# Patient Record
Sex: MALE | Employment: OTHER | URBAN - METROPOLITAN AREA
[De-identification: names, ages, dates, MRNs, and addresses within clinical notes are randomized per-mention and may not be internally consistent; named-entity substitution may affect disease eponyms.]

---

## 2017-02-01 ENCOUNTER — OFFICE VISIT (OUTPATIENT)
Dept: PEDIATRICS | Facility: CLINIC | Age: 77
End: 2017-02-01
Payer: COMMERCIAL

## 2017-02-01 VITALS
SYSTOLIC BLOOD PRESSURE: 120 MMHG | TEMPERATURE: 97.7 F | DIASTOLIC BLOOD PRESSURE: 60 MMHG | BODY MASS INDEX: 26.23 KG/M2 | OXYGEN SATURATION: 97 % | RESPIRATION RATE: 20 BRPM | HEIGHT: 70 IN | WEIGHT: 183.2 LBS | HEART RATE: 72 BPM

## 2017-02-01 DIAGNOSIS — J06.9 VIRAL URI WITH COUGH: Primary | ICD-10-CM

## 2017-02-01 DIAGNOSIS — J02.9 ACUTE PHARYNGITIS, UNSPECIFIED: ICD-10-CM

## 2017-02-01 DIAGNOSIS — B00.1 COLD SORE: ICD-10-CM

## 2017-02-01 LAB
DEPRECATED S PYO AG THROAT QL EIA: NORMAL
MICRO REPORT STATUS: NORMAL
SPECIMEN SOURCE: NORMAL

## 2017-02-01 PROCEDURE — 87081 CULTURE SCREEN ONLY: CPT | Performed by: STUDENT IN AN ORGANIZED HEALTH CARE EDUCATION/TRAINING PROGRAM

## 2017-02-01 PROCEDURE — 99213 OFFICE O/P EST LOW 20 MIN: CPT | Mod: GE | Performed by: STUDENT IN AN ORGANIZED HEALTH CARE EDUCATION/TRAINING PROGRAM

## 2017-02-01 PROCEDURE — 87880 STREP A ASSAY W/OPTIC: CPT | Performed by: STUDENT IN AN ORGANIZED HEALTH CARE EDUCATION/TRAINING PROGRAM

## 2017-02-01 NOTE — NURSING NOTE
"Chief Complaint   Patient presents with     Cough     URI       Initial /60 mmHg  Pulse 72  Temp(Src) 97.7  F (36.5  C) (Oral)  Resp 20  Ht 5' 10\" (1.778 m)  Wt 183 lb 3.2 oz (83.099 kg)  BMI 26.29 kg/m2  SpO2 97% Estimated body mass index is 26.29 kg/(m^2) as calculated from the following:    Height as of this encounter: 5' 10\" (1.778 m).    Weight as of this encounter: 183 lb 3.2 oz (83.099 kg).  BP completed using cuff size: bobbi Wallace MA      "

## 2017-02-01 NOTE — MR AVS SNAPSHOT
After Visit Summary   2/1/2017    Ciro Almonte    MRN: [id number]           Patient Information     Date Of Birth          1940        Visit Information        Provider Department      2/1/2017 11:15 AM Mauricio Aguilar MD [facility]        Today's Diagnoses     Viral URI with cough    -  1     Acute pharyngitis, unspecified           Care Instructions      Viral Upper Respiratory Illness (Adult)  You have a viral upper respiratory illness (URI), which is another term for the common cold. This illness is contagious during the first few days. It is spread through the air by coughing and sneezing. It may also be spread by direct contact (touching the sick person and then touching your own eyes, nose, or mouth). Frequent handwashing will decrease risk of spread. Most viral illnesses go away within 7 to 10 days with rest and simple home remedies. Sometimes the illness may last for several weeks. Antibiotics will not kill a virus, and they are generally not prescribed for this condition.    Home care    If symptoms are severe, rest at home for the first 2 to 3 days. When you resume activity, don't let yourself get too tired.    Avoid being exposed to cigarette smoke (yours or others ).    You may use acetaminophen or ibuprofen to control pain and fever, unless another medicine was prescribed. (Note: If you have chronic liver or kidney disease, have ever had a stomach ulcer or gastrointestinal bleeding, or are taking blood-thinning medicines, talk with your healthcare provider before using these medicines.) Aspirin should never be given to anyone under 18 years of age who is ill with a viral infection or fever. It may cause severe liver or brain damage.    Your appetite may be poor, so a light diet is fine. Avoid dehydration by drinking 6 to 8 glasses of fluids per day (water, soft drinks, juices, tea, or soup). Extra fluids will help loosen secretions in the nose and  lungs.    Over-the-counter cold medicines will not shorten the length of time you re sick, but they may be helpful for the following symptoms: cough, sore throat, and nasal and sinus congestion. (Note: Do not use decongestants if you have high blood pressure.)  Follow-up care  Follow up with your healthcare provider, or as advised.  When to seek medical advice  Call your healthcare provider right away if any of these occur:    Cough with lots of colored sputum (mucus)    Severe headache; face, neck, or ear pain    Difficulty swallowing due to throat pain    Fever of 100.4 F (38 C)  Call 911, or get immediate medical care  Call emergency services right away if any of these occur:    Chest pain, shortness of breath, wheezing, or difficulty breathing    Coughing up blood    Inability to swallow due to throat pain    7165-5036 [employer]. [address] [place] [address]. All rights reserved. This information is not intended as a substitute for professional medical care. Always follow your healthcare professional's instructions.      Please continue to get rest, use fluids, humidifier, warm beverages, and mucinex OTC.  Please apply Orajel to your sores in your mouth and if they do not go away after you feel better come back for a topical ointment or oral medication to help with resolution.  If you have questions or concerns please do not hesitate to contact us in clinic.    Mauricio Aguilar MD  IM/PEDS, PGY2  pager [phone]          Follow-ups after your visit        Who to contact     If you have questions or need follow up information about today's clinic visit or your schedule please contact [facility] directly at [phone].  Normal or non-critical lab and imaging results will be communicated to you by WebEventshart, letter or phone within 4 business days after the clinic has received the results. If you do not hear from us within 7 days, please contact the clinic through [email or web address]  "or phone. If you have a critical or abnormal lab result, we will notify you by phone as soon as possible.  Submit refill requests through [employer] or call your pharmacy and they will forward the refill request to us. Please allow 3 business days for your refill to be completed.          Additional Information About Your Visit        Markrhart Information     [employer] lets you send messages to your doctor, view your test results, renew your prescriptions, schedule appointments and more. To sign up, go to [email or web address] . Click on \"Log in\" on the left side of the screen, which will take you to the Welcome page. Then click on \"Sign up Now\" on the right side of the page.     You will be asked to enter the access code listed below, as well as some personal information. Please follow the directions to create your username and password.     Your access code is: [email or web address]  Expires: 2017  2:24 PM     Your access code will  in 90 days. If you need help or a new code, please call your [place] clinic or [phone].        Care EveryWhere ID     This is your Care EveryWhere ID. This could be used by other organizations to access your [place] medical records  [id number]        Your Vitals Were     Pulse Temperature Respirations Height BMI (Body Mass Index) Pulse Oximetry    72 97.7  F (36.5  C) (Oral) 20 5' 10\" (1.778 m) 26.29 kg/m2 97%       Blood Pressure from Last 3 Encounters:   17 120/60   11/10/16 124/68   10/24/16 130/78    Weight from Last 3 Encounters:   17 183 lb 3.2 oz (83.099 kg)   11/10/16 180 lb (81.647 kg)   10/13/16 176 lb (79.833 kg)              We Performed the Following     Beta strep group A culture     Strep, Rapid Screen        Primary Care Provider Office Phone # Fax #    Uri Queen [phone]       [facility] [address] [place] [address]        Thank you!     Thank you for choosing [facility]  for your care. Our goal " is always to provide you with excellent care. Hearing back from our patients is one way we can continue to improve our services. Please take a few minutes to complete the written survey that you may receive in the mail after your visit with us. Thank you!             Your Updated Medication List - Protect others around you: Learn how to safely use, store and throw away your medicines at www.disposemymeds.org.          This list is accurate as of: 2/1/17 11:55 AM.  Always use your most recent med list.                   Brand Name Dispense Instructions for use    amLODIPine 10 MG tablet    NORVASC    90 tablet    Take 1 tablet (10 mg) by mouth daily       AMOXICILLIN PO      Take 500 mg by mouth For dental visits       CITRACAL + D PO      Take 2 tablets by mouth daily       gabapentin 300 MG capsule    NEURONTIN    270 capsule    TAKE 3 CAPSULES BY MOUTH EVERY NIGHT AT BEDTIME       latanoprost 0.005 % ophthalmic solution    XALATAN    7.5 mL    Place 1 drop into both eyes At Bedtime Both eyes       lisinopril 10 MG tablet    PRINIVIL/ZESTRIL    90 tablet    Take 1 tablet (10 mg) by mouth daily       MENS MULTIVITAMIN PLUS PO      Take 1 tablet by mouth daily       PROSTATE 2.4 Caps      Take 2 capsules by mouth daily Prostate cap 2.0       UNABLE TO FIND      MEDICATION NAME: Protandim-Dietary Supplement

## 2017-02-01 NOTE — PATIENT INSTRUCTIONS
Viral Upper Respiratory Illness (Adult)  You have a viral upper respiratory illness (URI), which is another term for the common cold. This illness is contagious during the first few days. It is spread through the air by coughing and sneezing. It may also be spread by direct contact (touching the sick person and then touching your own eyes, nose, or mouth). Frequent handwashing will decrease risk of spread. Most viral illnesses go away within 7 to 10 days with rest and simple home remedies. Sometimes the illness may last for several weeks. Antibiotics will not kill a virus, and they are generally not prescribed for this condition.    Home care    If symptoms are severe, rest at home for the first 2 to 3 days. When you resume activity, don't let yourself get too tired.    Avoid being exposed to cigarette smoke (yours or others ).    You may use acetaminophen or ibuprofen to control pain and fever, unless another medicine was prescribed. (Note: If you have chronic liver or kidney disease, have ever had a stomach ulcer or gastrointestinal bleeding, or are taking blood-thinning medicines, talk with your healthcare provider before using these medicines.) Aspirin should never be given to anyone under 18 years of age who is ill with a viral infection or fever. It may cause severe liver or brain damage.    Your appetite may be poor, so a light diet is fine. Avoid dehydration by drinking 6 to 8 glasses of fluids per day (water, soft drinks, juices, tea, or soup). Extra fluids will help loosen secretions in the nose and lungs.    Over-the-counter cold medicines will not shorten the length of time you re sick, but they may be helpful for the following symptoms: cough, sore throat, and nasal and sinus congestion. (Note: Do not use decongestants if you have high blood pressure.)  Follow-up care  Follow up with your healthcare provider, or as advised.  When to seek medical advice  Call your healthcare provider right away if any  of these occur:    Cough with lots of colored sputum (mucus)    Severe headache; face, neck, or ear pain    Difficulty swallowing due to throat pain    Fever of 100.4 F (38 C)  Call 911, or get immediate medical care  Call emergency services right away if any of these occur:    Chest pain, shortness of breath, wheezing, or difficulty breathing    Coughing up blood    Inability to swallow due to throat pain    [id number] [employer]. [address] [place] [address], [place] [address]. All rights reserved. This information is not intended as a substitute for professional medical care. Always follow your healthcare professional's instructions.      Please continue to get rest, use fluids, humidifier, warm beverages, and mucinex OTC.  Please apply Orajel to your sores in your mouth and if they do not go away after you feel better come back for a topical ointment or oral medication to help with resolution.  If you have questions or concerns please do not hesitate to contact us in clinic.    Mauricio Aguilar MD  IM/PEDS, PGY2  pager [phone]

## 2017-02-01 NOTE — PROGRESS NOTES
SUBJECTIVE:                                                    Ciro Almonte is a 76 year old male who presents to clinic today for the following health issues:      RESPIRATORY SYMPTOMS      Duration: 6 days ago    Description  nasal congestion, rhinorrhea, sore throat, facial pain/pressure, cough, fever, chills, fatigue/malaise, hoarse voice, conjunctival irritation and cold sores and drainage    Severity: mild    Accompanying signs and symptoms: cold sores and drainage    History (predisposing factors):  none    Precipitating or alleviating factors: laying down helps    Therapies tried and outcome:  Tylenol and Ibuprofen     Thick green nasal discharge intermittent.  Nasal secretions thinning out last 24 hours.  Cough with light yellow phlegm.  Denies SOB.  Endorses a cold sore on the right side; has not applied Orajel.  Endorses conjunctivitis without discharge.  No strep exposures; rapid negative.  Influenza vaccination.  Denies myalgias, spiking fevers, and significant fatigue.  No seasonal and environmental allergies.  Has tried Tylenol and ibuprofen with fevers (up to 101) with improvement in symptoms and resolution of fevers.  No smoking exposures.  No changes in medication regimen.      Problem list and histories reviewed & adjusted, as indicated.  Additional history: as documented    Patient Active Problem List   Diagnosis     Cervical spine degeneration     Glaucoma     CARDIOVASCULAR SCREENING; LDL GOAL LESS THAN 130     Mitral regurgitation - systolic murmur     Hereditary and idiopathic peripheral neuropathy     Essential hypertension with goal blood pressure less than 140/90     Primary osteoarthritis of right foot     Benign non-nodular prostatic hyperplasia with lower urinary tract symptoms     Chronic low back pain, unspecified back pain laterality, with sciatica presence unspecified     Past Surgical History   Procedure Laterality Date     Ent surgery       Tonsils     Dermatology referral   2000     melanoma resection L shoulder     Orthopedic surgery  2008     R hip replacement     Arthroscopy knee  2002     meniscus ( side?)     Arthroplasty revision hip  3/12/2014      right Procedure: ARTHROPLASTY REVISION HIP;  Surgeon: Rudy Tobias MD;  Location: [facility] OR     C total hip arthroplasty Right 2008     Orthopedic surgery  2004     big toe in lawn mower, needed reconstructive surgery     Arthroplasty hip anterior Left 2014     Procedure: ARTHROPLASTY HIP ANTERIOR;  Surgeon: Rudy Tobias MD;  Location: [facility] OR     Discectomy lumbar posterior microscopic one level N/A 2016     Procedure: DISCECTOMY LUMBAR POSTERIOR MICROSCOPIC ONE LEVEL;  Surgeon: Ruben Marsh MD;  Location: [facility] OR     Biopsy of prostate,needle/punch         Social History   Substance Use Topics     Smoking status: Former Smoker     Quit date: 1976     Smokeless tobacco: Never Used     Alcohol Use: No      Comment: Quit 1014     Family History   Problem Relation Age of Onset     CEREBROVASCULAR DISEASE Mother      Hypertension Mother      Glaucoma Mother      HEART DISEASE Father 58     MI     CANCER Father      lung cancer;  age 91         Current Outpatient Prescriptions   Medication Sig Dispense Refill     gabapentin (NEURONTIN) 300 MG capsule TAKE 3 CAPSULES BY MOUTH EVERY NIGHT AT BEDTIME 270 capsule 0     UNABLE TO FIND MEDICATION NAME: Protandim-Dietary Supplement       amLODIPine (NORVASC) 10 MG tablet Take 1 tablet (10 mg) by mouth daily 90 tablet 3     lisinopril (PRINIVIL,ZESTRIL) 10 MG tablet Take 1 tablet (10 mg) by mouth daily 90 tablet 3     latanoprost (XALATAN) 0.005 % ophthalmic solution Place 1 drop into both eyes At Bedtime Both eyes 7.5 mL 3     AMOXICILLIN PO Take 500 mg by mouth For dental visits       Calcium Citrate-Vitamin D (CITRACAL + D PO) Take 2 tablets by mouth daily       Nutritional Supplements (PROSTATE 2.4) CAPS Take 2 capsules by mouth daily Prostate cap 2.0        "Multiple Vitamins-Minerals (MENS MULTIVITAMIN PLUS PO) Take 1 tablet by mouth daily       No Known Allergies  Recent Labs   Lab Test  07/05/16   1053  04/11/16   0749  07/02/15   1007  05/11/15   1127   10/13/14   1007  07/23/14   0915  07/01/14   0948   02/28/14   0936   LDL  121*   --   138*   --    --    --    --   117   --    --    HDL  59   --   73   --    --    --    --   80   --    --    TRIG  103   --   59   --    --    --    --   60   --    --    ALT   --   32   --    --    --   29   --    --    --   25   CR   --   0.98   --   0.90   < >  0.80   --    --    < >  1.00   GFRESTIMATED   --   75   --   83   < >  >90   --    --    < >  74   GFRESTBLACK   --   >90   GFR Calc     --   >90   < >  >90   --    --    < >  89   POTASSIUM   --   4.1   --   4.3   < >  4.0   --    --    < >  4.6   TSH   --    --    --    --    --   1.92  1.45   --    --    --     < > = values in this interval not displayed.      BP Readings from Last 3 Encounters:   02/01/17 120/60   11/10/16 124/68   10/24/16 130/78    Wt Readings from Last 3 Encounters:   02/01/17 183 lb 3.2 oz (83.099 kg)   11/10/16 180 lb (81.647 kg)   10/13/16 176 lb (79.833 kg)            Problem list, Medication list, Allergies, and Medical/Social/Surgical histories reviewed in [facility] and updated as appropriate.    ROS:  A focused ROS was obtained and documented as above in the HPI.      OBJECTIVE:                                                    /60 mmHg  Pulse 72  Temp(Src) 97.7  F (36.5  C) (Oral)  Resp 20  Ht 5' 10\" (1.778 m)  Wt 183 lb 3.2 oz (83.099 kg)  BMI 26.29 kg/m2  SpO2 97%  Body mass index is 26.29 kg/(m^2).  GENERAL: mildly ill appearing, alert and no distress  EYES: Eyes grossly normal to inspection, PERRLA, mild conjunctivitis bilaterally without drainage  HENT: ear canals and clear bulging TMs bilaterally, nose without ulcers or lesions, one ulcerated lesion inside of right side of mouth  NECK: no adenopathy, no " asymmetry, masses, or scars and thyroid normal to palpation  RESP: lungs clear to auscultation - no rales, rhonchi or wheezes  CV: regular rate and rhythm, normal S1 S2, no S3 or S4, 1/6 holosystolic murmur best appreciated at LLSB, no click or rub, no peripheral edema and peripheral pulses strong  MS: no gross musculoskeletal defects noted, no edema    Diagnostic Test Results:  none      ASSESSMENT/PLAN:                                                    1. Viral URI with cough  - symptomatic cares with rest, fluids, cough suppressant as needed, humidifier, warm beverages, throat lozenges, hand-washing, etc.    2. Acute pharyngitis, unspecified  Centor criteria 0.  No strep exposures.  - Strep, Rapid Screen  - Beta strep group A culture    3. Cold sores  - apply Orajel to affected areas  - follow up in a couple weeks for oral or topical antiviral    Follow up as needed in 5-7 days if symptoms do not improve.    The patient was discussed with Dr. Sharlene Dobbs, the attending, who agrees with the plan as stated above.    Mauricio Aguilar MD  [facility]  pager [phone]    I discussed this case in depth with Dr. Aguilar and agree with the key components of the history, assessment and plan.      Sharlene Dobbs MD  Internal Medicine/Pediatrics

## 2017-02-03 LAB
BACTERIA SPEC CULT: NORMAL
MICRO REPORT STATUS: NORMAL
SPECIMEN SOURCE: NORMAL

## 2017-03-20 DIAGNOSIS — G62.9 NEUROPATHY: ICD-10-CM

## 2017-03-21 RX ORDER — GABAPENTIN 300 MG/1
CAPSULE ORAL
Qty: 270 CAPSULE | Refills: 3 | Status: SHIPPED | OUTPATIENT
Start: 2017-03-21 | End: 2018-04-03

## 2017-03-21 RX ORDER — GABAPENTIN 300 MG/1
CAPSULE ORAL
Qty: 270 CAPSULE | Refills: 0 | Status: CANCELLED | OUTPATIENT
Start: 2017-03-21

## 2017-03-21 NOTE — TELEPHONE ENCOUNTER
gabapentin (NEURONTIN) 300 MG  Last Written Prescription Date: 12/15/2016  Last Quantity: 270, # refills: 0  Last Office Visit with [facility], [facility] or [facility] prescribing provider: 2/1/2017       Creatinine   Date Value Ref Range Status   04/11/2016 0.98 0.66 - 1.25 mg/dL Final     Lab Results   Component Value Date    AST 26 04/11/2016     Lab Results   Component Value Date    ALT 32 04/11/2016     BP Readings from Last 3 Encounters:   02/01/17 120/60   11/10/16 124/68   10/24/16 130/78

## 2017-03-22 ENCOUNTER — OFFICE VISIT (OUTPATIENT)
Dept: OPHTHALMOLOGY | Facility: CLINIC | Age: 77
End: 2017-03-22

## 2017-03-22 DIAGNOSIS — H25.13 SENILE NUCLEAR SCLEROSIS, BILATERAL: ICD-10-CM

## 2017-03-22 DIAGNOSIS — H40.003 GLAUCOMA SUSPECT OF BOTH EYES: Primary | ICD-10-CM

## 2017-03-22 ASSESSMENT — REFRACTION_WEARINGRX
OD_SPHERE: -3.00
OS_SPHERE: -3.00
SPECS_TYPE: PAL
OS_AXIS: 165
OD_AXIS: 015
OS_CYLINDER: +4.50
OD_CYLINDER: +5.00
OS_ADD: +2.50
OD_ADD: +2.50

## 2017-03-22 ASSESSMENT — VISUAL ACUITY
OS_CC: 20/30
OS_CC+: -2
OD_CC+: +1
CORRECTION_TYPE: GLASSES
METHOD: SNELLEN - LINEAR
OD_CC: 20/25

## 2017-03-22 ASSESSMENT — CUP TO DISC RATIO
OD_RATIO: 0.0
OS_RATIO: 0.0

## 2017-03-22 ASSESSMENT — CONF VISUAL FIELD
OD_NORMAL: 1
OS_NORMAL: 1
METHOD: COUNTING FINGERS

## 2017-03-22 ASSESSMENT — SLIT LAMP EXAM - LIDS
COMMENTS: NORMAL
COMMENTS: NORMAL

## 2017-03-22 ASSESSMENT — TONOMETRY
IOP_METHOD: APPLANATION
OD_IOP_MMHG: 18
OS_IOP_MMHG: 21

## 2017-03-22 ASSESSMENT — EXTERNAL EXAM - LEFT EYE: OS_EXAM: NORMAL

## 2017-03-22 ASSESSMENT — EXTERNAL EXAM - RIGHT EYE: OD_EXAM: NORMAL

## 2017-03-22 NOTE — PROGRESS NOTES
1)Glc Suspect --  K pachy:530/549    Tmax: L20s    HVF:Full c fluct      CDR: 0.0/0.0    HRT/OCT:OD:Mild RNFL thinning and OS:Mod RNFL thinning   FHX of Glc: Mother -- gtts,      Gonio: open      Intolerant to:      Asthma/COPD: No, on po BB  Steroid Use: No    Kidney Stones: Yes, in remote past    Sulfa Allergy: No      IOP targets:L20s -- IOP good, could consider further trial off gtts in future  2)ONH Drusen  3)NS OU -- ?visually significant   4)LAMBERT  5)Astigmatism -- will need corneal topography prior to IOL selection -- consider repeat refraction given significant change in amount of astigmatism in new glasses prior to cosndieration of cataract surgery    Patient will continue on Latanoprost which is a teal top drop at bedtime in both eyes.  Patient will return to clinic in 8-12 months with repeat visual field test, dilated eye exam, and OCT with RNFL analysis.  Patient will return to clinic for refraction only with technician (does not need to see MD on that day).  Patient will also return to clinic sooner for consideration of cataract surgery if blurred vision and glare continue to be a problem despite a change in glasses prescription.

## 2017-03-22 NOTE — PATIENT INSTRUCTIONS
Patient will continue on Latanoprost which is a teal top drop at bedtime in both eyes.  Patient will return to clinic in 8-12 months with repeat visual field test, dilated eye exam, and OCT with RNFL analysis.  Patient will return to clinic for refraction only with technician (does not need to see MD on that day).  Patient will also return to clinic sooner for consideration of cataract surgery if blurred vision and glare continue to be a problem despite a change in glasses prescription.

## 2017-03-22 NOTE — NURSING NOTE
Chief Complaints and History of Present Illnesses   Patient presents with     Glaucoma Follow Up     8 month return     HPI    Affected eye(s):  Both   Symptoms:     Blurred vision      Duration:  7 months   Frequency:  Constant       Do you have eye pain now?:  No      Comments:  Latanoprost QSH to BE / LD @ 10 pm last nicolasa MAHARAJ 8:08 AM 03/22/2017

## 2017-03-22 NOTE — MR AVS SNAPSHOT
After Visit Summary   3/22/2017    Ciro Almonte    MRN: [id number]           Patient Information     Date Of Birth          1940        Visit Information        Provider Department      3/22/2017 8:00 AM Shannon Yang MD [facility]        Today's Diagnoses     Glaucoma suspect of both eyes    -  1    Senile nuclear sclerosis, bilateral          Care Instructions    Patient will continue on Latanoprost which is a teal top drop at bedtime in both eyes.  Patient will return to clinic in 8-12 months with repeat visual field test, dilated eye exam, and OCT with RNFL analysis.  Patient will return to clinic for refraction only with technician (does not need to see MD on that day).  Patient will also return to clinic sooner for consideration of cataract surgery if blurred vision and glare continue to be a problem despite a change in glasses prescription.         Follow-ups after your visit        Who to contact     Please call your clinic at [phone] to:    Ask questions about your health    Make or cancel appointments    Discuss your medicines    Learn about your test results    Speak to your doctor   If you have compliments or concerns about an experience at your clinic, or if you wish to file a complaint, please contact [facility] Patient Relations at [phone] or email us at [email or web address]         Additional Information About Your Visit        MyChart Information     [employer] is an electronic gateway that provides easy, online access to your medical records. With [employer], you can request a clinic appointment, read your test results, renew a prescription or communicate with your care team.     To sign up for [employer]-Orig[employer] visit the website at [email or web address]   You will be asked to enter the access code listed below, as well as some personal information. Please follow the directions to create your username  and password.     Your access code is: [email or web address]  Expires: 2017  9:17 AM     Your access code will  in 90 days. If you need help or a new code, please contact your [facility] or call [phone] for assistance.        Care EveryWhere ID     This is your Care EveryWhere ID. This could be used by other organizations to access your [facility] medical records  [id number]         Blood Pressure from Last 3 Encounters:   17 120/60   11/10/16 124/68   10/24/16 130/78    Weight from Last 3 Encounters:   17 83.1 kg (183 lb 3.2 oz)   11/10/16 81.6 kg (180 lb)   10/13/16 79.8 kg (176 lb)              We Performed the Following     OCT Optic Nerve RNFL Spectralis OU (both eyes)     OVF 24-2 Dynamic OU        Primary Care Provider Office Phone # Fax #    Uri Queen [phone]       [facility] [address] [place] [address]        Thank you!     Thank you for choosing [facility]  for your care. Our goal is always to provide you with excellent care. Hearing back from our patients is one way we can continue to improve our services. Please take a few minutes to complete the written survey that you may receive in the mail after your visit with us. Thank you!             Your Updated Medication List - Protect others around you: Learn how to safely use, store and throw away your medicines at www.disposemymeds.org.          This list is accurate as of: 3/22/17  9:17 AM.  Always use your most recent med list.                   Brand Name Dispense Instructions for use    amLODIPine 10 MG tablet    NORVASC    90 tablet    Take 1 tablet (10 mg) by mouth daily       AMOXICILLIN PO      Take 500 mg by mouth For dental visits       CITRACAL + D PO      Take 2 tablets by mouth daily       gabapentin 300 MG capsule    NEURONTIN    270 capsule    TAKE 3 CAPSULES BY MOUTH EVERY NIGHT AT BEDTIME       latanoprost  0.005 % ophthalmic solution    XALATAN    7.5 mL    Place 1 drop into both eyes At Bedtime Both eyes       lisinopril 10 MG tablet    PRINIVIL/ZESTRIL    90 tablet    Take 1 tablet (10 mg) by mouth daily       MENS MULTIVITAMIN PLUS PO      Take 1 tablet by mouth daily       PROSTATE 2.4 Caps      Take 2 capsules by mouth daily Prostate cap 2.0       UNABLE TO FIND      MEDICATION NAME: Protandim-Dietary Supplement

## 2017-03-24 ENCOUNTER — ALLIED HEALTH/NURSE VISIT (OUTPATIENT)
Dept: OPHTHALMOLOGY | Facility: CLINIC | Age: 77
End: 2017-03-24

## 2017-03-24 DIAGNOSIS — H52.4 PRESBYOPIA: Primary | ICD-10-CM

## 2017-03-24 RX ORDER — LATANOPROST 50 UG/ML
1 SOLUTION/ DROPS OPHTHALMIC AT BEDTIME
Qty: 7.5 ML | Refills: 3 | Status: CANCELLED | OUTPATIENT
Start: 2017-03-24

## 2017-03-24 ASSESSMENT — REFRACTION_WEARINGRX
OD_CYLINDER: +4.75
OS_AXIS: 165
OS_CYLINDER: +4.25
OS_ADD: +2.50
SPECS_TYPE: PAL
OD_AXIS: 015
OD_ADD: +2.50
OD_SPHERE: -3.25
OS_SPHERE: -3.00

## 2017-03-24 ASSESSMENT — VISUAL ACUITY
OD_CC: 20/20
OD_CC+: -2
OS_CC+: -2+2
OS_CC: 20/30
CORRECTION_TYPE: GLASSES
METHOD: SNELLEN - LINEAR

## 2017-03-24 ASSESSMENT — REFRACTION_MANIFEST
OS_CYLINDER: +4.25
OS_SPHERE: -3.25
OD_SPHERE: -3.50
OD_CYLINDER: +4.75
OS_AXIS: 165
OD_AXIS: 017
OS_ADD: +2.50
OD_ADD: +2.50

## 2017-04-06 ENCOUNTER — OFFICE VISIT (OUTPATIENT)
Dept: URGENT CARE | Facility: URGENT CARE | Age: 77
End: 2017-04-06
Payer: COMMERCIAL

## 2017-04-06 VITALS
DIASTOLIC BLOOD PRESSURE: 62 MMHG | TEMPERATURE: 96.2 F | BODY MASS INDEX: 27.12 KG/M2 | OXYGEN SATURATION: 97 % | SYSTOLIC BLOOD PRESSURE: 138 MMHG | HEART RATE: 75 BPM | WEIGHT: 189 LBS

## 2017-04-06 DIAGNOSIS — S61.219A FINGER LACERATION, INITIAL ENCOUNTER: Primary | ICD-10-CM

## 2017-04-06 PROCEDURE — 90715 TDAP VACCINE 7 YRS/> IM: CPT | Performed by: PHYSICIAN ASSISTANT

## 2017-04-06 PROCEDURE — 90471 IMMUNIZATION ADMIN: CPT | Performed by: PHYSICIAN ASSISTANT

## 2017-04-06 PROCEDURE — 99213 OFFICE O/P EST LOW 20 MIN: CPT | Mod: 25 | Performed by: PHYSICIAN ASSISTANT

## 2017-04-06 NOTE — NURSING NOTE
"Chief Complaint   Patient presents with     Urgent Care     Laceration     \"pinched\" skin- bleeding, pt is wondering if it can be stitched        Initial /62 (BP Location: Right arm)  Pulse 75  Temp 96.2  F (35.7  C) (Tympanic)  Wt 189 lb (85.7 kg)  SpO2 97%  BMI 27.12 kg/m2 Estimated body mass index is 27.12 kg/(m^2) as calculated from the following:    Height as of 2/1/17: 5' 10\" (1.778 m).    Weight as of this encounter: 189 lb (85.7 kg).  Medication Reconciliation: complete     Gypsy Tse CMA.............................April 6, 2017 6:12 PM       "

## 2017-04-06 NOTE — PROGRESS NOTES
"SUBJECTIVE:     Chief Complaint   Patient presents with     Urgent Care     Laceration     \"pinched\" skin- bleeding, pt is wondering if it can be stitched      Ciro Almonte is a 77 year old male who presents to the clinic with a laceration on the right index finger sustained 30 minutes ago. Was putting away step ladder and pinched finger between step ladder. Pinched off a piece of skin. Still bleeding.     Associated symptoms: Denies numbness, weakness, or loss of function  Last tetanus booster within 10 years: no    EXAM:   The patient appears today in alert,no apparent distress distress  VITALS: /62 (BP Location: Right arm)  Pulse 75  Temp 96.2  F (35.7  C) (Tympanic)  Wt 189 lb (85.7 kg)  SpO2 97%  BMI 27.12 kg/m2    Size of laceration: 1 centimeters  Characteristics of the laceration: bleeding- moderate and flap type with flap avulsed, no tissue to be sutured   Tendon function intact: yes  Sensation to light touch intact: yes  Pulses intact: yes  Picture included in patient's chart: no    Assessment:  Finger laceration, initial encounter    PLAN:  Active bleeding was cauterized with electrocautery. Lesion was dressed with antibiotic ointment, telfa and coban. Recommended leaving dressing in place x 24 hours then change daily or when saturated. Discussed signs of infection that warrant follow up. Keep area clean and dry until healed.   Tdap given at today's visit.   "

## 2017-04-06 NOTE — MR AVS SNAPSHOT
"              After Visit Summary   4/6/2017    Ciro Almonte    MRN: [id number]           Patient Information     Date Of Birth          1940        Visit Information        Provider Department      4/6/2017 6:00 PM Shayy Hdz PA-C [facility]        Today's Diagnoses     Finger laceration, initial encounter    -  1       Follow-ups after your visit        Follow-up notes from your care team     Return if symptoms worsen or fail to improve.      Your next 10 appointments already scheduled     Jul 06, 2017  8:30 AM CDT   [facility]    [address]  [address]  [facility] [id number] [phone]              Who to contact     If you have questions or need follow up information about today's clinic visit or your schedule please contact [facility] directly at [phone].  Normal or non-critical lab and imaging results will be communicated to you by MyChart, letter or phone within 4 business days after the clinic has received the results. If you do not hear from us within 7 days, please contact the clinic through MyChart or phone. If you have a critical or abnormal lab result, we will notify you by phone as soon as possible.  Submit refill requests through [employer] or call your pharmacy and they will forward the refill request to us. Please allow 3 business days for your refill to be completed.          Additional Information About Your Visit        MyChart Information     [employer] lets you send messages to your doctor, view your test results, renew your prescriptions, schedule appointments and more. To sign up, go to [email or web address] . Click on \"Log in\" on the left side of the screen, which will take you to the Welcome page. Then click on \"Sign up Now\" on the right side of the page.     You will be asked to enter the access code listed below, as well as some personal " information. Please follow the directions to create your username and password.     Your access code is: [email or web address]  Expires: 2017  9:17 AM     Your access code will  in 90 days. If you need help or a new code, please call your [facility] clinic or [phone].        Care EveryWhere ID     This is your Care EveryWhere ID. This could be used by other organizations to access your [facility] medical records  [id number]        Your Vitals Were     Pulse Temperature Pulse Oximetry BMI (Body Mass Index)          75 96.2  F (35.7  C) (Tympanic) 97% 27.12 kg/m2         Blood Pressure from Last 3 Encounters:   17 138/62   17 120/60   11/10/16 124/68    Weight from Last 3 Encounters:   17 189 lb (85.7 kg)   17 183 lb 3.2 oz (83.1 kg)   11/10/16 180 lb (81.6 kg)              We Performed the Following     TDAP VACCINE (ADACEL)        Primary Care Provider Office Phone # Fax #    Uri Queen [phone]       [address] [place] [address]        Thank you!     Thank you for choosing [facility]  for your care. Our goal is always to provide you with excellent care. Hearing back from our patients is one way we can continue to improve our services. Please take a few minutes to complete the written survey that you may receive in the mail after your visit with us. Thank you!             Your Updated Medication List - Protect others around you: Learn how to safely use, store and throw away your medicines at www.disposemymeds.org.          This list is accurate as of: 17  6:56 PM.  Always use your most recent med list.                   Brand Name Dispense Instructions for use    amLODIPine 10 MG tablet    NORVASC    90 tablet    Take 1 tablet (10 mg) by mouth daily       AMOXICILLIN PO      Take 500 mg by mouth Reported on 2017       CITRACAL + D PO      Take 2 tablets by mouth daily       gabapentin 300 MG capsule     NEURONTIN    270 capsule    TAKE 3 CAPSULES BY MOUTH EVERY NIGHT AT BEDTIME       latanoprost 0.005 % ophthalmic solution    XALATAN    7.5 mL    Place 1 drop into both eyes At Bedtime Both eyes       lisinopril 10 MG tablet    PRINIVIL/ZESTRIL    90 tablet    Take 1 tablet (10 mg) by mouth daily       MENS MULTIVITAMIN PLUS PO      Take 1 tablet by mouth daily       PROSTATE 2.4 Caps      Take 2 capsules by mouth daily Prostate cap 2.0       UNABLE TO FIND      MEDICATION NAME: Protandim-Dietary Supplement

## 2017-04-21 ENCOUNTER — ALLIED HEALTH/NURSE VISIT (OUTPATIENT)
Dept: NURSING | Facility: CLINIC | Age: 77
End: 2017-04-21
Payer: COMMERCIAL

## 2017-04-21 DIAGNOSIS — H61.23 BILATERAL IMPACTED CERUMEN: Primary | ICD-10-CM

## 2017-04-21 PROCEDURE — 99207 ZZC NO CHARGE NURSE ONLY: CPT

## 2017-04-21 NOTE — MR AVS SNAPSHOT
"              After Visit Summary   4/21/2017    Ciro Almonte    MRN: [id number]           Patient Information     Date Of Birth          1940        Visit Information        Provider Department      4/21/2017 9:00 AM BUZZ NURSE AB [facility]        Today's Diagnoses     Bilateral impacted cerumen    -  1       Follow-ups after your visit        Your next 10 appointments already scheduled     Jul 06, 2017  8:30 AM CDT   PHYSICAL with Uri Queen MD   [facility]    [address]  [address]  [facility] [place] [facility] [address] [phone]              Who to contact     If you have questions or need follow up information about today's clinic visit or your schedule please contact [facility] directly at [phone].  Normal or non-critical lab and imaging results will be communicated to you by MyChart, letter or phone within 4 business days after the clinic has received the results. If you do not hear from us within 7 days, please contact the clinic through MyChart or phone. If you have a critical or abnormal lab result, we will notify you by phone as soon as possible.  Submit refill requests through [employer] or call your pharmacy and they will forward the refill request to us. Please allow 3 business days for your refill to be completed.          Additional Information About Your Visit        Wintegrahart Information     [employer] lets you send messages to your doctor, view your test results, renew your prescriptions, schedule appointments and more. To sign up, go to [email or web address] . Click on \"Log in\" on the left side of the screen, which will take you to the Welcome page. Then click on \"Sign up Now\" on the right side of the page.     You will be asked to enter the access code listed below, as well as some personal information. Please follow the directions to create your username and password.     Your access code is: [email or web address]  Expires: " 2017  9:17 AM     Your access code will  in 90 days. If you need help or a new code, please call your [facility] clinic or [phone].        Care EveryWhere ID     This is your Care EveryWhere ID. This could be used by other organizations to access your [facility] medical records  [id number]         Blood Pressure from Last 3 Encounters:   17 138/62   17 120/60   11/10/16 124/68    Weight from Last 3 Encounters:   17 189 lb (85.7 kg)   17 183 lb 3.2 oz (83.1 kg)   11/10/16 180 lb (81.6 kg)              We Performed the Following     REMOVE KWAME MARTINS        Primary Care Provider Office Phone # Fax #    Uri Queen [phone]       [facility] [address] [place] [address]        Thank you!     Thank you for choosing [facility]  for your care. Our goal is always to provide you with excellent care. Hearing back from our patients is one way we can continue to improve our services. Please take a few minutes to complete the written survey that you may receive in the mail after your visit with us. Thank you!             Your Updated Medication List - Protect others around you: Learn how to safely use, store and throw away your medicines at www.disposemymeds.org.          This list is accurate as of: 17 10:11 AM.  Always use your most recent med list.                   Brand Name Dispense Instructions for use    amLODIPine 10 MG tablet    NORVASC    90 tablet    Take 1 tablet (10 mg) by mouth daily       AMOXICILLIN PO      Take 500 mg by mouth Reported on 2017       CITRACAL + D PO      Take 2 tablets by mouth daily       gabapentin 300 MG capsule    NEURONTIN    270 capsule    TAKE 3 CAPSULES BY MOUTH EVERY NIGHT AT BEDTIME       latanoprost 0.005 % ophthalmic solution    XALATAN    7.5 mL    Place 1 drop into both eyes At Bedtime Both eyes       lisinopril 10 MG tablet    PRINIVIL/ZESTRIL    90 tablet     Take 1 tablet (10 mg) by mouth daily       MENS MULTIVITAMIN PLUS PO      Take 1 tablet by mouth daily       PROSTATE 2.4 Caps      Take 2 capsules by mouth daily Prostate cap 2.0       UNABLE TO FIND      MEDICATION NAME: Protandim-Dietary Supplement

## 2017-04-21 NOTE — NURSING NOTE
"Chief Complaint   Patient presents with     Allied Health Visit     ear wash        Initial There were no vitals taken for this visit. Estimated body mass index is 27.12 kg/(m^2) as calculated from the following:    Height as of 2/1/17: 5' 10\" (1.778 m).    Weight as of 4/6/17: 189 lb (85.7 kg).  Medication Reconciliation: {Medication Reconciliation:[id number]}     Molly Goldstein MA @today@,9:46 AM    "

## 2017-05-12 ENCOUNTER — APPOINTMENT (OUTPATIENT)
Dept: MRI IMAGING | Facility: CLINIC | Age: 77
End: 2017-05-12
Attending: EMERGENCY MEDICINE
Payer: MEDICARE

## 2017-05-12 ENCOUNTER — HOSPITAL ENCOUNTER (EMERGENCY)
Facility: CLINIC | Age: 77
Discharge: HOME | End: 2017-05-12
Attending: EMERGENCY MEDICINE | Admitting: EMERGENCY MEDICINE
Payer: MEDICARE

## 2017-05-12 ENCOUNTER — TELEPHONE (OUTPATIENT)
Dept: PEDIATRICS | Facility: CLINIC | Age: 77
End: 2017-05-12

## 2017-05-12 VITALS
HEIGHT: 70 IN | TEMPERATURE: 98 F | RESPIRATION RATE: 16 BRPM | DIASTOLIC BLOOD PRESSURE: 71 MMHG | HEART RATE: 72 BPM | SYSTOLIC BLOOD PRESSURE: 137 MMHG | WEIGHT: 183 LBS | BODY MASS INDEX: 26.2 KG/M2 | OXYGEN SATURATION: 97 %

## 2017-05-12 DIAGNOSIS — H02.401 DROOPING EYELID, RIGHT: ICD-10-CM

## 2017-05-12 DIAGNOSIS — H40.1130 PRIMARY OPEN ANGLE GLAUCOMA OF BOTH EYES: ICD-10-CM

## 2017-05-12 DIAGNOSIS — H81.10 BPPV (BENIGN PAROXYSMAL POSITIONAL VERTIGO), UNSPECIFIED LATERALITY: ICD-10-CM

## 2017-05-12 LAB
ANION GAP SERPL CALCULATED.3IONS-SCNC: 4 MMOL/L (ref 3–14)
BASOPHILS # BLD AUTO: 0 10E9/L (ref 0–0.2)
BASOPHILS NFR BLD AUTO: 0.4 %
BUN SERPL-MCNC: 16 MG/DL (ref 7–30)
CALCIUM SERPL-MCNC: 9.2 MG/DL (ref 8.5–10.1)
CHLORIDE SERPL-SCNC: 107 MMOL/L (ref 94–109)
CO2 SERPL-SCNC: 30 MMOL/L (ref 20–32)
CREAT SERPL-MCNC: 0.86 MG/DL (ref 0.66–1.25)
DIFFERENTIAL METHOD BLD: ABNORMAL
EOSINOPHIL # BLD AUTO: 0.1 10E9/L (ref 0–0.7)
EOSINOPHIL NFR BLD AUTO: 2.5 %
ERYTHROCYTE [DISTWIDTH] IN BLOOD BY AUTOMATED COUNT: 13.3 % (ref 10–15)
GFR SERPL CREATININE-BSD FRML MDRD: 87 ML/MIN/1.7M2
GLUCOSE BLDC GLUCOMTR-MCNC: 89 MG/DL (ref 70–99)
GLUCOSE SERPL-MCNC: 93 MG/DL (ref 70–99)
HCT VFR BLD AUTO: 38.2 % (ref 40–53)
HGB BLD-MCNC: 13 G/DL (ref 13.3–17.7)
IMM GRANULOCYTES # BLD: 0 10E9/L (ref 0–0.4)
IMM GRANULOCYTES NFR BLD: 0.2 %
INTERPRETATION ECG - MUSE: NORMAL
LYMPHOCYTES # BLD AUTO: 1.5 10E9/L (ref 0.8–5.3)
LYMPHOCYTES NFR BLD AUTO: 25.6 %
MCH RBC QN AUTO: 31.6 PG (ref 26.5–33)
MCHC RBC AUTO-ENTMCNC: 34 G/DL (ref 31.5–36.5)
MCV RBC AUTO: 93 FL (ref 78–100)
MONOCYTES # BLD AUTO: 0.5 10E9/L (ref 0–1.3)
MONOCYTES NFR BLD AUTO: 8 %
NEUTROPHILS # BLD AUTO: 3.6 10E9/L (ref 1.6–8.3)
NEUTROPHILS NFR BLD AUTO: 63.3 %
NRBC # BLD AUTO: 0 10*3/UL
NRBC BLD AUTO-RTO: 0 /100
PLATELET # BLD AUTO: 219 10E9/L (ref 150–450)
POTASSIUM SERPL-SCNC: 3.9 MMOL/L (ref 3.4–5.3)
RBC # BLD AUTO: 4.12 10E12/L (ref 4.4–5.9)
SODIUM SERPL-SCNC: 141 MMOL/L (ref 133–144)
WBC # BLD AUTO: 5.7 10E9/L (ref 4–11)

## 2017-05-12 PROCEDURE — 70553 MRI BRAIN STEM W/O & W/DYE: CPT

## 2017-05-12 PROCEDURE — 25000131 ZZH RX MED GY IP 250 OP 636 PS 637: Performed by: EMERGENCY MEDICINE

## 2017-05-12 PROCEDURE — 93005 ELECTROCARDIOGRAM TRACING: CPT

## 2017-05-12 PROCEDURE — 85025 COMPLETE CBC W/AUTO DIFF WBC: CPT | Performed by: EMERGENCY MEDICINE

## 2017-05-12 PROCEDURE — 80048 BASIC METABOLIC PNL TOTAL CA: CPT | Performed by: EMERGENCY MEDICINE

## 2017-05-12 PROCEDURE — 99285 EMERGENCY DEPT VISIT HI MDM: CPT | Mod: 25

## 2017-05-12 PROCEDURE — 00000146 ZZHCL STATISTIC GLUCOSE BY METER IP

## 2017-05-12 PROCEDURE — A9585 GADOBUTROL INJECTION: HCPCS | Performed by: RADIOLOGY

## 2017-05-12 PROCEDURE — 25500064 ZZH RX 255 OP 636: Performed by: RADIOLOGY

## 2017-05-12 RX ORDER — MECLIZINE HYDROCHLORIDE 25 MG/1
25 TABLET ORAL ONCE
Status: COMPLETED | OUTPATIENT
Start: 2017-05-12 | End: 2017-05-12

## 2017-05-12 RX ORDER — FOLIC ACID 0.8 MG
500 TABLET ORAL DAILY
COMMUNITY

## 2017-05-12 RX ORDER — MECLIZINE HYDROCHLORIDE 25 MG/1
25 TABLET ORAL EVERY 6 HOURS PRN
Qty: 12 TABLET | Refills: 0 | Status: SHIPPED | OUTPATIENT
Start: 2017-05-12 | End: 2017-07-06

## 2017-05-12 RX ORDER — GADOBUTROL 604.72 MG/ML
10 INJECTION INTRAVENOUS ONCE
Status: COMPLETED | OUTPATIENT
Start: 2017-05-12 | End: 2017-05-12

## 2017-05-12 RX ADMIN — GADOBUTROL 8 ML: 604.72 INJECTION INTRAVENOUS at 14:00

## 2017-05-12 RX ADMIN — MECLIZINE 25 MG: 25 TABLET ORAL at 12:42

## 2017-05-12 NOTE — ED AVS SNAPSHOT
[facility] Emergency Department    [address]    [facility] [address]    Phone:  [phone]    Fax:  [phone]                                       Ciro Almonte   MRN: [id number]    Department:  [facility] Emergency Department   Date of Visit:  5/12/2017           Patient Information     Date Of Birth          1940        Your diagnoses for this visit were:     BPPV (benign paroxysmal positional vertigo), unspecified laterality     Drooping eyelid, right        You were seen by Mauricio Galeano MD.      Follow-up Information     Follow up with Uri Queen MD In 3 days.    Specialty:  Internal Medicine    Contact information:    [facility] [facility] [address] [place] [address]  [phone]          Follow up with [facility] Emergency Department.    Specialty:  EMERGENCY MEDICINE    Why:  If symptoms worsen    Contact information:    [address]  [facility] [id number] [phone]        Discharge Instructions           Benign Paroxysmal Positional Vertigo  Benign paroxysmal positional vertigo (BPPV) is a problem with the inner ear. The inner ear contains the vestibular system. This system is what helps you keep your balance. BPPV causes a feeling of spinning. It is a common problem of the vestibular system.  Understanding the vestibular system  The vestibular system of the ear is made up of very tiny parts. They include the utricle, saccule, and semicircular canals. The utricle is a tiny organ that contains calcium crystals. In some people, the crystals can move into the semicircular canals. When this happens, the system no longer works as it should. This causes BPPV. Benign means it is not life-threatening. Paroxysmal means it happens suddenly. Positional means that it happens when you move your head. Vertigo is a feeling of spinning.  What causes BPPV?  Causes include injury to your head or neck.  Other problems with the vestibular system may cause BPPV. In many people, the cause of BPPV is not known.  Symptoms of BPPV  You many have repeated feelings of spinning (vertigo). The vertigo usually lasts less than 1 minute. Some movements, suchas rolling over in bed, can bring on vertigo.  Diagnosing BPPV  Your primary health care provider may diagnose and treat your BPPV. Or you may see an ear, nose, and throat doctor (otolaryngologist). In some cases, you may see a nervous system doctor (neurologist).  The health care provider will ask about your symptoms and your medical history. He or she will examine you. You may have hearing and balance tests. As part of the exam, your health care provider may have you move your head and body in certain ways. If you have BPPV, the movements can bring on vertigo. Your provider will also look for abnormal movements of your eyes. You may have other tests to check your vestibular or nervous systems.  Treatment for BPPV  Your health care provider may try to move the calcium crystals. This is done by having you move your head and neck in certain ways. This treatment is safe and often works well. You may also be told to do these movements at home. You may still have vertigo for a few weeks. Your health care provider will recheck your symptoms, usually in about a month. Special physical therapy may also be part of treatment.  In rare cases surgery may be needed for BPPV that does not go away.     When to call the health care provider  Call your health care provider right away if you have any of these:    Symptoms that do not go away with treatment    Symptoms that get worse    New symptoms        [id number] [employer]. [address] [place] [address], [place] [address]. All rights reserved. This information is not intended as a substitute for professional medical care. Always follow your healthcare professional's instructions.            Future Appointments        Provider  Department Dept Phone Center    7/6/2017 8:50 AM Uri Queen MD, MD [facility] [phone] [facility]      24 Hour Appointment Hotline       To make an appointment at any [facility], call [phone] ([phone]). If you don't have a family doctor or clinic, we will help you find one. [facility] are conveniently located to serve the needs of you and your family.             Review of your medicines      START taking        Dose / Directions Last dose taken    meclizine 25 MG tablet   Commonly known as:  ANTIVERT   Dose:  25 mg   Quantity:  12 tablet        Take 1 tablet (25 mg) by mouth every 6 hours as needed for dizziness   Refills:  0          Our records show that you are taking the medicines listed below. If these are incorrect, please call your family doctor or clinic.        Dose / Directions Last dose taken    amLODIPine 10 MG tablet   Commonly known as:  NORVASC   Dose:  10 mg   Quantity:  90 tablet        Take 1 tablet (10 mg) by mouth daily   Refills:  3        AMOXICILLIN PO   Dose:  500 mg   Indication:  takes 4 of them prior to dental work        Take 500 mg by mouth Reported on 4/6/2017   Refills:  0        ASPIRIN PO   Dose:  81 mg        Take 81 mg by mouth   Refills:  0        CITRACAL + D PO   Dose:  2 tablet        Take 2 tablets by mouth daily   Refills:  0        gabapentin 300 MG capsule   Commonly known as:  NEURONTIN   Quantity:  270 capsule        TAKE 3 CAPSULES BY MOUTH EVERY NIGHT AT BEDTIME   Refills:  3        latanoprost 0.005 % ophthalmic solution   Commonly known as:  XALATAN   Dose:  1 drop   Quantity:  7.5 mL        Place 1 drop into both eyes At Bedtime Both eyes   Refills:  3        lisinopril 10 MG tablet   Commonly known as:  PRINIVIL/ZESTRIL   Dose:  10 mg   Quantity:  90 tablet        Take 1 tablet (10 mg) by mouth daily   Refills:  3        Magnesium 500 MG Caps        Refills:  0        MENS MULTIVITAMIN PLUS PO   Dose:  1 tablet        Take  1 tablet by mouth daily   Refills:  0        PROSTATE 2.4 Caps   Dose:  2 capsule        Take 2 capsules by mouth daily Prostate cap 2.0   Refills:  0        UNABLE TO FIND        MEDICATION NAME: Protandim-Dietary Supplement   Refills:  0                Prescriptions were sent or printed at these locations (1 Prescription)                   Other Prescriptions                Printed at Department/Unit printer (1 of 1)         meclizine (ANTIVERT) 25 MG tablet                Procedures and tests performed during your visit     Basic metabolic panel (BMP)    CBC + differential    EKG 12 lead    Glucose by meter    IV access    MR Brain w/o & w Contrast      Orders Needing Specimen Collection     None      Pending Results     No orders found from 5/10/2017 to 5/13/2017.            Pending Culture Results     No orders found from 5/10/2017 to 5/13/2017.            Pending Results Instructions     If you had any lab results that were not finalized at the time of your Discharge, you can call the ED Lab Result RN at [phone]. You will be contacted by this team for any positive Lab results or changes in treatment. The nurses are available 7 days a week from 10A to 6:30P.  You can leave a message 24 hours per day and they will return your call.        Test Results From Your Hospital Stay        5/12/2017 12:26 PM      Component Results     Component Value Ref Range & Units Status    Glucose 89 70 - 99 mg/dL Final         5/12/2017  2:18 PM      Narrative     MRI OF THE BRAIN WITHOUT AND WITH CONTRAST  5/12/2017 2:05 PM     COMPARISON: Head CT 1/4/2014.    HISTORY:  Dizziness, slight imbalance, eyelid drooping right, right  side of mouth feels funny.     TECHNIQUE: Axial diffusion-weighted with ADC map, axial T2-weighted  with fat saturation, axial T1-weighted, axial turboFLAIR and coronal  T1-weighted images of the brain were acquired without intravenous  contrast.  Following intravenous administration of gadolinium (8  mL  Gadavist), axial T1-weighted images of the brain were acquired.     FINDINGS: There is mild diffuse cerebral volume loss. There are a few  tiny scattered focal and minimal patchy periventricular areas of  abnormal T2 signal hyperintensity in the cerebral white matter  bilaterally that are consistent with sequela of chronic small vessel  ischemic disease.    The ventricles and basal cisterns are within normal limits in  configuration given the degree of cerebral volume loss.  There is no  midline shift.  There are no extra-axial fluid collections.  There is  no evidence for stroke or acute intracranial hemorrhage.  There is no  abnormal contrast enhancement in the brain or its coverings.    There is no mastoiditis. There is an air-fluid level in the right  maxillary sinus that may represent acute right maxillary sinusitis.  The remaining paranasal sinuses are well aerated.        Impression     IMPRESSION: Possible acute right maxillary sinusitis.  Diffuse  cerebral volume loss and cerebral white matter changes consistent with  chronic small vessel ischemic disease. No evidence for acute  intracranial pathology.    SANIYA CARNEY MD         5/12/2017 12:42 PM      Component Results     Component Value Ref Range & Units Status    WBC 5.7 4.0 - 11.0 10e9/L Final    RBC Count 4.12 (L) 4.4 - 5.9 10e12/L Final    Hemoglobin 13.0 (L) 13.3 - 17.7 g/dL Final    Hematocrit 38.2 (L) 40.0 - 53.0 % Final    MCV 93 78 - 100 fl Final    MCH 31.6 26.5 - 33.0 pg Final    MCHC 34.0 31.5 - 36.5 g/dL Final    RDW 13.3 10.0 - 15.0 % Final    Platelet Count 219 150 - 450 10e9/L Final    Diff Method Automated Method  Final    % Neutrophils 63.3 % Final    % Lymphocytes 25.6 % Final    % Monocytes 8.0 % Final    % Eosinophils 2.5 % Final    % Basophils 0.4 % Final    % Immature Granulocytes 0.2 % Final    Nucleated RBCs 0 0 /100 Final    Absolute Neutrophil 3.6 1.6 - 8.3 10e9/L Final    Absolute Lymphocytes 1.5 0.8 - 5.3 10e9/L Final     Absolute Monocytes 0.5 0.0 - 1.3 10e9/L Final    Absolute Eosinophils 0.1 0.0 - 0.7 10e9/L Final    Absolute Basophils 0.0 0.0 - 0.2 10e9/L Final    Abs Immature Granulocytes 0.0 0 - 0.4 10e9/L Final    Absolute Nucleated RBC 0.0  Final         5/12/2017  1:05 PM      Component Results     Component Value Ref Range & Units Status    Sodium 141 133 - 144 mmol/L Final    Potassium 3.9 3.4 - 5.3 mmol/L Final    Chloride 107 94 - 109 mmol/L Final    Carbon Dioxide 30 20 - 32 mmol/L Final    Anion Gap 4 3 - 14 mmol/L Final    Glucose 93 70 - 99 mg/dL Final    Urea Nitrogen 16 7 - 30 mg/dL Final    Creatinine 0.86 0.66 - 1.25 mg/dL Final    GFR Estimate 87 >60 mL/min/1.7m2 Final    Non  GFR Calc    GFR Estimate If Black >90   GFR Calc   >60 mL/min/1.7m2 Final    Calcium 9.2 8.5 - 10.1 mg/dL Final                Clinical Quality Measure: Blood Pressure Screening     Your blood pressure was checked while you were in the emergency department today. The last reading we obtained was  BP: 136/76 . Please read the guidelines below about what these numbers mean and what you should do about them.  If your systolic blood pressure (the top number) is less than 120 and your diastolic blood pressure (the bottom number) is less than 80, then your blood pressure is normal. There is nothing more that you need to do about it.  If your systolic blood pressure (the top number) is 120-139 or your diastolic blood pressure (the bottom number) is 80-89, your blood pressure may be higher than it should be. You should have your blood pressure rechecked within a year by a primary care provider.  If your systolic blood pressure (the top number) is 140 or greater or your diastolic blood pressure (the bottom number) is 90 or greater, you may have high blood pressure. High blood pressure is treatable, but if left untreated over time it can put you at risk for heart attack, stroke, or kidney failure. You should have  "your blood pressure rechecked by a primary care provider within the next 4 weeks.  If your provider in the emergency department today gave you specific instructions to follow-up with your doctor or provider even sooner than that, you should follow that instruction and not wait for up to 4 weeks for your follow-up visit.        Thank you for choosing [facility]       Thank you for choosing [facility] for your care. Our goal is always to provide you with excellent care. Hearing back from our patients is one way we can continue to improve our services. Please take a few minutes to complete the written survey that you may receive in the mail after you visit with us. Thank you!        [employer] Information     [employer] lets you send messages to your doctor, view your test results, renew your prescriptions, schedule appointments and more. To sign up, go to [email or web address] . Click on \"Log in\" on the left side of the screen, which will take you to the Welcome page. Then click on \"Sign up Now\" on the right side of the page.     You will be asked to enter the access code listed below, as well as some personal information. Please follow the directions to create your username and password.     Your access code is: [email or web address]  Expires: 2017  9:17 AM     Your access code will  in 90 days. If you need help or a new code, please call your [facility] clinic or [phone].        Care EveryWhere ID     This is your Care EveryWhere ID. This could be used by other organizations to access your [facility] medical records  [id number]        After Visit Summary       This is your record. Keep this with you and show to your community pharmacist(s) and doctor(s) at your next visit.                  "

## 2017-05-12 NOTE — DISCHARGE INSTRUCTIONS
Benign Paroxysmal Positional Vertigo  Benign paroxysmal positional vertigo (BPPV) is a problem with the inner ear. The inner ear contains the vestibular system. This system is what helps you keep your balance. BPPV causes a feeling of spinning. It is a common problem of the vestibular system.  Understanding the vestibular system  The vestibular system of the ear is made up of very tiny parts. They include the utricle, saccule, and semicircular canals. The utricle is a tiny organ that contains calcium crystals. In some people, the crystals can move into the semicircular canals. When this happens, the system no longer works as it should. This causes BPPV. Benign means it is not life-threatening. Paroxysmal means it happens suddenly. Positional means that it happens when you move your head. Vertigo is a feeling of spinning.  What causes BPPV?  Causes include injury to your head or neck. Other problems with the vestibular system may cause BPPV. In many people, the cause of BPPV is not known.  Symptoms of BPPV  You many have repeated feelings of spinning (vertigo). The vertigo usually lasts less than 1 minute. Some movements, suchas rolling over in bed, can bring on vertigo.  Diagnosing BPPV  Your primary health care provider may diagnose and treat your BPPV. Or you may see an ear, nose, and throat doctor (otolaryngologist). In some cases, you may see a nervous system doctor (neurologist).  The health care provider will ask about your symptoms and your medical history. He or she will examine you. You may have hearing and balance tests. As part of the exam, your health care provider may have you move your head and body in certain ways. If you have BPPV, the movements can bring on vertigo. Your provider will also look for abnormal movements of your eyes. You may have other tests to check your vestibular or nervous systems.  Treatment for BPPV  Your health care provider may try to move the calcium crystals. This is done  by having you move your head and neck in certain ways. This treatment is safe and often works well. You may also be told to do these movements at home. You may still have vertigo for a few weeks. Your health care provider will recheck your symptoms, usually in about a month. Special physical therapy may also be part of treatment.  In rare cases surgery may be needed for BPPV that does not go away.     When to call the health care provider  Call your health care provider right away if you have any of these:    Symptoms that do not go away with treatment    Symptoms that get worse    New symptoms        [id number] [employer]. [address] [place] [address], [place] [address]. All rights reserved. This information is not intended as a substitute for professional medical care. Always follow your healthcare professional's instructions.

## 2017-05-12 NOTE — ED NOTES
Pt noted a right side eye droop for 2 days,  Also noted dizziness and off balance for the last few days.

## 2017-05-12 NOTE — TELEPHONE ENCOUNTER
"Patient's wife calls.  States that patient's eyelid is drooping.  Onset-2 days, but states that something was happening in the last 7 days, then the eye lid drooped.  Advised ER visit, but patient declined.  Explained this is important to go to rule out stroke.      Patient declines HA, one sided weakness, facial drooping.  States he is \"fine\".  ER advised due to eye symptoms.     Huddled with Dr. Queen to notify him and he agrees that ER is appropriate.    Patient agreed to go and wife will take him to the [facility].    Myesha Hoffmann, RN  Message handled by Nurse Triage.        "

## 2017-05-12 NOTE — ED AVS SNAPSHOT
[facility] Emergency Department    [address]    [facility] [address]    Phone:  [phone]    Fax:  [phone]                                       Ciro Almonte   MRN: [id number]    Department:  [facility] Emergency Department   Date of Visit:  5/12/2017           After Visit Summary Signature Page     I have received my discharge instructions, and my questions have been answered. I have discussed any challenges I see with this plan with the nurse or doctor.    ..........................................................................................................................................  Patient/Patient Representative Signature      ..........................................................................................................................................  Patient Representative Print Name and Relationship to Patient    ..................................................               ................................................  Date                                            Time    ..........................................................................................................................................  Reviewed by Signature/Title    ...................................................              ..............................................  Date                                                            Time

## 2017-05-13 NOTE — ED PROVIDER NOTES
CHIEF COMPLAINT:  Noted right eyelid droop as well as some slight increase of dizziness and off balance for the last couple of days.      HISTORY OF PRESENT ILLNESS:  Ciro Almonte is a pleasant, 67-year-old male who presents after his wife noticed that his right upper eyelid seemed to be drooping more over the last couple days.  He also reports he had a vague feeling of almost like a drooling sensation in the right outer aspect of his mouth, but no numbness or obvious weakness around the mouth.  He has had no numbness, tingling or weakness in extremities that he reports.  He does state that he has had some increased feelings of imbalance at times with walking over the last few days.  He does admit that he does have some imbalance fairly consistently due to his neuropathy, but it seemed to be worse lately.  He has had no recent head injuries; he has had no confusion, disorientation.  There has been no chest pain, shortness of breath or palpitations.      PAST MEDICAL HISTORY:  The patient has a history of heart murmur, neuropathy, PVCs, and hypertension.      MEDICATIONS:  Include gabapentin, lisinopril, and amlodipine.      ALLERGIES:  No known drug allergies.      REVIEW OF SYSTEMS:  All negative except as stated above.      FAMILY HISTORY:  Positive for family history of CVA.      SOCIAL HISTORY:  He presents with his wife.      PHYSICAL EXAMINATION:   VITAL SIGNS:  Blood pressure is 174/96, pulse is 72, respirations 16, O2 sat 98% on room air.   HEENT:  Eye exam:  Pupils are equal, round, react to light.  There is no nystagmus.  TMs are normal without hemotympanum.  Oral exam is moist without oral lesions noted.   NECK:  Supple, no meningeal signs or lymphadenopathy.   LUNGS:  Clear.  There are no rales, rhonchi or wheeze.   HEART:  Regular rate and rhythm, there are no murmurs.   ABDOMEN:  Soft, nondistended, nontender.     EXTREMITIES:  Normal, he does have some right hip pain which is normal for him with  range of motion.   NEUROLOGIC:  He is alert, oriented, answers questions appropriately.  He has a very subtle right upper eyelid droop, though this could be due to more of a structural and skin problem than actual facial weakness because he has good strength with lifting his eyebrows and he has no other lower facial motor weakness or sensory deficits.  There is no indication of Bell's palsy based on examination.  He has got equal strength in upper and lower extremities without weakness.  No peripheral vision defects.     PSYCH:  He has normal affect.        EKG normal sinus rhythm at a rate of 73 beats per minute, there is normal WA, QRS and QT intervals, no signs of any ST segment elevation or depression.        MEDICAL DECISION MAKING:  The patient is a 77-year-old male who presents due to concern about right upper eyelid droop.  On examination, there does not appear to be a true weakness of the eyelid or a ptosis.  It appears more to be consistent with loose skin that is now starting to drop into the patient's field of vision.  He had no definable facial, motor or extremity weakness.  However, he did describe some vertiginous symptoms and somewhat off balance and as well as an odd sensation in this right mouth.  Therefore, I did obtain lab tests which were unremarkable as well as an MRI of the brain which I think showed no evidence of acute ischemic stroke or other concerning findings.  At re-evaluation, the patient continues to appear well.  I discussed with him at length about his findings.  I suspect he likely has a case of mild vertigo based on his mild dizziness symptoms and imbalance which is more correlated with activity and movement and not at rest.  I recommended meclizine therapy and also discussed with him about potentially needing outpatient referral to the audiology balance center if symptoms persist.   I also advised him to see his Ophthalmologist for evaluation and treatment of right eye lid drop.    I recommended follow up with his primary care physician on Monday, which is 3 days from now.  The patient understands the reasons to return.  He was discharged home in good condition.        DIAGNOSIS:     ICD-10-CM   1. BPPV (benign paroxysmal positional vertigo), unspecified laterality H81.10   2. Drooping eyelid, right H02.401             RISHI BARRIOS MD             D: 2017 17:54   T: 2017 07:24   MT: JOAQUIN#[id number]      Name:     LUPE TURNER   MRN:      [id number]        Account:      [id number]   :      1940           Visit Date:   2017      Document: [id number]

## 2017-05-15 RX ORDER — LATANOPROST 50 UG/ML
1 SOLUTION/ DROPS OPHTHALMIC AT BEDTIME
Qty: 7.5 ML | Refills: 3 | Status: SHIPPED | OUTPATIENT
Start: 2017-05-15 | End: 2018-06-04

## 2017-05-15 NOTE — TELEPHONE ENCOUNTER
LATANOPROST 0.005%        Last Written Prescription Date:  4/27/16  Last Fill Quantity: 7.5ml,   # refills: 3  Last Office Visit : 3/22/17  Future Office visit:  NONE

## 2017-05-17 ENCOUNTER — RADIANT APPOINTMENT (OUTPATIENT)
Dept: GENERAL RADIOLOGY | Facility: CLINIC | Age: 77
End: 2017-05-17
Attending: INTERNAL MEDICINE
Payer: COMMERCIAL

## 2017-05-17 ENCOUNTER — OFFICE VISIT (OUTPATIENT)
Dept: PEDIATRICS | Facility: CLINIC | Age: 77
End: 2017-05-17
Payer: COMMERCIAL

## 2017-05-17 VITALS
TEMPERATURE: 98.9 F | OXYGEN SATURATION: 96 % | DIASTOLIC BLOOD PRESSURE: 62 MMHG | BODY MASS INDEX: 26.77 KG/M2 | SYSTOLIC BLOOD PRESSURE: 128 MMHG | HEIGHT: 70 IN | WEIGHT: 187 LBS | HEART RATE: 67 BPM

## 2017-05-17 DIAGNOSIS — R10.9 LEFT FLANK PAIN: ICD-10-CM

## 2017-05-17 DIAGNOSIS — G89.29 CHRONIC LEFT-SIDED LOW BACK PAIN WITH SCIATICA, SCIATICA LATERALITY UNSPECIFIED: ICD-10-CM

## 2017-05-17 DIAGNOSIS — R93.89 ABNORMAL FINDINGS ON DIAGNOSTIC IMAGING OF OTHER SPECIFIED BODY STRUCTURES: ICD-10-CM

## 2017-05-17 DIAGNOSIS — G51.0 BELL'S PALSY: ICD-10-CM

## 2017-05-17 DIAGNOSIS — M54.40 CHRONIC LEFT-SIDED LOW BACK PAIN WITH SCIATICA, SCIATICA LATERALITY UNSPECIFIED: ICD-10-CM

## 2017-05-17 DIAGNOSIS — M25.552 HIP PAIN, LEFT: ICD-10-CM

## 2017-05-17 DIAGNOSIS — H81.10 BPPV (BENIGN PAROXYSMAL POSITIONAL VERTIGO), UNSPECIFIED LATERALITY: Primary | ICD-10-CM

## 2017-05-17 LAB
ALBUMIN UR-MCNC: NEGATIVE MG/DL
APPEARANCE UR: CLEAR
BILIRUB UR QL STRIP: NEGATIVE
COLOR UR AUTO: YELLOW
ERYTHROCYTE [SEDIMENTATION RATE] IN BLOOD BY WESTERGREN METHOD: 9 MM/H (ref 0–20)
GLUCOSE UR STRIP-MCNC: NEGATIVE MG/DL
HGB UR QL STRIP: NEGATIVE
KETONES UR STRIP-MCNC: NEGATIVE MG/DL
LEUKOCYTE ESTERASE UR QL STRIP: NEGATIVE
NITRATE UR QL: NEGATIVE
PH UR STRIP: 5.5 PH (ref 5–7)
SP GR UR STRIP: 1.01 (ref 1–1.03)
URN SPEC COLLECT METH UR: NORMAL
UROBILINOGEN UR STRIP-ACNC: 0.2 EU/DL (ref 0.2–1)

## 2017-05-17 PROCEDURE — 84443 ASSAY THYROID STIM HORMONE: CPT | Performed by: INTERNAL MEDICINE

## 2017-05-17 PROCEDURE — 85652 RBC SED RATE AUTOMATED: CPT | Performed by: INTERNAL MEDICINE

## 2017-05-17 PROCEDURE — 81003 URINALYSIS AUTO W/O SCOPE: CPT | Performed by: INTERNAL MEDICINE

## 2017-05-17 PROCEDURE — 99214 OFFICE O/P EST MOD 30 MIN: CPT | Performed by: INTERNAL MEDICINE

## 2017-05-17 PROCEDURE — 86618 LYME DISEASE ANTIBODY: CPT | Performed by: INTERNAL MEDICINE

## 2017-05-17 PROCEDURE — 73523 X-RAY EXAM HIPS BI 5/> VIEWS: CPT

## 2017-05-17 PROCEDURE — 36415 COLL VENOUS BLD VENIPUNCTURE: CPT | Performed by: INTERNAL MEDICINE

## 2017-05-17 NOTE — NURSING NOTE
"Chief Complaint   Patient presents with     ER F/U       Initial /62 (BP Location: Right arm, Cuff Size: Adult Regular)  Pulse 67  Temp 98.9  F (37.2  C) (Oral)  Ht 5' 10\" (1.778 m)  Wt 187 lb (84.8 kg)  SpO2 96%  BMI 26.83 kg/m2 Estimated body mass index is 26.83 kg/(m^2) as calculated from the following:    Height as of this encounter: 5' 10\" (1.778 m).    Weight as of this encounter: 187 lb (84.8 kg).  Medication Reconciliation: complete   Tiffanie To MA    "

## 2017-05-17 NOTE — MR AVS SNAPSHOT
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, sciatica laterality unspecified        Bell's palsy        Abnormal findings on diagnostic imaging of other specified body structures           Care Instructions    1) For the dizziness- there is a balance clinic that can help get stones out of the ear    2) We will check bloodwork looking for lyme today as well as thyroid studies and markers of inflammation    3) Physical therapy downstairs for evaluation of the back area    4) Checking urine looking for cause of the left flank pain- let me know if pushing and finding pain    5) Xrays of the hip today as well    Uri Queen MD        Follow-ups after your visit        Additional Services     [facility] PT, HAND, AND CHIROPRACTIC REFERRAL       **This order will print in the [facility] Scheduling Office**    Physical Therapy, Hand Therapy and Chiropractic Care are available through:    *[facility]  *[facility]  *[facility]    Call one number to schedule at any of the above locations: [phone].    Your provider has referred you to: Physical Therapy at [facility] or [facility]    Indication/Reason for Referral: hip and back pain, previous back surgery  Onset of Illness: several weeks  Therapy Orders: Evaluate and Treat  Special Programs: None  Special Request: None    Agatha Snider      Additional Comments for the Therapist or Chiropractor:     Please be aware that coverage of these services is subject to the terms and limitations of your health insurance plan.  Call  member services at your health plan with any benefit or coverage questions.      Please bring the following to your appointment:    *Your personal calendar for scheduling future appointments  *Comfortable clothing            PHYSICAL THERAPY REFERRAL       *This therapy referral will be filtered to a centralized scheduling office at [facility] and the patient will receive a call to schedule an appointment at a [facility] location most convenient for them. *     [facility] provides Physical Therapy evaluation and treatment and many specialty services across the [facility] system.  If requesting a specialty program, please choose from the list below.    If you have not heard from the scheduling office within 2 business days, please call [phone] for all locations, with the exception of [place], please call [phone].  Treatment: Evaluation & Treatment  Special Instructions/Modalities:   Special Programs: Balance/Vestibular    Please be aware that coverage of these services is subject to the terms and limitations of your health insurance plan.  Call member services at your health plan with any benefit or coverage questions.      **Note to Provider:  If you are referring outside of [facility] for the therapy appointment, please list the name of the location in the  special instructions  above, print the referral and give to the patient to schedule the appointment.                  Your next 10 appointments already scheduled     May 17, 2017  3:05 PM CDT   XR PELVIS AND HIP BILATERAL 2 VIEWS with [id number]   [facility] [place] ([facility])    [address] [place] [address]  [address] [place] [id number] [phone]           Please bring a list of your current medicines to your exam. (Include vitamins, minerals and over-thecounter medicines.) Leave your valuables at home.  Tell your doctor if there is a chance you may be pregnant.  You do not need to do anything  "special for this exam.            2017  8:50 AM CDT   PHYSICAL with Uri Queen MD   [facility] [place] ([facility])    [address]  [address]  [place] [address] [phone]              Who to contact     If you have questions or need follow up information about today's clinic visit or your schedule please contact [facility] directly at [phone].  Normal or non-critical lab and imaging results will be communicated to you by [employer], letter or phone within 4 business days after the clinic has received the results. If you do not hear from us within 7 days, please contact the clinic through [facility] or phone. If you have a critical or abnormal lab result, we will notify you by phone as soon as possible.  Submit refill requests through [employer] or call your pharmacy and they will forward the refill request to us. Please allow 3 business days for your refill to be completed.          Additional Information About Your Visit        [employer] Information     [employer] lets you send messages to your doctor, view your test results, renew your prescriptions, schedule appointments and more. To sign up, go to [email or web address] . Click on \"Log in\" on the left side of the screen, which will take you to the Welcome page. Then click on \"Sign up Now\" on the right side of the page.     You will be asked to enter the access code listed below, as well as some personal information. Please follow the directions to create your username and password.     Your access code is: [email or web address]  Expires: 2017  9:17 AM     Your access code will  in 90 days. If you need help or a new code, please call your [place] clinic or [phone].        Care EveryWhere ID     This is your Care EveryWhere ID. This could be used by other organizations to access your [place] medical records  [id number]        Your Vitals Were     Pulse Temperature Height Pulse Oximetry BMI " "(Body Mass Index)       67 98.9  F (37.2  C) (Oral) 5' 10\" (1.778 m) 96% 26.83 kg/m2        Blood Pressure from Last 3 Encounters:   05/17/17 128/62   05/12/17 137/71   04/06/17 138/62    Weight from Last 3 Encounters:   05/17/17 187 lb (84.8 kg)   05/12/17 183 lb (83 kg)   04/06/17 189 lb (85.7 kg)              We Performed the Following     *UA reflex to Microscopic and Culture ([facility] (except [place] and [place])     ESR: Erythrocyte sedimentation rate     [facility] PT, HAND, AND CHIROPRACTIC REFERRAL     Lyme Disease Erica with reflex to WB Serum     PHYSICAL THERAPY REFERRAL     TSH with free T4 reflex        Primary Care Provider Office Phone # Fax #    Uri Queen [phone]       [address] [place] [address]        Thank you!     Thank you for choosing [facility]  for your care. Our goal is always to provide you with excellent care. Hearing back from our patients is one way we can continue to improve our services. Please take a few minutes to complete the written survey that you may receive in the mail after your visit with us. Thank you!             Your Updated Medication List - Protect others around you: Learn how to safely use, store and throw away your medicines at www.disposemymeds.org.          This list is accurate as of: 5/17/17  3:04 PM.  Always use your most recent med list.                   Brand Name Dispense Instructions for use    amLODIPine 10 MG tablet    NORVASC    90 tablet    Take 1 tablet (10 mg) by mouth daily       AMOXICILLIN PO      Take 500 mg by mouth Reported on 4/6/2017       ASPIRIN PO      Take 81 mg by mouth       CITRACAL + D PO      Take 2 tablets by mouth daily       gabapentin 300 MG capsule    NEURONTIN    270 capsule    TAKE 3 CAPSULES BY MOUTH EVERY NIGHT AT BEDTIME       latanoprost 0.005 % ophthalmic solution    XALATAN    7.5 mL    Place 1 drop into both eyes At Bedtime       " lisinopril 10 MG tablet    PRINIVIL/ZESTRIL    90 tablet    Take 1 tablet (10 mg) by mouth daily       Magnesium 500 MG Caps          meclizine 25 MG tablet    ANTIVERT    12 tablet    Take 1 tablet (25 mg) by mouth every 6 hours as needed for dizziness       MENS MULTIVITAMIN PLUS PO      Take 1 tablet by mouth daily       PROSTATE 2.4 Caps      Take 2 capsules by mouth daily Prostate cap 2.0       UNABLE TO FIND      MEDICATION NAME: Protandim-Dietary Supplement

## 2017-05-17 NOTE — PATIENT INSTRUCTIONS
1) For the dizziness- there is a balance clinic that can help get stones out of the ear    2) We will check bloodwork looking for lyme today as well as thyroid studies and markers of inflammation    3) Physical therapy downstairs for evaluation of the back area    4) Checking urine looking for cause of the left flank pain- let me know if pushing and finding pain    5) Xrays of the hip today as well    Uri Queen MD

## 2017-05-17 NOTE — PROGRESS NOTES
SUBJECTIVE:                                                    Ciro Almonte is a 77 year old male who presents to clinic today for the following health issues:      ED/UC Followup:    Facility:  [facility]   Date of visit: 5/12/2017  Reason for visit: Drooping on right side of face of eye and lip.   Current Status: Improved.      Right eye was drooping and did have slight drooping out of the mouth, did have some difficulty with balance. Did get some pills for dizziness (meclizine). Had MRI that was done ruling out central cause. He has been having improvement in symptoms and can close his right eye completely without issues.     Still feeling dizzy, worse with moving head some- especially forward then back quickly.     HR 48-52 noted in ER, did not feel dizzy with this, will get HR  Of 50's, does not seem to go along with it.     Will get pain in the side, feel pain in the left side, will get at times, cannot put finger on area, getting out of bed and walking to bathroom and going to bathroom. This has been going on for the last 2 weeks. No hematuria. No rashes.     Problem list and histories reviewed & adjusted, as indicated.  Additional history: as documented    Patient Active Problem List   Diagnosis     Cervical spine degeneration     Glaucoma     CARDIOVASCULAR SCREENING; LDL GOAL LESS THAN 130     Mitral regurgitation - systolic murmur     Hereditary and idiopathic peripheral neuropathy     Essential hypertension with goal blood pressure less than 140/90     Primary osteoarthritis of right foot     Benign non-nodular prostatic hyperplasia with lower urinary tract symptoms     Chronic low back pain, unspecified back pain laterality, with sciatica presence unspecified     Past Surgical History:   Procedure Laterality Date     ARTHROPLASTY HIP ANTERIOR Left 11/14/2014    Procedure: ARTHROPLASTY HIP ANTERIOR;  Surgeon: Rudy Tobias MD;  Location: [facility] OR     ARTHROPLASTY REVISION HIP  3/12/2014      "right Procedure: ARTHROPLASTY REVISION HIP;  Surgeon: Rudy Tobias MD;  Location:  OR     ARTHROSCOPY KNEE  2002    meniscus ( side?)     BIOPSY OF PROSTATE,NEEDLE/PUNCH       C TOTAL HIP ARTHROPLASTY Right 2008     DERMATOLOGY REFERRAL      melanoma resection L shoulder     DISCECTOMY LUMBAR POSTERIOR MICROSCOPIC ONE LEVEL N/A 2016    Procedure: DISCECTOMY LUMBAR POSTERIOR MICROSCOPIC ONE LEVEL;  Surgeon: Ruben Marsh MD;  Location:  OR     ENT SURGERY      Tonsils     ORTHOPEDIC SURGERY  2008    R hip replacement     ORTHOPEDIC SURGERY      big toe in lawn mower, needed reconstructive surgery       Social History   Substance Use Topics     Smoking status: Former Smoker     Quit date: 1976     Smokeless tobacco: Never Used     Alcohol use No      Comment: Quit 1014     Family History   Problem Relation Age of Onset     CEREBROVASCULAR DISEASE Mother      Hypertension Mother      Glaucoma Mother      HEART DISEASE Father 58     MI     CANCER Father      lung cancer;  age 91           Reviewed and updated as needed this visit by clinical staff       Reviewed and updated as needed this visit by Provider         ROS:  Constitutional, HEENT, cardiovascular, pulmonary, GI, , musculoskeletal, neuro, skin, endocrine and psych systems are negative, except as otherwise noted.    OBJECTIVE:                                                    /62 (BP Location: Right arm, Cuff Size: Adult Regular)  Pulse 67  Temp 98.9  F (37.2  C) (Oral)  Ht 5' 10\" (1.778 m)  Wt 187 lb (84.8 kg)  SpO2 96%  BMI 26.83 kg/m2  Body mass index is 26.83 kg/(m^2).  GENERAL: healthy, alert and no distress  EYES: Eyes grossly normal to inspection, PERRL and conjunctivae and sclerae normal  HENT: ear canals and TM's normal, nose and mouth without ulcers or lesions  NECK: no adenopathy, no asymmetry, masses, or scars and thyroid normal to palpation  RESP: lungs clear to auscultation - no rales, rhonchi or " wheezes  CV: regular rate and rhythm, normal S1 S2, no S3 or S4, no murmur, click or rub, no peripheral edema and peripheral pulses strong  ABDOMEN: soft, nontender, no hepatosplenomegaly, no masses and bowel sounds normal  MS: normal ROM of the hips, negative straight leg raise, normal great toe strength  SKIN: no suspicious lesions or rashes  NEURO: Normal strength and tone, mentation intact and speech normal  PSYCH: mentation appears normal, affect normal/bright    Diagnostic Test Results:  Results for orders placed or performed in visit on 05/17/17   *UA reflex to Microscopic and Culture ([facility] (except [place] and [place])   Result Value Ref [place]    Color Urine Yellow     Appearance Urine Clear     Glucose Urine Negative NEG mg/dL    Bilirubin Urine Negative NEG    Ketones Urine Negative NEG mg/dL    Specific Gravity Urine 1.015 1.003 - 1.035    Blood Urine Negative NEG    pH Urine 5.5 5.0 - 7.0 pH    Protein Albumin Urine Negative NEG mg/dL    Urobilinogen Urine 0.2 0.2 - 1.0 EU/dL    Nitrite Urine Negative NEG    Leukocyte Esterase Urine Negative NEG    Source Midstream Urine    TSH with free T4 reflex   Result Value Ref [place]    TSH 1.91 0.40 - 4.00 mU/L   ESR: Erythrocyte sedimentation rate   Result Value Ref [place]    Sed Rate 9 0 - 20 mm/h        ASSESSMENT/PLAN:                                                    1. BPPV (benign paroxysmal positional vertigo), unspecified laterality  LIkely cause of dizziness. MRI negative for concerning causes such as stroke. Will do vertigo assessment and therapy with PT  - PHYSICAL THERAPY REFERRAL    2. Left flank pain  No hematuria to suggest nephrolithasis, no rash to suggest shingles, no evidence of UTI, no overt abdominal tenderness to suggest diverticulitis or other changes.  - *UA reflex to Microscopic and Culture ([facility] (except [facility])  - TSH with free T4 reflex  - Lyme Disease Erica with reflex to WB  Serum  - ESR: Erythrocyte sedimentation rate    3. Hip pain, left  Xray without clear cause of pain, will see if improved with PT  - XR Pelvis and Hip Bilateral 2 Views; Future  - [facility] PT, HAND, AND CHIROPRACTIC REFERRAL  - TSH with free T4 reflex  - Lyme Disease Erica with reflex to WB Serum  - ESR: Erythrocyte sedimentation rate    4. 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 Bell's palsy  Will check lyme as well   - Lyme Disease Erica with reflex to WB Serum  - ESR: Erythrocyte sedimentation rate    6. Abnormal findings on diagnostic imaging of other specified body structures   - TSH with free T4 reflex      Patient Instructions   1) For the dizziness- there is a balance clinic that can help get stones out of the ear    2) We will check bloodwork looking for lyme today as well as thyroid studies and markers of inflammation    3) Physical therapy downstairs for evaluation of the back area    4) Checking urine looking for cause of the left flank pain- let me know if pushing and finding pain    5) Xrays of the hip today as well    MD Uri Stacy MD, MD  [facility] [place]

## 2017-05-18 LAB — TSH SERPL DL<=0.005 MIU/L-ACNC: 1.91 MU/L (ref 0.4–4)

## 2017-05-19 LAB — B BURGDOR IGG+IGM SER QL: 0.03 (ref 0–0.89)

## 2017-05-22 ENCOUNTER — THERAPY VISIT (OUTPATIENT)
Dept: PHYSICAL THERAPY | Facility: CLINIC | Age: 77
End: 2017-05-22
Payer: COMMERCIAL

## 2017-05-22 DIAGNOSIS — M54.50 LUMBAGO: Primary | ICD-10-CM

## 2017-05-22 DIAGNOSIS — M25.552 HIP PAIN, LEFT: ICD-10-CM

## 2017-05-22 PROCEDURE — 97110 THERAPEUTIC EXERCISES: CPT | Mod: GP | Performed by: PHYSICAL THERAPIST

## 2017-05-22 PROCEDURE — 97161 PT EVAL LOW COMPLEX 20 MIN: CPT | Mod: GP | Performed by: PHYSICAL THERAPIST

## 2017-05-22 ASSESSMENT — ACTIVITIES OF DAILY LIVING (ADL)
ROLLING_OVER_IN_BED: SLIGHT DIFFICULTY
GOING_UP_1_FLIGHT_OF_STAIRS: EXTREME DIFFICULTY
GOING_DOWN_1_FLIGHT_OF_STAIRS: EXTREME DIFFICULTY
WALKING_APPROXIMATELY_10_MINUTES: SLIGHT DIFFICULTY
WALKING_15_MINUTES_OR_GREATER: MODERATE DIFFICULTY
STEPPING_UP_AND_DOWN_CURBS: MODERATE DIFFICULTY
GETTING_INTO_AND_OUT_OF_AN_AVERAGE_CAR: SLIGHT DIFFICULTY
HOW_WOULD_YOU_RATE_YOUR_CURRENT_LEVEL_OF_FUNCTION_DURING_YOUR_USUAL_ACTIVITIES_OF_DAILY_LIVING_FROM_0_TO_100_WITH_100_BEING_YOUR_LEVEL_OF_FUNCTION_PRIOR_TO_YOUR_HIP_PROBLEM_AND_0_BEING_THE_INABILITY_TO_PERFORM_ANY_OF_YOUR_USUAL_DAILY_ACTIVITIES?: 60
TWISTING/PIVOTING_ON_INVOLVED_LEG: SLIGHT DIFFICULTY
WALKING_DOWN_STEEP_HILLS: MODERATE DIFFICULTY
RECREATIONAL_ACTIVITIES: MODERATE DIFFICULTY
HOS_ADL_COUNT: 16
PUTTING_ON_SOCKS_AND_SHOES: SLIGHT DIFFICULTY
LIGHT_TO_MODERATE_WORK: SLIGHT DIFFICULTY
HOS_ADL_HIGHEST_POTENTIAL_SCORE: 64
HEAVY_WORK: EXTREME DIFFICULTY
WALKING_INITIALLY: MODERATE DIFFICULTY
SITTING_FOR_15_MINUTES: NO DIFFICULTY AT ALL
WALKING_UP_STEEP_HILLS: MODERATE DIFFICULTY
STANDING_FOR_15_MINUTES: MODERATE DIFFICULTY
HOS_ADL_SCORE(%): 51.56
DEEP_SQUATTING: EXTREME DIFFICULTY
HOS_ADL_ITEM_SCORE_TOTAL: 33

## 2017-05-22 NOTE — PROGRESS NOTES
Subjective:    Patient is a 77 year old male presenting with rehab back hpi. The history is provided by the patient. No  was used.   Ciro Almonte is a 77 year old male with a lumbar (and left hip) condition.  Condition occurred with:  Insidious onset.  Condition occurred: for unknown reasons.  This is a recurrent and chronic condition  Patient reports chronic low back and hip issues for several years.  Physical therapy was ordered on 5/17/2017.  Patient c/o pain going upstairs and lfting especially >50 pounds.  He c/o increased pain after working in the yard for 6 hours.  Patient has had both hips replaced, right hip was revised.  Patient also had lumbar decompression surgery for right sided leg symptoms.  Patient's current left side symptoms are similar to the right side symptoms he had prior to lumbar surgery.  Right leg is weaker on the stairs.  He also c/o pain with prolong standing.    Site of Pain: left lateral hip, right lateral hip.  Radiates to:  Thigh left.  Pain is described as aching and is intermittent Pain Scale: 0/10.   Pain is worse in the P.M..  Symptoms are exacerbated by standing, bending, lifting and other (stairs) and relieved by rest and NSAID's.  Since onset symptoms are gradually worsening.  Special tests:  X-ray.  Previous treatment: None recently.    General health as reported by patient is good.  Pertinent medical history includes:  Cancer, high blood pressure, implanted device, other and history of fractures (peripheral neuropathy).  Medical allergies: no.  Other surgeries include:  Orthopedic surgery.  Current medications:  High blood pressure medication and anti-inflammatory.  Current occupation is Retired.        Barriers include:  None as reported by the patient.    Red flags:  None as reported by the patient.                        Objective:    Standing Alignment:        Lumbar:  Lordosis decr            Gait:    Gait Type:  Antalgic   Assistive Devices:   None  Deviations:  Lumbar:  Trunk lean R               Lumbar/SI Evaluation  ROM:    AROM Lumbar:   Flexion:        75% - HS tightness  Ext:                    33% -  No symptoms - ROM improves with repeated movement   Side Bend:        Left:     Right:   Rotation:           Left:     Right:   Side Glide:        Left:     Right:         Strength: Fair core strength  Lumbar Myotomes:  normal                  Neural Tension/Mobility:      Left side:SLR  negative.     Right side:   SLR  negative.                                             Hip Evaluation  HIP AROM:  : Allows for normal function. : Allows for normal function.                  Hip PROM:  : No pain w/PROM. : No pain w/PROM.                          Hip Strength:      Extension:  Left: 5-/5  Pain:Right: 5-/5    Pain:    Abduction:  Left: 4-/5     Pain:Right: 3-/5    Pain:                                 General     ROS    Assessment/Plan:      Patient is a 77 year old male with lumbar and left side hip complaints.    Patient has the following significant findings with corresponding treatment plan.                Diagnosis 1:  B Hip pain/weakness, lumbar derangement  Pain -  self management, education, directional preference exercise and home program  Decreased ROM/flexibility - therapeutic exercise, therapeutic activity and home program  Decreased strength - therapeutic exercise, therapeutic activities and home program  Impaired balance - neuro re-education, gait training, therapeutic activities and home program  Impaired gait - gait training and home program  Impaired muscle performance - neuro re-education and home program  Decreased function - therapeutic activities and home program    Therapy Evaluation Codes:   1) History comprised of:   Personal factors that impact the plan of care:      None.    Comorbidity factors that impact the plan of care are:      Cancer, High blood pressure, Implanted device and Vertigo, Peripheral Neuropathy .      Medications impacting care: Anti-inflammatory and High blood pressure.  2) Examination of Body Systems comprised of:   Body structures and functions that impact the plan of care:      Hip and Lumbar spine.   Activity limitations that impact the plan of care are:      Bending, Lifting, Stairs and Standing.  3) Clinical presentation characteristics are:   Stable/Uncomplicated.  4) Decision-Making    Low complexity using standardized patient assessment instrument and/or measureable assessment of functional outcome.  Cumulative Therapy Evaluation is: Low complexity.    Previous and current functional limitations:  (See Goal Flow Sheet for this information)    Short term and Long term goals: (See Goal Flow Sheet for this information)     Communication ability:  Patient appears to be able to clearly communicate and understand verbal and written communication and follow directions correctly.  Treatment Explanation - The following has been discussed with the patient:   RX ordered/plan of care  Anticipated outcomes  Possible risks and side effects  This patient would benefit from PT intervention to resume normal activities.   Rehab potential is good.    Frequency:  1 X week, once daily  Duration:  for 8 weeks  Discharge Plan:  Achieve all LTG.  Independent in home treatment program.  Reach maximal therapeutic benefit.    Please refer to the daily flowsheet for treatment today, total treatment time and time spent performing 1:1 timed codes.

## 2017-05-24 ENCOUNTER — HOSPITAL ENCOUNTER (OUTPATIENT)
Dept: PHYSICAL THERAPY | Facility: CLINIC | Age: 77
End: 2017-05-24
Attending: INTERNAL MEDICINE
Payer: COMMERCIAL

## 2017-05-24 DIAGNOSIS — R42 DIZZINESS: Primary | ICD-10-CM

## 2017-05-24 PROCEDURE — G8978 MOBILITY CURRENT STATUS: HCPCS | Mod: CI | Performed by: PHYSICAL THERAPIST

## 2017-05-24 PROCEDURE — 97162 PT EVAL MOD COMPLEX 30 MIN: CPT | Mod: GP | Performed by: PHYSICAL THERAPIST

## 2017-05-24 PROCEDURE — 40000840 ZZHC STATISTIC PT VESTIBULAR VISIT: Mod: GP | Performed by: PHYSICAL THERAPIST

## 2017-05-24 PROCEDURE — G8979 MOBILITY GOAL STATUS: HCPCS | Mod: CH | Performed by: PHYSICAL THERAPIST

## 2017-05-24 PROCEDURE — 97112 NEUROMUSCULAR REEDUCATION: CPT | Mod: GP | Performed by: PHYSICAL THERAPIST

## 2017-05-29 NOTE — PROGRESS NOTES
05/24/17 0900   Quick Adds   Quick Adds Vestibular Eval   General Information   Start of Care Date 05/24/17   Referring Physician Uri Queen MD    Orders Evaluate and Treat as Indicated   Medical Diagnosis BPPV (benign paroxysmal positional vertigo), unspecified laterality H81.10    Onset of illness/injury or Date of Surgery 05/24/17   Pertinent history of current problem (include personal factors and/or comorbidities that impact the POC) Patient present; reporting long stadning dizziness to have been happening for a couple weeks now. States that more recently, he had also noticed a drooping eye lid, lip and numbness over his face which brought him into the ER. An MRI was completed due to concerns of a stroke- but this was found to be negative. The aptietn notes that his dizziness seems to worsen in the AM; but there are times when it doesn't happen at all. Denying a room spinning sensation. States his dizziness feels moreso as if he is rocking on a boat. Also notices that sometimes when he is walking, he will stagger forward. States that dizziness can last for a little while. He notes vision changes due to worsening cataracts. Also indicating long standing hearing impairment- one ear is worse than the other. Doesn't feel as though he has noticed recent changes.    Patient role/Employment history Retired  ()   Living environment House/[facility]   Home/Community Accessibility Comments The toñito lives in a 2 story house with his spouse. Has stairs to enter and within his home. Railing present on both.    Assistive Devices Comments Does not utilize an AD   Patient/Family Goals Statement Resolve dizziness   Fall Risk Screen   Fall screen completed by PT   Per patient - Fall 2 or more times in past year? No   Per patient - Fall with injury in past year? No   Is patient a fall risk? No   Fall screen comments Patient is inherently at increased risk of falling tacos to dizziness symptom; However, he  demonstrates good stability with gait, transfers and positional changes through session this date. No overt concerns for balance and fall risk.    Functional Scales   Functional Scales and Outcomes Patient did not complete pre-evalaution paperwork, will provide patient with DHI next session   Pain   Pain comments Patient denying pain this date. Denies neck pain or headaches   Cognitive Status Examination   Orientation orientation to person, place and time   Level of Consciousness alert   Follows Commands and Answers Questions 100% of the time   Personal Safety and Judgment intact   Memory intact   Posture   Posture Forward head position;Protracted shoulders   Range of Motion (ROM)   ROM Comment FUnctionally assessed through general mobility screening and bilateral extremties found to be within normal limits   Strength   Strength Comments Functioanlly assessed through general mobility screening. Bilateral extremities found to be within functional limits and globally graded at least 3/5 as patient is able to lift all extrmeities agaisnt gravity without difficulty.   Bed Mobility   Bed Mobility Comments IND   Transfer Skills   Transfer Comments IND   Gait   Gait Comments IND   Balance   Balance Comments Patient demonstrating good stability with gait, trnasfers and positional changes this date. No overt concerns for balance.    Sensory Examination   Sensory Perception no deficits were identified   Coordination   Coordination no deficits were identified   Muscle Tone   Muscle Tone no deficits were identified   Cervicogenic Screen   Neck ROM Cervical ROM is within normal limits and approrpate to allow for positional testing.    Oculomotor Exam   Smooth Pursuit Normal   Saccades Normal   VOR Normal   Rapid Head Thrust Normal   Convergence Testing Normal   Convergence Testing Comments 4.5 inches   Infrared Goggle Exam or Frenzel Lense Exam   Vestibular Suppressant in Last 24 Hours? Yes   Exam completed with Infrared Goggles    Spontaneous Nystagmus Negative   Gaze Evoked Nystagmus Horizontal R   Head Shake Horizontal Nystagmus Negative   Positional testing Negative   Positional Testing comments Completed positional testing for bialteral posterior, anterior and horizontal canals. All positional testing found to be negative   Dynamic Visual Acuity (DVA)   Horizontal Head Movement at 1 Hz (LogMar) 5 line discrepency   Horizontal Head Movement at 2 Hz (LogMar) 8 line discrepency   DVA Comments Patient demonstrating significant discrepency ouside normative ranges that indicates deficieny with functional gaze stability. Patient demonstrating significant discrepency but denying dizziness sensation with activity.   Planned Therapy Interventions   Planned Therapy Interventions balance training;gait training;neuromuscular re-education;ROM;strengthening;stretching;other (see comments)  (Vestibular rehab)   Clinical Impression   Criteria for Skilled Therapeutic Interventions Met yes, treatment indicated   PT Diagnosis Decreased activity toerlance and safe functional mobility   Influenced by the following impairments Dizziness   Functional limitations due to impairments gait, transfers, positional changes   Clinical Presentation Stable/Uncomplicated   Clinical Decision Making (Complexity) Moderate complexity   Therapy Frequency 1 time/week   Predicted Duration of Therapy Intervention (days/wks) 6 weeks   Risk & Benefits of therapy have been explained Yes   Patient, Family & other staff in agreement with plan of care Yes   Clinical Impression Comments Patient presenting with long standing, but intermittent dizziness that seems to occur during ambulation activiites. Subjective and objective findings are indicative of possible hypofunction. Positional testing found to be negative for BPPV. A functional gaze stability deficits was identified with DVA assessment validating possible hypofunction; however; was unable to recreat dizziness symptoms docign  head shake and DVA assessment. Recommending particiaption in vestibular rehab to address gaze stability deficit that is likely contributing to experienced dizziness. Subjective reporting of dizziness symptoms are indicative of hypofunction, though further assessment required.   GOALS   PT Eval Goals 1;2   Goal 1   Goal Identifier Return to activity   Goal Description The patient will report being able to return to all normal activity without restrction due to dizziness   Target Date 07/05/17   Goal 2   Goal Identifier DHI   Goal Description The patien will demonstrate an 18 point improvement (or scoring of 0/100 if scoring less than 18/100 at baseline) to demonstrate a significant improvement in dizziness symptoms and the subjective perception of the degree of handicap associated with dizziness.    Target Date 07/05/17   Total Evaluation Time   Total Evaluation Time (Minutes) 50     This patient cancelled remaining visits and failed to schedule further treatment sessions. Suspect probable symptom recovery, however, had not been able to reassess goals and the patient has not been seen since time of evaluation. Please utilize this as discharge summary. The patient will be discharged at this time.

## 2017-05-29 NOTE — PROGRESS NOTES
[facility]        OUTPATIENT PHYSICAL THERAPY FUNCTIONAL EVALUATION  PLAN OF TREATMENT FOR OUTPATIENT REHABILITATION  (COMPLETE FOR INITIAL CLAIMS ONLY)  Patient's Last Name, First Name, M.I.  YOB: 1940  Ciro Almonte     Provider's Name   [facility]   Medical Record No.  [id number]     Start of Care Date:  05/24/17   Onset Date:  05/24/17   Type:     _X__PT   ____OT  ____SLP Medical Diagnosis:        PT Diagnosis:  Decreased activity toerlance and safe functional mobility Visits from SOC:  1                              __________________________________________________________________________________  Plan of Treatment/Functional Goals:  balance training, gait training, neuromuscular re-education, ROM, strengthening, stretching, other (see comments) (Vestibular rehab)           GOALS  Return to activity  The patient will report being able to return to all normal activity without restrction due to dizziness  07/05/17    DHI  The patien will demonstrate an 18 point improvement (or scoring of 0/100 if scoring less than 18/100 at baseline) to demonstrate a significant improvement in dizziness symptoms and the subjective perception of the degree of handicap associated with dizziness.   07/05/17           Patient presenting with long standing, but intermittent dizziness that seems to occur during ambulation activiites. Subjective and objective findings are suspect for possible hypofunction. Positional testing found to be negative for BPPV. Functional gaze stability deficits were identified with DVA assessment validating possible hypofunction; however; was unable to recreate the patient's dizziness symptoms this date with other testing which is unusual. Recommending particiaption in vestibular rehab to address gaze stability deficit that is likely contributing to  experienced dizziness. Subjective reporting of dizziness symptoms are indicative of hypofunction, though further assessment required pending response to gaze stability training.                                                           Therapy Frequency:  1 time/week   Predicted Duration of Therapy Intervention:  6 weeks    Carlie Aranda, JOSELYN                                    I CERTIFY THE NEED FOR THESE SERVICES FURNISHED UNDER        THIS PLAN OF TREATMENT AND WHILE UNDER MY CARE     (Physician co-signature of this document indicates review and certification of the therapy plan).                Certification Date From:    5/24/17  Certification Date To:   8/21/17    Referring Provider:  Uri Queen MD     Initial Assessment  See Epic Evaluation- Start of Care Date: 05/24/17

## 2017-05-31 ENCOUNTER — THERAPY VISIT (OUTPATIENT)
Dept: PHYSICAL THERAPY | Facility: CLINIC | Age: 77
End: 2017-05-31
Payer: COMMERCIAL

## 2017-05-31 DIAGNOSIS — M25.552 HIP PAIN, LEFT: ICD-10-CM

## 2017-05-31 DIAGNOSIS — M54.50 LUMBAGO: ICD-10-CM

## 2017-05-31 PROCEDURE — 97110 THERAPEUTIC EXERCISES: CPT | Mod: GP | Performed by: PHYSICAL THERAPIST

## 2017-05-31 PROCEDURE — 97112 NEUROMUSCULAR REEDUCATION: CPT | Mod: GP | Performed by: PHYSICAL THERAPIST

## 2017-06-07 ENCOUNTER — THERAPY VISIT (OUTPATIENT)
Dept: PHYSICAL THERAPY | Facility: CLINIC | Age: 77
End: 2017-06-07
Payer: COMMERCIAL

## 2017-06-07 DIAGNOSIS — M54.50 LUMBAGO: ICD-10-CM

## 2017-06-07 DIAGNOSIS — M25.552 HIP PAIN, LEFT: ICD-10-CM

## 2017-06-07 PROCEDURE — 97112 NEUROMUSCULAR REEDUCATION: CPT | Mod: GP | Performed by: PHYSICAL THERAPIST

## 2017-06-07 PROCEDURE — 97110 THERAPEUTIC EXERCISES: CPT | Mod: GP | Performed by: PHYSICAL THERAPIST

## 2017-06-21 ENCOUNTER — THERAPY VISIT (OUTPATIENT)
Dept: PHYSICAL THERAPY | Facility: CLINIC | Age: 77
End: 2017-06-21
Payer: COMMERCIAL

## 2017-06-21 DIAGNOSIS — M25.552 HIP PAIN, LEFT: ICD-10-CM

## 2017-06-21 DIAGNOSIS — M54.50 LUMBAGO: ICD-10-CM

## 2017-06-21 PROCEDURE — 97112 NEUROMUSCULAR REEDUCATION: CPT | Mod: GP | Performed by: PHYSICAL THERAPIST

## 2017-06-21 PROCEDURE — 97110 THERAPEUTIC EXERCISES: CPT | Mod: GP | Performed by: PHYSICAL THERAPIST

## 2017-07-06 ENCOUNTER — RADIANT APPOINTMENT (OUTPATIENT)
Dept: GENERAL RADIOLOGY | Facility: CLINIC | Age: 77
End: 2017-07-06
Attending: INTERNAL MEDICINE
Payer: COMMERCIAL

## 2017-07-06 ENCOUNTER — OFFICE VISIT (OUTPATIENT)
Dept: PEDIATRICS | Facility: CLINIC | Age: 77
End: 2017-07-06
Payer: COMMERCIAL

## 2017-07-06 DIAGNOSIS — R00.1 BRADYCARDIA: ICD-10-CM

## 2017-07-06 DIAGNOSIS — Z12.11 ENCOUNTER FOR SCREENING FOR MALIGNANT NEOPLASM OF COLON: ICD-10-CM

## 2017-07-06 DIAGNOSIS — M79.672 BILATERAL FOOT PAIN: ICD-10-CM

## 2017-07-06 DIAGNOSIS — E78.2 MIXED HYPERLIPIDEMIA: ICD-10-CM

## 2017-07-06 DIAGNOSIS — R25.2 CRAMP OF LIMB: ICD-10-CM

## 2017-07-06 DIAGNOSIS — Z12.5 ENCOUNTER FOR SCREENING FOR MALIGNANT NEOPLASM OF PROSTATE: ICD-10-CM

## 2017-07-06 DIAGNOSIS — Z00.00 ROUTINE GENERAL MEDICAL EXAMINATION AT A HEALTH CARE FACILITY: Primary | ICD-10-CM

## 2017-07-06 DIAGNOSIS — M79.671 BILATERAL FOOT PAIN: ICD-10-CM

## 2017-07-06 DIAGNOSIS — R42 VERTIGO: ICD-10-CM

## 2017-07-06 LAB
ALBUMIN SERPL-MCNC: 3.7 G/DL (ref 3.4–5)
ALP SERPL-CCNC: 88 U/L (ref 40–150)
ALT SERPL W P-5'-P-CCNC: 34 U/L (ref 0–70)
ANION GAP SERPL CALCULATED.3IONS-SCNC: 8 MMOL/L (ref 3–14)
AST SERPL W P-5'-P-CCNC: 28 U/L (ref 0–45)
BILIRUB SERPL-MCNC: 0.8 MG/DL (ref 0.2–1.3)
BUN SERPL-MCNC: 17 MG/DL (ref 7–30)
CALCIUM SERPL-MCNC: 8.8 MG/DL (ref 8.5–10.1)
CHLORIDE SERPL-SCNC: 108 MMOL/L (ref 94–109)
CHOLEST SERPL-MCNC: 220 MG/DL
CO2 SERPL-SCNC: 25 MMOL/L (ref 20–32)
CREAT SERPL-MCNC: 0.8 MG/DL (ref 0.66–1.25)
GFR SERPL CREATININE-BSD FRML MDRD: ABNORMAL ML/MIN/1.7M2
GLUCOSE SERPL-MCNC: 100 MG/DL (ref 70–99)
HCV AB SERPL QL IA: NORMAL
HDLC SERPL-MCNC: 72 MG/DL
LDLC SERPL CALC-MCNC: 135 MG/DL
NONHDLC SERPL-MCNC: 148 MG/DL
POTASSIUM SERPL-SCNC: 3.8 MMOL/L (ref 3.4–5.3)
PROT SERPL-MCNC: 6.6 G/DL (ref 6.8–8.8)
PSA SERPL-ACNC: 1.78 UG/L (ref 0–4)
SODIUM SERPL-SCNC: 141 MMOL/L (ref 133–144)
TRIGL SERPL-MCNC: 63 MG/DL
TSH SERPL DL<=0.005 MIU/L-ACNC: 1.88 MU/L (ref 0.4–4)

## 2017-07-06 PROCEDURE — 80061 LIPID PANEL: CPT | Performed by: INTERNAL MEDICINE

## 2017-07-06 PROCEDURE — 99213 OFFICE O/P EST LOW 20 MIN: CPT | Mod: 25 | Performed by: INTERNAL MEDICINE

## 2017-07-06 PROCEDURE — 36415 COLL VENOUS BLD VENIPUNCTURE: CPT | Performed by: INTERNAL MEDICINE

## 2017-07-06 PROCEDURE — 99397 PER PM REEVAL EST PAT 65+ YR: CPT | Performed by: INTERNAL MEDICINE

## 2017-07-06 PROCEDURE — 84443 ASSAY THYROID STIM HORMONE: CPT | Performed by: INTERNAL MEDICINE

## 2017-07-06 PROCEDURE — 73630 X-RAY EXAM OF FOOT: CPT | Mod: RT

## 2017-07-06 PROCEDURE — G0103 PSA SCREENING: HCPCS | Performed by: INTERNAL MEDICINE

## 2017-07-06 PROCEDURE — 86803 HEPATITIS C AB TEST: CPT | Performed by: INTERNAL MEDICINE

## 2017-07-06 PROCEDURE — 80053 COMPREHEN METABOLIC PANEL: CPT | Performed by: INTERNAL MEDICINE

## 2017-07-06 RX ORDER — TERBINAFINE HYDROCHLORIDE 250 MG/1
TABLET ORAL
Refills: 3 | COMMUNITY
Start: 2017-06-19 | End: 2017-11-01

## 2017-07-06 RX ORDER — ROPINIROLE 0.25 MG/1
0.25-0.5 TABLET, FILM COATED ORAL AT BEDTIME
Qty: 60 TABLET | Refills: 1 | Status: SHIPPED | OUTPATIENT
Start: 2017-07-06 | End: 2017-11-11

## 2017-07-06 ASSESSMENT — ANXIETY QUESTIONNAIRES
3. WORRYING TOO MUCH ABOUT DIFFERENT THINGS: NOT AT ALL
7. FEELING AFRAID AS IF SOMETHING AWFUL MIGHT HAPPEN: NOT AT ALL
2. NOT BEING ABLE TO STOP OR CONTROL WORRYING: NOT AT ALL
GAD7 TOTAL SCORE: 1
5. BEING SO RESTLESS THAT IT IS HARD TO SIT STILL: NOT AT ALL
1. FEELING NERVOUS, ANXIOUS, OR ON EDGE: NOT AT ALL
6. BECOMING EASILY ANNOYED OR IRRITABLE: SEVERAL DAYS
IF YOU CHECKED OFF ANY PROBLEMS ON THIS QUESTIONNAIRE, HOW DIFFICULT HAVE THESE PROBLEMS MADE IT FOR YOU TO DO YOUR WORK, TAKE CARE OF THINGS AT HOME, OR GET ALONG WITH OTHER PEOPLE: NOT DIFFICULT AT ALL

## 2017-07-06 ASSESSMENT — PATIENT HEALTH QUESTIONNAIRE - PHQ9: 5. POOR APPETITE OR OVEREATING: NOT AT ALL

## 2017-07-06 NOTE — LETTER
[facility]  [address]  [place] [address]                  [phone]   July 7, 2017    Ciro Almonte  [address] [place] [address]      Dear Ciro,     Here are the results from the recent Labs that we did.     Your prostate testing was normal.     Your thyroid testing was normal.     Your total cholesterol returned as elevated. I generally like your total cholesterol to be less than 200. I generally recommend that we start a cholesterol medication with these numbers to decrease your overall risk of heart attack and stroke. I will send this to your pharmacy and you can call to fill if interested. If you start this, I generally recheck things 2-3 months after starting this. You should continue on your daily aspirin.     Your foot xray did not show fractures (broken bones) but did show arthritis.         Let me know if you have questions or concerns!     Sincerely,       Uri Queen MD   Internal Medicine and Pediatrics         Results for orders placed or performed in visit on 07/06/17   Comprehensive metabolic panel   Result Value Ref Range    Sodium 141 133 - 144 mmol/L    Potassium 3.8 3.4 - 5.3 mmol/L    Chloride 108 94 - 109 mmol/L    Carbon Dioxide 25 20 - 32 mmol/L    Anion Gap 8 3 - 14 mmol/L    Glucose 100 (H) 70 - 99 mg/dL    Urea Nitrogen 17 7 - 30 mg/dL    Creatinine 0.80 0.66 - 1.25 mg/dL    GFR Estimate >90  Non  GFR Calc   >60 mL/min/1.7m2    GFR Estimate If Black >90   GFR Calc   >60 mL/min/1.7m2    Calcium 8.8 8.5 - 10.1 mg/dL    Bilirubin Total 0.8 0.2 - 1.3 mg/dL    Albumin 3.7 3.4 - 5.0 g/dL    Protein Total 6.6 (L) 6.8 - 8.8 g/dL    Alkaline Phosphatase 88 40 - 150 U/L    ALT 34 0 - 70 U/L    AST 28 0 - 45 U/L   Lipid panel reflex to direct LDL   Result Value Ref Range    Cholesterol 220 (H) <200 mg/dL    Triglycerides 63 <150 mg/dL    HDL Cholesterol 72 >39 mg/dL    LDL Cholesterol Calculated 135 (H) <100  mg/dL    Non HDL Cholesterol 148 (H) <130 mg/dL   Hepatitis C Screen Reflex to HCV RNA Quant and Genotype   Result Value Ref Range    Hepatitis C Antibody  NR     Nonreactive   Assay performance characteristics have not been established for newborns,   infants, and children     PSA, screen   Result Value Ref Range    PSA 1.78 0 - 4 ug/L   TSH with free T4 reflex   Result Value Ref Range    TSH 1.88 0.40 - 4.00 mU/L

## 2017-07-06 NOTE — PATIENT INSTRUCTIONS
1) Labs today as we discussed    2) Foot xrays today as well of the left side    3) Try the requip at night to help with cramps in the legs at night    4) They should call you to set up event monitor at [facility]    5) Call to set up with ears, nose, and throat for evaluation of the dizziness    Uri Queen MD      Preventive Health Recommendations:       Male Ages 65 and over    Yearly exam:             See your health care provider every year in order to  o   Review health changes.   o   Discuss preventive care.    o   Review your medicines if your doctor has prescribed any.    Talk with your health care provider about whether you should have a test to screen for prostate cancer (PSA).    Every 3 years, have a diabetes test (fasting glucose). If you are at risk for diabetes, you should have this test more often.    Every 5 years, have a cholesterol test. Have this test more often if you are at risk for high cholesterol or heart disease.     Every 10 years, have a colonoscopy. Or, have a yearly FIT test (stool test). These exams will check for colon cancer.    Talk to with your health care provider about screening for Abdominal Aortic Aneurysm if you have a family history of AAA or have a history of smoking.  Shots:     Get a flu shot each year.     Get a tetanus shot every 10 years.     Talk to your doctor about your pneumonia vaccines. There are now two you should receive - Pneumovax (PPSV 23) and Prevnar (PCV 13).    Talk to your doctor about a shingles vaccine.     Talk to your doctor about the hepatitis B vaccine.  Nutrition:     Eat at least 5 servings of fruits and vegetables each day.     Eat whole-grain bread, whole-wheat pasta and brown rice instead of white grains and rice.     Talk to your doctor about Calcium and Vitamin D.   Lifestyle    Exercise for at least 150 minutes a week (30 minutes a day, 5 days a week). This will help you control your weight and prevent disease.     Limit alcohol to one drink per  day.     No smoking.     Wear sunscreen to prevent skin cancer.     See your dentist every six months for an exam and cleaning.     See your eye doctor every 1 to 2 years to screen for conditions such as glaucoma, macular degeneration and cataracts.

## 2017-07-06 NOTE — PROGRESS NOTES
SUBJECTIVE:   Ciro Almonte is a 77 year old male who presents for Preventive Visit.    Are you in the first 12 months of your Medicare coverage?  No    Physical   Annual:     Getting at least 3 servings of Calcium per day::  Yes    Bi-annual eye exam::  Yes    Dental care twice a year::  Yes    Sleep apnea or symptoms of sleep apnea::  None    Taking medications regularly::  Yes    Additional concerns today::  YES    Dizziness: feels episodes of dizziness when getting up, has been working with balance center, had previous evaluation at [facility] with MRI without acute changes. PT does not think it is BPPV. Has been noted to have bradycardia in the past but is unsure what heart rate is doing at the time of symptoms.       Foot pain: has been having ongoing foot pain, has been using inserts without much for help. No paresthesias. No claudication. Has not tried medication for severe limb cramps at nighttime.      COGNITIVE SCREEN  1) Repeat 3 items (Banana, Sunrise, Chair)    2) Clock draw: NORMAL  3) 3 item recall: Recalls 3 objects  Results: 3 items recalled: COGNITIVE IMPAIRMENT LESS LIKELY    Mini-CogTM Copyright S Garret. Licensed by the author for use in [facility]; reprinted with permission (ricki@[email or web address]). All rights reserved.        Reviewed and updated as needed this visit by clinical staff         Reviewed and updated as needed this visit by Provider        Social History   Substance Use Topics     Smoking status: Former Smoker     Quit date: 6/27/1976     Smokeless tobacco: Never Used     Alcohol use No      Comment: Quit 8/1014       The patient does not drink >3 drinks per day nor >7 drinks per week.            Today's PHQ-2 Score:   PHQ-2 ( 1999 Pfizer) 12/18/2015   Q1: Little interest or pleasure in doing things 0   Q2: Feeling down, depressed or hopeless 0   PHQ-2 Score 0       Do you feel safe in your environment - Yes    Do you have a Health Care Directive?: No: Advance care planning was  "reviewed with patient; patient declined at this time.    Current providers sharing in care for this patient include:   Patient Care Team:  Uri Queen MD as PCP - General (Internal Medicine)  Ryan Fernandez MD as MD (Ophthalmology)      Hearing impairment: No    Ability to successfully perform activities of daily living: Yes, no assistance needed     Fall risk:  Fallen 2 or more times in the past year?: No  Any fall with injury in the past year?: No    Home safety:  none identified      The following health maintenance items are reviewed in Epic and correct as of today:  Health Maintenance   Topic Date Due     ANTONI QUESTIONNAIRE 1 YEAR  1940     PHQ-9 Q1YR  07/23/2015     FALL RISK ASSESSMENT  07/05/2017     INFLUENZA VACCINE (SYSTEM ASSIGNED)  09/01/2017     LIPID SCREEN Q5 YR MALE (SYSTEM ASSIGNED)  07/05/2021     ADVANCE DIRECTIVE PLANNING Q5 YRS  07/05/2021     TETANUS IMMUNIZATION (SYSTEM ASSIGNED)  04/06/2027     PNEUMOCOCCAL  Completed     AORTIC ANEURYSM SCREENING (SYSTEM ASSIGNED)  Completed     Labs reviewed in EPIC    Pneumonia Vaccine:Adults age 65+ who received Pneumovax (PPSV23) at 65 years or older: Should be given PCV13 > 1 year after their most recent PPSV23    ROS:  Constitutional, HEENT, cardiovascular, pulmonary, GI, , musculoskeletal, neuro, skin, endocrine and psych systems are negative, except as otherwise noted.    OBJECTIVE:   /78  Pulse 53  Temp 97.7  F (36.5  C) (Oral)  Ht 5' 10\" (1.778 m)  Wt 184 lb (83.5 kg)  SpO2 96%  BMI 26.4 kg/m2 Estimated body mass index is 26.83 kg/(m^2) as calculated from the following:    Height as of 5/17/17: 5' 10\" (1.778 m).    Weight as of 5/17/17: 187 lb (84.8 kg).  EXAM:   GENERAL: healthy, alert and no distress  EYES: Eyes grossly normal to inspection, PERRL and conjunctivae and sclerae normal  HENT: ear canals and TM's normal, nose and mouth without ulcers or lesions  NECK: no adenopathy, no asymmetry, masses, or scars " and thyroid normal to palpation  RESP: lungs clear to auscultation - no rales, rhonchi or wheezes  CV: slightly bradycardic but regular rate and rhythm, normal S1 S2, no S3 or S4, no murmur, click or rub, no peripheral edema and peripheral pulses strong  ABDOMEN: soft, nontender, no hepatosplenomegaly, no masses and bowel sounds normal  MS: no gross musculoskeletal defects noted, no edema  MS: bilateral feet with chronic arthritic changes noted, shoes with good inserts noted  SKIN: no suspicious lesions or rashes  NEURO: Normal strength and tone, mentation intact and speech normal  PSYCH: mentation appears normal, affect normal/bright    ASSESSMENT / PLAN:   1. Vertigo  Will refer to ENT for other causes, MRI ok recently, will get CV event monitor to ensure not related to bradycardia  - Cardiac Event Monitor - Peds/Adult; Future  - OTOLARYNGOLOGY REFERRAL    2. Bradycardia  Check thyroid function today, last EKG with sinus rhythm without acute changes, did check lyme recently as well  - TSH with free T4 reflex    3. Bilateral foot pain  Will get xrays of the feet, will continue to follow with podiatry    4. Cramp of limb  Will try requip for this  - rOPINIRole (REQUIP) 0.25 MG tablet; Take 1-2 tablets (0.25-0.5 mg) by mouth At Bedtime  Dispense: 60 tablet; Refill: 1    5. Routine general medical examination at a health care facility  Discussed routine screening  - Fecal colorectal cancer screen (FIT); Future  - Comprehensive metabolic panel  - Lipid panel reflex to direct LDL  - Hepatitis C Screen Reflex to HCV RNA Quant and Genotype  - PSA, screen    6. Encounter for screening for malignant neoplasm of colon     - Fecal colorectal cancer screen (FIT); Future    7. Encounter for screening for malignant neoplasm of prostate     - PSA, screen    8. Mixed hyperlipidemia  Continue current medication  - atorvastatin (LIPITOR) 20 MG tablet; Take 1 tablet (20 mg) by mouth daily  Dispense: 30 tablet; Refill: 3    End of  "Life Planning:  Patient currently has an advanced directive: Yes.  Practitioner is supportive of decision.    COUNSELING:  Reviewed preventive health counseling, as reflected in patient instructions        Estimated body mass index is 26.83 kg/(m^2) as calculated from the following:    Height as of 5/17/17: 5' 10\" (1.778 m).    Weight as of 5/17/17: 187 lb (84.8 kg).     reports that he quit smoking about 41 years ago. He has never used smokeless tobacco.      Appropriate preventive services were discussed with this patient, including applicable screening as appropriate for cardiovascular disease, diabetes, osteopenia/osteoporosis, and glaucoma.  As appropriate for age/gender, discussed screening for colorectal cancer, prostate cancer, breast cancer, and cervical cancer. Checklist reviewing preventive services available has been given to the patient.    Reviewed patients plan of care and provided an AVS. The Basic Care Plan (routine screening as documented in Health Maintenance) for Ciro meets the Care Plan requirement. This Care Plan has been established and reviewed with the Patient.    Counseling Resources:  ATP IV Guidelines  Pooled Cohorts Equation Calculator  Breast Cancer Risk Calculator  FRAX Risk Assessment  ICSI Preventive Guidelines  Dietary Guidelines for Americans, 2010  [facility]'s MyPlate  ASA Prophylaxis  Lung CA Screening    Uri Queen MD, MD  [facility] [place]  "

## 2017-07-06 NOTE — NURSING NOTE
"Chief Complaint   Patient presents with     Physical       Initial /72 (BP Location: Right arm, Cuff Size: Adult Regular)  Pulse 53  Temp 97.7  F (36.5  C) (Oral)  Ht 5' 10\" (1.778 m)  Wt 184 lb (83.5 kg)  SpO2 96%  BMI 26.4 kg/m2 Estimated body mass index is 26.4 kg/(m^2) as calculated from the following:    Height as of this encounter: 5' 10\" (1.778 m).    Weight as of this encounter: 184 lb (83.5 kg).  Medication Reconciliation: complete   Tiffanie To MA    "

## 2017-07-06 NOTE — MR AVS SNAPSHOT
After Visit Summary   7/6/2017    Ciro Almonte    MRN: [id number]           Patient Information     Date Of Birth          1940        Visit Information        Provider Department      7/6/2017 8:50 AM Uri Queen MD [facility]        Today's Diagnoses     Vertigo    -  1    Bradycardia        Bilateral foot pain        Cramp of limb        Routine general medical examination at a health care facility        Encounter for screening for malignant neoplasm of colon         Encounter for screening for malignant neoplasm of prostate           Care Instructions    1) Labs today as we discussed    2) Foot xrays today as well of the left side    3) Try the requip at night to help with cramps in the legs at night    4) They should call you to set up event monitor at [facility]    5) Call to set up with ears, nose, and throat for evaluation of the dizziness    Uri Queen MD      Preventive Health Recommendations:       Male Ages 65 and over    Yearly exam:             See your health care provider every year in order to  o   Review health changes.   o   Discuss preventive care.    o   Review your medicines if your doctor has prescribed any.    Talk with your health care provider about whether you should have a test to screen for prostate cancer (PSA).    Every 3 years, have a diabetes test (fasting glucose). If you are at risk for diabetes, you should have this test more often.    Every 5 years, have a cholesterol test. Have this test more often if you are at risk for high cholesterol or heart disease.     Every 10 years, have a colonoscopy. Or, have a yearly FIT test (stool test). These exams will check for colon cancer.    Talk to with your health care provider about screening for Abdominal Aortic Aneurysm if you have a family history of AAA or have a history of smoking.  Shots:     Get a flu shot each year.     Get a tetanus shot every 10 years.     Talk to your doctor about your  pneumonia vaccines. There are now two you should receive - Pneumovax (PPSV 23) and Prevnar (PCV 13).    Talk to your doctor about a shingles vaccine.     Talk to your doctor about the hepatitis B vaccine.  Nutrition:     Eat at least 5 servings of fruits and vegetables each day.     Eat whole-grain bread, whole-wheat pasta and brown rice instead of white grains and rice.     Talk to your doctor about Calcium and Vitamin D.   Lifestyle    Exercise for at least 150 minutes a week (30 minutes a day, 5 days a week). This will help you control your weight and prevent disease.     Limit alcohol to one drink per day.     No smoking.     Wear sunscreen to prevent skin cancer.     See your dentist every six months for an exam and cleaning.     See your eye doctor every 1 to 2 years to screen for conditions such as glaucoma, macular degeneration and cataracts.          Follow-ups after your visit        Additional Services     OTOLARYNGOLOGY REFERRAL       Your provider has referred you to: [facility] [phone]   [email or web address]  [facility]: [place] [facility] [phone]   [email or web address]    Please be aware that coverage of these services is subject to the terms and limitations of your health insurance plan.  Call member services at your health plan with any benefit or coverage questions.      Please bring the following with you to your appointment:    (1) Any X-Rays, CTs or MRIs which have been performed.  Contact the facility where they were done to arrange for  prior to your scheduled appointment.   (2) List of current medications  (3) This referral request   (4) Any documents/labs given to you for this referral                  Your next 10 appointments already scheduled     Jul 07, 2017  7:40 AM CDT   [facility] with Marcos James PT   [facility] [place]  "(RO[facility]N [facility]  )    [address]  [address]  [place] [address]   [phone]              Future tests that were ordered for you today     Open Future Orders        Priority Expected Expires Ordered    XR Foot Right G/E 3 Views Routine 2017    Fecal colorectal cancer screen (FIT) Routine 2017    Cardiac Event Monitor - Peds/Adult Routine  2017            Who to contact     If you have questions or need follow up information about today's clinic visit or your schedule please contact [facility] directly at [phone].  Normal or non-critical lab and imaging results will be communicated to you by "[employer]hart, letter or phone within 4 business days after the clinic has received the results. If you do not hear from us within 7 days, please contact the clinic through "[employer]hart or phone. If you have a critical or abnormal lab result, we will notify you by phone as soon as possible.  Submit refill requests through Ring or call your pharmacy and they will forward the refill request to us. Please allow 3 business days for your refill to be completed.          Additional Information About Your Visit        "[employer]hart Information     Ring lets you send messages to your doctor, view your test results, renew your prescriptions, schedule appointments and more. To sign up, go to [email or web address] . Click on \"Log in\" on the left side of the screen, which will take you to the Welcome page. Then click on \"Sign up Now\" on the right side of the page.     You will be asked to enter the access code listed below, as well as some personal information. Please follow the directions to create your username and password.     Your access code is: [email or web address]  Expires: 10/4/2017  9:21 AM     Your access code will  in 90 days. If you need help or a new code, please call your [facility] or [phone].        Care EveryWhere ID     This is your " "Care EveryWhere ID. This could be used by other organizations to access your [place] medical records  [id number]        Your Vitals Were     Pulse Temperature Height Pulse Oximetry BMI (Body Mass Index)       53 97.7  F (36.5  C) (Oral) 5' 10\" (1.778 m) 96% 26.4 kg/m2        Blood Pressure from Last 3 Encounters:   07/06/17 146/72   05/17/17 128/62   05/12/17 137/71    Weight from Last 3 Encounters:   07/06/17 184 lb (83.5 kg)   05/17/17 187 lb (84.8 kg)   05/12/17 183 lb (83 kg)              We Performed the Following     Comprehensive metabolic panel     Hepatitis C Screen Reflex to HCV RNA Quant and Genotype     Lipid panel reflex to direct LDL     OTOLARYNGOLOGY REFERRAL     PSA, screen     TSH with free T4 reflex          Today's Medication Changes          These changes are accurate as of: 7/6/17  9:21 AM.  If you have any questions, ask your nurse or doctor.               Start taking these medicines.        Dose/Directions    rOPINIRole 0.25 MG tablet   Commonly known as:  REQUIP   Used for:  Cramp of limb   Started by:  Uri Queen MD        Dose:  0.25-0.5 mg   Take 1-2 tablets (0.25-0.5 mg) by mouth At Bedtime   Quantity:  60 tablet   Refills:  1            Where to get your medicines      These medications were sent to [id number] [place] - [address]  AT [facility] & [address] [place] [address]     Phone:  [phone]     rOPINIRole 0.25 MG tablet                Primary Care Provider Office Phone # Fax #    Uri Queen [phone]       [facility] [place] [address] [place] [address]        Equal Access to Services     JAKY RAE AH: John Loya, waravida luvitalyadaha, qaybta kaalmada hugoyada, shawanda mortensen. So [facility] [phone].    ATENCIÓN: Si habla español, tiene a oliver disposición servicios gratuitos de asistencia lingüística. Llame al " [phone].    We comply with applicable federal civil rights laws and [place] laws. We do not discriminate on the basis of race, color, national origin, age, disability sex, sexual orientation or gender identity.            Thank you!     Thank you for choosing [facility] [place]  for your care. Our goal is always to provide you with excellent care. Hearing back from our patients is one way we can continue to improve our services. Please take a few minutes to complete the written survey that you may receive in the mail after your visit with us. Thank you!             Your Updated Medication List - Protect others around you: Learn how to safely use, store and throw away your medicines at www.disposemymeds.org.          This list is accurate as of: 7/6/17  9:21 AM.  Always use your most recent med list.                   Brand Name Dispense Instructions for use Diagnosis    amLODIPine 10 MG tablet    NORVASC    90 tablet    Take 1 tablet (10 mg) by mouth daily    Essential hypertension with goal blood pressure less than 140/90       AMOXICILLIN PO      Take 500 mg by mouth Reported on 4/6/2017        ASPIRIN PO      Take 81 mg by mouth        CITRACAL + D PO      Take 2 tablets by mouth daily        gabapentin 300 MG capsule    NEURONTIN    270 capsule    TAKE 3 CAPSULES BY MOUTH EVERY NIGHT AT BEDTIME    Neuropathy (H)       latanoprost 0.005 % ophthalmic solution    XALATAN    7.5 mL    Place 1 drop into both eyes At Bedtime    Primary open angle glaucoma of both eyes       lisinopril 10 MG tablet    PRINIVIL/ZESTRIL    90 tablet    Take 1 tablet (10 mg) by mouth daily    Essential hypertension with goal blood pressure less than 140/90       Magnesium 500 MG Caps           MENS MULTIVITAMIN PLUS PO      Take 1 tablet by mouth daily        PROSTATE 2.4 Caps      Take 2 capsules by mouth daily Prostate cap 2.0        rOPINIRole 0.25 MG tablet    REQUIP    60 tablet    Take 1-2 tablets (0.25-0.5 mg) by mouth  At Bedtime    Cramp of limb       terbinafine 250 MG tablet    lamISIL     TK 1 T PO QD FOR ONCHOMYCOSIS

## 2017-07-07 ENCOUNTER — THERAPY VISIT (OUTPATIENT)
Dept: PHYSICAL THERAPY | Facility: CLINIC | Age: 77
End: 2017-07-07
Payer: COMMERCIAL

## 2017-07-07 DIAGNOSIS — M25.552 HIP PAIN, LEFT: ICD-10-CM

## 2017-07-07 DIAGNOSIS — M54.50 LUMBAGO: ICD-10-CM

## 2017-07-07 PROBLEM — E78.2 MIXED HYPERLIPIDEMIA: Status: ACTIVE | Noted: 2017-07-07

## 2017-07-07 PROCEDURE — 97112 NEUROMUSCULAR REEDUCATION: CPT | Mod: GP | Performed by: PHYSICAL THERAPIST

## 2017-07-07 PROCEDURE — 97110 THERAPEUTIC EXERCISES: CPT | Mod: GP | Performed by: PHYSICAL THERAPIST

## 2017-07-07 RX ORDER — ATORVASTATIN CALCIUM 20 MG/1
20 TABLET, FILM COATED ORAL DAILY
Qty: 30 TABLET | Refills: 3 | Status: SHIPPED | OUTPATIENT
Start: 2017-07-07 | End: 2017-11-20

## 2017-07-07 ASSESSMENT — ANXIETY QUESTIONNAIRES: GAD7 TOTAL SCORE: 1

## 2017-07-07 ASSESSMENT — PATIENT HEALTH QUESTIONNAIRE - PHQ9: SUM OF ALL RESPONSES TO PHQ QUESTIONS 1-9: 2

## 2017-07-07 NOTE — PROGRESS NOTES
Subjective:    HPI                    Objective:    System    Physical Exam    General     ROS    Assessment/Plan:      DISCHARGE REPORT    Progress reporting period is from 5/22/2017 to 7/7/2017.       SUBJECTIVE  Patient no longer c/o left hip or LBP. He primarily c/o right lateral hip pain intermittently and significant B foot pain. Right lateral hip pain is most pronounced climbing stairs. Patient states he limps because of foot pain, not because of hip pain.  Current pain level is 0/10 L Hip,  2/10 R Hip, 4/10 B Foot     Changes in function: Function limited mostly by bilateral foot pain.  Adverse reaction to treatment or activity: None    OBJECTIVE  TROM: Allows for normal function.  Lumbar feel better with repeated extension.  B Hip ROM allows for normal function.  MMT: 3+/5 R Hip Abd, 4+/5 Hip Abd.  Gait: Antalgic d/t foot pain.        ASSESSMENT/PLAN  Updated problem list and treatment plan: Diagnosis 1:  LBP, Hip Pain  Pain -  home program  Decreased ROM/flexibility - home program  Decreased strength - home program  Impaired muscle performance - home program  Decreased function - home program  STG/LTGs have been met or progress has been made towards goals:  Yes (See Goal flow sheet completed today.)  Assessment of Progress: The patient's condition is improving.  Patient is meeting short term goals and is progressing towards long term goals.  Self Management Plans:  Patient has been instructed in a home treatment program.  Patient is independent in a home treatment program.  I have re-evaluated this patient and find that the nature, scope, duration and intensity of the therapy is appropriate for the medical condition of the patient.  Ciro continues to require the following intervention to meet STG and LTG's:  PT intervention is no longer required to meet STG/LTG.    Recommendations:  This patient is ready to be discharged from therapy and continue their home treatment program.    Patient to pursue  treatment for bilateral foot pain.    Please refer to the daily flowsheet for treatment today, total treatment time and time spent performing 1:1 timed codes.

## 2017-07-10 PROCEDURE — G0328 FECAL BLOOD SCRN IMMUNOASSAY: HCPCS | Performed by: INTERNAL MEDICINE

## 2017-07-10 RX ORDER — ROPINIROLE 0.25 MG/1
TABLET, FILM COATED ORAL
Qty: 180 TABLET | Refills: 1 | OUTPATIENT
Start: 2017-07-10

## 2017-07-11 DIAGNOSIS — Z12.11 ENCOUNTER FOR SCREENING FOR MALIGNANT NEOPLASM OF COLON: ICD-10-CM

## 2017-07-11 DIAGNOSIS — Z00.00 ROUTINE GENERAL MEDICAL EXAMINATION AT A HEALTH CARE FACILITY: ICD-10-CM

## 2017-07-11 LAB — HEMOCCULT STL QL IA: NEGATIVE

## 2017-07-18 VITALS
BODY MASS INDEX: 26.34 KG/M2 | WEIGHT: 184 LBS | TEMPERATURE: 97.7 F | HEIGHT: 70 IN | OXYGEN SATURATION: 96 % | HEART RATE: 53 BPM | DIASTOLIC BLOOD PRESSURE: 78 MMHG | SYSTOLIC BLOOD PRESSURE: 138 MMHG

## 2017-07-20 ENCOUNTER — HOSPITAL ENCOUNTER (OUTPATIENT)
Dept: CARDIOLOGY | Facility: CLINIC | Age: 77
Discharge: HOME | End: 2017-07-20
Attending: INTERNAL MEDICINE | Admitting: INTERNAL MEDICINE
Payer: MEDICARE

## 2017-07-20 DIAGNOSIS — R42 VERTIGO: ICD-10-CM

## 2017-07-20 PROCEDURE — 93270 REMOTE 30 DAY ECG REV/REPORT: CPT

## 2017-07-20 PROCEDURE — 93272 ECG/REVIEW INTERPRET ONLY: CPT | Performed by: INTERNAL MEDICINE

## 2017-07-20 NOTE — LETTER
[facility]  [address] [address]                  [phone]   2017    Ciro Almonte  [address] [address]      Dear Ciro,     Here are the results from the recent testing that we did.     Your heart rhythm did show a couple of rare early contractions of the ventricle (bottom part) of your heart. I would not recommend a medication for these as they did not always correlate with dizziness.     Let me know if you have questions or concerns!     Sincerely,       Uri Queen MD   Internal Medicine and Pediatrics         Results for orders placed or performed during the hospital encounter of 17   Cardiac Event Monitor - Peds/Adult    [facility]  [address]  [place] [address]  [phone]  2017      Patient:  Ciro Almonte  Chart: [id number]  :  1940  Age:  77 year old  Sex:  male       Procedure:  Event Monitor Placed: Please see scanned document for result once interpretation is completed.        Technician performing hook-up:  Fanta Greenfield

## 2017-07-24 ENCOUNTER — TRANSFERRED RECORDS (OUTPATIENT)
Dept: HEALTH INFORMATION MANAGEMENT | Facility: CLINIC | Age: 77
End: 2017-07-24

## 2017-08-02 ENCOUNTER — TRANSFERRED RECORDS (OUTPATIENT)
Dept: HEALTH INFORMATION MANAGEMENT | Facility: CLINIC | Age: 77
End: 2017-08-02

## 2017-08-09 ENCOUNTER — TRANSFERRED RECORDS (OUTPATIENT)
Dept: HEALTH INFORMATION MANAGEMENT | Facility: CLINIC | Age: 77
End: 2017-08-09

## 2017-08-10 ENCOUNTER — TELEPHONE (OUTPATIENT)
Dept: PEDIATRICS | Facility: CLINIC | Age: 77
End: 2017-08-10

## 2017-08-10 DIAGNOSIS — R42 DISEQUILIBRIUM: Primary | ICD-10-CM

## 2017-08-10 NOTE — TELEPHONE ENCOUNTER
Spoke with Patients wife Phoebe, she stated that she believes the patient already sees a neurologist, but couldn't remember where or who. She said she would talk with Ciro and get back to us.     If he does already see a neurologist, he just needs to follow up with them. But if he doesn't we can provide the referral info to the patient.    Tiffanie To MA

## 2017-08-10 NOTE — TELEPHONE ENCOUNTER
Please call patient. I placed a referral for the neurologist after reviewing the ENT notes. He can call to set up if he would like.    Uri Queen MD

## 2017-10-02 ENCOUNTER — TRANSFERRED RECORDS (OUTPATIENT)
Dept: HEALTH INFORMATION MANAGEMENT | Facility: CLINIC | Age: 77
End: 2017-10-02

## 2017-10-04 DIAGNOSIS — I10 ESSENTIAL HYPERTENSION WITH GOAL BLOOD PRESSURE LESS THAN 140/90: ICD-10-CM

## 2017-10-04 NOTE — TELEPHONE ENCOUNTER
amLODIPine (NORVASC) 10 MG tablet      Last Written Prescription Date: 7/5/2016  Last Fill Quantity: 90, # refills: 3    [facility] or  Health prescribing provider:  7/6/2017   Future Office Visit:    Next 5 appointments (look out 90 days)     Oct 05, 2017 10:30 AM CDT   Nurse Only with EA NURSE AB   [facility]    [address] [place] [address]  [address]  [place] [address]   [phone]                    BP Readings from Last 3 Encounters:   07/06/17 138/78   05/17/17 128/62   05/12/17 137/71       lisinopril (PRINIVIL,ZESTRIL) 10 MG tablet      Last Written Prescription Date: 7/5/2016  Last Fill Quantity: 90, # refills: 3  Last Office Visit with [facility] or  Health prescribing provider: 7/6/2017  Next 5 appointments (look out 90 days)     Oct 05, 2017 10:30 AM CDT   Nurse Only with EA NURSE AB   [facility]    [address] [place] [address]  [address]  [place] [address]   [phone]                   Potassium   Date Value Ref Range Status   07/06/2017 3.8 3.4 - 5.3 mmol/L Final     Creatinine   Date Value Ref Range Status   07/06/2017 0.80 0.66 - 1.25 mg/dL Final     BP Readings from Last 3 Encounters:   07/06/17 138/78   05/17/17 128/62   05/12/17 137/71

## 2017-10-05 ENCOUNTER — ALLIED HEALTH/NURSE VISIT (OUTPATIENT)
Dept: NURSING | Facility: CLINIC | Age: 77
End: 2017-10-05
Payer: COMMERCIAL

## 2017-10-05 DIAGNOSIS — Z23 NEED FOR PROPHYLACTIC VACCINATION AND INOCULATION AGAINST INFLUENZA: Primary | ICD-10-CM

## 2017-10-05 PROCEDURE — 90662 IIV NO PRSV INCREASED AG IM: CPT

## 2017-10-05 PROCEDURE — G0008 ADMIN INFLUENZA VIRUS VAC: HCPCS

## 2017-10-05 PROCEDURE — 99207 ZZC NO CHARGE NURSE ONLY: CPT

## 2017-10-05 RX ORDER — AMLODIPINE BESYLATE 10 MG/1
10 TABLET ORAL DAILY
Qty: 90 TABLET | Refills: 2 | Status: SHIPPED | OUTPATIENT
Start: 2017-10-05 | End: 2018-07-09

## 2017-10-05 RX ORDER — LISINOPRIL 10 MG/1
10 TABLET ORAL DAILY
Qty: 90 TABLET | Refills: 2 | Status: SHIPPED | OUTPATIENT
Start: 2017-10-05 | End: 2018-07-09

## 2017-10-05 NOTE — TELEPHONE ENCOUNTER
Prescription approved per [facility] Refill Protocol.  Vianney Colvin, RN  Triage Nurse

## 2017-10-05 NOTE — PROGRESS NOTES
Injectable Influenza Immunization Documentation    1.  Is the person to be vaccinated sick today?   No    2. Does the person to be vaccinated have an allergy to a component   of the vaccine?   No    3. Has the person to be vaccinated ever had a serious reaction   to influenza vaccine in the past?   No    4. Has the person to be vaccinated ever had Guillain-Barré syndrome?   No    Form completed by Laila Mckeon LPN           Injectable Influenza Immunization Documentation    1.  Is the person to be vaccinated sick today?   No    2. Does the person to be vaccinated have an allergy to a component   of the vaccine?   No    3. Has the person to be vaccinated ever had a serious reaction   to influenza vaccine in the past?   No    4. Has the person to be vaccinated ever had Guillain-Barré syndrome?   No    Form completed by Laila Mckeon LPN

## 2017-10-05 NOTE — MR AVS SNAPSHOT
"              After Visit Summary   10/5/2017    Ciro Almonte    MRN: [id number]           Patient Information     Date Of Birth          1940        Visit Information        Provider Department      10/5/2017 10:30 AM BUZZ NURSE AB [facility] [place]        Today's Diagnoses     Need for prophylactic vaccination and inoculation against influenza    -  1       Follow-ups after your visit        Who to contact     If you have questions or need follow up information about today's clinic visit or your schedule please contact [facility] directly at [phone].  Normal or non-critical lab and imaging results will be communicated to you by MyChart, letter or phone within 4 business days after the clinic has received the results. If you do not hear from us within 7 days, please contact the clinic through [employer] or phone. If you have a critical or abnormal lab result, we will notify you by phone as soon as possible.  Submit refill requests through [employer] or call your pharmacy and they will forward the refill request to us. Please allow 3 business days for your refill to be completed.          Additional Information About Your Visit        MyChart Information     [employer] lets you send messages to your doctor, view your test results, renew your prescriptions, schedule appointments and more. To sign up, go to [email or web address] . Click on \"Log in\" on the left side of the screen, which will take you to the Welcome page. Then click on \"Sign up Now\" on the right side of the page.     You will be asked to enter the access code listed below, as well as some personal information. Please follow the directions to create your username and password.     Your access code is: [email or web address]  Expires: 1/3/2018 11:14 AM     Your access code will  in 90 days. If you need help or a new code, please call your [facility] or [phone].        Care EveryWhere ID     This is your Care EveryWhere ID. This " could be used by other organizations to access your [place] medical records  [id number]         Blood Pressure from Last 3 Encounters:   07/06/17 138/78   05/17/17 128/62   05/12/17 137/71    Weight from Last 3 Encounters:   07/06/17 184 lb (83.5 kg)   05/17/17 187 lb (84.8 kg)   05/12/17 183 lb (83 kg)              We Performed the Following     FLU VACCINE, INCREASED ANTIGEN, PRESV FREE, AGE 65+ [12512]     Vaccine Administration, Initial [49547]        Primary Care Provider Office Phone # Fax #    Uri Queen [phone] [address] [place] [address]        Equal Access to Services     BLAYNE RAE : Hadii eusebia price hadasho Soleonali, waaxda luqadaha, qaybta kaalmada adeegyada, shawanda alonzo . So [facility] [phone].    ATENCIÓN: Si habla español, tiene a oliver disposición servicios gratuitos de asistencia lingüística. [facility] [phone].    We comply with applicable federal civil rights laws and [place] laws. We do not discriminate on the basis of race, color, national origin, age, disability, sex, sexual orientation, or gender identity.            Thank you!     Thank you for choosing [facility]  for your care. Our goal is always to provide you with excellent care. Hearing back from our patients is one way we can continue to improve our services. Please take a few minutes to complete the written survey that you may receive in the mail after your visit with us. Thank you!             Your Updated Medication List - Protect others around you: Learn how to safely use, store and throw away your medicines at www.disposemymeds.org.          This list is accurate as of: 10/5/17 11:14 AM.  Always use your most recent med list.                   Brand Name Dispense Instructions for use Diagnosis    amLODIPine 10 MG tablet    NORVASC    90 tablet    Take 1 tablet (10 mg) by mouth daily    Essential hypertension with goal blood pressure less  than 140/90       AMOXICILLIN PO      Take 500 mg by mouth Reported on 4/6/2017        ASPIRIN PO      Take 81 mg by mouth        atorvastatin 20 MG tablet    LIPITOR    30 tablet    Take 1 tablet (20 mg) by mouth daily    Mixed hyperlipidemia       CITRACAL + D PO      Take 2 tablets by mouth daily        gabapentin 300 MG capsule    NEURONTIN    270 capsule    TAKE 3 CAPSULES BY MOUTH EVERY NIGHT AT BEDTIME    Neuropathy       latanoprost 0.005 % ophthalmic solution    XALATAN    7.5 mL    Place 1 drop into both eyes At Bedtime    Primary open angle glaucoma of both eyes       lisinopril 10 MG tablet    PRINIVIL/ZESTRIL    90 tablet    Take 1 tablet (10 mg) by mouth daily    Essential hypertension with goal blood pressure less than 140/90       Magnesium 500 MG Caps           MENS MULTIVITAMIN PLUS PO      Take 1 tablet by mouth daily        PROSTATE 2.4 Caps      Take 2 capsules by mouth daily Prostate cap 2.0        rOPINIRole 0.25 MG tablet    REQUIP    60 tablet    Take 1-2 tablets (0.25-0.5 mg) by mouth At Bedtime    Cramp of limb       terbinafine 250 MG tablet    lamISIL     TK 1 T PO QD FOR ONCHOMYCOSIS

## 2017-11-01 ENCOUNTER — OFFICE VISIT (OUTPATIENT)
Dept: PEDIATRICS | Facility: CLINIC | Age: 77
End: 2017-11-01
Payer: COMMERCIAL

## 2017-11-01 VITALS
BODY MASS INDEX: 26.05 KG/M2 | DIASTOLIC BLOOD PRESSURE: 62 MMHG | SYSTOLIC BLOOD PRESSURE: 130 MMHG | TEMPERATURE: 97.7 F | WEIGHT: 182 LBS | HEART RATE: 60 BPM | OXYGEN SATURATION: 96 % | HEIGHT: 70 IN

## 2017-11-01 DIAGNOSIS — Z01.818 PREOP GENERAL PHYSICAL EXAM: Primary | ICD-10-CM

## 2017-11-01 DIAGNOSIS — I49.3 PVC'S (PREMATURE VENTRICULAR CONTRACTIONS): ICD-10-CM

## 2017-11-01 DIAGNOSIS — G56.02 CARPAL TUNNEL SYNDROME OF LEFT WRIST: ICD-10-CM

## 2017-11-01 LAB
ANION GAP SERPL CALCULATED.3IONS-SCNC: 10 MMOL/L (ref 3–14)
BUN SERPL-MCNC: 19 MG/DL (ref 7–30)
CALCIUM SERPL-MCNC: 9 MG/DL (ref 8.5–10.1)
CHLORIDE SERPL-SCNC: 107 MMOL/L (ref 94–109)
CO2 SERPL-SCNC: 26 MMOL/L (ref 20–32)
CREAT SERPL-MCNC: 0.91 MG/DL (ref 0.66–1.25)
GFR SERPL CREATININE-BSD FRML MDRD: 81 ML/MIN/1.7M2
GLUCOSE SERPL-MCNC: 88 MG/DL (ref 70–99)
POTASSIUM SERPL-SCNC: 3.9 MMOL/L (ref 3.4–5.3)
SODIUM SERPL-SCNC: 143 MMOL/L (ref 133–144)

## 2017-11-01 PROCEDURE — 99215 OFFICE O/P EST HI 40 MIN: CPT | Performed by: INTERNAL MEDICINE

## 2017-11-01 PROCEDURE — 93000 ELECTROCARDIOGRAM COMPLETE: CPT | Performed by: INTERNAL MEDICINE

## 2017-11-01 PROCEDURE — 80048 BASIC METABOLIC PNL TOTAL CA: CPT | Performed by: INTERNAL MEDICINE

## 2017-11-01 PROCEDURE — 36415 COLL VENOUS BLD VENIPUNCTURE: CPT | Performed by: INTERNAL MEDICINE

## 2017-11-01 NOTE — NURSING NOTE
"Chief Complaint   Patient presents with     Pre-Op Exam       Initial /62 (BP Location: Right arm, Cuff Size: Adult Regular)  Pulse 60  Temp 97.7  F (36.5  C) (Oral)  Ht 5' 10\" (1.778 m)  Wt 182 lb (82.6 kg)  SpO2 96%  BMI 26.11 kg/m2 Estimated body mass index is 26.11 kg/(m^2) as calculated from the following:    Height as of this encounter: 5' 10\" (1.778 m).    Weight as of this encounter: 182 lb (82.6 kg).  Medication Reconciliation: complete   Tiffanie To MA    "

## 2017-11-01 NOTE — LETTER
[facility]  [address]  [place] [address]                  [phone]   November 2, 2017    Ciro Almonte  [address]   [place] [address]      Dear Ciro,    Here is a summary of your recent test results:    Your kidney function and electrolytes were normal. Good luck with your surgery!     Your test results are enclosed.      Please contact me if you have any questions.           Thank you very much for choosing [facility]    Best regards,    Uri Queen MD        Results for orders placed or performed in visit on 11/01/17   Basic metabolic panel   Result Value Ref Range    Sodium 143 133 - 144 mmol/L    Potassium 3.9 3.4 - 5.3 mmol/L    Chloride 107 94 - 109 mmol/L    Carbon Dioxide 26 20 - 32 mmol/L    Anion Gap 10 3 - 14 mmol/L    Glucose 88 70 - 99 mg/dL    Urea Nitrogen 19 7 - 30 mg/dL    Creatinine 0.91 0.66 - 1.25 mg/dL    GFR Estimate 81 >60 mL/min/1.7m2    GFR Estimate If Black >90 >60 mL/min/1.7m2    Calcium 9.0 8.5 - 10.1 mg/dL

## 2017-11-01 NOTE — PROGRESS NOTES
[facility]  [address]  [address]  [place] [address]  [phone]  Dept: [phone]    PRE-OP EVALUATION:  Today's date: 2017    Ciro Almonte (: 1940) presents for pre-operative evaluation assessment as requested by Dr. Pitts.  He requires evaluation and anesthesia risk assessment prior to undergoing surgery/procedure for treatment of Left open carpal tunnel release .  Proposed procedure: carpal tunnel release    Date of Surgery/ Procedure: 17  Time of Surgery/ Procedure: 6:15am  Hospital/Surgical Facility: [facility]  Fax number for surgical facility: [phone]  Primary Physician: Uri Queen  Type of Anesthesia Anticipated: to be determined    Patient has a Health Care Directive or Living Will:  NO    1. NO - Do you have a history of heart attack, stroke, stent, bypass or surgery on an artery in the head, neck, heart or legs?  2. NO - Do you ever have any pain or discomfort in your chest?  3. NO - Do you have a history of  Heart Failure?  4. NO - Are you troubled by shortness of breath when: walking on the level, up a slight hill or at night?  5. NO - Do you currently have a cold, bronchitis or other respiratory infection?  6. NO - Do you have a cough, shortness of breath or wheezing?  7. YES - DO YOU SOMETIMES GET PAINS IN THE CALVES OF YOUR LEGS WHEN YOU WALK? Not when walking but when sleeping  8. NO - Do you or anyone in your family have previous history of blood clots?  9. NO - Do you or does anyone in your family have a serious bleeding problem such as prolonged bleeding following surgeries or cuts?  10. NO - Have you ever had problems with anemia or been told to take iron pills?  11. NO - Have you had any abnormal blood loss such as black, tarry or bloody stools, or abnormal vaginal bleeding?  12. NO - Have you ever had a blood transfusion?  13. NO - Have you or any of your relatives ever had problems with anesthesia?  14.  NO - Do you have sleep apnea, excessive snoring or daytime drowsiness?  15. NO - Do you have any prosthetic heart valves?  16. YES - DO YOU HAVE PROSTHETIC JOINTS? Both Hip Joints  17. NO - Is there any chance that you may be pregnant?        HPI:                                                      Brief HPI related to upcoming procedure: Mr. Almonte is a 77 yr old male with history of hypertension who presents for pre-op prior to left carpal tunnel surgery. He has been having progressive pain in the left hand at night and getting electrical shocks into thumb, index finger over the last 6 months. He will get some pain into the fingers with flexion of his hand on the left.       HYPERTENSION - Patient has longstanding history of mod-severe HTN , currently denies any symptoms referable to elevated blood pressure. Specifically denies chest pain, palpitations, dyspnea, orthopnea, PND or peripheral edema. Blood pressure readings have been in normal range. Current medication regimen is as listed below. Patient denies any side effects of medication.                                                                                                                                                                                          .    MEDICAL HISTORY:                                                    Patient Active Problem List    Diagnosis Date Noted     Mixed hyperlipidemia 07/07/2017     Priority: Medium     Lumbago 05/22/2017     Priority: Medium     Hip pain, left 05/22/2017     Priority: Medium     Essential hypertension with goal blood pressure less than 140/90 07/05/2016     Priority: Medium     Primary osteoarthritis of right foot 07/05/2016     Priority: Medium     Benign non-nodular prostatic hyperplasia with lower urinary tract symptoms 07/05/2016     Priority: Medium     Chronic low back pain, unspecified back pain laterality, with sciatica presence unspecified 07/05/2016     Priority: Medium     Hereditary  and idiopathic peripheral neuropathy 07/02/2015     Priority: Medium     Problem list name updated by automated process. Provider to review       CARDIOVASCULAR SCREENING; LDL GOAL LESS THAN 130 05/11/2015     Priority: Medium     Mitral regurgitation - systolic murmur 05/11/2015     Priority: Medium     Glaucoma 11/07/2014     Priority: Medium     Cervical spine degeneration 07/01/2014     Priority: Medium      Past Medical History:   Diagnosis Date     Cancer (H) 2000    L shoulder melanoma     Glaucoma      Heart murmur      Neuropathy      Other premature beats      PVC's (premature ventricular contractions) 5/14/2015     Zoster 2000     Past Surgical History:   Procedure Laterality Date     ARTHROPLASTY HIP ANTERIOR Left 11/14/2014    Procedure: ARTHROPLASTY HIP ANTERIOR;  Surgeon: Rudy Tobias MD;  Location:  OR     ARTHROPLASTY REVISION HIP  3/12/2014     right Procedure: ARTHROPLASTY REVISION HIP;  Surgeon: Rudy Tobias MD;  Location:  OR     ARTHROSCOPY KNEE  2002    meniscus ( side?)     BIOPSY OF PROSTATE,NEEDLE/PUNCH       C TOTAL HIP ARTHROPLASTY Right 2008     DERMATOLOGY REFERRAL  2000    melanoma resection L shoulder     DISCECTOMY LUMBAR POSTERIOR MICROSCOPIC ONE LEVEL N/A 4/11/2016    Procedure: DISCECTOMY LUMBAR POSTERIOR MICROSCOPIC ONE LEVEL;  Surgeon: Ruben Marsh MD;  Location:  OR     ENT SURGERY      Tonsils     ORTHOPEDIC SURGERY  2008    R hip replacement     ORTHOPEDIC SURGERY  2004    big toe in , needed reconstructive surgery     Current Outpatient Prescriptions   Medication Sig Dispense Refill     amLODIPine (NORVASC) 10 MG tablet Take 1 tablet (10 mg) by mouth daily 90 tablet 2     lisinopril (PRINIVIL/ZESTRIL) 10 MG tablet Take 1 tablet (10 mg) by mouth daily 90 tablet 2     atorvastatin (LIPITOR) 20 MG tablet Take 1 tablet (20 mg) by mouth daily 30 tablet 3     terbinafine (LAMISIL) 250 MG tablet TK 1 T PO QD FOR ONCHOMYCOSIS  3     rOPINIRole (REQUIP)  "0.25 MG tablet Take 1-2 tablets (0.25-0.5 mg) by mouth At Bedtime 60 tablet 1     latanoprost (XALATAN) 0.005 % ophthalmic solution Place 1 drop into both eyes At Bedtime 7.5 mL 3     ASPIRIN PO Take 81 mg by mouth       Magnesium 500 MG CAPS        gabapentin (NEURONTIN) 300 MG capsule TAKE 3 CAPSULES BY MOUTH EVERY NIGHT AT BEDTIME 270 capsule 3     AMOXICILLIN PO Take 500 mg by mouth Reported on 4/6/2017       Calcium Citrate-Vitamin D (CITRACAL + D PO) Take 2 tablets by mouth daily       Nutritional Supplements (PROSTATE 2.4) CAPS Take 2 capsules by mouth daily Prostate cap 2.0       Multiple Vitamins-Minerals (MENS MULTIVITAMIN PLUS PO) Take 1 tablet by mouth daily       OTC products: Aspirin    No Known Allergies   Latex Allergy: NO    Social History   Substance Use Topics     Smoking status: Former Smoker     Quit date: 6/27/1976     Smokeless tobacco: Never Used     Alcohol use No      Comment: Quit 8/1014     History   Drug Use No       REVIEW OF SYSTEMS:                                                    Constitutional, neuro, ENT, endocrine, pulmonary, cardiac, gastrointestinal, genitourinary, musculoskeletal, integument and psychiatric systems are negative, except as otherwise noted.      EXAM:                                                    /62 (BP Location: Right arm, Cuff Size: Adult Regular)  Pulse 60  Temp 97.7  F (36.5  C) (Oral)  Ht 5' 10\" (1.778 m)  Wt 182 lb (82.6 kg)  SpO2 96%  BMI 26.11 kg/m2    GENERAL APPEARANCE: healthy, alert and no distress     EYES: EOMI,  PERRL     HENT: ear canals and TM's normal and nose and mouth without ulcers or lesions     NECK: no adenopathy, no asymmetry, masses, or scars and thyroid normal to palpation     RESP: lungs clear to auscultation - no rales, rhonchi or wheezes     CV: regular rates and rhythm, normal S1 S2, no S3 or S4 and no murmur, click or rub     ABDOMEN:  soft, nontender, no HSM or masses and bowel sounds normal     MS: positive " tinel and phalen test on the left carpal tunnel     SKIN: no suspicious lesions or rashes     NEURO: Normal strength and tone, sensory exam grossly normal, mentation intact and speech normal     PSYCH: mentation appears normal. and affect normal/bright     LYMPHATICS: No axillary, cervical, or supraclavicular nodes    DIAGNOSTICS:                                                    EKG: appears normal, NSR, normal axis, normal intervals, no acute ST/T changes c/w ischemia, no LVH by voltage criteria, unchanged from previous tracings    Recent Labs   Lab Test  07/06/17   0938  05/12/17   1220  04/11/16   0749   HGB   --   13.0*  12.9*   PLT   --   219  175   NA  141  141  143   POTASSIUM  3.8  3.9  4.1   CR  0.80  0.86  0.98        IMPRESSION:                                                    Reason for surgery/procedure: Left carpal tunnel  Diagnosis/reason for consult: pre-operative clearance    The proposed surgical procedure is considered LOW risk.    REVISED CARDIAC RISK INDEX  The patient has the following serious cardiovascular risks for perioperative complications such as (MI, PE, VFib and 3  AV Block):  No serious cardiac risks  INTERPRETATION: 0 risks: Class I (very low risk - 0.4% complication rate)    The patient has the following additional risks for perioperative complications:  No identified additional risks      ICD-10-CM    1. Preop general physical exam Z01.818 EKG 12-lead complete w/read - Clinics   2. Carpal tunnel syndrome of left wrist G56.02    3. PVC's (premature ventricular contractions) I49.3        RECOMMENDATIONS:                                                        Cardiovascular Risk  Performs 4 METs exercise without symptoms (Climb a flight of stairs and Shoveling) .   - history of PVCs without symptoms with this      --Patient is to take all scheduled medications on the day of surgery EXCEPT for modifications listed below.  - hold aspirin    APPROVAL GIVEN to proceed with proposed  procedure, without further diagnostic evaluation       Signed Electronically by: Uri Queen MD, MD    Copy of this evaluation report is provided to requesting physician.    [place] Preop Guidelines

## 2017-11-01 NOTE — MR AVS SNAPSHOT
After Visit Summary   11/1/2017    Ciro Almonte    MRN: [id number]           Patient Information     Date Of Birth          1940        Visit Information        Provider Department      11/1/2017 9:30 AM Uri Queen MD [facility]        Today's Diagnoses     Preop general physical exam    -  1    Carpal tunnel syndrome of left wrist        PVC's (premature ventricular contractions)          Care Instructions    1) Stop aspirin 7 days before surgery- no ibuprofen, can take tylenol for pain    2) Continue all other medications    3) Blood work downstairs    Uri Queen MD      Before Your Surgery      Call your surgeon if there is any change in your health. This includes signs of a cold or flu (such as a sore throat, runny nose, cough, rash or fever).    Do not smoke, drink alcohol or take over the counter medicine (unless your surgeon or primary care doctor tells you to) for the 24 hours before and after surgery.    If you take prescribed drugs: Follow your doctor s orders about which medicines to take and which to stop until after surgery.    Eating and drinking prior to surgery: follow the instructions from your surgeon    Take a shower or bath the night before surgery. Use the soap your surgeon gave you to gently clean your skin. If you do not have soap from your surgeon, use your regular soap. Do not shave or scrub the surgery site.  Wear clean pajamas and have clean sheets on your bed.           Follow-ups after your visit        Who to contact     If you have questions or need follow up information about today's clinic visit or your schedule please contact [facility] directly at [phone].  Normal or non-critical lab and imaging results will be communicated to you by MyChart, letter or phone within 4 business days after the clinic has received the results. If you do not hear from us within 7 days, please contact the clinic through MyChart or phone. If you  "have a critical or abnormal lab result, we will notify you by phone as soon as possible.  Submit refill requests through Bloom[employer] or call your pharmacy and they will forward the refill request to us. Please allow 3 business days for your refill to be completed.          Additional Information About Your Visit        TxCellhart Information     [employer]omfire lets you send messages to your doctor, view your test results, renew your prescriptions, schedule appointments and more. To sign up, go to [email or web address] . Click on \"Log in\" on the left side of the screen, which will take you to the Welcome page. Then click on \"Sign up Now\" on the right side of the page.     You will be asked to enter the access code listed below, as well as some personal information. Please follow the directions to create your username and password.     Your access code is: [email or web address]  Expires: 1/3/2018 11:14 AM     Your access code will  in 90 days. If you need help or a new code, please call your [place] clinic or [phone].        Care EveryWhere ID     This is your Care EveryWhere ID. This could be used by other organizations to access your [place] medical records  [id number]        Your Vitals Were     Pulse Temperature Height Pulse Oximetry BMI (Body Mass Index)       60 97.7  F (36.5  C) (Oral) 5' 10\" (1.778 m) 96% 26.11 kg/m2        Blood Pressure from Last 3 Encounters:   17 130/62   17 138/78   17 128/62    Weight from Last 3 Encounters:   17 182 lb (82.6 kg)   17 184 lb (83.5 kg)   17 187 lb (84.8 kg)              We Performed the Following     Basic metabolic panel     EKG 12-lead complete w/read - Clinics        Primary Care Provider Office Phone # Fax #    Uri Queen [phone] [address] [place] [address]        Equal Access to Services     [facility]: Hadii eusebia Loya, alexandr chino, shawanda urena " diogenes mcdanieljanusz reedaan ah. So [facility] [phone].    ATENCIÓN: Si sukhjinder huynh, tiene a oliver disposición servicios gratuitos de asistencia lingüística. Bonnie al [phone].    We comply with applicable federal civil rights laws and [place] laws. We do not discriminate on the basis of race, color, national origin, age, disability, sex, sexual orientation, or gender identity.            Thank you!     Thank you for choosing [facility] [place]  for your care. Our goal is always to provide you with excellent care. Hearing back from our patients is one way we can continue to improve our services. Please take a few minutes to complete the written survey that you may receive in the mail after your visit with us. Thank you!             Your Updated Medication List - Protect others around you: Learn how to safely use, store and throw away your medicines at www.disposemymeds.org.          This list is accurate as of: 11/1/17  9:59 AM.  Always use your most recent med list.                   Brand Name Dispense Instructions for use Diagnosis    amLODIPine 10 MG tablet    NORVASC    90 tablet    Take 1 tablet (10 mg) by mouth daily    Essential hypertension with goal blood pressure less than 140/90       AMOXICILLIN PO      Take 500 mg by mouth Reported on 4/6/2017        ASPIRIN PO      Take 81 mg by mouth        atorvastatin 20 MG tablet    LIPITOR    30 tablet    Take 1 tablet (20 mg) by mouth daily    Mixed hyperlipidemia       CITRACAL + D PO      Take 2 tablets by mouth daily        gabapentin 300 MG capsule    NEURONTIN    270 capsule    TAKE 3 CAPSULES BY MOUTH EVERY NIGHT AT BEDTIME    Neuropathy       latanoprost 0.005 % ophthalmic solution    XALATAN    7.5 mL    Place 1 drop into both eyes At Bedtime    Primary open angle glaucoma of both eyes       lisinopril 10 MG tablet    PRINIVIL/ZESTRIL    90 tablet    Take 1 tablet (10 mg) by mouth daily    Essential hypertension with goal blood pressure less  than 140/90       Magnesium 500 MG Caps           MENS MULTIVITAMIN PLUS PO      Take 1 tablet by mouth daily        PROSTATE 2.4 Caps      Take 2 capsules by mouth daily Prostate cap 2.0        rOPINIRole 0.25 MG tablet    REQUIP    60 tablet    Take 1-2 tablets (0.25-0.5 mg) by mouth At Bedtime    Cramp of limb

## 2017-11-11 DIAGNOSIS — R25.2 CRAMP OF LIMB: ICD-10-CM

## 2017-11-14 RX ORDER — ROPINIROLE 0.25 MG/1
TABLET, FILM COATED ORAL
Qty: 60 TABLET | Refills: 3 | Status: SHIPPED | OUTPATIENT
Start: 2017-11-14 | End: 2018-07-14

## 2017-11-14 NOTE — TELEPHONE ENCOUNTER
Prescription approved per [facility] Refill Protocol.    Magalie FREGOSO RN, BSN, PHN  [employer] RN

## 2017-11-20 DIAGNOSIS — E78.2 MIXED HYPERLIPIDEMIA: ICD-10-CM

## 2017-11-21 RX ORDER — ATORVASTATIN CALCIUM 20 MG/1
TABLET, FILM COATED ORAL
Qty: 30 TABLET | Refills: 7 | Status: SHIPPED | OUTPATIENT
Start: 2017-11-21 | End: 2018-10-02

## 2018-01-23 ENCOUNTER — OFFICE VISIT (OUTPATIENT)
Dept: PEDIATRICS | Facility: CLINIC | Age: 78
End: 2018-01-23
Payer: COMMERCIAL

## 2018-01-23 VITALS
DIASTOLIC BLOOD PRESSURE: 68 MMHG | SYSTOLIC BLOOD PRESSURE: 138 MMHG | TEMPERATURE: 97.9 F | OXYGEN SATURATION: 96 % | BODY MASS INDEX: 26.99 KG/M2 | WEIGHT: 188.5 LBS | HEART RATE: 79 BPM | HEIGHT: 70 IN

## 2018-01-23 DIAGNOSIS — H93.8X1 EAR FULLNESS, RIGHT: Primary | ICD-10-CM

## 2018-01-23 DIAGNOSIS — I49.3 PVC'S (PREMATURE VENTRICULAR CONTRACTIONS): ICD-10-CM

## 2018-01-23 PROCEDURE — 99214 OFFICE O/P EST MOD 30 MIN: CPT | Performed by: INTERNAL MEDICINE

## 2018-01-23 NOTE — PATIENT INSTRUCTIONS
Flonase or Nasonex 2 sprays per nostril per day to help the ear drain.    2 sprays per nostril per day. Continue with humidifier in the bedroom to keep moisturized.    Recheck with the Ears, Nose, and throat doctor if not getting better.    Uri Queen MD

## 2018-01-23 NOTE — MR AVS SNAPSHOT
"              After Visit Summary   1/23/2018    Ciro Almonte    MRN: [id number]           Patient Information     Date Of Birth          1940        Visit Information        Provider Department      1/23/2018 11:30 AM [facility] MD [facility]        Care Instructions    Flonase or Nasonex 2 sprays per nostril per day to help the ear drain.    2 sprays per nostril per day. Continue with humidifier in the bedroom to keep moisturized.    Recheck with the Ears, Nose, and throat doctor if not getting better.    Uri Queen MD          Follow-ups after your visit        Who to contact     If you have questions or need follow up information about today's clinic visit or your schedule please contact [facility] directly at [phone].  Normal or non-critical lab and imaging results will be communicated to you by MyChart, letter or phone within 4 business days after the clinic has received the results. If you do not hear from us within 7 days, please contact the clinic through MyChart or phone. If you have a critical or abnormal lab result, we will notify you by phone as soon as possible.  Submit refill requests through [employer] or call your pharmacy and they will forward the refill request to us. Please allow 3 business days for your refill to be completed.          Additional Information About Your Visit        MyChart Information     [employer]iew lets you send messages to your doctor, view your test results, renew your prescriptions, schedule appointments and more. To sign up, go to [email or web address] . Click on \"Log in\" on the left side of the screen, which will take you to the Welcome page. Then click on \"Sign up Now\" on the right side of the page.     You will be asked to enter the access code listed below, as well as some personal information. Please follow the directions to create your username and password.     Your access code is: [email or web address]  Expires: 4/23/2018 12:23 PM   " "  Your access code will  in 90 days. If you need help or a new code, please call your [place] clinic or [phone].        Care EveryWhere ID     This is your Care EveryWhere ID. This could be used by other organizations to access your [place] medical records  [id number]        Your Vitals Were     Pulse Temperature Height Pulse Oximetry BMI (Body Mass Index)       79 97.9  F (36.6  C) (Oral) 5' 10\" (1.778 m) 96% 27.05 kg/m2        Blood Pressure from Last 3 Encounters:   18 138/68   17 130/62   17 138/78    Weight from Last 3 Encounters:   18 188 lb 8 oz (85.5 kg)   17 182 lb (82.6 kg)   17 184 lb (83.5 kg)              Today, you had the following     No orders found for display       Primary Care Provider Office Phone # Fax #    Uri Queen [phone] [address] [place] [address]        Equal Access to Services     [facility]: Hadii aad ku hadasho Soomaali, waaxda luqadaha, qaybta kaalmada adereid, shawanda alonzo . So [facility] [phone].    ATENCIÓN: Si habla español, tiene a oliver disposición servicios gratuitos de asistencia lingüística. [facility] [phone].    We comply with applicable federal civil rights laws and [place] laws. We do not discriminate on the basis of race, color, national origin, age, disability, sex, sexual orientation, or gender identity.            Thank you!     Thank you for choosing [facility] [place]  for your care. Our goal is always to provide you with excellent care. Hearing back from our patients is one way we can continue to improve our services. Please take a few minutes to complete the written survey that you may receive in the mail after your visit with us. Thank you!             Your Updated Medication List - Protect others around you: Learn how to safely use, store and throw away your medicines at www.disposemymeds.org.          This list is " accurate as of: 1/23/18 12:23 PM.  Always use your most recent med list.                   Brand Name Dispense Instructions for use Diagnosis    amLODIPine 10 MG tablet    NORVASC    90 tablet    Take 1 tablet (10 mg) by mouth daily    Essential hypertension with goal blood pressure less than 140/90       ASPIRIN PO      Take 81 mg by mouth        atorvastatin 20 MG tablet    LIPITOR    30 tablet    TAKE 1 TABLET BY MOUTH DAILY.    Mixed hyperlipidemia       CITRACAL + D PO      Take 2 tablets by mouth daily        gabapentin 300 MG capsule    NEURONTIN    270 capsule    TAKE 3 CAPSULES BY MOUTH EVERY NIGHT AT BEDTIME    Neuropathy       latanoprost 0.005 % ophthalmic solution    XALATAN    7.5 mL    Place 1 drop into both eyes At Bedtime    Primary open angle glaucoma of both eyes       lisinopril 10 MG tablet    PRINIVIL/ZESTRIL    90 tablet    Take 1 tablet (10 mg) by mouth daily    Essential hypertension with goal blood pressure less than 140/90       Magnesium 500 MG Caps           MENS MULTIVITAMIN PLUS PO      Take 1 tablet by mouth daily        PROSTATE 2.4 Caps      Take 2 capsules by mouth daily Prostate cap 2.0        rOPINIRole 0.25 MG tablet    REQUIP    60 tablet    TAKE 1 TO 2 TABLETS(0.25 TO 0.5 MG) BY MOUTH AT BEDTIME    Cramp of limb

## 2018-01-23 NOTE — PROGRESS NOTES
SUBJECTIVE:   Ciro Almonte is a 77 year old male who presents to clinic today for the following health issues:      Pt was fit today for new hearing aids this morning and was told that right ear was concave, no pain or pressure.   Pt reported have normal hearing loss and in the last 2 weeks hearing has decrease more on the, feels that there's water in right ear.     Patient notes that he has been evaluated by ENT in the past.  He has had some ongoing intermittent dizziness which they note there is no clear cause for right now.  They offered balance rehabilitation.  He is decided to wait on this for right now.  He feels that this is been stable.  No paresthesias or focal weakness.    Patient has been having some slight lightheadedness at times.  He was stopped on metoprolol by cardiology.  He is noted to have PVCs in the past.  He will note these occasionally when he checks his heart rate.  No orthopnea PND.  He no chest pain or shortness of breath.  He notes that his blood pressures typically in the 130s-140s at home and does not have lows.      Problem list and histories reviewed & adjusted, as indicated.  Additional history: as documented    Patient Active Problem List   Diagnosis     Cervical spine degeneration     Glaucoma     CARDIOVASCULAR SCREENING; LDL GOAL LESS THAN 130     Mitral regurgitation - systolic murmur     Hereditary and idiopathic peripheral neuropathy     Essential hypertension with goal blood pressure less than 140/90     Primary osteoarthritis of right foot     Benign non-nodular prostatic hyperplasia with lower urinary tract symptoms     Chronic low back pain, unspecified back pain laterality, with sciatica presence unspecified     Lumbago     Hip pain, left     Mixed hyperlipidemia     Past Surgical History:   Procedure Laterality Date     ARTHROPLASTY HIP ANTERIOR Left 11/14/2014    Procedure: ARTHROPLASTY HIP ANTERIOR;  Surgeon: Rudy Tobias MD;  Location: [facility] OR     ARTHROPLASTY  "REVISION HIP  3/12/2014     right Procedure: ARTHROPLASTY REVISION HIP;  Surgeon: Rudy Tobias MD;  Location:  OR     ARTHROSCOPY KNEE  2002    meniscus ( side?)     BIOPSY OF PROSTATE,NEEDLE/PUNCH       C TOTAL HIP ARTHROPLASTY Right 2008     DERMATOLOGY REFERRAL      melanoma resection L shoulder     DISCECTOMY LUMBAR POSTERIOR MICROSCOPIC ONE LEVEL N/A 2016    Procedure: DISCECTOMY LUMBAR POSTERIOR MICROSCOPIC ONE LEVEL;  Surgeon: Ruben Marsh MD;  Location:  OR     ENT SURGERY      Tonsils     ORTHOPEDIC SURGERY  2008    R hip replacement     ORTHOPEDIC SURGERY      big toe in lawn mower, needed reconstructive surgery       Social History   Substance Use Topics     Smoking status: Former Smoker     Quit date: 1976     Smokeless tobacco: Never Used     Alcohol use No      Comment: Quit 1014     Family History   Problem Relation Age of Onset     CEREBROVASCULAR DISEASE Mother      Hypertension Mother      Glaucoma Mother      HEART DISEASE Father 58     MI     CANCER Father      lung cancer;  age 91             Reviewed and updated as needed this visit by clinical staff       Reviewed and updated as needed this visit by Provider         ROS:  Constitutional, HEENT, cardiovascular, pulmonary, GI, , musculoskeletal, neuro, skin, endocrine and psych systems are negative, except as otherwise noted.      OBJECTIVE:   /68  Pulse 79  Temp 97.9  F (36.6  C) (Oral)  Ht 5' 10\" (1.778 m)  Wt 188 lb 8 oz (85.5 kg)  SpO2 96%  BMI 27.05 kg/m2  Body mass index is 27.05 kg/(m^2).  GENERAL: healthy, alert and no distress  EYES: Eyes grossly normal to inspection, PERRL and conjunctivae and sclerae normal  HENT: right TM slightly contracted, normal left TM, MMM without lesions  NECK: no adenopathy, no asymmetry, masses, or scars and thyroid normal to palpation  RESP: lungs clear to auscultation - no rales, rhonchi or wheezes  CV: occasional premature beat but variable, otherwise " regular rhythm, no loud murmurs  ABDOMEN: soft, nontender, no hepatosplenomegaly, no masses and bowel sounds normal  MS: no gross musculoskeletal defects noted, no edema  SKIN: no suspicious lesions or rashes  NEURO: Normal strength and tone, mentation intact and speech normal  PSYCH: mentation appears normal, affect normal/bright    Diagnostic Test Results:  Results for orders placed or performed in visit on 11/01/17   Basic metabolic panel   Result Value Ref Range    Sodium 143 133 - 144 mmol/L    Potassium 3.9 3.4 - 5.3 mmol/L    Chloride 107 94 - 109 mmol/L    Carbon Dioxide 26 20 - 32 mmol/L    Anion Gap 10 3 - 14 mmol/L    Glucose 88 70 - 99 mg/dL    Urea Nitrogen 19 7 - 30 mg/dL    Creatinine 0.91 0.66 - 1.25 mg/dL    GFR Estimate 81 >60 mL/min/1.7m2    GFR Estimate If Black >90 >60 mL/min/1.7m2    Calcium 9.0 8.5 - 10.1 mg/dL       ASSESSMENT/PLAN:       ICD-10-CM    1. Ear fullness, right H93.8X1    2. PVC's (premature ventricular contractions) I49.3      We will start nasal spray to try to help ear drain on the right otherwise will consider ENT evaluation to see if tube warranted on this side.  For lightheadedness encourage patient to continue to check his blood pressure at home.  May consider repeat echocardiogram last one was in 2015 with slightly decreased EF.  He has recently had a Holter monitor done showing PVCs but no correlation with lightheadedness or dizziness.  Discussed warning signs for recheck in clinic.    Patient Instructions   Flonase or Nasonex 2 sprays per nostril per day to help the ear drain.    2 sprays per nostril per day. Continue with humidifier in the bedroom to keep moisturized.    Recheck with the Ears, Nose, and throat doctor if not getting better.    MD Uri Stacy MD, MD  [facility] [place]

## 2018-04-03 DIAGNOSIS — G62.9 NEUROPATHY: ICD-10-CM

## 2018-04-03 RX ORDER — GABAPENTIN 300 MG/1
CAPSULE ORAL
Qty: 270 CAPSULE | Refills: 0 | Status: SHIPPED | OUTPATIENT
Start: 2018-04-03 | End: 2018-07-06

## 2018-04-03 NOTE — TELEPHONE ENCOUNTER
** reviewed, last refilled on 1/2/18 for #270** filled oxycodone 5 mg for #20 on 11/13/17 from Dr. Pitts**    Routing refill request to provider for review/approval because:  Drug not on the [facility] refill protocol     Myesha Hoffmann RN  Message handled by Nurse Triage.

## 2018-04-03 NOTE — TELEPHONE ENCOUNTER
gabapentin (NEURONTIN) 300 MG capsule      Last Written Prescription Date:  3/21/2017  Last Fill Quantity: 270,   # refills: 3  Last Office Visit: 1/23/2018  Future Office visit:       Routing refill request to provider for review/approval because:  Drug not on the [facility], [facility] or [facility] refill protocol or controlled substance

## 2018-05-29 ENCOUNTER — OFFICE VISIT (OUTPATIENT)
Dept: PEDIATRICS | Facility: CLINIC | Age: 78
End: 2018-05-29
Payer: COMMERCIAL

## 2018-05-29 VITALS
HEART RATE: 64 BPM | BODY MASS INDEX: 26.48 KG/M2 | HEIGHT: 70 IN | TEMPERATURE: 97.6 F | OXYGEN SATURATION: 96 % | DIASTOLIC BLOOD PRESSURE: 66 MMHG | WEIGHT: 185 LBS | SYSTOLIC BLOOD PRESSURE: 124 MMHG

## 2018-05-29 DIAGNOSIS — R07.0 THROAT PAIN: Primary | ICD-10-CM

## 2018-05-29 DIAGNOSIS — M25.511 ACUTE PAIN OF RIGHT SHOULDER: ICD-10-CM

## 2018-05-29 LAB
DEPRECATED S PYO AG THROAT QL EIA: NORMAL
SPECIMEN SOURCE: NORMAL

## 2018-05-29 PROCEDURE — 99214 OFFICE O/P EST MOD 30 MIN: CPT | Performed by: INTERNAL MEDICINE

## 2018-05-29 PROCEDURE — 87880 STREP A ASSAY W/OPTIC: CPT | Performed by: INTERNAL MEDICINE

## 2018-05-29 PROCEDURE — 87081 CULTURE SCREEN ONLY: CPT | Performed by: INTERNAL MEDICINE

## 2018-05-29 NOTE — PATIENT INSTRUCTIONS
[facility]; no signs of infection in your ear today.    For your shoulder, Follow up with physical therapy; they will call you.    For your sore throat:    Increase fluid intake, and gargle if this helps.    Motrin or tylenol may also help with sore throat symptoms.      Call back if any problems with difficulty swallowing or breathing easily..                                            Rotator Cuff Injury   What is a rotator cuff injury?   A rotator cuff injury is a strain or tear in the group of tendons and muscles that hold your shoulder joint together and help move your shoulder.   How does it occur?   A rotator cuff injury may result from:     using your arm to break a fall     falling onto your arm     lifting a heavy object     use of your shoulder in sports with a repetitive overhead movement, such as swimming, baseball (mainly pitchers), football, and tennis, which gradually strains the tendon     manual labor such as painting, plastering, raking leaves, or housework   What are the symptoms?   The symptoms of a torn rotator cuff are:     arm and shoulder pain     shoulder weakness     shoulder tenderness     loss of shoulder movement, especially overhead   How is it diagnosed?   Your healthcare provider will examine you and check your shoulder for pain, tenderness, and loss of motion as you move your arm in all directions. Your provider will ask if your shoulder pain began suddenly or gradually. You may have an X-ray to make sure there are not any fractures or bone spurs.   Based on these results, you may have other tests or procedures right away or later, such as:     magnetic resonance imaging (MRI), which creates images of your shoulder and surrounding structures with sound waves     an arthrogram, which is an X-ray or MRI that is taken after a special dye has been injected into your shoulder joint to outline its soft structures     arthroscopy, a surgical  procedure in which a small instrument is inserted into your shoulder joint so your provider can look directly at your rotator cuff   What is the treatment?   A tendon in your shoulder can be inflamed, partially torn, or completely torn. What is done about it depends on how torn it is and how much it hurts.   If your tear is a minor one, it can be left to heal by itself if it does not interfere with your everyday activities. Your treatment plan should include:     proper sitting posture, in which your head and shoulders are balanced     rest for your shoulder, which means avoiding strenuous activity or any overhead motion that causes pain     ice packs at least once a day, and preferably 2 or 3 times a day     doing the exercises your healthcare provider gives you     anti-inflammatory drugs. Adults aged 65 years and older should not take non-steroidal anti-inflammatory medicine for more than 7 days without their healthcare provider's approval.     physical therapy to strengthen your shoulder as it heals   If you have a bad tear, you may need to have it repaired by arthroscopy. Arthroscopy can be used to perform surgery on a joint as well as to see inside the joint. The rough edges of a torn tendon can be trimmed and left to heal. Larger tears can be stitched back together. After surgery, your treatment plan will include physical therapy to strengthen your shoulder as it heals.   How long will the effects last?   Full recovery depends on what is torn and how it is treated.   When can I return to my normal activities?   Everyone recovers from an injury at a different rate. Return to your activities will be determined by how soon your shoulder recovers, not by how many days or weeks it has been since your injury has occurred. In general, the longer you have symptoms before you start treatment, the longer it will take to get better. The goal of rehabilitation is to return you to your normal activities as soon as is  safely possible. If you return too soon you may worsen your injury.   You may safely return to your normal activities when:     Your injured shoulder has full range of motion without pain.     Your injured shoulder has regained normal strength compared to the uninjured shoulder.   What can be done to help prevent this from recurring?   The best way to prevent a recurrence is to strengthen your shoulder muscles and keep them in peak condition with shoulder exercises.           Rotator Cuff Strain Rehabilitation Exercises                  You may do all of these exercises right away.   Isometric shoulder external rotation:  a doorway with your elbow bent 90 degrees and the back of the wrist on your injured side pressed against the door frame. Try to press your hand outward into the door frame. Hold for 5 seconds. Do 3 sets of 10.   Isometric shoulder internal rotation:  a doorway with your elbow bent 90 degrees and the front of the wrist on your injured side pressed against the door frame. Try to press your palm into the door frame. Hold for 5 seconds. Do 3 sets of 10.   Wand exercise: Flexion: Stand upright and hold a stick in both hands, palms down. Stretch your arms by lifting them over your head, keeping your arms straight. Hold for 5 seconds and return to the starting position. Repeat 10 times.   Wand exercise: Extension: Stand upright and hold a stick in both hands behind your back. Move the stick away from your back. Hold the end position for 5 seconds. Relax and return to the starting position. Repeat 10 times.   Wand exercise: External rotation: Lie on your back and hold a stick in both hands, palms up. Your upper arms should be resting on the floor with your elbows at your sides and bent 90 degrees. Use your uninjured arm to push your injured arm out away from your body. Keep the elbow of your injured arm at your side while it is being pushed. Hold the stretch for 5 seconds. Repeat 10 times.    Wand exercise: Shoulder abduction and adduction: Stand and hold a stick with both hands, palms facing away from your body. Rest the stick against the front of your thighs. Use your uninjured arm to push your injured arm out to the side and up as high as possible. Keep your arms straight. Hold for 5 seconds. Repeat 10 times.   Resisted shoulder external rotation: Stand sideways next to a door with your injured arm farther from the door. Tie a knot in the end of the tubing and shut the knot in the door at waist level. Hold the other end of the tubing with the hand of your injured arm. Rest the hand of your injured arm across your stomach. Keeping your elbow in at your side, rotate your arm outward and away from your waist. Make sure you keep your elbow bent 90 degrees and your forearm parallel to the floor. Repeat 10 times. Build up to 3 sets of 10.   Resisted shoulder internal rotation: Stand sideways next to a door with your injured arm closest to the door. Tie a knot in the end of the tubing and shut the knot in the door at waist level. Hold the other end of the tubing with the hand of your injured arm. Bend the elbow of your injured arm 90 degrees. Keeping your elbow in at your side, rotate your forearm across your body and then back to the starting position. Make sure you keep your forearm parallel to the floor. Do 3 sets of 10.   Scaption: Stand with your arms at your sides and with your elbows straight. Slowly raise your arms to eye level. As you raise your arms, spread them apart so that they are only slightly in front of your body (at about a 30-degree angle to the front of your body). Point your thumbs toward the ceiling. Hold for 2 seconds and lower your arms slowly. Do 3 sets of 10. Progress to holding a soup can or light weight when you are doing the exercise and increase the weight as the exercise gets easier.   Side-lying external rotation: Lie on your uninjured side with your injured arm at your side  "and your elbow bent 90 degrees. Keeping your elbow against your side, raise your forearm toward the ceiling and hold for 2 seconds. Slowly lower your arm. Do 3 sets of 10. You can start doing this exercise holding a soup can or light weight and gradually increase the weight as long as there is no pain.   Horizontal abduction: Lie on your stomach on a table or the edge of a bed with the arm on your injured side hanging down over the edge. Raise your arm out to the side, with your thumb pointed toward the ceiling, until your arm is parallel to the floor. Hold for 2 seconds and then lower it slowly. Start this exercise with no weight. As you get stronger, add a light weight or hold a soup can. Do 3 sets of 10.   Push-up with a plus: Begin on the floor on your hands and knees. Keep your arms a shoulder width apart and lift your feet off the floor. Arch your back as high as possible and round your shoulders (this is the \"plus\" part or the exercise). Bend your elbows and lower your body to the floor. Return to the starting position and arch your back again. Do 3 sets of 10.   Published by [employer].  This content is reviewed periodically and is subject to change as new health information becomes available. The information is intended to inform and educate and is not a replacement for medical evaluation, advice, diagnosis or treatment by a healthcare professional.   Written by Ayse Irby MS, PT, and Saumya Linton PT, [facility], for [employer].   ? 2010 [facility] and/or its affiliates. All Rights Reserved.   Copyright   [employer] 2011  Adult Health Advisor                "

## 2018-05-29 NOTE — MR AVS SNAPSHOT
After Visit Summary   5/29/2018    Ciro Almonte    MRN: [id number]           Patient Information     Date Of Birth          1940        Visit Information        Provider Department      5/29/2018 11:00 AM Ciro Macdonald MD [facility]        Today's Diagnoses     Throat pain    -  1    Acute pain of right shoulder          Care Instructions    [facility]; no signs of infection in your ear today.    For your shoulder, Follow up with physical therapy; they will call you.    For your sore throat:    Increase fluid intake, and gargle if this helps.    Motrin or tylenol may also help with sore throat symptoms.      Call back if any problems with difficulty swallowing or breathing easily..                                            Rotator Cuff Injury   What is a rotator cuff injury?   A rotator cuff injury is a strain or tear in the group of tendons and muscles that hold your shoulder joint together and help move your shoulder.   How does it occur?   A rotator cuff injury may result from:     using your arm to break a fall     falling onto your arm     lifting a heavy object     use of your shoulder in sports with a repetitive overhead movement, such as swimming, baseball (mainly pitchers), football, and tennis, which gradually strains the tendon     manual labor such as painting, plastering, raking leaves, or housework   What are the symptoms?   The symptoms of a torn rotator cuff are:     arm and shoulder pain     shoulder weakness     shoulder tenderness     loss of shoulder movement, especially overhead   How is it diagnosed?   Your healthcare provider will examine you and check your shoulder for pain, tenderness, and loss of motion as you move your arm in all directions. Your provider will ask if your shoulder pain began suddenly or gradually. You may have an X-ray to make sure there are not any fractures or bone spurs.   Based on these  results, you may have other tests or procedures right away or later, such as:     magnetic resonance imaging (MRI), which creates images of your shoulder and surrounding structures with sound waves     an arthrogram, which is an X-ray or MRI that is taken after a special dye has been injected into your shoulder joint to outline its soft structures     arthroscopy, a surgical procedure in which a small instrument is inserted into your shoulder joint so your provider can look directly at your rotator cuff   What is the treatment?   A tendon in your shoulder can be inflamed, partially torn, or completely torn. What is done about it depends on how torn it is and how much it hurts.   If your tear is a minor one, it can be left to heal by itself if it does not interfere with your everyday activities. Your treatment plan should include:     proper sitting posture, in which your head and shoulders are balanced     rest for your shoulder, which means avoiding strenuous activity or any overhead motion that causes pain     ice packs at least once a day, and preferably 2 or 3 times a day     doing the exercises your healthcare provider gives you     anti-inflammatory drugs. Adults aged 65 years and older should not take non-steroidal anti-inflammatory medicine for more than 7 days without their healthcare provider's approval.     physical therapy to strengthen your shoulder as it heals   If you have a bad tear, you may need to have it repaired by arthroscopy. Arthroscopy can be used to perform surgery on a joint as well as to see inside the joint. The rough edges of a torn tendon can be trimmed and left to heal. Larger tears can be stitched back together. After surgery, your treatment plan will include physical therapy to strengthen your shoulder as it heals.   How long will the effects last?   Full recovery depends on what is torn and how it is treated.   When can I return to my normal activities?   Everyone recovers from an  injury at a different rate. Return to your activities will be determined by how soon your shoulder recovers, not by how many days or weeks it has been since your injury has occurred. In general, the longer you have symptoms before you start treatment, the longer it will take to get better. The goal of rehabilitation is to return you to your normal activities as soon as is safely possible. If you return too soon you may worsen your injury.   You may safely return to your normal activities when:     Your injured shoulder has full range of motion without pain.     Your injured shoulder has regained normal strength compared to the uninjured shoulder.   What can be done to help prevent this from recurring?   The best way to prevent a recurrence is to strengthen your shoulder muscles and keep them in peak condition with shoulder exercises.           Rotator Cuff Strain Rehabilitation Exercises                  You may do all of these exercises right away.   Isometric shoulder external rotation:  a doorway with your elbow bent 90 degrees and the back of the wrist on your injured side pressed against the door frame. Try to press your hand outward into the door frame. Hold for 5 seconds. Do 3 sets of 10.   Isometric shoulder internal rotation:  a doorway with your elbow bent 90 degrees and the front of the wrist on your injured side pressed against the door frame. Try to press your palm into the door frame. Hold for 5 seconds. Do 3 sets of 10.   Abiodun exercise: Flexion: Stand upright and hold a stick in both hands, palms down. Stretch your arms by lifting them over your head, keeping your arms straight. Hold for 5 seconds and return to the starting position. Repeat 10 times.   Abiodun exercise: Extension: Stand upright and hold a stick in both hands behind your back. Move the stick away from your back. Hold the end position for 5 seconds. Relax and return to the starting position. Repeat 10 times.   Abiodun  exercise: External rotation: Lie on your back and hold a stick in both hands, palms up. Your upper arms should be resting on the floor with your elbows at your sides and bent 90 degrees. Use your uninjured arm to push your injured arm out away from your body. Keep the elbow of your injured arm at your side while it is being pushed. Hold the stretch for 5 seconds. Repeat 10 times.   Wand exercise: Shoulder abduction and adduction: Stand and hold a stick with both hands, palms facing away from your body. Rest the stick against the front of your thighs. Use your uninjured arm to push your injured arm out to the side and up as high as possible. Keep your arms straight. Hold for 5 seconds. Repeat 10 times.   Resisted shoulder external rotation: Stand sideways next to a door with your injured arm farther from the door. Tie a knot in the end of the tubing and shut the knot in the door at waist level. Hold the other end of the tubing with the hand of your injured arm. Rest the hand of your injured arm across your stomach. Keeping your elbow in at your side, rotate your arm outward and away from your waist. Make sure you keep your elbow bent 90 degrees and your forearm parallel to the floor. Repeat 10 times. Build up to 3 sets of 10.   Resisted shoulder internal rotation: Stand sideways next to a door with your injured arm closest to the door. Tie a knot in the end of the tubing and shut the knot in the door at waist level. Hold the other end of the tubing with the hand of your injured arm. Bend the elbow of your injured arm 90 degrees. Keeping your elbow in at your side, rotate your forearm across your body and then back to the starting position. Make sure you keep your forearm parallel to the floor. Do 3 sets of 10.   Scaption: Stand with your arms at your sides and with your elbows straight. Slowly raise your arms to eye level. As you raise your arms, spread them apart so that they are only slightly in front of your body  "(at about a 30-degree angle to the front of your body). Point your thumbs toward the ceiling. Hold for 2 seconds and lower your arms slowly. Do 3 sets of 10. Progress to holding a soup can or light weight when you are doing the exercise and increase the weight as the exercise gets easier.   Side-lying external rotation: Lie on your uninjured side with your injured arm at your side and your elbow bent 90 degrees. Keeping your elbow against your side, raise your forearm toward the ceiling and hold for 2 seconds. Slowly lower your arm. Do 3 sets of 10. You can start doing this exercise holding a soup can or light weight and gradually increase the weight as long as there is no pain.   Horizontal abduction: Lie on your stomach on a table or the edge of a bed with the arm on your injured side hanging down over the edge. Raise your arm out to the side, with your thumb pointed toward the ceiling, until your arm is parallel to the floor. Hold for 2 seconds and then lower it slowly. Start this exercise with no weight. As you get stronger, add a light weight or hold a soup can. Do 3 sets of 10.   Push-up with a plus: Begin on the floor on your hands and knees. Keep your arms a shoulder width apart and lift your feet off the floor. Arch your back as high as possible and round your shoulders (this is the \"plus\" part or the exercise). Bend your elbows and lower your body to the floor. Return to the starting position and arch your back again. Do 3 sets of 10.   Published by [employer].  This content is reviewed periodically and is subject to change as new health information becomes available. The information is intended to inform and educate and is not a replacement for medical evaluation, advice, diagnosis or treatment by a healthcare professional.   Written by Ayse Irby, MS, PT, and Saumya Linton, PT, [facility], [facility], for [employer].   ? 2010 [employer] and/or its affiliates. All Rights Reserved.   Copyright   Clinical Reference " Systems 2011  Adult Health Advisor                        Follow-ups after your visit        Additional Services     [facility] PT, HAND, AND CHIROPRACTIC REFERRAL       **This order will print in the [facility] Scheduling Office**    Physical Therapy, Hand Therapy and Chiropractic Care are available through:    *[facility]  *[facility]  *[facility]    Call one number to schedule at any of the above locations: [phone].    Your provider has referred you to: Physical Therapy at [facility] or [facility]    Indication/Reason for Referral: Shoulder Pain  Onset of Illness: 2 weeks.  Therapy Orders: Evaluate and Treat  Special Programs: None  Special Request: None    Agatha Snider      Additional Comments for the Therapist or Chiropractor:     Please be aware that coverage of these services is subject to the terms and limitations of your health insurance plan.  Call member services at your health plan with any benefit or coverage questions.      Please bring the following to your appointment:    *Your personal calendar for scheduling future appointments  *Comfortable clothing                  Who to contact     If you have questions or need follow up information about today's clinic visit or your schedule please contact [facility] directly at [phone].  Normal or non-critical lab and imaging results will be communicated to you by MyChart, letter or phone within 4 business days after the clinic has received the results. If you do not hear from us within 7 days, please contact the clinic through MyChart or phone. If you have a critical or abnormal lab result, we will notify you by phone as soon as possible.  Submit refill requests through [employer] or call your pharmacy and they will forward the refill request to us. Please allow 3 business days for your refill to be completed.          Additional Information About Your Visit        Care EveryWhere ID     This is your Care  "EveryWhere ID. This could be used by other organizations to access your [facility] medical records  [id number]        Your Vitals Were     Pulse Temperature Height Pulse Oximetry BMI (Body Mass Index)       64 97.6  F (36.4  C) (Oral) 5' 10\" (1.778 m) 96% 26.54 kg/m2        Blood Pressure from Last 3 Encounters:   05/29/18 124/66   01/23/18 138/68   11/01/17 130/62    Weight from Last 3 Encounters:   05/29/18 185 lb (83.9 kg)   01/23/18 188 lb 8 oz (85.5 kg)   11/01/17 182 lb (82.6 kg)              We Performed the Following     [facility] PT, HAND, AND CHIROPRACTIC REFERRAL     Strep, Rapid Screen        Primary Care Provider Office Phone # Fax #    Uri Queen [phone] [address] [place] [address]        Equal Access to Services     [facility]: Hadii aad ku hadasho Soomaali, waaxda luqadaha, qaybta kaalmada adeegyada, waxay ashleyin hayvikyn hugo alonzo . So [facility] [phone].    ATENCIÓN: Si sukhjinder huynh, tiene a oliver disposición servicios gratuitos de asistencia lingüística. Llame al [phone].    We comply with applicable federal civil rights laws and [place] laws. We do not discriminate on the basis of race, color, national origin, age, disability, sex, sexual orientation, or gender identity.            Thank you!     [facility]  [facility]. Our goal is always to provide you with excellent care. Hearing back from our patients is one way we can continue to improve our services. Please take a few minutes to complete the written survey that you may receive in the mail after your visit with us. Thank you!             Your Updated Medication List - Protect others around you: Learn how to safely use, store and throw away your medicines at www.disposemymeds.org.          This list is accurate as of 5/29/18 11:28 AM.  Always use your most recent med list.                   Brand Name Dispense Instructions for use Diagnosis    " amLODIPine 10 MG tablet    NORVASC    90 tablet    Take 1 tablet (10 mg) by mouth daily    Essential hypertension with goal blood pressure less than 140/90       ASPIRIN PO      Take 81 mg by mouth        atorvastatin 20 MG tablet    LIPITOR    30 tablet    TAKE 1 TABLET BY MOUTH DAILY.    Mixed hyperlipidemia       CITRACAL + D PO      Take 2 tablets by mouth daily        gabapentin 300 MG capsule    NEURONTIN    270 capsule    TAKE 3 CAPSULES BY MOUTH EVERY NIGHT AT BEDTIME    Neuropathy       latanoprost 0.005 % ophthalmic solution    XALATAN    7.5 mL    Place 1 drop into both eyes At Bedtime    Primary open angle glaucoma of both eyes       lisinopril 10 MG tablet    PRINIVIL/ZESTRIL    90 tablet    Take 1 tablet (10 mg) by mouth daily    Essential hypertension with goal blood pressure less than 140/90       Magnesium 500 MG Caps           MENS MULTIVITAMIN PLUS PO      Take 1 tablet by mouth daily        PROSTATE 2.4 Caps      Take 2 capsules by mouth daily Prostate cap 2.0        rOPINIRole 0.25 MG tablet    REQUIP    60 tablet    TAKE 1 TO 2 TABLETS(0.25 TO 0.5 MG) BY MOUTH AT BEDTIME    Cramp of limb

## 2018-05-29 NOTE — PROGRESS NOTES
"  SUBJECTIVE:   Ciro Almonte is a 78 year old male who presents to clinic today for the following health issues:      RESPIRATORY SYMPTOMS      Duration: five days    Description  Throat issue: feels like piece of pepper is stuck and gets scratchy, hoarse.  Right ear pressure or that \"ear drum isn't moving properly\". No underlying nasal congestion or drainage, but has been sniffling more. Occasional cough that is burning.     Severity: mild    Accompanying signs and symptoms: None    History (predisposing factors):  strep exposure, grandson had strep about one month ago.    Precipitating or alleviating factors: None    Therapies tried and outcome:  None    Pt also with right shoulder strained, now with significant pain and difficulty lifting arm.       1)  Throat pain for last 5-6 days; not severe; does not feel like a \"normal sore throat\", but feels like voice is hoarse; feels something in throat that burns.  No fevers.  Has not felt sick, but had expose to strep a month ago.  Coughing makes it worse.      2) has hearing aids.  Tried flonase, which improved symptoms.  Now hearing is worse again.  No pain or drainage.  Some mild sniffing.     3)  Also, he has additional complaints of having pulled right shoulder; feels sore.      Problem list and histories reviewed & adjusted, as indicated.  Additional history: as documented    Patient Active Problem List   Diagnosis     Cervical spine degeneration     Glaucoma     Mitral regurgitation - systolic murmur     Hereditary and idiopathic peripheral neuropathy     Essential hypertension with goal blood pressure less than 140/90     Primary osteoarthritis of right foot     Benign non-nodular prostatic hyperplasia with lower urinary tract symptoms     Chronic low back pain, unspecified back pain laterality, with sciatica presence unspecified     Lumbago     Hip pain, left     Mixed hyperlipidemia     Past Surgical History:   Procedure Laterality Date     ARTHROPLASTY HIP " ANTERIOR Left 2014    Procedure: ARTHROPLASTY HIP ANTERIOR;  Surgeon: Rudy Tobias MD;  Location: [facility] OR     ARTHROPLASTY REVISION HIP  3/12/2014     right Procedure: ARTHROPLASTY REVISION HIP;  Surgeon: Rudy Tobias MD;  Location:  OR     ARTHROSCOPY KNEE      meniscus ( side?)     BIOPSY OF PROSTATE,NEEDLE/PUNCH       C TOTAL HIP ARTHROPLASTY Right 2008     DERMATOLOGY REFERRAL      melanoma resection L shoulder     DISCECTOMY LUMBAR POSTERIOR MICROSCOPIC ONE LEVEL N/A 2016    Procedure: DISCECTOMY LUMBAR POSTERIOR MICROSCOPIC ONE LEVEL;  Surgeon: Ruben Marsh MD;  Location:  OR     ENT SURGERY      Tonsils     ORTHOPEDIC SURGERY  2008    R hip replacement     ORTHOPEDIC SURGERY      big toe in lawn mower, needed reconstructive surgery       Social History   Substance Use Topics     Smoking status: Former Smoker     Quit date: 1976     Smokeless tobacco: Never Used     Alcohol use No      Comment: Quit 1014     Family History   Problem Relation Age of Onset     CEREBROVASCULAR DISEASE Mother      Hypertension Mother      Glaucoma Mother      HEART DISEASE Father 58     MI     CANCER Father      lung cancer;  age 91         Current Outpatient Prescriptions   Medication Sig Dispense Refill     amLODIPine (NORVASC) 10 MG tablet Take 1 tablet (10 mg) by mouth daily 90 tablet 2     ASPIRIN PO Take 81 mg by mouth       atorvastatin (LIPITOR) 20 MG tablet TAKE 1 TABLET BY MOUTH DAILY. 30 tablet 7     Calcium Citrate-Vitamin D (CITRACAL + D PO) Take 2 tablets by mouth daily       gabapentin (NEURONTIN) 300 MG capsule TAKE 3 CAPSULES BY MOUTH EVERY NIGHT AT BEDTIME 270 capsule 0     latanoprost (XALATAN) 0.005 % ophthalmic solution Place 1 drop into both eyes At Bedtime 7.5 mL 3     lisinopril (PRINIVIL/ZESTRIL) 10 MG tablet Take 1 tablet (10 mg) by mouth daily 90 tablet 2     Magnesium 500 MG CAPS        Multiple Vitamins-Minerals (MENS MULTIVITAMIN PLUS PO) Take 1 tablet  "by mouth daily       Nutritional Supplements (PROSTATE 2.4) CAPS Take 2 capsules by mouth daily Prostate cap 2.0       rOPINIRole (REQUIP) 0.25 MG tablet TAKE 1 TO 2 TABLETS(0.25 TO 0.5 MG) BY MOUTH AT BEDTIME 60 tablet 3     No Known Allergies  BP Readings from Last 3 Encounters:   05/29/18 124/66   01/23/18 138/68   11/01/17 130/62    Wt Readings from Last 3 Encounters:   05/29/18 185 lb (83.9 kg)   01/23/18 188 lb 8 oz (85.5 kg)   11/01/17 182 lb (82.6 kg)                  Labs reviewed in EPIC    Reviewed and updated as needed this visit by clinical staff       Reviewed and updated as needed this visit by Provider         ROS:  CONSTITUTIONAL: NEGATIVE for fever, chills, change in weight  ENT/MOUTH: NEGATIVE for ear, mouth and throat problems  RESP: NEGATIVE for significant cough or SOB  CV: NEGATIVE for chest pain, palpitations or peripheral edema    OBJECTIVE:                                                    /66 (BP Location: Right arm, Cuff Size: Adult Regular)  Pulse 64  Temp 97.6  F (36.4  C) (Oral)  Ht 5' 10\" (1.778 m)  Wt 185 lb (83.9 kg)  SpO2 96%  BMI 26.54 kg/m2  Body mass index is 26.54 kg/(m^2).   GENERAL: healthy, alert, well nourished, well hydrated, no distress  HENT: ear canals- normal; TMs- normal in color, no fluid; mobility did not seem impaired, but difficult to get a good seal; Nose- normal; Mouth- no ulcers, no lesions  NECK: no tenderness, no adenopathy, no asymmetry, no masses, no stiffness; thyroid- normal to palpation  RESP: lungs clear to auscultation - no rales, no rhonchi, no wheezes  CV: regular rates and rhythm, normal S1 S2, no S3 or S4 and no murmur, no click or rub -  ABDOMEN: soft, no tenderness, no  hepatosplenomegaly, no masses, normal bowel sounds    Diagnostic test results:  Diagnostic Test Results:  none      ASSESSMENT/PLAN:                                                    1. Throat pain  Increase fluid intake, and gargle if this helps.    Motrin or " tylenol may also help with sore throat symptoms.      Call back if any problems with difficulty swallowing or breathing easily.   - Strep, Rapid Screen  - Beta strep group A culture    2. Acute pain of right shoulder  Begin physical therapy and as needed tyelnol or motrin.  - [facility] PT, HAND, AND CHIROPRACTIC REFERRAL    3.  Right ear decreased hearing:  No signs of infection; advised trial of zyrtec with flonase.  [facility].       See Patient Instructions    Ciro Macdonald MD  [facility]

## 2018-05-30 ENCOUNTER — THERAPY VISIT (OUTPATIENT)
Dept: PHYSICAL THERAPY | Facility: CLINIC | Age: 78
End: 2018-05-30
Attending: INTERNAL MEDICINE
Payer: COMMERCIAL

## 2018-05-30 DIAGNOSIS — M25.511 SHOULDER PAIN, RIGHT: Primary | ICD-10-CM

## 2018-05-30 LAB
BACTERIA SPEC CULT: NORMAL
SPECIMEN SOURCE: NORMAL

## 2018-05-30 PROCEDURE — 97161 PT EVAL LOW COMPLEX 20 MIN: CPT | Mod: GP | Performed by: PHYSICAL THERAPIST

## 2018-05-30 PROCEDURE — 97110 THERAPEUTIC EXERCISES: CPT | Mod: GP | Performed by: PHYSICAL THERAPIST

## 2018-05-30 NOTE — PROGRESS NOTES
[facility] Initial Evaluation  Subjective:  Patient is a 78 year old male presenting with rehab right shoulder hpi.   Ciro Almonte is a 78 year old male with a right shoulder condition.  Condition occurred with:  Lifting.  Condition occurred: at home.  This is a new condition  Patient reports that he hurt his right shoulder about 5/15/18.  Thinks it was from lifting some stuff in the garage.    Right lateral and anterior shoulder pain. .        Pain is described as aching and sharp and is intermittent and reported as 7/10 and 3/10.   Pain is the same all the time.  Symptoms are exacerbated by carrying, lifting, certain positions, lying on extremity, using arm behind back, using arm at shoulder level and using arm overhead and relieved by rest and NSAID's.  Since onset symptoms are gradually improving.        General health as reported by patient is good.  Pertinent medical history includes:  Cancer, heart problems, high blood pressure, implaned devices and osteoporosis.  Medical allergies: no.  Other surgeries include:  Cancer surgery and orthopedic surgery (Hips 2007 and 2014 (right), left 2014. ).  Current medications:  Anti-inflammatory, meds to increase bone density and high blood pressure medication.  Current occupation is Retired.   Hobbies: House improvement.    .        Barriers include:  None as reported by the patient.    Red flags:  None as reported by the patient.                        Objective:  System                   Shoulder Evaluation:  ROM:  AROM:  : crepitus with AROM on right shoulder.  Flexion:  Left:  145    Right:  120    Abduction:  Left: 145   Right:  70      External Rotation:  Left:  90    Right:  70            Extension/Internal Rotation:  Left:  10    Right:  L5          Strength:    Flexion: Right: 4/5     Pain:   Extension:  Right: 4/5    Pain:  Abduction:  Right: 4-/5     Pain:    Internal Rotation:  Right: 4+/5     Pain:  External Rotation:   Right:4+/5      Pain:    Horizontal Abduction:  Right:4-/5    Pain:  Horizontal Adduction:  Right:4-/5     Pain:  Elbow Flexion:  Right:5/5     Pain:  Elbow Extension:  Right:5/5     Pain:                                           General     ROS    Assessment/Plan:    Patient is a 78 year old male with right side shoulder complaints.    Patient has the following significant findings with corresponding treatment plan.                Diagnosis 1:  Right shoulder pain  Pain -  manual therapy, self management, education, directional preference exercise and home program  Decreased ROM/flexibility - manual therapy, therapeutic exercise and home program  Decreased strength - therapeutic exercise, therapeutic activities and home program  Impaired muscle performance - neuro re-education and home program  Decreased function - therapeutic activities and home program    Therapy Evaluation Codes:   1) History comprised of:   Personal factors that impact the plan of care:      None.    Comorbidity factors that impact the plan of care are:      None.     Medications impacting care: Anti-inflammatory.  2) Examination of Body Systems comprised of:   Body structures and functions that impact the plan of care:      Shoulder.   Activity limitations that impact the plan of care are:      Lifting, Sleeping and Laying down.  3) Clinical presentation characteristics are:   Stable/Uncomplicated.  4) Decision-Making    Low complexity using standardized patient assessment instrument and/or measureable assessment of functional outcome.  Cumulative Therapy Evaluation is: Low complexity.    Previous and current functional limitations:  (See Goal Flow Sheet for this information)    Short term and Long term goals: (See Goal Flow Sheet for this information)     Communication ability:  Patient appears to be able to clearly communicate and understand verbal and written communication and follow directions correctly.  Treatment Explanation - The following has been  discussed with the patient:   RX ordered/plan of care  Anticipated outcomes  Possible risks and side effects  This patient would benefit from PT intervention to resume normal activities.   Rehab potential is excellent.    Frequency:  1 X week, once daily  Duration:  for 6 weeks  Discharge Plan:  Achieve all LTG.  Independent in home treatment program.  Reach maximal therapeutic benefit.    Please refer to the daily flowsheet for treatment today, total treatment time and time spent performing 1:1 timed codes.

## 2018-05-30 NOTE — MR AVS SNAPSHOT
After Visit Summary   5/30/2018    Ciro Almonte    MRN: [id number]           Patient Information     Date Of Birth          1940        Visit Information        Provider Department      5/30/2018 2:00 PM Emigdio Joshi, PT [facility]        Today's Diagnoses     Shoulder pain, right    -  1       Follow-ups after your visit        Who to contact     If you have questions or need follow up information about today's clinic visit or your schedule please contact [facility] directly at [phone].  Normal or non-critical lab and imaging results will be communicated to you by MyChart, letter or phone within 4 business days after the clinic has received the results. If you do not hear from us within 7 days, please contact the clinic through MyChart or phone. If you have a critical or abnormal lab result, we will notify you by phone as soon as possible.  Submit refill requests through [employer] or call your pharmacy and they will forward the refill request to us. Please allow 3 business days for your refill to be completed.          Additional Information About Your Visit        Care EveryWhere ID     This is your Care EveryWhere ID. This could be used by other organizations to access your [place] medical records  [id number]         Blood Pressure from Last 3 Encounters:   05/29/18 124/66   01/23/18 138/68   11/01/17 130/62    Weight from Last 3 Encounters:   05/29/18 83.9 kg (185 lb)   01/23/18 85.5 kg (188 lb 8 oz)   11/01/17 82.6 kg (182 lb)              We Performed the Following     HC PT EVAL, LOW COMPLEXITY     ROSELYN INITIAL EVAL REPORT     THERAPEUTIC EXERCISES        Primary Care Provider Office Phone # Fax #    Uri Queen [phone] [address] [place] [address]        Equal Access to Services     JAKY RAE : alexandr Mustafa qaybta kaalmada adeegyada, waxay  diogenes mcdanieljanusz ortez'aan ah. So [facility] [phone].    ATENCIÓN: Si sukhjinder huynh, tiene a oliver disposición servicios gratuitos de asistencia lingüística. Bonnie al [phone].    We comply with applicable federal civil rights laws and [place] laws. We do not discriminate on the basis of race, color, national origin, age, disability, sex, sexual orientation, or gender identity.            Thank you!     Thank you for choosing [facility]  for your care. Our goal is always to provide you with excellent care. Hearing back from our patients is one way we can continue to improve our services. Please take a few minutes to complete the written survey that you may receive in the mail after your visit with us. Thank you!             Your Updated Medication List - Protect others around you: Learn how to safely use, store and throw away your medicines at www.disposemymeds.org.          This list is accurate as of 5/30/18  3:54 PM.  Always use your most recent med list.                   Brand Name Dispense Instructions for use Diagnosis    amLODIPine 10 MG tablet    NORVASC    90 tablet    Take 1 tablet (10 mg) by mouth daily    Essential hypertension with goal blood pressure less than 140/90       ASPIRIN PO      Take 81 mg by mouth        atorvastatin 20 MG tablet    LIPITOR    30 tablet    TAKE 1 TABLET BY MOUTH DAILY.    Mixed hyperlipidemia       CITRACAL + D PO      Take 2 tablets by mouth daily        gabapentin 300 MG capsule    NEURONTIN    270 capsule    TAKE 3 CAPSULES BY MOUTH EVERY NIGHT AT BEDTIME    Neuropathy       latanoprost 0.005 % ophthalmic solution    XALATAN    7.5 mL    Place 1 drop into both eyes At Bedtime    Primary open angle glaucoma of both eyes       lisinopril 10 MG tablet    PRINIVIL/ZESTRIL    90 tablet    Take 1 tablet (10 mg) by mouth daily    Essential hypertension with goal blood pressure less than 140/90       Magnesium 500 MG Caps           MENS  MULTIVITAMIN PLUS PO      Take 1 tablet by mouth daily        PROSTATE 2.4 Caps      Take 2 capsules by mouth daily Prostate cap 2.0        rOPINIRole 0.25 MG tablet    REQUIP    60 tablet    TAKE 1 TO 2 TABLETS(0.25 TO 0.5 MG) BY MOUTH AT BEDTIME    Cramp of limb

## 2018-06-04 DIAGNOSIS — H40.1130 PRIMARY OPEN ANGLE GLAUCOMA OF BOTH EYES: ICD-10-CM

## 2018-06-04 DIAGNOSIS — H40.233 INTERMITTENT PRIMARY ANGLE-CLOSURE GLAUCOMA OF BOTH EYES: Primary | ICD-10-CM

## 2018-06-05 NOTE — TELEPHONE ENCOUNTER
latanoprost (XALATAN) 0.005 %   Last Written Prescription Date:  5/15/17  Last Fill Quantity: 7.5,   # refills: 3  Last Office Visit : 3/22/17  Future Office visit: NONE    Routing refill request to provider for review/approval because:  3/22/17 NOTE:Patient will continue on Latanoprost which is a teal top drop at bedtime in both eyes.  Patient will return to clinic in 8-12 months with repeat visual field test, dilated eye exam, and OCT with RNFL analysis.  NO PT APPT. F/U

## 2018-06-11 ENCOUNTER — THERAPY VISIT (OUTPATIENT)
Dept: PHYSICAL THERAPY | Facility: CLINIC | Age: 78
End: 2018-06-11
Payer: COMMERCIAL

## 2018-06-11 DIAGNOSIS — M25.511 SHOULDER PAIN, RIGHT: ICD-10-CM

## 2018-06-11 PROCEDURE — 97110 THERAPEUTIC EXERCISES: CPT | Mod: GP | Performed by: PHYSICAL THERAPIST

## 2018-06-11 PROCEDURE — 97112 NEUROMUSCULAR REEDUCATION: CPT | Mod: GP | Performed by: PHYSICAL THERAPIST

## 2018-06-11 RX ORDER — LATANOPROST 50 UG/ML
1 SOLUTION/ DROPS OPHTHALMIC AT BEDTIME
Qty: 7.5 ML | Refills: 0 | Status: SHIPPED | OUTPATIENT
Start: 2018-06-11 | End: 2018-06-12

## 2018-06-12 DIAGNOSIS — H40.233 INTERMITTENT PRIMARY ANGLE-CLOSURE GLAUCOMA OF BOTH EYES: Primary | ICD-10-CM

## 2018-06-12 RX ORDER — LATANOPROST 50 UG/ML
1 SOLUTION/ DROPS OPHTHALMIC AT BEDTIME
Qty: 7.5 ML | Refills: 0 | Status: SHIPPED | OUTPATIENT
Start: 2018-06-12 | End: 2019-01-29

## 2018-06-22 ENCOUNTER — TELEPHONE (OUTPATIENT)
Dept: OPHTHALMOLOGY | Facility: CLINIC | Age: 78
End: 2018-06-22

## 2018-06-22 NOTE — TELEPHONE ENCOUNTER
[facility] requesting that pt make follow up appt with Dr. Yang. His last refill was approved, but no more will be given until then.  Ellen MAHARAJ 2:37 PM 06/22/2018

## 2018-06-26 ENCOUNTER — THERAPY VISIT (OUTPATIENT)
Dept: PHYSICAL THERAPY | Facility: CLINIC | Age: 78
End: 2018-06-26
Payer: COMMERCIAL

## 2018-06-26 DIAGNOSIS — M25.511 SHOULDER PAIN, RIGHT: ICD-10-CM

## 2018-06-26 PROCEDURE — 97112 NEUROMUSCULAR REEDUCATION: CPT | Mod: GP | Performed by: PHYSICAL THERAPIST

## 2018-06-26 PROCEDURE — 97110 THERAPEUTIC EXERCISES: CPT | Mod: GP | Performed by: PHYSICAL THERAPIST

## 2018-06-26 NOTE — MR AVS SNAPSHOT
After Visit Summary   6/26/2018    Ciro Almonte    MRN: [id number]           Patient Information     Date Of Birth          1940        Visit Information        Provider Department      6/26/2018 8:00 AM Emigdio Joshi PT [facility] [place]        Today's Diagnoses     Shoulder pain, right           Follow-ups after your visit        Who to contact     If you have questions or need follow up information about today's clinic visit or your schedule please contact [facility] directly at [phone].  Normal or non-critical lab and imaging results will be communicated to you by MyChart, letter or phone within 4 business days after the clinic has received the results. If you do not hear from us within 7 days, please contact the clinic through MyChart or phone. If you have a critical or abnormal lab result, we will notify you by phone as soon as possible.  Submit refill requests through [employer] or call your pharmacy and they will forward the refill request to us. Please allow 3 business days for your refill to be completed.          Additional Information About Your Visit        Care EveryWhere ID     This is your Care EveryWhere ID. This could be used by other organizations to access your [facility] medical records  [id number]         Blood Pressure from Last 3 Encounters:   05/29/18 124/66   01/23/18 138/68   11/01/17 130/62    Weight from Last 3 Encounters:   05/29/18 83.9 kg (185 lb)   01/23/18 85.5 kg (188 lb 8 oz)   11/01/17 82.6 kg (182 lb)              We Performed the Following     NEUROMUSCULAR RE-EDUCATION     THERAPEUTIC EXERCISES        Primary Care Provider Office Phone # Fax #    Uri Queen [phone] [address] [place] [address]        Equal Access to Services     BLAYNE RAE AH: John Loya, alexandr chino, shawanda urena  ah. So [facility] [phone].    ATENCIÓN: Si sukhjinder huynh, tiene a oliver disposición servicios gratuitos de asistencia lingüística. Bonnie britton [phone].    We comply with applicable federal civil rights laws and [place] laws. We do not discriminate on the basis of race, color, national origin, age, disability, sex, sexual orientation, or gender identity.            Thank you!     Thank you for choosing [facility] [place]  for your care. Our goal is always to provide you with excellent care. Hearing back from our patients is one way we can continue to improve our services. Please take a few minutes to complete the written survey that you may receive in the mail after your visit with us. Thank you!             Your Updated Medication List - Protect others around you: Learn how to safely use, store and throw away your medicines at www.disposemymeds.org.          This list is accurate as of 6/26/18  8:41 AM.  Always use your most recent med list.                   Brand Name Dispense Instructions for use Diagnosis    amLODIPine 10 MG tablet    NORVASC    90 tablet    Take 1 tablet (10 mg) by mouth daily    Essential hypertension with goal blood pressure less than 140/90       ASPIRIN PO      Take 81 mg by mouth        atorvastatin 20 MG tablet    LIPITOR    30 tablet    TAKE 1 TABLET BY MOUTH DAILY.    Mixed hyperlipidemia       CITRACAL + D PO      Take 2 tablets by mouth daily        gabapentin 300 MG capsule    NEURONTIN    270 capsule    TAKE 3 CAPSULES BY MOUTH EVERY NIGHT AT BEDTIME    Neuropathy       latanoprost 0.005 % ophthalmic solution    XALATAN    7.5 mL    Place 1 drop into both eyes At Bedtime Call clinic to schedule MD APPT.    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

## 2018-07-05 ENCOUNTER — OFFICE VISIT (OUTPATIENT)
Dept: OPHTHALMOLOGY | Facility: CLINIC | Age: 78
End: 2018-07-05
Payer: COMMERCIAL

## 2018-07-05 DIAGNOSIS — H11.32 CONJUNCTIVAL HEMORRHAGE OF LEFT EYE: Primary | ICD-10-CM

## 2018-07-05 ASSESSMENT — SLIT LAMP EXAM - LIDS
COMMENTS: NORMAL
COMMENTS: NORMAL

## 2018-07-05 ASSESSMENT — CONF VISUAL FIELD
METHOD: COUNTING FINGERS
OS_NORMAL: 1
OD_NORMAL: 1

## 2018-07-05 ASSESSMENT — TONOMETRY
IOP_METHOD: ICARE
OD_IOP_MMHG: 12
OS_IOP_MMHG: 17

## 2018-07-05 ASSESSMENT — REFRACTION_WEARINGRX
OS_ADD: +2.50
OS_CYLINDER: +4.75
OS_SPHERE: -3.25
OD_SPHERE: -3.50
OD_ADD: +2.50
OD_CYLINDER: +5.00
OS_AXIS: 165
OD_AXIS: 015

## 2018-07-05 ASSESSMENT — VISUAL ACUITY
OS_PH_SC: 20/20-3
OD_CC: 20/25-2
METHOD: SNELLEN - LINEAR
OS_CC: 20/30+
CORRECTION_TYPE: GLASSES

## 2018-07-05 ASSESSMENT — EXTERNAL EXAM - RIGHT EYE: OD_EXAM: NORMAL

## 2018-07-05 ASSESSMENT — EXTERNAL EXAM - LEFT EYE: OS_EXAM: NORMAL

## 2018-07-05 NOTE — MR AVS SNAPSHOT
After Visit Summary   7/5/2018    Ciro Almonte    MRN: [id number]           Patient Information     Date Of Birth          1940        Visit Information        Provider Department      7/5/2018 8:30 AM Jesse Upton, KEITH [facility]        Today's Diagnoses     Conjunctival hemorrhage of left eye    -  1       Follow-ups after your visit        Your next 10 appointments already scheduled     Jul 19, 2018  8:00 AM CDT   RETURN GLAUCOMA with Shannon Yang MD   Eye Clinic ([facility])    [address]  [facility] [address] [phone]              Who to contact     Please call your clinic at [phone] to:    Ask questions about your health    Make or cancel appointments    Discuss your medicines    Learn about your test results    Speak to your doctor            Additional Information About Your Visit        Care EveryWhere ID     This is your Care EveryWhere ID. This could be used by other organizations to access your [place] medical records  [id number]         Blood Pressure from Last 3 Encounters:   05/29/18 124/66   01/23/18 138/68   11/01/17 130/62    Weight from Last 3 Encounters:   05/29/18 83.9 kg (185 lb)   01/23/18 85.5 kg (188 lb 8 oz)   11/01/17 82.6 kg (182 lb)              Today, you had the following     No orders found for display       Primary Care Provider Office Phone # Fax #    Uri Queen [phone] [address] [place] [address]        Equal Access to Services     [facility] AH: Hadii aad ku hadasho Soomaali, waaxda luqadaha, qaybta kaalmada adeegyada, waxay idiin hayvikyn hugo ortez'nguyễn . So [facility] [phone].    ATENCIÓN: Si habla español, tiene a oliver disposición servicios gratuitos de asistencia lingüística. Llame al [phone].    We comply with applicable federal civil rights laws and [place] laws. We do not  discriminate on the basis of race, color, national origin, age, disability, sex, sexual orientation, or gender identity.            Thank you!     Thank you for choosing [facility]  for your care. Our goal is always to provide you with excellent care. Hearing back from our patients is one way we can continue to improve our services. Please take a few minutes to complete the written survey that you may receive in the mail after your visit with us. Thank you!             Your Updated Medication List - Protect others around you: Learn how to safely use, store and throw away your medicines at www.disposemymeds.org.          This list is accurate as of 7/5/18 10:28 AM.  Always use your most recent med list.                   Brand Name Dispense Instructions for use Diagnosis    amLODIPine 10 MG tablet    NORVASC    90 tablet    Take 1 tablet (10 mg) by mouth daily    Essential hypertension with goal blood pressure less than 140/90       ASPIRIN PO      Take 81 mg by mouth        atorvastatin 20 MG tablet    LIPITOR    30 tablet    TAKE 1 TABLET BY MOUTH DAILY.    Mixed hyperlipidemia       CITRACAL + D PO      Take 2 tablets by mouth daily        gabapentin 300 MG capsule    NEURONTIN    270 capsule    TAKE 3 CAPSULES BY MOUTH EVERY NIGHT AT BEDTIME    Neuropathy       latanoprost 0.005 % ophthalmic solution    XALATAN    7.5 mL    Place 1 drop into both eyes At Bedtime Call clinic to schedule MD APPT.    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

## 2018-07-05 NOTE — PROGRESS NOTES
Assessment/Plan  (H11.32) Conjunctival hemorrhage of left eye  (primary encounter diagnosis)  Comment: Subconjunctival hemorrhage OS, taking daily aspirin, rubbed eye due to foreign body sensation  Plan:  Educated patient on clinical findings and normal course of healing over the next 2 weeks. Recommended warm or cool compresses for comfort, and artificial tears as needed.    Return to clinic in 2 weeks for comprehensive eye exam with Dr. Yang. Recommend BAT testing given complaints of blurred vision with night driving.    Complete documentation of historical and exam elements from today's encounter can  be found in the full encounter summary report (not reduplicated in this progress  note). I personally obtained the chief complaint(s) and history of present illness. I  confirmed and edited as necessary the review of systems, past medical/surgical  history, family history, social history, and examination findings as documented by  others; and I examined the patient myself. I personally reviewed the relevant tests,  images, and reports as documented above. I formulated and edited as necessary the  assessment and plan and discussed the findings and management plan with the  patient and family.    Jesse Upton, KEITH, FAAO

## 2018-07-06 DIAGNOSIS — G62.9 NEUROPATHY: ICD-10-CM

## 2018-07-07 NOTE — TELEPHONE ENCOUNTER
Requested Prescriptions   Pending Prescriptions Disp Refills     gabapentin (NEURONTIN) 300 MG capsule [Pharmacy Med Name: GABAPENTIN 300MG CAPSULES]  Last Written Prescription Date:  04/03/2018  Last Fill Quantity: 270 capsule,  # refills: 0   Last office visit: 5/29/2018 with prescribing provider:  Ciro Macdonald MD   Future Office Visit:     270 capsule 0     Sig: TAKE 3 CAPSULES BY MOUTH EVERY NIGHT AT BEDTIME    There is no refill protocol information for this order   :     Routing refill request to provider for review/approval because:  Drug not on the [facility], [facility] or [facility] refill protocol or controlled substance

## 2018-07-09 DIAGNOSIS — I10 ESSENTIAL HYPERTENSION WITH GOAL BLOOD PRESSURE LESS THAN 140/90: ICD-10-CM

## 2018-07-09 RX ORDER — GABAPENTIN 300 MG/1
CAPSULE ORAL
Qty: 270 CAPSULE | Refills: 0 | Status: SHIPPED | OUTPATIENT
Start: 2018-07-09 | End: 2018-10-08

## 2018-07-09 NOTE — TELEPHONE ENCOUNTER
"Requested Prescriptions   Pending Prescriptions Disp Refills     amLODIPine (NORVASC) 10 MG tablet [Pharmacy Med Name: AMLODIPINE BESYLATE 10MG TABLETS]    Last Written Prescription Date:  10/5/2017  Last Fill Quantity: 90,  # refills: 2   Last office visit: 5/29/2018 with prescribing provider:  Uri Queen Office Visit:     90 tablet 0     Sig: TAKE 1 TABLET(10 MG) BY MOUTH DAILY    Calcium Channel Blockers Protocol  Passed    7/9/2018  9:47 AM       Passed - Blood pressure under 140/90 in past 12 months    BP Readings from Last 3 Encounters:   05/29/18 124/66   01/23/18 138/68   11/01/17 130/62                Passed - Recent (12 mo) or future (30 days) visit within the authorizing provider's specialty    Patient had office visit in the last 12 months or has a visit in the next 30 days with authorizing provider or within the authorizing provider's specialty.  See \"Patient Info\" tab in inbasket, or \"Choose Columns\" in Meds & Orders section of the refill encounter.           Passed - Patient is age 18 or older       Passed - Normal serum creatinine on file in past 12 months    Recent Labs   Lab Test  11/01/17   1003   CR  0.91             lisinopril (PRINIVIL/ZESTRIL) 10 MG tablet [Pharmacy Med Name: LISINOPRIL 10MG TABLETS]    Last Written Prescription Date:  11/21/2017  Last Fill Quantity: 30,  # refills: 7   Last office visit: 5/29/2018 with prescribing provider:  Uri Queen Office Visit:     90 tablet 0     Sig: TAKE 1 TABLET(10 MG) BY MOUTH DAILY    ACE Inhibitors (Including Combos) Protocol Passed    7/9/2018  9:47 AM       Passed - Blood pressure under 140/90 in past 12 months    BP Readings from Last 3 Encounters:   05/29/18 124/66   01/23/18 138/68   11/01/17 130/62                Passed - Recent (12 mo) or future (30 days) visit within the authorizing provider's specialty    Patient had office visit in the last 12 months or has a visit in the next 30 days with " "authorizing provider or within the authorizing provider's specialty.  See \"Patient Info\" tab in inbasket, or \"Choose Columns\" in Meds & Orders section of the refill encounter.           Passed - Patient is age 18 or older       Passed - Normal serum creatinine on file in past 12 months    Recent Labs   Lab Test  11/01/17   1003   CR  0.91            Passed - Normal serum potassium on file in past 12 months    Recent Labs   Lab Test  11/01/17   1003   POTASSIUM  3.9                 "

## 2018-07-09 NOTE — TELEPHONE ENCOUNTER
Routing refill request to provider for review/approval because:  Drug not on the [facility] refill protocol     RX [place] reviewed:  reviewed- no concerns    [place]  [email or web address]      Last Filled:  1/2/2018, #590    Magalie FREGOSO RN, BSN, PHN  Satin Flex RN

## 2018-07-10 RX ORDER — AMLODIPINE BESYLATE 10 MG/1
TABLET ORAL
Qty: 90 TABLET | Refills: 1 | Status: SHIPPED | OUTPATIENT
Start: 2018-07-10 | End: 2018-11-07

## 2018-07-10 RX ORDER — LISINOPRIL 10 MG/1
TABLET ORAL
Qty: 90 TABLET | Refills: 1 | Status: SHIPPED | OUTPATIENT
Start: 2018-07-10 | End: 2018-11-07

## 2018-07-14 DIAGNOSIS — R25.2 CRAMP OF LIMB: ICD-10-CM

## 2018-07-14 NOTE — TELEPHONE ENCOUNTER
"Requested Prescriptions   Pending Prescriptions Disp Refills     rOPINIRole (REQUIP) 0.25 MG tablet [Pharmacy Med Name: ROPINIROLE 0.25MG TABLETS]  Last Written Prescription Date:  11/17/2017  Last Fill Quantity: 60 tablet,  # refills: 3   Last office visit: 5/29/2018 with prescribing provider:  Ciro Macdonald MD    Future Office Visit:     60 tablet 0     Sig: TAKE 1 TO 2 TABLETS(0.25 TO 0.5 MG) BY MOUTH AT BEDTIME    Antiparkinson's Agents Protocol Failed    7/14/2018  9:53 AM       Failed - CBC on record in past 12 months    Recent Labs   Lab Test  05/12/17   1220   WBC  5.7   RBC  4.12*   HGB  13.0*   HCT  38.2*   PLT  219       For [facility] ONLY: [id number] = WBC, LBAE127 = RBC         Failed - ALT on record in past 12 months        Recent Labs   Lab Test  07/06/17   0938   ALT  34            Passed - Blood pressure under 140/90 in past 12 months    BP Readings from Last 3 Encounters:   05/29/18 124/66   01/23/18 138/68   11/01/17 130/62                Passed - Serum Creatinine on file in past 12 months    Recent Labs   Lab Test  11/01/17   1003   CR  0.91            Passed - Patient is age 18 or older       Passed - Recent (6 mo) or future (30 days) visit within the authorizing provider's specialty    Patient had office visit in the last 6 months or has a visit in the next 30 days with authorizing provider or within the authorizing provider's specialty.  See \"Patient Info\" tab in inbasket, or \"Choose Columns\" in Meds & Orders section of the refill encounter.              "

## 2018-07-17 RX ORDER — ROPINIROLE 0.25 MG/1
TABLET, FILM COATED ORAL
Qty: 60 TABLET | Refills: 0 | Status: SHIPPED | OUTPATIENT
Start: 2018-07-17 | End: 2018-09-18

## 2018-07-17 NOTE — TELEPHONE ENCOUNTER
Routing refill request to provider for review/approval because:  Labs not current:  CBC/ALT  Charles Strickland RN, BSN

## 2018-07-19 ENCOUNTER — TELEPHONE (OUTPATIENT)
Dept: OPHTHALMOLOGY | Facility: CLINIC | Age: 78
End: 2018-07-19

## 2018-07-19 ENCOUNTER — OFFICE VISIT (OUTPATIENT)
Dept: OPHTHALMOLOGY | Facility: CLINIC | Age: 78
End: 2018-07-19
Attending: OPHTHALMOLOGY
Payer: MEDICARE

## 2018-07-19 DIAGNOSIS — H40.9 GLAUCOMA: ICD-10-CM

## 2018-07-19 DIAGNOSIS — H40.003 GLAUCOMA SUSPECT OF BOTH EYES: Primary | ICD-10-CM

## 2018-07-19 DIAGNOSIS — H25.13 NUCLEAR SENILE CATARACT OF BOTH EYES: ICD-10-CM

## 2018-07-19 PROCEDURE — 92015 DETERMINE REFRACTIVE STATE: CPT | Mod: ZF

## 2018-07-19 PROCEDURE — G0463 HOSPITAL OUTPT CLINIC VISIT: HCPCS | Mod: ZF

## 2018-07-19 PROCEDURE — 92083 EXTENDED VISUAL FIELD XM: CPT | Mod: ZF | Performed by: OPHTHALMOLOGY

## 2018-07-19 PROCEDURE — 92133 CPTRZD OPH DX IMG PST SGM ON: CPT | Mod: ZF | Performed by: OPHTHALMOLOGY

## 2018-07-19 RX ORDER — DICLOFENAC SODIUM 75 MG/1
2 TABLET, DELAYED RELEASE ORAL PRN
Refills: 3 | COMMUNITY
Start: 2018-07-16 | End: 2019-11-14

## 2018-07-19 RX ORDER — CELECOXIB 200 MG/1
1 CAPSULE ORAL DAILY
Refills: 2 | COMMUNITY
Start: 2018-07-16 | End: 2019-07-26

## 2018-07-19 ASSESSMENT — VISUAL ACUITY
OS_CC: 20/25
OD_CC: J3
OS_CC+: -2
OS_CC: J3
METHOD: SNELLEN - LINEAR
OD_CC: 20/20
CORRECTION_TYPE: GLASSES
OD_CC+: -2

## 2018-07-19 ASSESSMENT — REFRACTION_WEARINGRX
OD_ADD: +2.50
OS_SPHERE: -3.25
OS_CYLINDER: +4.25
OS_ADD: +2.50
OD_AXIS: 017
SPECS_TYPE: PAL
OS_AXIS: 165
OD_CYLINDER: +4.75
OD_SPHERE: -3.50

## 2018-07-19 ASSESSMENT — REFRACTION_MANIFEST
OS_AXIS: 155
OD_SPHERE: -3.75
OD_AXIS: 010
OS_CYLINDER: +4.00
OD_CYLINDER: +5.00
OS_SPHERE: -2.75
OD_ADD: +2.75
OS_ADD: +2.75

## 2018-07-19 ASSESSMENT — TONOMETRY
OS_IOP_MMHG: 15
OD_IOP_MMHG: 13
IOP_METHOD: APPLANATION

## 2018-07-19 ASSESSMENT — EXTERNAL EXAM - RIGHT EYE: OD_EXAM: NORMAL

## 2018-07-19 ASSESSMENT — CONF VISUAL FIELD
OD_NORMAL: 1
OS_NORMAL: 1
METHOD: COUNTING FINGERS

## 2018-07-19 ASSESSMENT — SLIT LAMP EXAM - LIDS
COMMENTS: NORMAL
COMMENTS: NORMAL

## 2018-07-19 ASSESSMENT — EXTERNAL EXAM - LEFT EYE: OS_EXAM: NORMAL

## 2018-07-19 ASSESSMENT — CUP TO DISC RATIO
OD_RATIO: 0.0
OS_RATIO: 0.0

## 2018-07-19 ASSESSMENT — PACHYMETRY
OD_CT(UM): 530
OS_CT(UM): 549

## 2018-07-19 NOTE — NURSING NOTE
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 Would like an updated pair of gls, current pair is 2-3yrs old. Also, would like to know the progression of his cataracts; Pt is interested in sx.     No other concerns.     Ocular meds: Latanoprost qhsOU, Refresh PRN  Anika Sepulveda COT 9:16 AM July 19, 2018

## 2018-07-19 NOTE — PATIENT INSTRUCTIONS
Patient will continue on Latanoprost which is a teal top drop at bedtime in both eyes.  Patient will return to clinic in 1-2 months with IOL master, glare test and repeat discussion regarding possible cataract surgery.  Patient will also obtain Pentacam corneal topography at [facility] eye [place] prior to his follow up visit.

## 2018-07-19 NOTE — PROGRESS NOTES
1)Glc Suspect --  K pachy:530/549    Tmax: L20s    HVF:Full c fluct      CDR: 0.0/0.0    HRT/OCT:OD:Mild RNFL thinning and OS:Mod RNFL thinning   FHX of Glc: Mother -- gtts,      Gonio: open      Intolerant to:      Asthma/COPD: No, on po BB  Steroid Use: No    Kidney Stones: Yes, in remote past    Sulfa Allergy: No      IOP targets:L20s -- IOP good, could consider further trial off gtts in future  2)ONH Drusen  3)NS OU -- ?visually significant  -- pt uses magnifier for reading, difficulty driving at night  4)LAMBERT  5)Astigmatism -- will need corneal topography prior to IOL selection    Patient will continue on Latanoprost which is a teal top drop at bedtime in both eyes.  Patient will return to clinic in 1-2 months with IOL master, glare test and repeat discussion regarding possible cataract surgery.  Patient will also obtain Pentacam corneal topography at [facility] eye [place] prior to his follow up visit.         Resident note:  One year follow up  Patient having trouble with vision at night, issues with glare and halos  On Latanoprost at bedtime - tolerating that ok  IOP within rang  Continue Latanoprost  Patient interested in cataract surgery, perform BAT testing    Jude Iverson MD  PGY-3 Ophthalmology Resident  [phone]    Attending Physician Attestation:  Complete documentation of historical and exam elements from today's encounter can be found in the full encounter summary report (not reduplicated in this progress note). I personally obtained the chief complaint(s) and history of present illness.  I confirmed and edited as necessary the review of systems, past medical/surgical history, family history, social history, and examination findings as documented by others; and I examined the patient myself. I personally reviewed the relevant tests, images, and reports as documented above. I formulated and edited as necessary the assessment and plan and discussed the findings and management plan with the  patient and family.  - Shannon Yang MD

## 2018-07-19 NOTE — MR AVS SNAPSHOT
After Visit Summary   7/19/2018    Ciro Almonte    MRN: [id number]           Patient Information     Date Of Birth          1940        Visit Information        Provider Department      7/19/2018 8:00 AM Shannon Yang MD Eye Clinic        Today's Diagnoses     Glaucoma suspect of both eyes    -  1    Glaucoma        Nuclear senile cataract of both eyes          Care Instructions    Patient will continue on Latanoprost which is a teal top drop at bedtime in both eyes.  Patient will return to clinic in 1-2 months with IOL master, glare test and repeat discussion regarding possible cataract surgery.  Patient will also obtain Pentacam corneal topography at [facility] eye [place] prior to his follow up visit.           Follow-ups after your visit        Follow-up notes from your care team     Return 1-2 months with IOL master, glare test and repeat discussion regarding possible cataract surgery. .      Future tests that were ordered for you today     Open Future Orders        Priority Expected Expires Ordered    DILATED FUNDUS EXAM Routine  7/19/2019 7/19/2018            Who to contact     Please call your clinic at [phone] to:    Ask questions about your health    Make or cancel appointments    Discuss your medicines    Learn about your test results    Speak to your doctor            Additional Information About Your Visit        Care EveryWhere ID     This is your Care EveryWhere ID. This could be used by other organizations to access your [place] medical records  [id number]         Blood Pressure from Last 3 Encounters:   05/29/18 124/66   01/23/18 138/68   11/01/17 130/62    Weight from Last 3 Encounters:   05/29/18 83.9 kg (185 lb)   01/23/18 85.5 kg (188 lb 8 oz)   11/01/17 82.6 kg (182 lb)              We Performed the Following     OCT Optic Nerve RNFL Spectralis OU (both eyes)     OVF 24-2 Dynamic OU     [place]se-Operative Worksheet (Glaucoma)          Today's Medication Changes           These changes are accurate as of 7/19/18 11:59 PM.  If you have any questions, ask your nurse or doctor.               Stop taking these medicines if you haven't already. Please contact your care team if you have questions.     ASPIRIN PO   Stopped by:  Shannon Yang MD                    Primary Care Provider Office Phone # Fax #    Uri Ricardo Queen [phone] [address] [place] [address]        Equal Access to Services     [facility]: Hadii aad ku hadasho Soomaali, waaxda luqadaha, qaybta kaalmada adeegyada, waxay idiin hayaan adeeg kharash la'aan . So [facility] [phone].    ATENCIÓN: Si habla español, tiene a oliver disposición servicios gratuitos de asistencia lingüística. [facility] [phone].    We comply with applicable federal civil rights laws and [place] laws. We do not discriminate on the basis of race, color, national origin, age, disability, sex, sexual orientation, or gender identity.            Thank you!     Thank you for choosing EYE CLINIC  for your care. Our goal is always to provide you with excellent care. Hearing back from our patients is one way we can continue to improve our services. Please take a few minutes to complete the written survey that you may receive in the mail after your visit with us. Thank you!             Your Updated Medication List - Protect others around you: Learn how to safely use, store and throw away your medicines at www.disposemymeds.org.          This list is accurate as of 7/19/18 11:59 PM.  Always use your most recent med list.                   Brand Name Dispense Instructions for use Diagnosis    amLODIPine 10 MG tablet    NORVASC    90 tablet    TAKE 1 TABLET(10 MG) BY MOUTH DAILY    Essential hypertension with goal blood pressure less than 140/90       atorvastatin 20 MG tablet    LIPITOR    30 tablet    TAKE 1 TABLET BY MOUTH DAILY.    Mixed hyperlipidemia       celecoxib 200 MG capsule    celeBREX      Take 1 capsule by mouth daily        CITRACAL + D PO      Take 2 tablets by mouth daily        diclofenac 75 MG EC tablet    VOLTAREN     Take 1 tablet by mouth as needed        gabapentin 300 MG capsule    NEURONTIN    270 capsule    TAKE 3 CAPSULES BY MOUTH EVERY NIGHT AT BEDTIME    Neuropathy       latanoprost 0.005 % ophthalmic solution    XALATAN    7.5 mL    Place 1 drop into both eyes At Bedtime Call clinic to schedule MD APPT.    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

## 2018-07-19 NOTE — TELEPHONE ENCOUNTER
[facility] Call Center    Phone Message    May a detailed message be left on voicemail: yes    Reason for Call: PT lost his wallet in PWB and is wondering if the clinic found it.     Action Taken: Message routed to:  [facility] E[facility]

## 2018-07-20 NOTE — TELEPHONE ENCOUNTER
Note forwarded to /facilitator   Will verbally ask also this AM  Reginaldo Wood RN 6:47 AM 07/20/18

## 2018-07-24 ENCOUNTER — TELEPHONE (OUTPATIENT)
Dept: OPHTHALMOLOGY | Facility: CLINIC | Age: 78
End: 2018-07-24

## 2018-07-30 ENCOUNTER — TELEPHONE (OUTPATIENT)
Dept: OPHTHALMOLOGY | Facility: CLINIC | Age: 78
End: 2018-07-30

## 2018-07-31 ENCOUNTER — TRANSFERRED RECORDS (OUTPATIENT)
Dept: HEALTH INFORMATION MANAGEMENT | Facility: CLINIC | Age: 78
End: 2018-07-31

## 2018-09-11 ENCOUNTER — OFFICE VISIT (OUTPATIENT)
Dept: OPHTHALMOLOGY | Facility: CLINIC | Age: 78
End: 2018-09-11
Attending: OPHTHALMOLOGY
Payer: MEDICARE

## 2018-09-11 DIAGNOSIS — H25.13 NUCLEAR SENILE CATARACT OF BOTH EYES: Primary | ICD-10-CM

## 2018-09-11 PROCEDURE — G0463 HOSPITAL OUTPT CLINIC VISIT: HCPCS | Mod: ZF

## 2018-09-11 ASSESSMENT — REFRACTION_WEARINGRX
OD_CYLINDER: +4.75
OS_ADD: +2.50
OD_AXIS: 017
OD_SPHERE: -3.50
OD_ADD: +2.50
OS_AXIS: 165
OS_CYLINDER: +4.25
SPECS_TYPE: PAL
OS_SPHERE: -3.25

## 2018-09-11 ASSESSMENT — VISUAL ACUITY
OD_CC: 20/40
CORRECTION_TYPE: GLASSES
OS_CC: 20/40
OD_PH_CC: 20/25-1
OS_BAT_HIGH: 20/80
OD_BAT_HIGH: 20/80
OD_BAT_MED: 20/70
OD_CC+: -2
OS_PH_CC: 20/30-2
METHOD: SNELLEN - LINEAR
OS_BAT_MED: 20/70
OS_CC+: -1
OD_BAT_LOW: 20/50
OS_BAT_LOW: 20/60

## 2018-09-11 ASSESSMENT — PACHYMETRY
OD_CT(UM): 530
OS_CT(UM): 549

## 2018-09-11 ASSESSMENT — SLIT LAMP EXAM - LIDS
COMMENTS: NORMAL
COMMENTS: NORMAL

## 2018-09-11 ASSESSMENT — EXTERNAL EXAM - LEFT EYE: OS_EXAM: NORMAL

## 2018-09-11 ASSESSMENT — EXTERNAL EXAM - RIGHT EYE: OD_EXAM: NORMAL

## 2018-09-11 ASSESSMENT — TONOMETRY
IOP_METHOD: APPLANATION
OS_IOP_MMHG: 18
OD_IOP_MMHG: 17

## 2018-09-11 ASSESSMENT — CONF VISUAL FIELD
OD_NORMAL: 1
OS_NORMAL: 1

## 2018-09-11 NOTE — NURSING NOTE
Chief Complaints and History of Present Illnesses   Patient presents with     Follow Up For     2 month f/u for Glc Suspect, and discuss cataract surgery     HPI    Affected eye(s):  Both   Symptoms:           Do you have eye pain now?:  No      Comments:  Pt notes vision is about the same if not deteriorated a bit since last visit.     Cheyanne Davidson@ [facility] 1:52 PM September 11, 2018

## 2018-09-11 NOTE — PROGRESS NOTES
1)Glc Suspect --  K pachy:530/549    Tmax: L20s    HVF:Full c fluct      CDR: 0.0/0.0    HRT/OCT:OD:Mild RNFL thinning and OS:Mod RNFL thinning   FHX of Glc: Mother -- gtts,      Gonio: open      Intolerant to:      Asthma/COPD: No, on po BB  Steroid Use: No    Kidney Stones: Yes, in remote past    Sulfa Allergy: No      IOP targets:L20s -- IOP good, could consider further trial off gtts in future  2)[facility] Drusen  3)NS OU -- visually significant by glare testing (9/18) -- pt uses magnifier for reading, difficulty driving at night -- consider Istent and toric IOL  4)LAMBERT  5)Astigmatism -- had Pentacam at [facility] (approx 2.5-3D) -- will need meeting with FC abraham prior to toric -- pt leaning towards just wearing glasses    Patient will continue on Latanoprost which is a teal top drop at bedtime in both eyes. A long discussion of the risks, benefits, and alternatives including potential treatment and management options were had with patient and a decision was made to trial glasses prior to consideration of cataract surgery.  Patient will return to clinic in 8-12 months with visual field test, dilated eye exam, and OCT with RNFL analysis and return to clinic sooner if interested in proceeding with cataract surgery.        Attending Physician Attestation:  Complete documentation of historical and exam elements from today's encounter can be found in the full encounter summary report (not reduplicated in this progress note). I personally obtained the chief complaint(s) and history of present illness.  I confirmed and edited as necessary the review of systems, past medical/surgical history, family history, social history, and examination findings as documented by others; and I examined the patient myself. I personally reviewed the relevant tests, images, and reports as documented above. I formulated and edited as necessary the assessment and plan and discussed the findings and management plan with the patient and family.  - Shannon  Trey WETZEL

## 2018-09-11 NOTE — PATIENT INSTRUCTIONS
Patient will continue on Latanoprost which is a teal top drop at bedtime in both eyes. A long discussion of the risks, benefits, and alternatives including potential treatment and management options were had with patient and a decision was made to trial glasses prior to consideration of cataract surgery.  Patient will return to clinic in 8-12 months with visual field test, dilated eye exam, and OCT with RNFL analysis and return to clinic sooner if interested in proceeding with cataract surgery.      Future Appointments  Date Time Provider Department Center   9/12/2019 9:00 AM [facility]

## 2018-09-11 NOTE — MR AVS SNAPSHOT
After Visit Summary   9/11/2018    Ciro Almonte    MRN: [id number]           Patient Information     Date Of Birth          1940        Visit Information        Provider Department      9/11/2018 1:30 PM Shannon Yang MD Eye Clinic        Today's Diagnoses     Nuclear senile cataract of both eyes    -  1      Care Instructions    Patient will continue on Latanoprost which is a teal top drop at bedtime in both eyes. A long discussion of the risks, benefits, and alternatives including potential treatment and management options were had with patient and a decision was made to trial glasses prior to consideration of cataract surgery.  Patient will return to clinic in 8-12 months with visual field test, dilated eye exam, and OCT with RNFL analysis and return to clinic sooner if interested in proceeding with cataract surgery.      Future Appointments  Date Time Provider Department Center   9/12/2019 9:00 AM Shannon Yang MD [facility] CL[facility]               Follow-ups after your visit        Follow-up notes from your care team     Return 8-12 months with visual field test, dilated eye exam, and OCT with RNFL analysis and return to clini.      Who to contact     Please call your clinic at [phone] to:    Ask questions about your health    Make or cancel appointments    Discuss your medicines    Learn about your test results    Speak to your doctor            Additional Information About Your Visit        MyChart Information     [employer] is an electronic gateway that provides easy, online access to your medical records. With [employer], you can request a clinic appointment, read your test results, renew a prescription or communicate with your care team.     To sign up for [employer] visit the website at [email or web address]   You will be asked to enter the access code listed below, as well as some personal information. Please follow the directions to create your username and  password.     Your access code is: [email or web address]  Expires: 12/10/2018  3:41 PM     Your access code will  in 90 days. If you need help or a new code, please contact your [facility] or call [phone] for assistance.        Care EveryWhere ID     This is your Care EveryWhere ID. This could be used by other organizations to access your [place] medical records  [id number]         Blood Pressure from Last 3 Encounters:   18 124/66   18 138/68   17 130/62    Weight from Last 3 Encounters:   18 83.9 kg (185 lb)   18 85.5 kg (188 lb 8 oz)   17 82.6 kg (182 lb)              Today, you had the following     No orders found for display       Primary Care Provider Office Phone # Fax #    Uri Queen [phone] [address] [place] [address]        Equal Access to Services     [facility]: Hadii aad ku hadasho Soomaali, waaxda luqadaha, qaybta kaalmada adeegyada, waxay idiin hayaan hugo buiarachasity alonzo . So [facility] [phone].    ATENCIÓN: Si habla español, tiene a oliver disposición servicios gratuitos de asistencia lingüística. Llame al [phone].    We comply with applicable federal civil rights laws and [place] laws. We do not discriminate on the basis of race, color, national origin, age, disability, sex, sexual orientation, or gender identity.            Thank you!     Thank you for choosing EYE CLINIC  for your care. Our goal is always to provide you with excellent care. Hearing back from our patients is one way we can continue to improve our services. Please take a few minutes to complete the written survey that you may receive in the mail after your visit with us. Thank you!             Your Updated Medication List - Protect others around you: Learn how to safely use, store and throw away your medicines at www.disposemymeds.org.          This list is accurate as of 18  3:46 PM.  Always use  your most recent med list.                   Brand Name Dispense Instructions for use Diagnosis    amLODIPine 10 MG tablet    NORVASC    90 tablet    TAKE 1 TABLET(10 MG) BY MOUTH DAILY    Essential hypertension with goal blood pressure less than 140/90       atorvastatin 20 MG tablet    LIPITOR    30 tablet    TAKE 1 TABLET BY MOUTH DAILY.    Mixed hyperlipidemia       celecoxib 200 MG capsule    celeBREX     Take 1 capsule by mouth daily        CITRACAL + D PO      Take 2 tablets by mouth daily        diclofenac 75 MG EC tablet    VOLTAREN     Take 1 tablet by mouth as needed        gabapentin 300 MG capsule    NEURONTIN    270 capsule    TAKE 3 CAPSULES BY MOUTH EVERY NIGHT AT BEDTIME    Neuropathy       latanoprost 0.005 % ophthalmic solution    XALATAN    7.5 mL    Place 1 drop into both eyes At Bedtime Call clinic to schedule MD APPT.    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

## 2018-09-18 DIAGNOSIS — R25.2 CRAMP OF LIMB: ICD-10-CM

## 2018-09-19 RX ORDER — ROPINIROLE 0.25 MG/1
TABLET, FILM COATED ORAL
Qty: 90 TABLET | Refills: 11 | Status: SHIPPED | OUTPATIENT
Start: 2018-09-19 | End: 2018-09-20

## 2018-09-19 NOTE — TELEPHONE ENCOUNTER
"Requested Prescriptions   Pending Prescriptions Disp Refills     rOPINIRole (REQUIP) 0.25 MG tablet [Pharmacy Med Name: ROPINIROLE 0.25MG TABLETS]  Last Written Prescription Date:  07/17/2018  Last Fill Quantity: 60 tablet,  # refills: 0   Last office visit: 5/29/2018 with prescribing provider:  Ciro Macdonald MD    Future Office Visit:     60 tablet 0     Sig: TAKE 1 TO 2 TABLETS(0.25 TO 0.5 MG) BY MOUTH AT BEDTIME    Antiparkinson's Agents Protocol Failed    9/18/2018  7:33 PM       Failed - CBC on record in past 12 months    Recent Labs   Lab Test  05/12/17   1220   WBC  5.7   RBC  4.12*   HGB  13.0*   HCT  38.2*   PLT  219       For [facility] ONLY: [id number] = WBC, [id number] = RBC         Failed - ALT on record in past 12 months        Recent Labs   Lab Test  07/06/17   0938   ALT  34            Passed - Blood pressure under 140/90 in past 12 months    BP Readings from Last 3 Encounters:   05/29/18 124/66   01/23/18 138/68   11/01/17 130/62                Passed - Serum Creatinine on file in past 12 months    Recent Labs   Lab Test  11/01/17   1003   CR  0.91            Passed - Patient is age 18 or older       Passed - Recent (6 mo) or future (30 days) visit within the authorizing provider's specialty    Patient had office visit in the last 6 months or has a visit in the next 30 days with authorizing provider or within the authorizing provider's specialty.  See \"Patient Info\" tab in inbasket, or \"Choose Columns\" in Meds & Orders section of the refill encounter.              "

## 2018-09-19 NOTE — TELEPHONE ENCOUNTER
Routing refill request to provider for review/approval because:  Labs not current.    Please send to pharmacy if approved and route to TC to schedule lab visit.  Mima Kaplan RN

## 2018-09-20 DIAGNOSIS — R25.2 CRAMP OF LIMB: ICD-10-CM

## 2018-09-20 RX ORDER — ROPINIROLE 0.25 MG/1
TABLET, FILM COATED ORAL
Qty: 180 TABLET | Refills: 11 | Status: SHIPPED | OUTPATIENT
Start: 2018-09-20 | End: 2019-11-14

## 2018-09-20 NOTE — TELEPHONE ENCOUNTER
PCP:    Pharmacy requesting 90 day supply. Med can be taken up to 2 times per day. Will route to you to ensure the full 180 per 90 days is ok.     Phoebe Lara RN -- [facility]

## 2018-09-20 NOTE — TELEPHONE ENCOUNTER
The pt is aware and has no further questions at this time.   Suzanne Fernandez on 9/20/2018 at 3:24 PM

## 2018-09-20 NOTE — TELEPHONE ENCOUNTER
Not due for a refill.     rOPINIRole (REQUIP) 0.25 MG tablet was filled 9/19/2018, qty 90 with 11 refills.

## 2018-10-02 DIAGNOSIS — E78.2 MIXED HYPERLIPIDEMIA: ICD-10-CM

## 2018-10-02 NOTE — TELEPHONE ENCOUNTER
"Requested Prescriptions   Pending Prescriptions Disp Refills     atorvastatin (LIPITOR) 20 MG tablet [Pharmacy Med Name: ATORVASTATIN 20MG TABLETS]    Last Written Prescription Date:  11/21/2017  Last Fill Quantity: 30,  # refills: 7   Last office visit: 5/29/2018 with prescribing provider:  Uri Queen     Future Office Visit:     30 tablet 0     Sig: TAKE 1 TABLET BY MOUTH DAILY.    Statins Protocol Failed    10/2/2018 12:24 PM       Failed - LDL on file in past 12 months    Recent Labs   Lab Test  07/06/17   0938   LDL  135*            Passed - No abnormal creatine kinase in past 12 months    No lab results found.            Passed - Recent (12 mo) or future (30 days) visit within the authorizing provider's specialty    Patient had office visit in the last 12 months or has a visit in the next 30 days with authorizing provider or within the authorizing provider's specialty.  See \"Patient Info\" tab in inbasket, or \"Choose Columns\" in Meds & Orders section of the refill encounter.           Passed - Patient is age 18 or older          "

## 2018-10-03 DIAGNOSIS — E78.2 MIXED HYPERLIPIDEMIA: ICD-10-CM

## 2018-10-03 RX ORDER — ATORVASTATIN CALCIUM 20 MG/1
TABLET, FILM COATED ORAL
Qty: 30 TABLET | Refills: 0 | Status: SHIPPED | OUTPATIENT
Start: 2018-10-03 | End: 2018-10-03

## 2018-10-03 NOTE — TELEPHONE ENCOUNTER
30 day supply given.  Patient is due for an physical and labs.  Please call and assist with scheduling appointment prior to next refill   Sharlene Espitia RN - Triage  [facility]

## 2018-10-04 NOTE — TELEPHONE ENCOUNTER
Please reach out to pt if he haven't scheduled an OV by next week Thanks.     Pt calling back, notified as below. Offered to schedule an appointment. Pt states that his wife will call us back to schedule that appointment. Pt is well aware that he need the OV for more refill. LOV for px was on 7/6/17.     Roxana, RN  Triage Nurse

## 2018-10-05 RX ORDER — ATORVASTATIN CALCIUM 20 MG/1
TABLET, FILM COATED ORAL
Status: SHIPPED
Start: 2018-10-05 | End: 2018-12-01

## 2018-10-17 ENCOUNTER — ALLIED HEALTH/NURSE VISIT (OUTPATIENT)
Dept: NURSING | Facility: CLINIC | Age: 78
End: 2018-10-17
Payer: COMMERCIAL

## 2018-10-17 DIAGNOSIS — Z23 NEED FOR PROPHYLACTIC VACCINATION AND INOCULATION AGAINST INFLUENZA: Primary | ICD-10-CM

## 2018-10-17 PROCEDURE — 90662 IIV NO PRSV INCREASED AG IM: CPT

## 2018-10-17 PROCEDURE — G0008 ADMIN INFLUENZA VIRUS VAC: HCPCS

## 2018-10-17 NOTE — PROGRESS NOTES

## 2018-10-17 NOTE — MR AVS SNAPSHOT
After Visit Summary   10/17/2018    Ciro Almonte    MRN: [id number]           Patient Information     Date Of Birth          1940        Visit Information        Provider Department      10/17/2018 10:20 AM EA FLU CLINIC NURSE [facility]        Today's Diagnoses     Need for prophylactic vaccination and inoculation against influenza    -  1       Follow-ups after your visit        Your next 10 appointments already scheduled     Oct 29, 2018  8:30 AM CDT   LAB with EA LAB   [facility]    [address] [place] [address]  [address]  [facility] [address]   [phone]           Please do not eat 10-12 hours before your appointment if you are coming in fasting for labs on lipids, cholesterol, or glucose (sugar). This does not apply to pregnant women. Water, hot tea and black coffee (with nothing added) are okay. Do not drink other fluids, diet soda or chew gum.            Nov 07, 2018  1:10 PM CST   PHYSICAL with Uri Queen MD   [facility] ([facility])    [address]  [address]  [facility] [address]   [phone]            Sep 12, 2019  9:00 AM CDT   RETURN GLAUCOMA with Shannon Yang MD   [facility] ([facility])    [address] [facility] [address] [place] [id number] [phone]              Who to contact     If you have questions or need follow up information about today's clinic visit or your schedule please contact [facility] directly at [phone].  Normal or non-critical lab and imaging results will be communicated to you by MyChart, letter or phone within 4 business days after the clinic has received the results. If you do not hear from us within 7 days, please contact the clinic through MyChart or phone. If you have a critical or abnormal lab result, we will notify you by phone as soon as possible.  Submit refill requests  "through [employer] or call your pharmacy and they will forward the refill request to us. Please allow 3 business days for your refill to be completed.          Additional Information About Your Visit        [employer] [place] Information     [employer] lets you send messages to your doctor, view your test results, renew your prescriptions, schedule appointments and more. To sign up, go to [email or web address] . Click on \"Log in\" on the left side of the screen, which will take you to the Welcome page. Then click on \"Sign up Now\" on the right side of the page.     You will be asked to enter the access code listed below, as well as some personal information. Please follow the directions to create your username and password.     Your access code is: [email or web address]  Expires: 12/10/2018  3:41 PM     Your access code will  in 90 days. If you need help or a new code, please call your [facility] clinic or [phone].        Care EveryWhere ID     This is your Care EveryWhere ID. This could be used by other organizations to access your [facility] medical records  [id number]         Blood Pressure from Last 3 Encounters:   18 124/66   18 138/68   17 130/62    Weight from Last 3 Encounters:   18 185 lb (83.9 kg)   18 188 lb 8 oz (85.5 kg)   17 182 lb (82.6 kg)              We Performed the Following     FLU VACCINE, INCREASED ANTIGEN, PRESV FREE, AGE 65+ [[id number]]     Vaccine Administration, Initial [[id number]]        Primary Care Provider Office Phone # Fax #    Uri Queen [phone] [address] [place] [address]        Equal Access to Services     [facility]: John Loya, alexanrd chino, shawanda urena. So [facility] [phone].    ATENCIÓN: Si habla español, tiene a oliver disposición servicios gratuitos de asistencia lingüística. Llame al [phone].    We comply with applicable federal " civil rights laws and [place] laws. We do not discriminate on the basis of race, color, national origin, age, disability, sex, sexual orientation, or gender identity.            Thank you!     Thank you for choosing [facility] [place]  for your care. Our goal is always to provide you with excellent care. Hearing back from our patients is one way we can continue to improve our services. Please take a few minutes to complete the written survey that you may receive in the mail after your visit with us. Thank you!             Your Updated Medication List - Protect others around you: Learn how to safely use, store and throw away your medicines at www.disposemymeds.org.          This list is accurate as of 10/17/18 10:34 AM.  Always use your most recent med list.                   Brand Name Dispense Instructions for use Diagnosis    amLODIPine 10 MG tablet    NORVASC    90 tablet    TAKE 1 TABLET(10 MG) BY MOUTH DAILY    Essential hypertension with goal blood pressure less than 140/90       atorvastatin 20 MG tablet    LIPITOR     TAKE 1 TABLET BY MOUTH DAILY    Mixed hyperlipidemia       celecoxib 200 MG capsule    celeBREX     Take 1 capsule by mouth daily        CITRACAL + D PO      Take 2 tablets by mouth daily        diclofenac 75 MG EC tablet    VOLTAREN     Take 1 tablet by mouth as needed        gabapentin 300 MG capsule    NEURONTIN    270 capsule    TAKE 3 CAPSULES BY MOUTH EVERY NIGHT AT BEDTIME    Neuropathy, Mixed hyperlipidemia       latanoprost 0.005 % ophthalmic solution    XALATAN    7.5 mL    Place 1 drop into both eyes At Bedtime Call clinic to schedule MD APPT.    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

## 2018-10-29 DIAGNOSIS — Z12.5 SCREENING FOR PROSTATE CANCER: ICD-10-CM

## 2018-10-29 DIAGNOSIS — E78.2 MIXED HYPERLIPIDEMIA: ICD-10-CM

## 2018-10-29 DIAGNOSIS — G62.9 NEUROPATHY: ICD-10-CM

## 2018-10-29 LAB
ALBUMIN SERPL-MCNC: 3.7 G/DL (ref 3.4–5)
ALP SERPL-CCNC: 87 U/L (ref 40–150)
ALT SERPL W P-5'-P-CCNC: 48 U/L (ref 0–70)
ANION GAP SERPL CALCULATED.3IONS-SCNC: 6 MMOL/L (ref 3–14)
AST SERPL W P-5'-P-CCNC: 27 U/L (ref 0–45)
BASOPHILS # BLD AUTO: 0 10E9/L (ref 0–0.2)
BASOPHILS NFR BLD AUTO: 0.2 %
BILIRUB SERPL-MCNC: 0.7 MG/DL (ref 0.2–1.3)
BUN SERPL-MCNC: 18 MG/DL (ref 7–30)
CALCIUM SERPL-MCNC: 8.8 MG/DL (ref 8.5–10.1)
CHLORIDE SERPL-SCNC: 109 MMOL/L (ref 94–109)
CHOLEST SERPL-MCNC: 139 MG/DL
CO2 SERPL-SCNC: 29 MMOL/L (ref 20–32)
CREAT SERPL-MCNC: 0.8 MG/DL (ref 0.66–1.25)
DIFFERENTIAL METHOD BLD: ABNORMAL
EOSINOPHIL # BLD AUTO: 0.2 10E9/L (ref 0–0.7)
EOSINOPHIL NFR BLD AUTO: 3 %
ERYTHROCYTE [DISTWIDTH] IN BLOOD BY AUTOMATED COUNT: 12.7 % (ref 10–15)
GFR SERPL CREATININE-BSD FRML MDRD: >90 ML/MIN/1.7M2
GLUCOSE SERPL-MCNC: 89 MG/DL (ref 70–99)
HCT VFR BLD AUTO: 38.7 % (ref 40–53)
HDLC SERPL-MCNC: 69 MG/DL
HGB BLD-MCNC: 12.5 G/DL (ref 13.3–17.7)
LDLC SERPL CALC-MCNC: 58 MG/DL
LYMPHOCYTES # BLD AUTO: 1.3 10E9/L (ref 0.8–5.3)
LYMPHOCYTES NFR BLD AUTO: 24.7 %
MCH RBC QN AUTO: 31.6 PG (ref 26.5–33)
MCHC RBC AUTO-ENTMCNC: 32.3 G/DL (ref 31.5–36.5)
MCV RBC AUTO: 98 FL (ref 78–100)
MONOCYTES # BLD AUTO: 0.6 10E9/L (ref 0–1.3)
MONOCYTES NFR BLD AUTO: 10.7 %
NEUTROPHILS # BLD AUTO: 3.3 10E9/L (ref 1.6–8.3)
NEUTROPHILS NFR BLD AUTO: 61.4 %
NONHDLC SERPL-MCNC: 70 MG/DL
PLATELET # BLD AUTO: 222 10E9/L (ref 150–450)
POTASSIUM SERPL-SCNC: 4.3 MMOL/L (ref 3.4–5.3)
PROT SERPL-MCNC: 6.8 G/DL (ref 6.8–8.8)
PSA SERPL-ACNC: 1.85 UG/L (ref 0–4)
RBC # BLD AUTO: 3.95 10E12/L (ref 4.4–5.9)
SODIUM SERPL-SCNC: 144 MMOL/L (ref 133–144)
TRIGL SERPL-MCNC: 62 MG/DL
WBC # BLD AUTO: 5.3 10E9/L (ref 4–11)

## 2018-10-29 PROCEDURE — 80053 COMPREHEN METABOLIC PANEL: CPT | Performed by: INTERNAL MEDICINE

## 2018-10-29 PROCEDURE — G0103 PSA SCREENING: HCPCS | Performed by: INTERNAL MEDICINE

## 2018-10-29 PROCEDURE — 85025 COMPLETE CBC W/AUTO DIFF WBC: CPT | Performed by: INTERNAL MEDICINE

## 2018-10-29 PROCEDURE — 80061 LIPID PANEL: CPT | Performed by: INTERNAL MEDICINE

## 2018-10-29 PROCEDURE — 36415 COLL VENOUS BLD VENIPUNCTURE: CPT | Performed by: INTERNAL MEDICINE

## 2018-11-06 NOTE — PATIENT INSTRUCTIONS
1) Urine check downstairs today- let me now if the back is changing    2) Check on the shingles vaccine (shingrix)     3) Can try the viagra at 50 mg per time to see if helps, could try the Cialis at 5 mg per day. Let me know if lightheaded or dizzy. Do not take the viagra and the cialis together.    4) Let me know if the skin areas are changing on the head    Let me know if other issues come up.    Uri Queen MD    Preventive Health Recommendations:     See your health care provider every year to    Review health changes.     Discuss preventive care.      Review your medicines if your doctor has prescribed any.      Talk with your health care provider about whether you should have a test to screen for prostate cancer (PSA).    Every 3 years, have a diabetes test (fasting glucose). If you are at risk for diabetes, you should have this test more often.    Every 5 years, have a cholesterol test. Have this test more often if you are at risk for high cholesterol or heart disease.     Every 10 years, have a colonoscopy. Or, have a yearly FIT test (stool test). These exams will check for colon cancer.    Talk to with your health care provider about screening for Abdominal Aortic Aneurysm if you have a family history of AAA or have a history of smoking.    Shots:     Get a flu shot each year.     Get a tetanus shot every 10 years.     Talk to your doctor about your pneumonia vaccines. There are now two you should receive - Pneumovax (PPSV 23) and Prevnar (PCV 13).     Talk to your pharmacist about a shingles vaccine.     Talk to your doctor about the hepatitis B vaccine.  Nutrition:     Eat at least 5 servings of fruits and vegetables each day.     Eat whole-grain bread, whole-wheat pasta and brown rice instead of white grains and rice.     Get adequate Calcium and Vitamin D.   Lifestyle    Exercise for at least 150 minutes a week (30 minutes a day, 5 days a week). This will help you control your weight and prevent  disease.     Limit alcohol to one drink per day.     No smoking.     Wear sunscreen to prevent skin cancer.    See your dentist every six months for an exam and cleaning.    See your eye doctor every 1 to 2 years to screen for conditions such as glaucoma, macular degeneration, cataracts, etc.    Personalized Prevention Plan  You are due for the preventive services outlined below.  Your care team is available to assist you in scheduling these services.  If you have already completed any of these items, please share that information with your care team to update in your medical record.  Health Maintenance Due   Topic Date Due     ANTONI QUESTIONNAIRE 1 YEAR  07/06/2018     Depression Assessment - yearly  07/06/2018       Preventive Health Recommendations:     See your health care provider every year to    Review health changes.     Discuss preventive care.      Review your medicines if your doctor has prescribed any.      Talk with your health care provider about whether you should have a test to screen for prostate cancer (PSA).    Every 3 years, have a diabetes test (fasting glucose). If you are at risk for diabetes, you should have this test more often.    Every 5 years, have a cholesterol test. Have this test more often if you are at risk for high cholesterol or heart disease.     Every 10 years, have a colonoscopy. Or, have a yearly FIT test (stool test). These exams will check for colon cancer.    Talk to with your health care provider about screening for Abdominal Aortic Aneurysm if you have a family history of AAA or have a history of smoking.    Shots:     Get a flu shot each year.     Get a tetanus shot every 10 years.     Talk to your doctor about your pneumonia vaccines. There are now two you should receive - Pneumovax (PPSV 23) and Prevnar (PCV 13).     Talk to your pharmacist about a shingles vaccine.     Talk to your doctor about the hepatitis B vaccine.  Nutrition:     Eat at least 5 servings of fruits  and vegetables each day.     Eat whole-grain bread, whole-wheat pasta and brown rice instead of white grains and rice.     Get adequate Calcium and Vitamin D.   Lifestyle    Exercise for at least 150 minutes a week (30 minutes a day, 5 days a week). This will help you control your weight and prevent disease.     Limit alcohol to one drink per day.     No smoking.     Wear sunscreen to prevent skin cancer.    See your dentist every six months for an exam and cleaning.    See your eye doctor every 1 to 2 years to screen for conditions such as glaucoma, macular degeneration, cataracts, etc.    Personalized Prevention Plan  You are due for the preventive services outlined below.  Your care team is available to assist you in scheduling these services.  If you have already completed any of these items, please share that information with your care team to update in your medical record.  Health Maintenance Due   Topic Date Due     ANTONI QUESTIONNAIRE 1 YEAR  07/06/2018     Depression Assessment - yearly  07/06/2018       At your visit today, we discussed your risk for falls and preventive options.    Fall Prevention  Falls often occur due to slipping, tripping or losing your balance. Millions of people fall every year and injure themselves. Here are ways to reduce your risk of falling again.    Think about your fall, was there anything that caused your fall that can be fixed, removed, or replaced?    Make your home safe by keeping walkways clear of objects you may trip over, such as electric cords.    Use non-slip pads under rugs. Don't use area rugs or small throw rugs.    Use non-slip mats in bathtubs and showers.    Install handrails and lights on staircases. The handrails should be on both sides of the stairs.    Don't walk in poorly lit areas.    Don't stand on chairs or wobbly ladders.    Use caution when reaching overhead or looking upward. This position can cause a loss of balance.    Be sure your shoes fit properly,  have non-slip bottoms and are in good condition.     Wear shoes both inside and out. Don't go barefoot or wear slippers.    Be cautious when going up and down stairs, curbs, and when walking on uneven sidewalks.    If your balance is poor, consider using a cane or walker.    If your fall was related to alcohol use, stop or limit alcohol intake.     If your fall was related to use of sleeping medicines, talk to your healthcare provider about this. You may need to reduce your dosage at bedtime if you awaken during the night to go to the bathroom.      To reduce the need for nighttime bathroom trips:  ? Don't drink fluids for several hours before going to bed  ? Empty your bladder before going to bed  ? Men can keep a urinal at the bedside    Stay as active as you can. Balance, flexibility, strength, and endurance all come from exercise. They all play a role in preventing falls. Ask your healthcare provider which types of activity are right for you.    Get your vision checked on a regular basis.    If you have pets, know where they are before you stand up or walk so you don't trip over them.    Use night lights.    Go over all your medicines with a pharmacist or other healthcare provider to see if any of them could make you more likely to fall.  Date Last Reviewed: 4/1/2018 2000-2018 [employer]. [address] [place] [address], [place] [address]. All rights reserved. This information is not intended as a substitute for professional medical care. Always follow your healthcare professional's instructions.

## 2018-11-06 NOTE — PROGRESS NOTES
"SUBJECTIVE:   CC: Ciro Almonte is an 78 year old male who presents for preventative health visit.     HPI  {Outside tests to abstract? :411024}    {additional problems to add (Optional):390361}    Today's PHQ-2 Score:   PHQ-2 ( 1999 Pfizer) 7/6/2017   Q1: Little interest or pleasure in doing things 0   Q2: Feeling down, depressed or hopeless 0   PHQ-2 Score 0   Q1: Little interest or pleasure in doing things Not at all   Q2: Feeling down, depressed or hopeless Not at all   PHQ-2 Score 0       Abuse: Current or Past(Physical, Sexual or Emotional)- {YES/NO/NA:282095}  Do you feel safe in your environment - {YES/NO/NA:058954}    Social History   Substance Use Topics     Smoking status: Former Smoker     Quit date: 6/27/1976     Smokeless tobacco: Never Used     Alcohol use No      Comment: Quit 8/1014     No flowsheet data found.{add AUDIT responses (Optional) (A score of 7 for adult men is an indication of hazardous drinking; a score of 8 or more is an indication of an alcohol use disorder.  A score of 7 or more for adult women is an indication of hazardous drinking or an alchohol use disorder):363399}    Last PSA:   PSA   Date Value Ref Range Status   10/29/2018 1.85 0 - 4 ug/L Final     Comment:     Assay Method:  Chemiluminescence using Siemens Vista analyzer       Reviewed orders with patient. Reviewed health maintenance and updated orders accordingly - {Yes/No:140815::\"Yes\"}  {Chronicprobdata (Optional):612476}    Reviewed and updated as needed this visit by clinical staff         Reviewed and updated as needed this visit by Provider        {HISTORY OPTIONS (Optional):359566}    Review of Systems  {MALE ROS (Optional):492994::\"CONSTITUTIONAL: NEGATIVE for fever, chills, change in weight\",\"INTEGUMENTARY/SKIN: NEGATIVE for worrisome rashes, moles or lesions\",\"EYES: NEGATIVE for vision changes or irritation\",\"ENT: NEGATIVE for ear, mouth and throat problems\",\"RESP: NEGATIVE for significant cough or SOB\",\"CV: " "NEGATIVE for chest pain, palpitations or peripheral edema\",\"GI: NEGATIVE for nausea, abdominal pain, heartburn, or change in bowel habits\",\" male: negative for dysuria, hematuria, decreased urinary stream, erectile dysfunction, urethral discharge\",\"MUSCULOSKELETAL: NEGATIVE for significant arthralgias or myalgia\",\"NEURO: NEGATIVE for weakness, dizziness or paresthesias\",\"PSYCHIATRIC: NEGATIVE for changes in mood or affect\"}    OBJECTIVE:   There were no vitals taken for this visit.    Physical Exam  {Exam Choices (Optional):815555}    {Diagnostic Test Results (Optional):494196::\"Diagnostic Test Results:\",\"none \"}    ASSESSMENT/PLAN:   {Dia Picklist:959606}    COUNSELING:   {MALE COUNSELING MESSAGES:599556::\"Reviewed preventive health counseling, as reflected in patient instructions\"}    BP Readings from Last 1 Encounters:   05/29/18 124/66     Estimated body mass index is 26.54 kg/(m^2) as calculated from the following:    Height as of 5/29/18: 5' 10\" (1.778 m).    Weight as of 5/29/18: 185 lb (83.9 kg).    {BP Counseling- Complete if BP >= 120/80  (Optional):798093}  {Weight Management Plan (ACO) Complete if BMI is abnormal-  Ages 18-64  BMI >24.9.  Age 65+ with BMI <23 or >30 (Optional):279638}     reports that he quit smoking about 42 years ago. He has never used smokeless tobacco.  {Tobacco Cessation -- Complete if patient is a smoker (Optional):267453}    Counseling Resources:  ATP IV Guidelines  Pooled Cohorts Equation Calculator  FRAX Risk Assessment  ICSI Preventive Guidelines  Dietary Guidelines for Americans, 2010  USDA's MyPlate  ASA Prophylaxis  Lung CA Screening    Uri Queen MD, MD  [facility] [place]  "

## 2018-11-07 ENCOUNTER — OFFICE VISIT (OUTPATIENT)
Dept: PEDIATRICS | Facility: CLINIC | Age: 78
End: 2018-11-07
Payer: COMMERCIAL

## 2018-11-07 VITALS
WEIGHT: 185 LBS | OXYGEN SATURATION: 96 % | HEIGHT: 70 IN | HEART RATE: 63 BPM | BODY MASS INDEX: 26.48 KG/M2 | TEMPERATURE: 98.3 F | DIASTOLIC BLOOD PRESSURE: 60 MMHG | SYSTOLIC BLOOD PRESSURE: 122 MMHG

## 2018-11-07 DIAGNOSIS — Z00.00 ROUTINE HISTORY AND PHYSICAL EXAMINATION OF ADULT: Primary | ICD-10-CM

## 2018-11-07 DIAGNOSIS — R10.9 FLANK PAIN: ICD-10-CM

## 2018-11-07 DIAGNOSIS — I10 ESSENTIAL HYPERTENSION WITH GOAL BLOOD PRESSURE LESS THAN 140/90: ICD-10-CM

## 2018-11-07 LAB
ALBUMIN UR-MCNC: NEGATIVE MG/DL
APPEARANCE UR: CLEAR
BILIRUB UR QL STRIP: NEGATIVE
COLOR UR AUTO: YELLOW
GLUCOSE UR STRIP-MCNC: NEGATIVE MG/DL
HGB UR QL STRIP: NEGATIVE
KETONES UR STRIP-MCNC: NEGATIVE MG/DL
LEUKOCYTE ESTERASE UR QL STRIP: NEGATIVE
NITRATE UR QL: NEGATIVE
PH UR STRIP: 7 PH (ref 5–7)
SOURCE: NORMAL
SP GR UR STRIP: 1.01 (ref 1–1.03)
UROBILINOGEN UR STRIP-ACNC: 0.2 EU/DL (ref 0.2–1)

## 2018-11-07 PROCEDURE — 99213 OFFICE O/P EST LOW 20 MIN: CPT | Mod: 25 | Performed by: INTERNAL MEDICINE

## 2018-11-07 PROCEDURE — 99397 PER PM REEVAL EST PAT 65+ YR: CPT | Performed by: INTERNAL MEDICINE

## 2018-11-07 PROCEDURE — 81003 URINALYSIS AUTO W/O SCOPE: CPT | Performed by: INTERNAL MEDICINE

## 2018-11-07 RX ORDER — AMLODIPINE BESYLATE 10 MG/1
TABLET ORAL
Qty: 90 TABLET | Refills: 11 | Status: SHIPPED | OUTPATIENT
Start: 2018-11-07 | End: 2019-04-09

## 2018-11-07 RX ORDER — LISINOPRIL 10 MG/1
TABLET ORAL
Qty: 90 TABLET | Refills: 11 | Status: SHIPPED | OUTPATIENT
Start: 2018-11-07 | End: 2019-04-09

## 2018-11-07 ASSESSMENT — PATIENT HEALTH QUESTIONNAIRE - PHQ9
SUM OF ALL RESPONSES TO PHQ QUESTIONS 1-9: 1
5. POOR APPETITE OR OVEREATING: NOT AT ALL

## 2018-11-07 ASSESSMENT — ANXIETY QUESTIONNAIRES
1. FEELING NERVOUS, ANXIOUS, OR ON EDGE: NOT AT ALL
2. NOT BEING ABLE TO STOP OR CONTROL WORRYING: NOT AT ALL
5. BEING SO RESTLESS THAT IT IS HARD TO SIT STILL: NOT AT ALL
GAD7 TOTAL SCORE: 0
6. BECOMING EASILY ANNOYED OR IRRITABLE: NOT AT ALL
3. WORRYING TOO MUCH ABOUT DIFFERENT THINGS: NOT AT ALL
IF YOU CHECKED OFF ANY PROBLEMS ON THIS QUESTIONNAIRE, HOW DIFFICULT HAVE THESE PROBLEMS MADE IT FOR YOU TO DO YOUR WORK, TAKE CARE OF THINGS AT HOME, OR GET ALONG WITH OTHER PEOPLE: NOT DIFFICULT AT ALL
7. FEELING AFRAID AS IF SOMETHING AWFUL MIGHT HAPPEN: NOT AT ALL

## 2018-11-07 NOTE — LETTER
[facility]  [address]  [place] [address]                  [phone]   November 8, 2018    Ciro Almonte  [address] [place] [place] [address]      Dear Ciro,    Here is a summary of your recent test results:    The urine was normal without concerning changes. We can consider imaging if the flank pain still is going on or worsening.     Your test results are enclosed.      Please contact me if you have any questions.           Thank you very much for choosing [facility]    Best regards,    Uri Queen MD        Results for orders placed or performed in visit on 11/07/18   *UA reflex to Microscopic and Culture ([facility] and [facility] (except [place] and [place])   Result Value Ref Range    Color Urine Yellow     Appearance Urine Clear     Glucose Urine Negative NEG^Negative mg/dL    Bilirubin Urine Negative NEG^Negative    Ketones Urine Negative NEG^Negative mg/dL    Specific Gravity Urine 1.015 1.003 - 1.035    Blood Urine Negative NEG^Negative    pH Urine 7.0 5.0 - 7.0 pH    Protein Albumin Urine Negative NEG^Negative mg/dL    Urobilinogen Urine 0.2 0.2 - 1.0 EU/dL    Nitrite Urine Negative NEG^Negative    Leukocyte Esterase Urine Negative NEG^Negative    Source Midstream Urine

## 2018-11-07 NOTE — MR AVS SNAPSHOT
After Visit Summary   11/7/2018    Ciro Almonte    MRN: [id number]           Patient Information     Date Of Birth          1940        Visit Information        Provider Department      11/7/2018 1:10 PM Uri Queen MD [facility]        Today's Diagnoses     Flank pain    -  1    Essential hypertension with goal blood pressure less than 140/90          Care Instructions    1) Urine check downstairs today- let me now if the back is changing    2) Check on the shingles vaccine (shingrix)     3) Can try the viagra at 50 mg per time to see if helps, could try the Cialis at 5 mg per day. Let me know if lightheaded or dizzy. Do not take the viagra and the cialis together.    4) Let me know if the skin areas are changing on the head    Let me know if other issues come up.    Uri Queen MD    Preventive Health Recommendations:     See your health care provider every year to    Review health changes.     Discuss preventive care.      Review your medicines if your doctor has prescribed any.      Talk with your health care provider about whether you should have a test to screen for prostate cancer (PSA).    Every 3 years, have a diabetes test (fasting glucose). If you are at risk for diabetes, you should have this test more often.    Every 5 years, have a cholesterol test. Have this test more often if you are at risk for high cholesterol or heart disease.     Every 10 years, have a colonoscopy. Or, have a yearly FIT test (stool test). These exams will check for colon cancer.    Talk to with your health care provider about screening for Abdominal Aortic Aneurysm if you have a family history of AAA or have a history of smoking.    Shots:     Get a flu shot each year.     Get a tetanus shot every 10 years.     Talk to your doctor about your pneumonia vaccines. There are now two you should receive - Pneumovax (PPSV 23) and Prevnar (PCV 13).     Talk to your pharmacist about a shingles  vaccine.     Talk to your doctor about the hepatitis B vaccine.  Nutrition:     Eat at least 5 servings of fruits and vegetables each day.     Eat whole-grain bread, whole-wheat pasta and brown rice instead of white grains and rice.     Get adequate Calcium and Vitamin D.   Lifestyle    Exercise for at least 150 minutes a week (30 minutes a day, 5 days a week). This will help you control your weight and prevent disease.     Limit alcohol to one drink per day.     No smoking.     Wear sunscreen to prevent skin cancer.    See your dentist every six months for an exam and cleaning.    See your eye doctor every 1 to 2 years to screen for conditions such as glaucoma, macular degeneration, cataracts, etc.    Personalized Prevention Plan  You are due for the preventive services outlined below.  Your care team is available to assist you in scheduling these services.  If you have already completed any of these items, please share that information with your care team to update in your medical record.  Health Maintenance Due   Topic Date Due     ANTONI QUESTIONNAIRE 1 YEAR  07/06/2018     Depression Assessment - yearly  07/06/2018       Preventive Health Recommendations:     See your health care provider every year to    Review health changes.     Discuss preventive care.      Review your medicines if your doctor has prescribed any.      Talk with your health care provider about whether you should have a test to screen for prostate cancer (PSA).    Every 3 years, have a diabetes test (fasting glucose). If you are at risk for diabetes, you should have this test more often.    Every 5 years, have a cholesterol test. Have this test more often if you are at risk for high cholesterol or heart disease.     Every 10 years, have a colonoscopy. Or, have a yearly FIT test (stool test). These exams will check for colon cancer.    Talk to with your health care provider about screening for Abdominal Aortic Aneurysm if you have a family  history of AAA or have a history of smoking.    Shots:     Get a flu shot each year.     Get a tetanus shot every 10 years.     Talk to your doctor about your pneumonia vaccines. There are now two you should receive - Pneumovax (PPSV 23) and Prevnar (PCV 13).     Talk to your pharmacist about a shingles vaccine.     Talk to your doctor about the hepatitis B vaccine.  Nutrition:     Eat at least 5 servings of fruits and vegetables each day.     Eat whole-grain bread, whole-wheat pasta and brown rice instead of white grains and rice.     Get adequate Calcium and Vitamin D.   Lifestyle    Exercise for at least 150 minutes a week (30 minutes a day, 5 days a week). This will help you control your weight and prevent disease.     Limit alcohol to one drink per day.     No smoking.     Wear sunscreen to prevent skin cancer.    See your dentist every six months for an exam and cleaning.    See your eye doctor every 1 to 2 years to screen for conditions such as glaucoma, macular degeneration, cataracts, etc.    Personalized Prevention Plan  You are due for the preventive services outlined below.  Your care team is available to assist you in scheduling these services.  If you have already completed any of these items, please share that information with your care team to update in your medical record.  Health Maintenance Due   Topic Date Due     ANTONI QUESTIONNAIRE 1 YEAR  07/06/2018     Depression Assessment - yearly  07/06/2018       At your visit today, we discussed your risk for falls and preventive options.    Fall Prevention  Falls often occur due to slipping, tripping or losing your balance. Millions of people fall every year and injure themselves. Here are ways to reduce your risk of falling again.    Think about your fall, was there anything that caused your fall that can be fixed, removed, or replaced?    Make your home safe by keeping walkways clear of objects you may trip over, such as electric cords.    Use non-slip  pads under rugs. Don't use area rugs or small throw rugs.    Use non-slip mats in bathtubs and showers.    Install handrails and lights on staircases. The handrails should be on both sides of the stairs.    Don't walk in poorly lit areas.    Don't stand on chairs or wobbly ladders.    Use caution when reaching overhead or looking upward. This position can cause a loss of balance.    Be sure your shoes fit properly, have non-slip bottoms and are in good condition.     Wear shoes both inside and out. Don't go barefoot or wear slippers.    Be cautious when going up and down stairs, curbs, and when walking on uneven sidewalks.    If your balance is poor, consider using a cane or walker.    If your fall was related to alcohol use, stop or limit alcohol intake.     If your fall was related to use of sleeping medicines, talk to your healthcare provider about this. You may need to reduce your dosage at bedtime if you awaken during the night to go to the bathroom.      To reduce the need for nighttime bathroom trips:  ? Don't drink fluids for several hours before going to bed  ? Empty your bladder before going to bed  ? Men can keep a urinal at the bedside    Stay as active as you can. Balance, flexibility, strength, and endurance all come from exercise. They all play a role in preventing falls. Ask your healthcare provider which types of activity are right for you.    Get your vision checked on a regular basis.    If you have pets, know where they are before you stand up or walk so you don't trip over them.    Use night lights.    Go over all your medicines with a pharmacist or other healthcare provider to see if any of them could make you more likely to fall.  Date Last Reviewed: 4/1/2018 2000-2018 [employer]. [address], [place] [address]. All rights reserved. This information is not intended as a substitute for professional medical care. Always follow your healthcare professional's  "instructions.                Follow-ups after your visit        Follow-up notes from your care team     Return in about 6 months (around 2019).      Your next 10 appointments already scheduled     Sep 12, 2019  9:00 AM CDT   RETURN GLAUCOMA with Shannon Yang MD   Eye Clinic ([facility])    Tavo Mohamud [address]  [address] [address]  [facility] [address] [phone]              Who to contact     If you have questions or need follow up information about today's clinic visit or your schedule please contact [facility] [place] directly at [phone].  Normal or non-critical lab and imaging results will be communicated to you by MyChart, letter or phone within 4 business days after the clinic has received the results. If you do not hear from us within 7 days, please contact the clinic through MyChart or phone. If you have a critical or abnormal lab result, we will notify you by phone as soon as possible.  Submit refill requests through [employer] or call your pharmacy and they will forward the refill request to us. Please allow 3 business days for your refill to be completed.          Additional Information About Your Visit        MyChart Information     [employer] lets you send messages to your doctor, view your test results, renew your prescriptions, schedule appointments and more. To sign up, go to [email or web address] . Click on \"Log in\" on the left side of the screen, which will take you to the Welcome page. Then click on \"Sign up Now\" on the right side of the page.     You will be asked to enter the access code listed below, as well as some personal information. Please follow the directions to create your username and password.     Your access code is: [email or web address]  Expires: 12/10/2018  2:41 PM     Your access code will  in 90 days. If you need help or a new code, please call your [facility] clinic or [phone].        Care EveryWhere ID     This is your " "Care EveryWhere ID. This could be used by other organizations to access your [facility] medical records  [id number]        Your Vitals Were     Pulse Temperature Height Pulse Oximetry BMI (Body Mass Index)       63 98.3  F (36.8  C) (Oral) 5' 10\" (1.778 m) 96% 26.54 kg/m2        Blood Pressure from Last 3 Encounters:   11/07/18 122/60   05/29/18 124/66   01/23/18 138/68    Weight from Last 3 Encounters:   11/07/18 185 lb (83.9 kg)   05/29/18 185 lb (83.9 kg)   01/23/18 188 lb 8 oz (85.5 kg)              We Performed the Following     *UA reflex to Microscopic and Culture ([place] and [facility] (except [place] and [place])          Where to get your medicines      These medications were sent to [facility] - [place], [place] - [address]]  AT [facility] [address] [place] [address]     Phone:  [phone]     amLODIPine 10 MG tablet    lisinopril 10 MG tablet          Primary Care Provider Office Phone # Fax #    Uri Queen [phone] [address] [place] [address]        Equal Access to Services     [facility] AH: Hadii aad ku hadasho Soomaali, waaxda luqadaha, qaybta kaalmada adeegyada, waxay ashleyin haynguyễn mortensen. So [facility] [phone].    ATENCIÓN: Si habla español, tiene a oliver disposición servicios gratuitos de asistencia lingüística. Bonnie al [phone].    We comply with applicable federal civil rights laws and [place] laws. We do not discriminate on the basis of race, color, national origin, age, disability, sex, sexual orientation, or gender identity.            Thank you!     Thank you for choosing [facility]  for your care. Our goal is always to provide you with excellent care. Hearing back from our patients is one way we can continue to improve our services. Please take a few minutes to complete the written survey that you may receive in the mail after your visit with us. " Thank you!             Your Updated Medication List - Protect others around you: Learn how to safely use, store and throw away your medicines at www.disposemymeds.org.          This list is accurate as of 11/7/18  2:31 PM.  Always use your most recent med list.                   Brand Name Dispense Instructions for use Diagnosis    amLODIPine 10 MG tablet    NORVASC    90 tablet    TAKE 1 TABLET(10 MG) BY MOUTH DAILY    Essential hypertension with goal blood pressure less than 140/90       ASPIRIN PO      Take 81 mg by mouth        atorvastatin 20 MG tablet    LIPITOR     TAKE 1 TABLET BY MOUTH DAILY    Mixed hyperlipidemia       celecoxib 200 MG capsule    celeBREX     Take 1 capsule by mouth daily        CITRACAL + D PO      Take 2 tablets by mouth daily        diclofenac 75 MG EC tablet    VOLTAREN     Take 2 tablets by mouth as needed        gabapentin 300 MG capsule    NEURONTIN    270 capsule    TAKE 3 CAPSULES BY MOUTH EVERY NIGHT AT BEDTIME    Neuropathy, Mixed hyperlipidemia       latanoprost 0.005 % ophthalmic solution    XALATAN    7.5 mL    Place 1 drop into both eyes At Bedtime Call clinic to schedule MD APPT.    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

## 2018-11-07 NOTE — PROGRESS NOTES
"  SUBJECTIVE:   Ciro Almonte is a 78 year old male who presents for Preventive Visit.    Are you in the first 12 months of your Medicare Part B coverage?  No    Physical Health:    In general, how would you rate your overall physical health? good    Outside of work, how many days during the week do you exercise? none    Outside of work, approximately how many minutes a day do you exercise?not applicable    If you drink alcohol do you typically have >3 drinks per day or >7 drinks per week? No    Do you usually eat at least 4 servings of fruit and vegetables a day, include whole grains & fiber and avoid regularly eating high fat or \"junk\" foods? NO    Do you have any problems taking medications regularly?  No    Do you have any side effects from medications? not applicable    Needs assistance for the following daily activities: no assistance needed    Which of the following safety concerns are present in your home?:  none identified     Hearing impairment: Yes, Difficulty following a conversation in a noisy restaurant or crowded room.    In the past 6 months, have you been bothered by leaking of urine? yes    Mental Health:    In general, how would you rate your overall mental or emotional health? good  PHQ-2 Score:      Additional concerns to address?  No    Fall risk:      Fallen 2 or more times in the past year?: Yes  Any fall with injury in the past year?: Yes      COGNITIVE SCREEN  1) Repeat 3 items (Leader, Season, Table)    2) Clock draw: NORMAL  3) 3 item recall: Recalls 3 objects  Results: 3 items recalled: COGNITIVE IMPAIRMENT LESS LIKELY    Mini-CogTM Copyright RIKKI Combs. Licensed by the author for use in [facility]; reprinted with permission (ricki@.[facility]). All rights reserved.    Follows with dermatology for skin, no acute issues.    Flank pain: has been having intermittent left flank pain, worse with rotation at times. No radicular symptoms. No fevers or chills. No dysuria or hematuria. " "    BLOOD PRESSURE has been well managed without headaches, chest pain, or shortness of breath.         Reviewed and updated as needed this visit by clinical staff  Tobacco  Allergies  Meds  Med Hx  Surg Hx  Fam Hx  Soc Hx        Reviewed and updated as needed this visit by Provider        Social History   Substance Use Topics     Smoking status: Former Smoker     Quit date: 6/27/1976     Smokeless tobacco: Never Used     Alcohol use No      Comment: Quit 8/1014                             Do you feel safe in your environment - Yes    Do you have a Health Care Directive?: No: Advance care planning was reviewed with patient; patient declined at this time.    Current providers sharing in care for this patient include:   Patient Care Team:  Uri Queen MD as PCP - General (Internal Medicine)    The following health maintenance items are reviewed in Epic and correct as of today:  Health Maintenance   Topic Date Due     ANTONI QUESTIONNAIRE 1 YEAR  07/06/2018     PHQ-9 Q1YR  07/06/2018     FALL RISK ASSESSMENT  09/11/2019     ADVANCE DIRECTIVE PLANNING Q5 YRS  07/05/2021     LIPID SCREEN Q5 YR MALE (SYSTEM ASSIGNED)  10/29/2023     TETANUS IMMUNIZATION (SYSTEM ASSIGNED)  04/06/2027     PNEUMOCOCCAL  Completed     INFLUENZA VACCINE  Completed     Labs reviewed in EPIC    Pneumonia Vaccine:Adults age 65+ who received Pneumovax (PPSV23) at 65 years or older: Should be given PCV13 > 1 year after their most recent PPSV23    ROS:  Constitutional, HEENT, cardiovascular, pulmonary, GI, , musculoskeletal, neuro, skin, endocrine and psych systems are negative, except as otherwise noted.    OBJECTIVE:   /60 (BP Location: Right arm, Cuff Size: Adult Regular)  Pulse 63  Temp 98.3  F (36.8  C) (Oral)  Ht 5' 10\" (1.778 m)  Wt 185 lb (83.9 kg)  SpO2 96%  BMI 26.54 kg/m2 Estimated body mass index is 26.54 kg/(m^2) as calculated from the following:    Height as of this encounter: 5' 10\" (1.778 m).    Weight as of " this encounter: 185 lb (83.9 kg).  EXAM:   GENERAL: healthy, alert and no distress  EYES: Eyes grossly normal to inspection, PERRL and conjunctivae and sclerae normal  HENT: ear canals and TM's normal, nose and mouth without ulcers or lesions  NECK: no adenopathy, no asymmetry, masses, or scars and thyroid normal to palpation  RESP: lungs clear to auscultation - no rales, rhonchi or wheezes  CV: regular rate and rhythm, normal S1 S2, no S3 or S4, no murmur, click or rub, no peripheral edema and peripheral pulses strong  ABDOMEN: soft, nontender, no hepatosplenomegaly, no masses and bowel sounds normal  MS: no gross musculoskeletal defects noted, no edema  SKIN: no suspicious lesions or rashes  NEURO: Normal strength and tone, mentation intact and speech normal  PSYCH: mentation appears normal, affect normal/bright    Diagnostic Test Results:  Results for orders placed or performed in visit on 11/07/18   *UA reflex to Microscopic and Culture ([facility] (except [place] and [place])   Result Value Ref Range    Color Urine Yellow     Appearance Urine Clear     Glucose Urine Negative NEG^Negative mg/dL    Bilirubin Urine Negative NEG^Negative    Ketones Urine Negative NEG^Negative mg/dL    Specific Gravity Urine 1.015 1.003 - 1.035    Blood Urine Negative NEG^Negative    pH Urine 7.0 5.0 - 7.0 pH    Protein Albumin Urine Negative NEG^Negative mg/dL    Urobilinogen Urine 0.2 0.2 - 1.0 EU/dL    Nitrite Urine Negative NEG^Negative    Leukocyte Esterase Urine Negative NEG^Negative    Source Midstream Urine        ASSESSMENT / PLAN:       ICD-10-CM    1. Routine history and physical examination of adult Z00.00    2. Essential hypertension with goal blood pressure less than 140/90 I10 amLODIPine (NORVASC) 10 MG tablet     lisinopril (PRINIVIL/ZESTRIL) 10 MG tablet     *UA reflex to Microscopic and Culture ([place] [facility] (except [place] and [place])   3. Flank pain R10.9 *UA reflex to  "Microscopic and Culture ([place] and [facility] (except [place] and [place])   flank pain seems MSK related, consider imaging such as CT scan if worsening    End of Life Planning:  Patient currently has an advanced directive: Full code    COUNSELING:  Reviewed preventive health counseling, as reflected in patient instructions    BP Readings from Last 1 Encounters:   11/07/18 122/60     Estimated body mass index is 26.54 kg/(m^2) as calculated from the following:    Height as of this encounter: 5' 10\" (1.778 m).    Weight as of this encounter: 185 lb (83.9 kg).           reports that he quit smoking about 42 years ago. He has never used smokeless tobacco.      Appropriate preventive services were discussed with this patient, including applicable screening as appropriate for cardiovascular disease, diabetes, osteopenia/osteoporosis, and glaucoma.  As appropriate for age/gender, discussed screening for colorectal cancer, prostate cancer, breast cancer, and cervical cancer. Checklist reviewing preventive services available has been given to the patient.    Reviewed patients plan of care and provided an AVS. The Basic Care Plan (routine screening as documented in Health Maintenance) for Ciro meets the Care Plan requirement. This Care Plan has been established and reviewed with the Patient.    Counseling Resources:  ATP IV Guidelines  Pooled Cohorts Equation Calculator  Breast Cancer Risk Calculator  FRAX Risk Assessment  ICSI Preventive Guidelines  Dietary Guidelines for Americans, 2010  USDA's MyPlate  ASA Prophylaxis  Lung CA Screening    Uri Queen MD, MD  [facility] [place]  "

## 2018-11-08 ASSESSMENT — ANXIETY QUESTIONNAIRES: GAD7 TOTAL SCORE: 0

## 2019-01-19 DIAGNOSIS — I10 ESSENTIAL HYPERTENSION WITH GOAL BLOOD PRESSURE LESS THAN 140/90: ICD-10-CM

## 2019-01-19 NOTE — TELEPHONE ENCOUNTER
"Requested Prescriptions   Pending Prescriptions Disp Refills     lisinopril (PRINIVIL/ZESTRIL) 10 MG tablet [Pharmacy Med Name: LISINOPRIL 10MG TABLETS]  Last Written Prescription Date:  11/7/18  Last Fill Quantity: 90,  # refills: 11?   Last office visit: 11/7/2018 with prescribing provider:  11/7/18   Future Office Visit:     90 tablet 0     Sig: TAKE 1 TABLET(10 MG) BY MOUTH DAILY    ACE Inhibitors (Including Combos) Protocol Passed - 1/19/2019  2:23 PM       Passed - Blood pressure under 140/90 in past 12 months    BP Readings from Last 3 Encounters:   11/07/18 122/60   05/29/18 124/66   01/23/18 138/68                Passed - Recent (12 mo) or future (30 days) visit within the authorizing provider's specialty    Patient had office visit in the last 12 months or has a visit in the next 30 days with authorizing provider or within the authorizing provider's specialty.  See \"Patient Info\" tab in inbasket, or \"Choose Columns\" in Meds & Orders section of the refill encounter.             Passed - Medication is active on med list       Passed - Patient is age 18 or older       Passed - Normal serum creatinine on file in past 12 months    Recent Labs   Lab Test 10/29/18  0824   CR 0.80            Passed - Normal serum potassium on file in past 12 months    Recent Labs   Lab Test 10/29/18  0824   POTASSIUM 4.3             amLODIPine (NORVASC) 10 MG tablet [Pharmacy Med Name: AMLODIPINE BESYLATE 10MG TABLETS]  Last Written Prescription Date:  11/7/18  Last Fill Quantity: 90,  # refills: 11?   Last office visit: 11/7/2018 with prescribing provider:  11/7/18   Future Office Visit:     90 tablet 0     Sig: TAKE 1 TABLET(10 MG) BY MOUTH DAILY    Calcium Channel Blockers Protocol  Passed - 1/19/2019  2:23 PM       Passed - Blood pressure under 140/90 in past 12 months    BP Readings from Last 3 Encounters:   11/07/18 122/60   05/29/18 124/66   01/23/18 138/68                Passed - Recent (12 mo) or future (30 days) visit " "within the authorizing provider's specialty    Patient had office visit in the last 12 months or has a visit in the next 30 days with authorizing provider or within the authorizing provider's specialty.  See \"Patient Info\" tab in inbasket, or \"Choose Columns\" in Meds & Orders section of the refill encounter.             Passed - Medication is active on med list       Passed - Patient is age 18 or older       Passed - Normal serum creatinine on file in past 12 months    Recent Labs   Lab Test 10/29/18  0824   CR 0.80               "

## 2019-01-21 RX ORDER — AMLODIPINE BESYLATE 10 MG/1
TABLET ORAL
Qty: 90 TABLET | Refills: 2 | Status: SHIPPED | OUTPATIENT
Start: 2019-01-21 | End: 2019-11-14

## 2019-01-21 RX ORDER — LISINOPRIL 10 MG/1
TABLET ORAL
Qty: 90 TABLET | Refills: 2 | Status: SHIPPED | OUTPATIENT
Start: 2019-01-21 | End: 2019-11-14

## 2019-01-21 NOTE — ADDENDUM NOTE
Encounter addended by: Carlie Antonio, PT on: 1/21/2019 10:55 AM   Actions taken: Sign clinical note, Episode resolved

## 2019-01-21 NOTE — TELEPHONE ENCOUNTER
Prescription approved per [facility] Refill Protocol.    Cindi Lewis RN

## 2019-01-25 DIAGNOSIS — H40.233 INTERMITTENT PRIMARY ANGLE-CLOSURE GLAUCOMA OF BOTH EYES: ICD-10-CM

## 2019-01-29 RX ORDER — LATANOPROST 50 UG/ML
1 SOLUTION/ DROPS OPHTHALMIC AT BEDTIME
Qty: 7.5 ML | Refills: 0 | Status: SHIPPED | OUTPATIENT
Start: 2019-01-29 | End: 2019-05-21

## 2019-02-05 PROBLEM — M25.511 SHOULDER PAIN, RIGHT: Status: RESOLVED | Noted: 2018-05-30 | Resolved: 2019-02-05

## 2019-02-05 NOTE — PROGRESS NOTES
Discharge Note    Progress reporting period is from initial eval/last PN to Jun 26, 2018.     Ciro failed to return and current status is unknown.  Please see information below for last relevant information on current status.  Patient seen for 3 visits.  SUBJECTIVE  Subjective changes noted by patient:  Feeling much better since cortisone injection.  Over 85% better.   .  Current pain level is  .     Previous pain level was  7/10.   Changes in function:  Yes (See Goal flowsheet attached for changes in current functional level)  Adverse reaction to treatment or activity: None    OBJECTIVE  Changes noted in objective findings: Right shoulder abd arom 120 degrees.  RIght shoulder ER 4+/5, IR 5/5, flexion 4+/5, abd 4+/5.      ASSESSMENT/PLAN  Diagnosis: Right shoulder   DIAGP:  The encounter diagnosis was Shoulder pain, right.  Updated problem list and treatment plan:   Decreased ROM/flexibility - HEP  Decreased function - HEP  Decreased strength - HEP  STG/LTGs have been met or progress has been made towards goals:  Yes, please see goal flowsheet for most current information  Assessment of Progress: current status is unknown.    Last current status: Pt is progressing well   Self Management Plans:  HEP  I have re-evaluated this patient and find that the nature, scope, duration and intensity of the therapy is appropriate for the medical condition of the patient.  Ciro continues to require the following intervention to meet STG and LTG's:  HEP.    Recommendations:  Discharge with current home program.  Patient to follow up with MD as needed.    Please refer to the daily flowsheet for treatment today, total treatment time and time spent performing 1:1 timed codes.

## 2019-04-09 ENCOUNTER — OFFICE VISIT (OUTPATIENT)
Dept: PEDIATRICS | Facility: CLINIC | Age: 79
End: 2019-04-09
Payer: COMMERCIAL

## 2019-04-09 VITALS
WEIGHT: 187 LBS | BODY MASS INDEX: 26.77 KG/M2 | DIASTOLIC BLOOD PRESSURE: 70 MMHG | OXYGEN SATURATION: 98 % | HEIGHT: 70 IN | TEMPERATURE: 97.6 F | HEART RATE: 53 BPM | SYSTOLIC BLOOD PRESSURE: 142 MMHG

## 2019-04-09 DIAGNOSIS — M79.18 GLUTEAL PAIN: Primary | ICD-10-CM

## 2019-04-09 PROCEDURE — 99213 OFFICE O/P EST LOW 20 MIN: CPT | Performed by: INTERNAL MEDICINE

## 2019-04-09 ASSESSMENT — MIFFLIN-ST. JEOR: SCORE: 1579.48

## 2019-04-09 NOTE — PROGRESS NOTES
SUBJECTIVE:                                                    Ciro Almonte is a 79 year old male who presents to clinic today for the following health issues:    Musculoskeletal problem/pain      Duration: 1-2 months    Description  Location: left side, buttocks-focal area.  Worse when sitting on hard kitchen chair, no sx when sitting on cushioned chair    Concerned about a cyst or mass. Pain does not radiate to leg, Pt denies numbness, paresthesias or weakness.     Intensity:  mild    Accompanying signs and symptoms: none    History  Previous similar problem: no   Previous evaluation:  none    Precipitating or alleviating factors:  Trauma or overuse: no   Aggravating factors include: sitting    Therapies tried and outcome: nothing      Patient Active Problem List   Diagnosis     Cervical spine degeneration     Glaucoma     Mitral regurgitation - systolic murmur     Hereditary and idiopathic peripheral neuropathy     Essential hypertension with goal blood pressure less than 140/90     Primary osteoarthritis of right foot     Benign non-nodular prostatic hyperplasia with lower urinary tract symptoms     Chronic low back pain, unspecified back pain laterality, with sciatica presence unspecified     Lumbago     Hip pain, left     Mixed hyperlipidemia     Past Surgical History:   Procedure Laterality Date     ARTHROPLASTY HIP ANTERIOR Left 11/14/2014    Procedure: ARTHROPLASTY HIP ANTERIOR;  Surgeon: Rudy Tobias MD;  Location:  OR     ARTHROPLASTY REVISION HIP  3/12/2014     right Procedure: ARTHROPLASTY REVISION HIP;  Surgeon: Rudy Tobias MD;  Location: [facility] OR     ARTHROSCOPY KNEE  2002    meniscus ( side?)     BIOPSY OF PROSTATE,NEEDLE/PUNCH       C TOTAL HIP ARTHROPLASTY Right 2008     DERMATOLOGY REFERRAL  2000    melanoma resection L shoulder     DISCECTOMY LUMBAR POSTERIOR MICROSCOPIC ONE LEVEL N/A 4/11/2016    Procedure: DISCECTOMY LUMBAR POSTERIOR MICROSCOPIC ONE LEVEL;  Surgeon: Ruben Marsh  MD ALEKSANDAR;  Location: [facility] OR     ENT SURGERY      Tonsils     ORTHOPEDIC SURGERY  2008    R hip replacement     ORTHOPEDIC SURGERY  2004    big toe in lawn mower, needed reconstructive surgery       Social History     Tobacco Use     Smoking status: Former Smoker     Last attempt to quit: 1976     Years since quittin.8     Smokeless tobacco: Never Used   Substance Use Topics     Alcohol use: No     Alcohol/week: 0.0 oz     Comment: Quit 1014     Family History   Problem Relation Age of Onset     Cerebrovascular Disease Mother      Hypertension Mother      Glaucoma Mother      Heart Disease Father 58        MI     Cancer Father         lung cancer;  age 91         Current Outpatient Medications   Medication Sig Dispense Refill     Acetaminophen (TYLENOL PO) Take 1,000 mg by mouth 2 times daily       amLODIPine (NORVASC) 10 MG tablet TAKE 1 TABLET(10 MG) BY MOUTH DAILY 90 tablet 2     ASPIRIN PO Take 81 mg by mouth       atorvastatin (LIPITOR) 20 MG tablet TAKE 1 TABLET BY MOUTH EVERY DAY 90 tablet 11     Calcium Citrate-Vitamin D (CITRACAL + D PO) Take 2 tablets by mouth daily       celecoxib (CELEBREX) 200 MG capsule Take 1 capsule by mouth daily  2     diclofenac (VOLTAREN) 75 MG EC tablet Take 2 tablets by mouth as needed   3     gabapentin (NEURONTIN) 300 MG capsule Take 3 capsules (900 mg) by mouth At Bedtime 270 capsule 11     latanoprost (XALATAN) 0.005 % ophthalmic solution Place 1 drop into both eyes At Bedtime Call clinic to schedule MD APPT. 7.5 mL 0     lisinopril (PRINIVIL/ZESTRIL) 10 MG tablet TAKE 1 TABLET(10 MG) BY MOUTH DAILY 90 tablet 2     Magnesium 500 MG CAPS        Multiple Vitamins-Minerals (MENS MULTIVITAMIN PLUS PO) Take 1 tablet by mouth daily       Nutritional Supplements (PROSTATE 2.4) CAPS Take 2 capsules by mouth daily Prostate cap 2.0       rOPINIRole (REQUIP) 0.25 MG tablet TAKE 1 TO 2 TABLETS(0.25 TO 0.5 MG) BY MOUTH AT BEDTIME 180 tablet 11         OBJECTIVE:                "                                     /70 (Cuff Size: Adult Large)   Pulse 53   Temp 97.6  F (36.4  C) (Oral)   Ht 1.778 m (5' 10\")   Wt 84.8 kg (187 lb)   SpO2 98%   BMI 26.83 kg/m   Body mass index is 26.83 kg/m .  GENERAL:  alert,  no distress  Ext: left hip, buttocks without erythema warmth or swelling, no palpable mass       Subjective tenderness over left ischial tuberosity     ASSESSMENT/PLAN:                                                        ICD-10-CM    1. Gluteal pain M79.18       rec conservative measures-donut cushion / offloading, added cushioning when seated  Sx not characteristic for sciatica  Follow-up next few weeks if sx fail to improve    Kevin Fabian MD  [facility] [place]    "

## 2019-05-21 DIAGNOSIS — H40.233 INTERMITTENT PRIMARY ANGLE-CLOSURE GLAUCOMA OF BOTH EYES: ICD-10-CM

## 2019-05-21 RX ORDER — LATANOPROST 50 UG/ML
1 SOLUTION/ DROPS OPHTHALMIC AT BEDTIME
Qty: 7.5 ML | Refills: 3 | Status: SHIPPED | OUTPATIENT
Start: 2019-05-21 | End: 2020-05-26

## 2019-07-16 DIAGNOSIS — E29.1 MALE HYPOGONADISM: ICD-10-CM

## 2019-07-16 PROCEDURE — 84270 ASSAY OF SEX HORMONE GLOBUL: CPT | Performed by: UROLOGY

## 2019-07-16 PROCEDURE — 36415 COLL VENOUS BLD VENIPUNCTURE: CPT | Performed by: UROLOGY

## 2019-07-16 PROCEDURE — 84403 ASSAY OF TOTAL TESTOSTERONE: CPT | Performed by: UROLOGY

## 2019-07-18 LAB
SHBG SERPL-SCNC: 40 NMOL/L (ref 11–80)
TESTOST FREE SERPL-MCNC: 2.53 NG/DL (ref 4.7–24.4)
TESTOST SERPL-MCNC: 153 NG/DL (ref 240–950)

## 2019-07-24 ENCOUNTER — OFFICE VISIT (OUTPATIENT)
Dept: OPHTHALMOLOGY | Facility: CLINIC | Age: 79
End: 2019-07-24
Attending: STUDENT IN AN ORGANIZED HEALTH CARE EDUCATION/TRAINING PROGRAM
Payer: COMMERCIAL

## 2019-07-24 DIAGNOSIS — H25.813 COMBINED FORMS OF AGE-RELATED CATARACT OF BOTH EYES: Primary | ICD-10-CM

## 2019-07-24 DIAGNOSIS — H40.233 INTERMITTENT PRIMARY ANGLE-CLOSURE GLAUCOMA OF BOTH EYES: ICD-10-CM

## 2019-07-24 DIAGNOSIS — H51.9 CONVERGENCE INSUFFICIENCY OR PALSY IN BINOCULAR EYE MOVEMENT: ICD-10-CM

## 2019-07-24 PROCEDURE — G0463 HOSPITAL OUTPT CLINIC VISIT: HCPCS | Mod: ZF

## 2019-07-24 PROCEDURE — 92060 SENSORIMOTOR EXAMINATION: CPT | Mod: ZF | Performed by: STUDENT IN AN ORGANIZED HEALTH CARE EDUCATION/TRAINING PROGRAM

## 2019-07-24 ASSESSMENT — TONOMETRY
IOP_METHOD: TONOPEN
OD_IOP_MMHG: 15
OS_IOP_MMHG: 16

## 2019-07-24 ASSESSMENT — REFRACTION_WEARINGRX
OS_SPHERE: -3.25
OS_ADD: +2.50
OD_AXIS: 017
OS_CYLINDER: +4.25
OS_AXIS: 165
OD_SPHERE: -3.50
OD_CYLINDER: +4.75
OD_ADD: +2.50
SPECS_TYPE: PAL

## 2019-07-24 ASSESSMENT — VISUAL ACUITY
OD_CC: 20/20
OS_PH_CC: 20/20
OD_CC+: -2
OS_CC: 20/40
METHOD: SNELLEN - LINEAR
OS_PH_CC+: -2
CORRECTION_TYPE: GLASSES

## 2019-07-24 ASSESSMENT — CONF VISUAL FIELD
OD_NORMAL: 1
METHOD: COUNTING FINGERS
OS_NORMAL: 1

## 2019-07-24 ASSESSMENT — SLIT LAMP EXAM - LIDS
COMMENTS: NORMAL
COMMENTS: NORMAL

## 2019-07-24 ASSESSMENT — EXTERNAL EXAM - LEFT EYE: OS_EXAM: NORMAL

## 2019-07-24 ASSESSMENT — EXTERNAL EXAM - RIGHT EYE: OD_EXAM: NORMAL

## 2019-07-24 ASSESSMENT — CUP TO DISC RATIO
OS_RATIO: 0.0
OD_RATIO: 0.0

## 2019-07-24 NOTE — PROGRESS NOTES
HPI  Ciro Almonte is a 79 year old male who presents for vision changes in his left eye. He has starting seeing a ghost image in the left eye about 3 weeks ago. This is making it difficult for him to see street signs. Not seeing two separate images. No eye pain. No flashes, floaters. No other concerns.     PMHx: HTN, HLD, MR, OA  POHx: Glaucoma suspect, refractive error  Ocular GTTS: Latanoprost    Assessment & Plan    (H25.813) Combined forms of age-related cataract of both eyes  (primary encounter diagnosis)  Comment: Suspect this is cause of patient's left eye changes. Significant cortical change in central axis. He pinholes to 20/20, discussed that a new pair of glasses may help his symptoms, he does not want to be refracted until September appt with Dr. Yang. Discussed that we can attempt to move up his appt, however his wife is scheduled the same day and he prefers to keep follow-up as scheduled.  Plan: Follow-up with Dr. Yang as scheduled, recommend checking MRx and BAT. Patient to call with any new or worsening symptoms.    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 He does not wish to pursue refraction today nor is he interested in prism.   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  Plan: Continue latanoprost, follow up with Dr. Yang as scheduled.       -----------------------------------------------------------------------------------    Patient disposition:   Return for If possible please move up Sept appt with Dr. Yang for cat eval/planning (wife needs same day). or sooner as needed.    Sondra Sepulveda MD  Ophthalmology Resident, PGY-3    Discussed with Dr. Ardon.    Teaching statement:  Complete documentation of historical and exam elements from today's  encounter can be found in the full encounter summary report (not reduplicated in this progress note). I personally obtained the chief complaint(s) and history of present illness.  I confirmed and edited as necessary the review of systems, past medical/surgical history, family history, social history, and examination findings as documented by others; and I examined the patient myself. I personally reviewed the relevant tests, images, and reports as documented above.     I formulated and edited as necessary the assessment and plan and discussed the findings and management plan with the patient and family.    Nataly Ardon MD  Comprehensive Ophthalmology & Ocular Pathology  Department of Ophthalmology and Visual Neurosciences  [email or web address]  Pager [phone]

## 2019-07-24 NOTE — NURSING NOTE
Chief Complaints and History of Present Illnesses   Patient presents with     Blurred Vision Evaluation     Pt states recent double VA      Chief Complaint(s) and History of Present Illness(es)     Blurred Vision Evaluation     Laterality: left eye    Onset: gradual    Onset: 3 weeks ago    Quality: shade in vision and dim vision    Context: driving, reading, distance vision and mid-range vision    Associated symptoms: double vision.  Negative for floaters, flashes, eye pain, dryness, glare and haloes    Treatments tried: no treatments    Pain scale: 0/10    Comments: Pt states recent double VA               Comments     Pt is reporting of recent double VA in left eye only. Started 3 weeks ago, states that when reading letters have a shadow letter. Vision in LE has always been worse. Has noticed when reading when turning his head to the side VA clears up. Denies flashes floaters. No changes in vision LUCIE LUCAS July 24, 2019 8:28 AM  Meg PIÑA July 24, 2019 8:50 AM

## 2019-07-26 ENCOUNTER — HOSPITAL ENCOUNTER (OUTPATIENT)
Dept: CT IMAGING | Facility: CLINIC | Age: 79
Discharge: HOME | End: 2019-07-26
Attending: PHYSICIAN ASSISTANT | Admitting: PHYSICIAN ASSISTANT
Payer: COMMERCIAL

## 2019-07-26 ENCOUNTER — OFFICE VISIT (OUTPATIENT)
Dept: PEDIATRICS | Facility: CLINIC | Age: 79
End: 2019-07-26
Payer: COMMERCIAL

## 2019-07-26 ENCOUNTER — OFFICE VISIT (OUTPATIENT)
Dept: URGENT CARE | Facility: URGENT CARE | Age: 79
End: 2019-07-26
Payer: COMMERCIAL

## 2019-07-26 VITALS
SYSTOLIC BLOOD PRESSURE: 137 MMHG | OXYGEN SATURATION: 99 % | TEMPERATURE: 98.3 F | HEART RATE: 59 BPM | DIASTOLIC BLOOD PRESSURE: 73 MMHG | RESPIRATION RATE: 14 BRPM

## 2019-07-26 VITALS
OXYGEN SATURATION: 98 % | HEART RATE: 50 BPM | WEIGHT: 181.9 LBS | HEIGHT: 70 IN | DIASTOLIC BLOOD PRESSURE: 52 MMHG | TEMPERATURE: 98.2 F | SYSTOLIC BLOOD PRESSURE: 136 MMHG | BODY MASS INDEX: 26.04 KG/M2

## 2019-07-26 DIAGNOSIS — R74.8 ELEVATED CK: ICD-10-CM

## 2019-07-26 DIAGNOSIS — K59.00 CONSTIPATION, UNSPECIFIED CONSTIPATION TYPE: Primary | ICD-10-CM

## 2019-07-26 DIAGNOSIS — E78.2 MIXED HYPERLIPIDEMIA: ICD-10-CM

## 2019-07-26 DIAGNOSIS — M54.50 ACUTE LEFT-SIDED LOW BACK PAIN WITHOUT SCIATICA: ICD-10-CM

## 2019-07-26 DIAGNOSIS — G62.9 NEUROPATHY: ICD-10-CM

## 2019-07-26 DIAGNOSIS — M54.50 ACUTE LEFT-SIDED LOW BACK PAIN WITHOUT SCIATICA: Primary | ICD-10-CM

## 2019-07-26 DIAGNOSIS — R10.9 LEFT FLANK PAIN: ICD-10-CM

## 2019-07-26 LAB
ALBUMIN SERPL-MCNC: 3.7 G/DL (ref 3.4–5)
ALBUMIN UR-MCNC: NEGATIVE MG/DL
ALP SERPL-CCNC: 86 U/L (ref 40–150)
ALT SERPL W P-5'-P-CCNC: 44 U/L (ref 0–70)
ANION GAP SERPL CALCULATED.3IONS-SCNC: 4 MMOL/L (ref 3–14)
APPEARANCE UR: CLEAR
AST SERPL W P-5'-P-CCNC: 30 U/L (ref 0–45)
BASOPHILS # BLD AUTO: 0 10E9/L (ref 0–0.2)
BASOPHILS NFR BLD AUTO: 0.2 %
BILIRUB SERPL-MCNC: 0.6 MG/DL (ref 0.2–1.3)
BILIRUB UR QL STRIP: NEGATIVE
BUN SERPL-MCNC: 18 MG/DL (ref 7–30)
CALCIUM SERPL-MCNC: 8.7 MG/DL (ref 8.5–10.1)
CHLORIDE SERPL-SCNC: 109 MMOL/L (ref 94–109)
CK SERPL-CCNC: 301 U/L (ref 30–300)
CO2 SERPL-SCNC: 30 MMOL/L (ref 20–32)
COLOR UR AUTO: YELLOW
CREAT BLD-MCNC: 0.8 MG/DL (ref 0.66–1.25)
CREAT SERPL-MCNC: 0.78 MG/DL (ref 0.66–1.25)
CRP SERPL-MCNC: <2.9 MG/L (ref 0–8)
DIFFERENTIAL METHOD BLD: ABNORMAL
EOSINOPHIL # BLD AUTO: 0.1 10E9/L (ref 0–0.7)
EOSINOPHIL NFR BLD AUTO: 2.7 %
ERYTHROCYTE [DISTWIDTH] IN BLOOD BY AUTOMATED COUNT: 13.1 % (ref 10–15)
ERYTHROCYTE [SEDIMENTATION RATE] IN BLOOD BY WESTERGREN METHOD: 8 MM/H (ref 0–20)
GFR SERPL CREATININE-BSD FRML MDRD: 87 ML/MIN/{1.73_M2}
GFR SERPL CREATININE-BSD FRML MDRD: >90 ML/MIN/{1.73_M2}
GLUCOSE SERPL-MCNC: 89 MG/DL (ref 70–99)
GLUCOSE UR STRIP-MCNC: NEGATIVE MG/DL
HCT VFR BLD AUTO: 37.3 % (ref 40–53)
HGB BLD-MCNC: 12.2 G/DL (ref 13.3–17.7)
HGB UR QL STRIP: NEGATIVE
IMM GRANULOCYTES # BLD: 0 10E9/L (ref 0–0.4)
IMM GRANULOCYTES NFR BLD: 0.2 %
KETONES UR STRIP-MCNC: NEGATIVE MG/DL
LEUKOCYTE ESTERASE UR QL STRIP: NEGATIVE
LYMPHOCYTES # BLD AUTO: 1.6 10E9/L (ref 0.8–5.3)
LYMPHOCYTES NFR BLD AUTO: 35.8 %
MCH RBC QN AUTO: 32 PG (ref 26.5–33)
MCHC RBC AUTO-ENTMCNC: 32.7 G/DL (ref 31.5–36.5)
MCV RBC AUTO: 98 FL (ref 78–100)
MONOCYTES # BLD AUTO: 0.5 10E9/L (ref 0–1.3)
MONOCYTES NFR BLD AUTO: 11.3 %
MUCOUS THREADS #/AREA URNS LPF: PRESENT /LPF
NEUTROPHILS # BLD AUTO: 2.2 10E9/L (ref 1.6–8.3)
NEUTROPHILS NFR BLD AUTO: 49.8 %
NITRATE UR QL: NEGATIVE
NRBC # BLD AUTO: 0 10*3/UL
NRBC BLD AUTO-RTO: 0 /100
PH UR STRIP: 5 PH (ref 5–7)
PLATELET # BLD AUTO: 226 10E9/L (ref 150–450)
POTASSIUM SERPL-SCNC: 4.2 MMOL/L (ref 3.4–5.3)
PROT SERPL-MCNC: 6.7 G/DL (ref 6.8–8.8)
RBC # BLD AUTO: 3.81 10E12/L (ref 4.4–5.9)
RBC #/AREA URNS AUTO: 1 /HPF (ref 0–2)
SODIUM SERPL-SCNC: 143 MMOL/L (ref 133–144)
SOURCE: ABNORMAL
SP GR UR STRIP: 1.03 (ref 1–1.03)
URATE SERPL-MCNC: 5.3 MG/DL (ref 3.5–7.2)
UROBILINOGEN UR STRIP-MCNC: NORMAL MG/DL (ref 0–2)
WBC # BLD AUTO: 4.4 10E9/L (ref 4–11)
WBC #/AREA URNS AUTO: 1 /HPF (ref 0–5)

## 2019-07-26 PROCEDURE — 25000128 H RX IP 250 OP 636: Performed by: PHYSICIAN ASSISTANT

## 2019-07-26 PROCEDURE — 36415 COLL VENOUS BLD VENIPUNCTURE: CPT | Performed by: PHYSICIAN ASSISTANT

## 2019-07-26 PROCEDURE — 81001 URINALYSIS AUTO W/SCOPE: CPT | Performed by: PHYSICIAN ASSISTANT

## 2019-07-26 PROCEDURE — 86140 C-REACTIVE PROTEIN: CPT | Performed by: PHYSICIAN ASSISTANT

## 2019-07-26 PROCEDURE — 87086 URINE CULTURE/COLONY COUNT: CPT | Performed by: PHYSICIAN ASSISTANT

## 2019-07-26 PROCEDURE — 82565 ASSAY OF CREATININE: CPT | Mod: 91

## 2019-07-26 PROCEDURE — 99214 OFFICE O/P EST MOD 30 MIN: CPT

## 2019-07-26 PROCEDURE — 85025 COMPLETE CBC W/AUTO DIFF WBC: CPT | Performed by: PHYSICIAN ASSISTANT

## 2019-07-26 PROCEDURE — 74177 CT ABD & PELVIS W/CONTRAST: CPT

## 2019-07-26 PROCEDURE — 82550 ASSAY OF CK (CPK): CPT | Performed by: PHYSICIAN ASSISTANT

## 2019-07-26 PROCEDURE — 99207: CPT | Performed by: FAMILY MEDICINE

## 2019-07-26 PROCEDURE — 80053 COMPREHEN METABOLIC PANEL: CPT | Performed by: PHYSICIAN ASSISTANT

## 2019-07-26 PROCEDURE — 84550 ASSAY OF BLOOD/URIC ACID: CPT | Performed by: PHYSICIAN ASSISTANT

## 2019-07-26 PROCEDURE — 85652 RBC SED RATE AUTOMATED: CPT | Performed by: PHYSICIAN ASSISTANT

## 2019-07-26 RX ORDER — IOPAMIDOL 755 MG/ML
100 INJECTION, SOLUTION INTRAVASCULAR ONCE
Status: COMPLETED | OUTPATIENT
Start: 2019-07-26 | End: 2019-07-26

## 2019-07-26 RX ORDER — GABAPENTIN 300 MG/1
CAPSULE ORAL
Qty: 270 CAPSULE | Refills: 11
Start: 2019-07-26 | End: 2019-11-14

## 2019-07-26 RX ADMIN — IOPAMIDOL 91 ML: 755 INJECTION, SOLUTION INTRAVENOUS at 15:48

## 2019-07-26 RX ADMIN — SODIUM CHLORIDE 64 ML: 9 INJECTION, SOLUTION INTRAVENOUS at 15:48

## 2019-07-26 ASSESSMENT — MIFFLIN-ST. JEOR: SCORE: 1556.34

## 2019-07-26 ASSESSMENT — PAIN SCALES - GENERAL: PAINLEVEL: SEVERE PAIN (7)

## 2019-07-26 NOTE — PROGRESS NOTES
Unable to place IV x2 (right anterior antecubital and left posterior wrist). Sent to Radiologist for IV insertion.      Niecy Hanson RN

## 2019-07-26 NOTE — PROGRESS NOTES
"SUBJECTIVE  HPI: Ciro Almonte is a 79 year old male who presents for evaluation of atraumatic, worsening, intermittent jabbing left lower back / left flank pain.  No obvious trauma.  Patient wants to rule out a tumor, rib problems, kidney stones, kidney infection.  Symptoms began a few months ago, have been worsening and sometimes the pain rating is 7 out of 10 when moving/twisting the torso .  The pain is sharp and \"jabbing.\"  No pain with palpation of the left lower back (especially when twisting to the left and stretching the back at the same time).    Pain is located in the left lower back region, with radiation to none.  , and are at worst a 7 on a scale of 1-10.  Recent injury:none.  No numbness/weakness at the left leg/toes.    No blood in the urine.    Personal hx of back pain is:    S/p surgery on the lumbar spine about 2 years ago.    Patient reports that the pain today is different from previous back pain.     Treatments:  Tylenol without improvement.     Pain is exacerbated by: movement/twisting. Sometimes the pain occurs just by sitting. .  Pain is relieved by: nothing.  [unfilled] sx include: none.  Red flag symptoms: negative.    Past Medical History:   Diagnosis Date     Cancer (H) 2000    L shoulder melanoma     Glaucoma      Heart murmur      Hypertension      Neuropathy      Nonsenile cataract      Other premature beats      PVC's (premature ventricular contractions) 5/14/2015     Zoster 2000     Current Outpatient Medications   Medication Sig Dispense Refill     Acetaminophen (TYLENOL PO) Take 1,000 mg by mouth 2 times daily       amLODIPine (NORVASC) 10 MG tablet TAKE 1 TABLET(10 MG) BY MOUTH DAILY 90 tablet 2     ASPIRIN PO Take 81 mg by mouth       atorvastatin (LIPITOR) 20 MG tablet TAKE 1 TABLET BY MOUTH EVERY DAY 90 tablet 11     Calcium Citrate-Vitamin D (CITRACAL + D PO) Take 2 tablets by mouth daily       celecoxib (CELEBREX) 200 MG capsule Take 1 capsule by mouth daily  2     " "diclofenac (VOLTAREN) 75 MG EC tablet Take 2 tablets by mouth as needed   3     gabapentin (NEURONTIN) 300 MG capsule Take 3 capsules (900 mg) by mouth At Bedtime 270 capsule 11     latanoprost (XALATAN) 0.005 % ophthalmic solution Place 1 drop into both eyes At Bedtime Call clinic to schedule MD APPT. 7.5 mL 3     lisinopril (PRINIVIL/ZESTRIL) 10 MG tablet TAKE 1 TABLET(10 MG) BY MOUTH DAILY 90 tablet 2     Magnesium 500 MG CAPS        Multiple Vitamins-Minerals (MENS MULTIVITAMIN PLUS PO) Take 1 tablet by mouth daily       Nutritional Supplements (PROSTATE 2.4) CAPS Take 2 capsules by mouth daily Prostate cap 2.0       rOPINIRole (REQUIP) 0.25 MG tablet TAKE 1 TO 2 TABLETS(0.25 TO 0.5 MG) BY MOUTH AT BEDTIME 180 tablet 11     Social History     Tobacco Use     Smoking status: Former Smoker     Last attempt to quit: 1976     Years since quittin.1     Smokeless tobacco: Never Used   Substance Use Topics     Alcohol use: No     Alcohol/week: 0.0 oz     Comment: Quit 1014       ROS:  MUSCULOSKELETAL: positive for left lower back pain.   NEURO: NEGATIVE for weakness/numbness in the legs.     OBJECTIVE:  /52   Pulse 50   Temp 98.2  F (36.8  C) (Tympanic)   Ht 1.778 m (5' 10\")   Wt 82.5 kg (181 lb 14.4 oz)   SpO2 98%   BMI 26.10 kg/m    Back examination: Back symmetric, no curvature. ROM normal (although  Mildly limited with extension). No CVA tenderness., positive findings: pain with rotating the torso to the with simultaneous extension of the back.  No pain with palpation over the spinous processes of the lumbar spine.   NEURO:  Normal light touch sensation at the legs.  Normal strength in the legs.  DTRs 1/4 at the patellas and Achilles.    GAIT:  within normal limits.      ASSESSMENT/IMPRESSION:  Left Low Back Pain    PLAN:    [facility]  I have contacted the staff at the [facility] at [phone] to confirm patient acceptance.  For:  Further " Work-up of worsening severe left low back pain  Special Needs: None    D[facility]ed [facility].  Patient agrees with next level of care.  Patient transportation will be provided by the patientHis wife will accompany the patient to this health care facility.      Gallito Call MD

## 2019-07-26 NOTE — PATIENT INSTRUCTIONS
Go to the [facility] near the [facility] now for further evaluation.

## 2019-07-26 NOTE — PROGRESS NOTES
SUBJECTIVE:   Ciro Almonte is a 79 year old male who presents to clinic today for the following health issues:    ACUTE & DIAGNOSTIC SERVICES  DAY OF DIAGNOSIS  REFERRED BY: [facility]  SUBJECTIVE: left flank pain x months - worsening    Ciro is a 79-year-old male who presents to acute and diagnostic services today for evaluation of his left flank pain that he has had for greater than 8 months due to the fact that it is worsening and becoming more intense.  He denies any trauma or falls that preceded the onset of this pain that seems to be exacerbated by rotation of his thoracic spine.  This pain does not wake him from sleep.  He did report this pain at his annual physical to his primary in November 2018.  If no improvement was noted in his symptoms a CT scan was advised.  He denies any urinary or bowel symptoms.  He denies any history of kidney stones.  Otherwise he feels quite well.  He is very concerned about possible underlying malignancy or masses.    He does have a remote history of melanoma that was excised from his left shoulder in 2000.  He does continue to follow with his dermatologist, Dr. Carrillo, at least every 6 months.  He has had no further lesions consistent with melanoma.    Patient has had a lumbar discectomy in April 2016 and reports complete resolution of his discomfort with this procedure.  He reports that his pain prior to his discectomy was very different than his current pain.    He reports that his current discomfort is sharp somewhat nervelike in quality and resolves with change in position.  The pain does not move or radiate.  The pain typically resolves within a few seconds.  Last night, his pain was different as it was present in a sitting position at rest and lasted for a few hours before it resolved.  He denies any known aggravating factor for this change in his symptoms.  He has never tried heat or ice.  Has not had any physical therapy or chiropractic  care.  He does take gabapentin for an idiopathic neuropathy of his lower extremities and was recently increased in his dosage by his neurologist to 1200 mg nightly and 600 mg in the morning.  Additionally, he takes diclofenac twice daily.    Problem list and histories reviewed & adjusted, as indicated.  Additional history: as documented    Patient Active Problem List   Diagnosis     Cervical spine degeneration     Glaucoma     Mitral regurgitation - systolic murmur     Hereditary and idiopathic peripheral neuropathy     Essential hypertension with goal blood pressure less than 140/90     Primary osteoarthritis of right foot     Benign non-nodular prostatic hyperplasia with lower urinary tract symptoms     Chronic low back pain, unspecified back pain laterality, with sciatica presence unspecified     Lumbago     Hip pain, left     Mixed hyperlipidemia     Past Surgical History:   Procedure Laterality Date     ARTHROPLASTY HIP ANTERIOR Left 2014    Procedure: ARTHROPLASTY HIP ANTERIOR;  Surgeon: Rudy Tobias MD;  Location:  OR     ARTHROPLASTY REVISION HIP  3/12/2014     right Procedure: ARTHROPLASTY REVISION HIP;  Surgeon: Rudy Tobias MD;  Location:  OR     ARTHROSCOPY KNEE  2002    meniscus ( side?)     BIOPSY OF PROSTATE,NEEDLE/PUNCH       C TOTAL HIP ARTHROPLASTY Right      DERMATOLOGY REFERRAL      melanoma resection L shoulder     DISCECTOMY LUMBAR POSTERIOR MICROSCOPIC ONE LEVEL N/A 2016    Procedure: DISCECTOMY LUMBAR POSTERIOR MICROSCOPIC ONE LEVEL;  Surgeon: Ruben Marsh MD;  Location:  OR     ENT SURGERY      Tonsils     ORTHOPEDIC SURGERY      R hip replacement     ORTHOPEDIC SURGERY  2004    big toe in lawn mower, needed reconstructive surgery       Social History     Tobacco Use     Smoking status: Former Smoker     Last attempt to quit: 1976     Years since quittin.1     Smokeless tobacco: Never Used   Substance Use Topics     Alcohol use: No      Alcohol/week: 0.0 oz     Comment: Quit 1014     Family History   Problem Relation Age of Onset     Cerebrovascular Disease Mother      Hypertension Mother      Glaucoma Mother      Heart Disease Father 58        MI     Cancer Father         lung cancer;  age 91         Current Outpatient Medications   Medication Sig Dispense Refill     Acetaminophen (TYLENOL PO) Take 1,000 mg by mouth 2 times daily       amLODIPine (NORVASC) 10 MG tablet TAKE 1 TABLET(10 MG) BY MOUTH DAILY 90 tablet 2     ASPIRIN PO Take 81 mg by mouth       atorvastatin (LIPITOR) 20 MG tablet TAKE 1 TABLET BY MOUTH EVERY DAY 90 tablet 11     Calcium Citrate-Vitamin D (CITRACAL + D PO) Take 2 tablets by mouth daily       diclofenac (VOLTAREN) 75 MG EC tablet Take 2 tablets by mouth as needed   3     gabapentin (NEURONTIN) 300 MG capsule Take 4 capsules (1,200 mg) by mouth At Bedtime AND 2 capsules (600 mg) every morning. 270 capsule 11     latanoprost (XALATAN) 0.005 % ophthalmic solution Place 1 drop into both eyes At Bedtime Call clinic to schedule MD APPT. 7.5 mL 3     lisinopril (PRINIVIL/ZESTRIL) 10 MG tablet TAKE 1 TABLET(10 MG) BY MOUTH DAILY 90 tablet 2     Magnesium 500 MG CAPS        magnesium citrate solution Take 296 mLs by mouth once for 1 dose 296 mL 0     Multiple Vitamins-Minerals (MENS MULTIVITAMIN PLUS PO) Take 1 tablet by mouth daily       Nutritional Supplements (PROSTATE 2.4) CAPS Take 2 capsules by mouth daily Prostate cap 2.0       rOPINIRole (REQUIP) 0.25 MG tablet TAKE 1 TO 2 TABLETS(0.25 TO 0.5 MG) BY MOUTH AT BEDTIME 180 tablet 11     No Known Allergies    Reviewed and updated as needed this visit by clinical staff  Tobacco  Allergies  Meds  Problems  Med Hx  Surg Hx  Fam Hx       Reviewed and updated as needed this visit by Provider  Tobacco  Allergies  Meds  Problems  Med Hx  Surg Hx  Fam Hx         ROS:  Constitutional, HEENT, cardiovascular, pulmonary, GI, , musculoskeletal, neuro, skin, endocrine  and psych systems are negative, except as otherwise noted.      OBJECTIVE:   /73 (BP Location: Right arm, Patient Position: Chair, Cuff Size: Adult Regular)   Pulse 59   Temp 98.3  F (36.8  C) (Oral)   Resp 14   SpO2 99%  There is no height or weight on file to calculate BMI.    GENERAL: healthy, alert and no distress  RESP: lungs clear to auscultation - no rales, rhonchi or wheezes  CV: regular rate and rhythm, normal S1 S2, no S3 or S4, no murmur, click or rub, no peripheral edema and peripheral pulses strong  ABDOMEN: soft, nontender, no hepatosplenomegaly, no masses and bowel sounds normal  MS: no gross musculoskeletal defects noted, no edema  SKIN: no suspicious lesions or rashes  NEURO: Normal strength and tone, mentation intact and speech normal  PSYCH: mentation appears normal, affect normal/bright    Diagnostic Test Results:  Results for orders placed or performed in visit on 07/26/19 (from the past 24 hour(s))   Urine Culture Aerobic Bacterial   Result Value Ref Range    Specimen Description Midstream Urine     Special Requests Specimen received in preservative     Culture Micro PENDING    UA with Microscopic ([place]; [facility])   Result Value Ref Range    Color Urine Yellow     Appearance Urine Clear     Glucose Urine Negative NEG^Negative mg/dL    Bilirubin Urine Negative NEG^Negative    Ketones Urine Negative NEG^Negative mg/dL    Specific Gravity Urine 1.028 1.003 - 1.035    Blood Urine Negative NEG^Negative    pH Urine 5.0 5.0 - 7.0 pH    Protein Albumin Urine Negative NEG^Negative mg/dL    Urobilinogen mg/dL Normal 0.0 - 2.0 mg/dL    Nitrite Urine Negative NEG^Negative    Leukocyte Esterase Urine Negative NEG^Negative    Source Midstream Urine     WBC Urine 1 0 - 5 /HPF    RBC Urine 1 0 - 2 /HPF    Mucous Urine Present (A) NEG^Negative /LPF   Comprehensive metabolic panel   Result Value Ref Range    Sodium 143 133 - 144 mmol/L    Potassium 4.2 3.4 - 5.3 mmol/L    Chloride 109 94  - 109 mmol/L    Carbon Dioxide 30 20 - 32 mmol/L    Anion Gap 4 3 - 14 mmol/L    Glucose 89 70 - 99 mg/dL    Urea Nitrogen 18 7 - 30 mg/dL    Creatinine 0.78 0.66 - 1.25 mg/dL    GFR Estimate 87 >60 mL/min/[1.73_m2]    GFR Estimate If Black >90 >60 mL/min/[1.73_m2]    Calcium 8.7 8.5 - 10.1 mg/dL    Bilirubin Total 0.6 0.2 - 1.3 mg/dL    Albumin 3.7 3.4 - 5.0 g/dL    Protein Total 6.7 (L) 6.8 - 8.8 g/dL    Alkaline Phosphatase 86 40 - 150 U/L    ALT 44 0 - 70 U/L    AST 30 0 - 45 U/L   CBC with platelets and differential   Result Value Ref Range    WBC 4.4 4.0 - 11.0 10e9/L    RBC Count 3.81 (L) 4.4 - 5.9 10e12/L    Hemoglobin 12.2 (L) 13.3 - 17.7 g/dL    Hematocrit 37.3 (L) 40.0 - 53.0 %    MCV 98 78 - 100 fl    MCH 32.0 26.5 - 33.0 pg    MCHC 32.7 31.5 - 36.5 g/dL    RDW 13.1 10.0 - 15.0 %    Platelet Count 226 150 - 450 10e9/L    Diff Method Automated Method     % Neutrophils 49.8 %    % Lymphocytes 35.8 %    % Monocytes 11.3 %    % Eosinophils 2.7 %    % Basophils 0.2 %    % Immature Granulocytes 0.2 %    Nucleated RBCs 0 0 /100    Absolute Neutrophil 2.2 1.6 - 8.3 10e9/L    Absolute Lymphocytes 1.6 0.8 - 5.3 10e9/L    Absolute Monocytes 0.5 0.0 - 1.3 10e9/L    Absolute Eosinophils 0.1 0.0 - 0.7 10e9/L    Absolute Basophils 0.0 0.0 - 0.2 10e9/L    Abs Immature Granulocytes 0.0 0 - 0.4 10e9/L    Absolute Nucleated RBC 0.0    CRP, inflammation   Result Value Ref Range    CRP Inflammation <2.9 0.0 - 8.0 mg/L   ESR: Erythrocyte sedimentation rate   Result Value Ref Range    Sed Rate 8 0 - 20 mm/h   CK total   Result Value Ref Range    CK Total 301 (H) 30 - 300 U/L   Uric acid   Result Value Ref Range    Uric Acid 5.3 3.5 - 7.2 mg/dL     Recent Results (from the past 744 hour(s))   CT Abdomen Pelvis w Contrast    Narrative    CT ABDOMEN AND PELVIS WITH CONTRAST   7/26/2019 4:00 PM     HISTORY: Left flank pain - worse with movement, worsening over the  last few months. Last night unrelenting. Left flank pain.  Acute  left-sided low back pain without sciatica.    TECHNIQUE: 91mL Isovue-370. CT images of the abdomen and pelvis  following nonionic intravenous contrast. Radiation dose for this scan  was reduced using automated exposure control, adjustment of the mA  and/or kV according to patient size, or iterative reconstruction  technique.    COMPARISON: 7/6/2014    FINDINGS: No hydronephrosis or evidence of solid renal mass. There are  bilateral parapelvic cysts. No urinary tract calculi demonstrated. The  ureters are nondilated.    The liver, gallbladder, spleen, pancreas, and adrenal glands  demonstrate no worrisome abnormalities. No enlarged abdominal or  retroperitoneal lymph nodes. There is nonaneurysmal aortic  atherosclerosis. The appendix is normal. Moderate to large volume of  retained colonic stool. No small bowel distention. No free air or  ascites.    Images through the pelvis degraded by artifact related to bilateral  hip replacements. No pelvic adenopathy or free fluid identified. There  are degenerative changes of the spine and bilateral SI joints. No  lytic or blastic bone lesions.      Impression    IMPRESSION:  1. No acute abnormality demonstrated in the abdomen or pelvis. There  are degenerative changes of the skeletal structures.  2. Moderate to large volume of retained colonic stool suggestive of  constipation.    ASHLEY TRUJILLO MD       ASSESSMENT/PLAN:   Ciro was seen today for back pain.    Diagnoses and all orders for this visit:    Constipation, unspecified constipation type, Left flank pain  CT scan and labs reassuring.  Advised patient that constipation can certainly cause left-sided abdominal pain.  Patient hesitant to accept this as a possible etiology of his pain.  Advised that producing a large volume bowel movement to alleviate the stool burden would be the only way to determine whether or not this was contributing to his pain.  Advised patient to stay well-hydrated and increase fiber  "intake to bulk up his stool.  Patient states that he has daily bowel movements and will \"keep an eye \"on his bowel patterns.  Plan to follow-up with Dr. Banks in approximately 2.5 weeks for reevaluation of symptoms.  Advised that if no improvement with a valiant effort to clear his bowels a musculoskeletal etiology should be considered.  At that point in time would recommend PT/chiropractic care as well as a possible course of steroids since the patient is on daily NSAIDs and this does not seem to be improving his symptoms.  -     magnesium citrate solution; Take 296 mLs by mouth once for 1 dose  -     Urine Culture Aerobic Bacterial  -     Comprehensive metabolic panel  -     CBC with platelets and differential  -     CRP, inflammation  -     ESR: Erythrocyte sedimentation rate  -     CK total  -     Uric acid  -     Cancel: UA with Microscopic ([facility])  -     UA with Microscopic ([facility])  -     Cancel: UA with Microscopic  -     Cancel: CT Abdomen Pelvis w/o & w Contrast; Future  -     Hub Referral    Neuropathy  Long-standing history.  Followed by [facility], [facility].  Dosage updated in chart today.  -     gabapentin (NEURONTIN) 300 MG capsule; Take 4 capsules (1,200 mg) by mouth At Bedtime AND 2 capsules (600 mg) every morning.    Elevated CK  Nominal.  Recheck at a later date.        Return in about 2 weeks (around 8/9/2019) for Reevaluation.      Geno Diaz MS, PALauraC    [facility]      "

## 2019-07-26 NOTE — RESULT ENCOUNTER NOTE
Results discussed directly with patient while patient was present. Any further details documented in the note.   Geno Diaz PA-C

## 2019-07-27 LAB
BACTERIA SPEC CULT: NO GROWTH
Lab: NORMAL
SPECIMEN SOURCE: NORMAL

## 2019-07-31 ENCOUNTER — TELEPHONE (OUTPATIENT)
Dept: PEDIATRICS | Facility: CLINIC | Age: 79
End: 2019-07-31

## 2019-07-31 NOTE — TELEPHONE ENCOUNTER
Reason for call:  Other   Patient called regarding (reason for call): call back  Additional comments: Patient was seen at the [facility] on 7/26 and not doing better.  Would like to speak to a nurse.    Phone number to reach patient:  Home number on file [phone] (home)    Best Time:  any    Can we leave a detailed message on this number?  YES

## 2019-07-31 NOTE — TELEPHONE ENCOUNTER
Spoke with Phoebe, pt's spouse.  (CTC on file).  Pt is still experiencing left mid back/flank pain, which seems to be worsening.  Appetite is good.  Pt is having daily BM's.  Denies faintness, severe weakness, profuse sweating, radiating pain, light-headedness, nausea/vomiting, blood in stool, rapidly increasing pain, fever,blood in urine, and bowel problems.    Huddled with Judi to place pt on 's schedule for Thursday, with the understanding that if symptoms rapidly progress, pt be seen at the ER.    Pt is scheduled for 8/1/19 at 0820 with .  Phoebe, pt's spouse in agreement with plan.    Shayy Hull RN - Triage  [facility]

## 2019-08-01 ENCOUNTER — ANCILLARY PROCEDURE (OUTPATIENT)
Dept: GENERAL RADIOLOGY | Facility: CLINIC | Age: 79
End: 2019-08-01
Attending: NURSE PRACTITIONER
Payer: COMMERCIAL

## 2019-08-01 ENCOUNTER — OFFICE VISIT (OUTPATIENT)
Dept: PEDIATRICS | Facility: CLINIC | Age: 79
End: 2019-08-01
Payer: COMMERCIAL

## 2019-08-01 VITALS
TEMPERATURE: 97.7 F | WEIGHT: 183 LBS | OXYGEN SATURATION: 97 % | SYSTOLIC BLOOD PRESSURE: 122 MMHG | DIASTOLIC BLOOD PRESSURE: 64 MMHG | HEART RATE: 63 BPM | HEIGHT: 70 IN | BODY MASS INDEX: 26.2 KG/M2

## 2019-08-01 DIAGNOSIS — G89.29 CHRONIC LEFT-SIDED THORACIC BACK PAIN: ICD-10-CM

## 2019-08-01 DIAGNOSIS — G89.29 CHRONIC LEFT-SIDED LOW BACK PAIN WITHOUT SCIATICA: ICD-10-CM

## 2019-08-01 DIAGNOSIS — M54.50 CHRONIC LEFT-SIDED LOW BACK PAIN WITHOUT SCIATICA: ICD-10-CM

## 2019-08-01 DIAGNOSIS — M54.6 CHRONIC LEFT-SIDED THORACIC BACK PAIN: ICD-10-CM

## 2019-08-01 DIAGNOSIS — M54.6 CHRONIC LEFT-SIDED THORACIC BACK PAIN: Primary | ICD-10-CM

## 2019-08-01 DIAGNOSIS — G89.29 CHRONIC LEFT-SIDED THORACIC BACK PAIN: Primary | ICD-10-CM

## 2019-08-01 PROCEDURE — 99214 OFFICE O/P EST MOD 30 MIN: CPT | Performed by: NURSE PRACTITIONER

## 2019-08-01 PROCEDURE — 72100 X-RAY EXAM L-S SPINE 2/3 VWS: CPT

## 2019-08-01 PROCEDURE — 72070 X-RAY EXAM THORAC SPINE 2VWS: CPT

## 2019-08-01 ASSESSMENT — MIFFLIN-ST. JEOR: SCORE: 1561.33

## 2019-08-01 NOTE — PATIENT INSTRUCTIONS
Let's get x-rays today to make sure your pain isn't coming from your spine. I will keep you posted with these results. I'm glad your pain is so much better!    Keep your appointment with Dr. Banks next week and you can talk more about the gabapentin.

## 2019-08-01 NOTE — PROGRESS NOTES
"Subjective     Ciro Almonte is a 79 year old male who presents to clinic today for the following health issues:    [facility]   Hospital Follow-up Visit: [facility]    Hospital/Nursing Home/IP Rehab Facility: [facility]  Date of Admission: 07/26/2019  Date of Discharge: 7/26/19  Reason(s) for Admission: Left Flank Pain.              Problems taking medications regularly:  None       Medication changes since discharge: None       Problems adhering to non-medication therapy:  None       [facility] visit for chronic left back/flank pain.  CT scan done on 7/26, normal except for large amnt of stool. Reports that he normally doesn't feel constipated so was surprised by this finding.  Took Magnesium Citrate, had good results from this.  Notes symptoms improved for a couple days after Mag Citrate but since then have returned.  Left lower and mid back pain, and radiates to the side. Today pain is much improved. Describes as intermittent, sharp. No known trauma or injury to the back.  Exacerbated by bending /twisting.  Denies weakness, no loss of B/B control, N/T, no blood in stool, loss of appettie, no weight loss, no pain or sweating at night.    Reviewed and updated as needed this visit by Provider  Meds  Problems         Review of Systems   ROS COMP: Constitutional, HEENT, cardiovascular, pulmonary, GI, , musculoskeletal, neuro, skin, endocrine and psych systems are negative, except as otherwise noted.      Objective    /64 (BP Location: Right arm, Patient Position: Chair, Cuff Size: Adult Regular)   Pulse 63   Temp 97.7  F (36.5  C) (Tympanic)   Ht 1.778 m (5' 10\")   Wt 83 kg (183 lb)   SpO2 97%   BMI 26.26 kg/m    Body mass index is 26.26 kg/m .  Physical Exam   GENERAL: healthy, alert and no distress  RESP: lungs clear to auscultation - no rales, rhonchi or wheezes  CV: regular rate and rhythm, normal S1 S2, no S3 or S4, no murmur, click or rub, no peripheral edema and peripheral pulses strong  ABDOMEN: " soft, nontender, no hepatosplenomegaly, no masses and bowel sounds normal  MSK: No obvious deformity of spine. No bruising, redness, or masses. Spine, SI joint, and paraspinal muscles non-tender. LE 5/5 strength. Full ROM of spine but somewhat painful rotation of spine.  NEURO: +2 DTRs of lower extremities. Negative SLR.       Diagnostic Test Results:  Labs reviewed in Epic  No results found for this or any previous visit (from the past 24 hour(s)).        Assessment & Plan   1. Chronic left-sided thoracic back pain  2. Chronic left-sided low back pain without sciatica  Reassured by previous CT scan and labs.     X-rays done today to rule out MSK etiology, will await final radiologist review but my initial read shows degenerative disease. Pain has improved over the past week, but pt and spouse are very concerned about his symptoms and would like answers. No red flag symptoms today in clinic.     Will await final radiologist review for plan but may benefit from PT vs ortho referral.     -XR Thoracic Spine 2 Views; Future  -XR Lumbar Spine 2/3 Views; Future     See Patient Instructions    Return in about 1 week (around 8/8/2019) for Routine Visit.    Homa Warner NP  [facility]

## 2019-08-06 ENCOUNTER — OFFICE VISIT (OUTPATIENT)
Dept: PEDIATRICS | Facility: CLINIC | Age: 79
End: 2019-08-06
Payer: COMMERCIAL

## 2019-08-06 VITALS
HEART RATE: 54 BPM | HEIGHT: 70 IN | WEIGHT: 181 LBS | BODY MASS INDEX: 25.91 KG/M2 | OXYGEN SATURATION: 97 % | SYSTOLIC BLOOD PRESSURE: 132 MMHG | TEMPERATURE: 98.6 F | DIASTOLIC BLOOD PRESSURE: 64 MMHG

## 2019-08-06 DIAGNOSIS — G60.9 HEREDITARY AND IDIOPATHIC PERIPHERAL NEUROPATHY: Primary | ICD-10-CM

## 2019-08-06 DIAGNOSIS — M54.6 CHRONIC LEFT-SIDED THORACIC BACK PAIN: ICD-10-CM

## 2019-08-06 DIAGNOSIS — G89.29 CHRONIC LEFT-SIDED THORACIC BACK PAIN: ICD-10-CM

## 2019-08-06 DIAGNOSIS — M79.18 GLUTEAL PAIN: ICD-10-CM

## 2019-08-06 PROCEDURE — 99214 OFFICE O/P EST MOD 30 MIN: CPT | Performed by: INTERNAL MEDICINE

## 2019-08-06 ASSESSMENT — MIFFLIN-ST. JEOR: SCORE: 1552.26

## 2019-08-06 NOTE — PROGRESS NOTES
Subjective     Ciro Almonte is a 79 year old male who presents to clinic today for the following health issues:    HPI   ED/UC Followup:    Facility:  Acute/Diagnostic Clinic   Date of visit: 7/26  Reason for visit: Constipation and Left Flank Pain   Current Status: Twisting causes pain still.      Ciro comes in for follow-up of his L mid-back pain. He reports that this did improve somewhat following bowel cleanout, although he does find it hard to believe this was due to constipation. Pain is improved and in a smaller area, tender with twisting movements. Can't seem to localize this well, feels like pain is deeper. Overall seems to be getting better but still present.    He is wondering about decreasing his gabapentin. Concerned that he is getting more forgetful and foggy on his increased dose of medication, doesn't know if this is really helping his neuropathy much anyway. Daughter had significant side effects with this and he is wondering a bout tapering off.    Does have some L gluteal pain over the bone when he sits. No phone, wallet in his pocket. Doesn't cross his legs. More bothersome after sitting for extended periods.     Patient Active Problem List   Diagnosis     Cervical spine degeneration     Glaucoma     Mitral regurgitation - systolic murmur     Hereditary and idiopathic peripheral neuropathy     Essential hypertension with goal blood pressure less than 140/90     Primary osteoarthritis of right foot     Benign non-nodular prostatic hyperplasia with lower urinary tract symptoms     Chronic low back pain, unspecified back pain laterality, with sciatica presence unspecified     Lumbago     Hip pain, left     Mixed hyperlipidemia     Past Surgical History:   Procedure Laterality Date     ARTHROPLASTY HIP ANTERIOR Left 11/14/2014    Procedure: ARTHROPLASTY HIP ANTERIOR;  Surgeon: Rudy Tobias MD;  Location: [facility] OR     ARTHROPLASTY REVISION HIP  3/12/2014     right Procedure: ARTHROPLASTY REVISION  "HIP;  Surgeon: Rudy Tobias MD;  Location:  OR     ARTHROSCOPY KNEE  2002    meniscus ( side?)     BIOPSY OF PROSTATE,NEEDLE/PUNCH       C TOTAL HIP ARTHROPLASTY Right 2008     DERMATOLOGY REFERRAL      melanoma resection L shoulder     DISCECTOMY LUMBAR POSTERIOR MICROSCOPIC ONE LEVEL N/A 2016    Procedure: DISCECTOMY LUMBAR POSTERIOR MICROSCOPIC ONE LEVEL;  Surgeon: Ruben Marsh MD;  Location:  OR     ENT SURGERY      Tonsils     ORTHOPEDIC SURGERY  2008    R hip replacement     ORTHOPEDIC SURGERY      big toe in lawn mower, needed reconstructive surgery       Social History     Tobacco Use     Smoking status: Former Smoker     Last attempt to quit: 1976     Years since quittin.1     Smokeless tobacco: Never Used   Substance Use Topics     Alcohol use: No     Alcohol/week: 0.0 oz     Comment: Quit 1014     Family History   Problem Relation Age of Onset     Cerebrovascular Disease Mother      Hypertension Mother      Glaucoma Mother      Heart Disease Father 58        MI     Cancer Father         lung cancer;  age 91           Reviewed and updated as needed this visit by Provider  Meds         Review of Systems   ROS COMP: Constitutional, MSK, GI, , neuro systems are negative, except as otherwise noted.      Objective    /64 (BP Location: Right arm, Patient Position: Chair, Cuff Size: Adult Large)   Pulse 54   Temp 98.6  F (37  C) (Oral)   Ht 1.778 m (5' 10\")   Wt 82.1 kg (181 lb)   SpO2 97%   BMI 25.97 kg/m    Body mass index is 25.97 kg/m .  Physical Exam   GENERAL: healthy, alert and no distress  MS: minimal tenderness along L lower thoracic paraspinal muscles and inferior latissmus.     Diagnostic Test Results:  Imaging reviewed in Epic        Assessment & Plan     1. Hereditary and idiopathic peripheral neuropathy  Discussed trial of taper of gabapentin; patient is interested in this due to perceived side effects with this medication. Will decrease by " "300mg every 3 days until off, he will stop taper prior to this if neuropathy worsens. Could consider SNRI or pregabalin in future if interested in trying different medication.     2. Chronic left-sided thoracic back pain  Improving following bowel clean-out, likely due in part to constipation. However, some persistent pain with movements; given this and location most likely muscular in nature. Offered referral to physical therapy but patient declines. Discussed ice, massage and heat as tolerated.     3. Gluteal pain  Unclear etiology. Reviewed CT images with patient; no clear abnormality seen on these. I suppose it is possible it could be related to prior L hip replacement. He will follow-up with his orthopedic surgeon if he continues to have symptoms.        BMI:   Estimated body mass index is 25.97 kg/m  as calculated from the following:    Height as of this encounter: 1.778 m (5' 10\").    Weight as of this encounter: 82.1 kg (181 lb).           Patient Instructions   Fine to taper down on the gabapentin. I would decrease by 300mg every 3 days until you are off or until your neuropathy gets worse.     Most likely the back pain is muscular. Try staying active and stretching. Let me know if you want a referral to physical therapy.    Follow-up with orthopedics regarding the gluteal pain. I doubt it is related to your hip replacement but it is possible.       No follow-ups on file.    I spent 25 minutes with the patient, greater than 50% of that time was spent in counseling or coordination of the above issues.    Radha Herrera MD  [facility]      \  "

## 2019-08-06 NOTE — PATIENT INSTRUCTIONS
Fine to taper down on the gabapentin. I would decrease by 300mg every 3 days until you are off or until your neuropathy gets worse.     Most likely the back pain is muscular. Try staying active and stretching. Let me know if you want a referral to physical therapy.    Follow-up with orthopedics regarding the gluteal pain. I doubt it is related to your hip replacement but it is possible.

## 2019-09-12 ENCOUNTER — OFFICE VISIT (OUTPATIENT)
Dept: OPHTHALMOLOGY | Facility: CLINIC | Age: 79
End: 2019-09-12
Attending: OPHTHALMOLOGY
Payer: COMMERCIAL

## 2019-09-12 DIAGNOSIS — H25.13 NUCLEAR SENILE CATARACT OF BOTH EYES: ICD-10-CM

## 2019-09-12 DIAGNOSIS — H40.003 GLAUCOMA SUSPECT OF BOTH EYES: Primary | ICD-10-CM

## 2019-09-12 PROCEDURE — 92083 EXTENDED VISUAL FIELD XM: CPT | Mod: ZF | Performed by: OPHTHALMOLOGY

## 2019-09-12 PROCEDURE — 92133 CPTRZD OPH DX IMG PST SGM ON: CPT | Mod: ZF | Performed by: OPHTHALMOLOGY

## 2019-09-12 PROCEDURE — 76519 ECHO EXAM OF EYE: CPT | Mod: ZF | Performed by: OPHTHALMOLOGY

## 2019-09-12 PROCEDURE — G0463 HOSPITAL OUTPT CLINIC VISIT: HCPCS | Mod: ZF

## 2019-09-12 ASSESSMENT — SLIT LAMP EXAM - LIDS
COMMENTS: DERMATOCHALASIS
COMMENTS: DERMATOCHALASIS

## 2019-09-12 ASSESSMENT — TONOMETRY
IOP_METHOD: APPLANATION
OS_IOP_MMHG: 16
OD_IOP_MMHG: 17

## 2019-09-12 ASSESSMENT — REFRACTION_WEARINGRX
SPECS_TYPE: PAL
OS_SPHERE: -3.25
OD_AXIS: 017
OS_AXIS: 165
OD_ADD: +2.50
OS_ADD: +2.50
OD_SPHERE: -3.50
OD_CYLINDER: +4.75
OS_CYLINDER: +4.25

## 2019-09-12 ASSESSMENT — VISUAL ACUITY
OS_PH_CC+: -2
OS_PH_CC: 20/25
OD_CC+: -2
OD_CC: 20/20
OS_CC: 20/40
METHOD: SNELLEN - LINEAR
CORRECTION_TYPE: GLASSES
OS_CC+: +1

## 2019-09-12 ASSESSMENT — EXTERNAL EXAM - RIGHT EYE: OD_EXAM: NORMAL

## 2019-09-12 ASSESSMENT — CONF VISUAL FIELD
METHOD: COUNTING FINGERS
OS_NORMAL: 1
OD_NORMAL: 1

## 2019-09-12 ASSESSMENT — CUP TO DISC RATIO
OD_RATIO: 0.0
OS_RATIO: 0.0

## 2019-09-12 ASSESSMENT — EXTERNAL EXAM - LEFT EYE: OS_EXAM: NORMAL

## 2019-09-12 NOTE — PATIENT INSTRUCTIONS
Patient will continue on Latanoprost which is a teal top drop at bedtime in both eyes. A long discussion of the risks, benefits, and alternatives including potential treatment and management options were had with patient and a decision was made to proceed with cataract surgery with possible toric IOL and Istent in the left eye followed by the right eye.  Patient will obtain repeat corneal pentacam measurements at [facility] and discussion with FC and return to clinic in 3-4 weeks with repeat discussion about surgery.

## 2019-09-12 NOTE — PROGRESS NOTES
1)Glc Suspect --  K pachy:530/549    Tmax: L20s    HVF:Full c fluct      CDR: 0.0/0.0    HRT/OCT:OD:Mild RNFL thinning and OS:Mod RNFL thinning   FHX of Glc: Mother -- gtts,      Gonio: open      Intolerant to:      Asthma/COPD: No, on po BB  Steroid Use: No    Kidney Stones: Yes, in remote past    Sulfa Allergy: No      IOP targets:L20s -- IOP good, could consider further trial off gtts in future  2)ONH Drusen  3)NS OU (OS>OD) -- visually significant by glare testing (9/18) -- pt uses magnifier for reading, difficulty driving at night -- consider Istent and toric IOL  4)LAMBERT  5)Astigmatism -- had Pentacam at [facility] (approx 2.5-3D) -- will need meeting with FC eval prior to toric -- pt leaning towards just wearing glasses  6)Dermatochalasis -- rec oculoplastics eval after CE    MD:pt prefers to wait until after fall for surgery    Patient will continue on Latanoprost which is a teal top drop at bedtime in both eyes. A long discussion of the risks, benefits, and alternatives including potential treatment and management options were had with patient and a decision was made to proceed with cataract surgery with possible toric IOL and Istent in the left eye followed by the right eye.  Patient will obtain repeat corneal pentacam measurements at [facility] and discussion with FC and return to clinic in 3-4 weeks with repeat discussion about surgery.        Resident Note:  IOP 17/16 today  VF stable today. 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 I personally obtained the chief complaint(s) and history of present illness.  I confirmed and edited as necessary the review of systems, past medical/surgical history, family history, social history, and examination findings as documented by others; and I examined the patient myself. I personally reviewed the relevant tests, images, and reports as documented above. I formulated and edited as necessary the assessment and plan and discussed the findings and management plan with the patient and family.  - Shannon Yang MD

## 2019-09-16 ENCOUNTER — TRANSFERRED RECORDS (OUTPATIENT)
Dept: HEALTH INFORMATION MANAGEMENT | Facility: CLINIC | Age: 79
End: 2019-09-16

## 2019-09-23 NOTE — NURSING NOTE
Chief Complaints and History of Present Illnesses   Patient presents with     Glaucoma Follow-Up     Chief Complaint(s) and History of Present Illness(es)     Glaucoma Follow-Up     Laterality: both eyes    Associated symptoms: glare, double vision and haloes    Treatment side effects: none    Compliance with Treatment: always    Pain scale: 0/10              Comments     Pt states VA has been getting worse.  He has been seeing double at near and far. He says that if he turns/tilts his head a little he can usually focus better. He says that the cataracts may have gotten worse, wants to discuss options for surgery. He believes his ptosis may be obstructing his vision.     Taj Linda8:52 AM September 12, 2019   Exam/info reviewed and verified  Nataly Sullivan, COA COA 9:07 AM September 12, 2019

## 2019-10-08 ENCOUNTER — OFFICE VISIT (OUTPATIENT)
Dept: OPHTHALMOLOGY | Facility: CLINIC | Age: 79
End: 2019-10-08
Attending: OPHTHALMOLOGY
Payer: COMMERCIAL

## 2019-10-08 DIAGNOSIS — H25.13 NUCLEAR SENILE CATARACT OF BOTH EYES: ICD-10-CM

## 2019-10-08 DIAGNOSIS — H40.003 GLAUCOMA SUSPECT OF BOTH EYES: Primary | ICD-10-CM

## 2019-10-08 PROCEDURE — G0463 HOSPITAL OUTPT CLINIC VISIT: HCPCS | Mod: ZF

## 2019-10-08 ASSESSMENT — SLIT LAMP EXAM - LIDS
COMMENTS: DERMATOCHALASIS
COMMENTS: DERMATOCHALASIS

## 2019-10-08 ASSESSMENT — VISUAL ACUITY
METHOD: SNELLEN - LINEAR
OS_PH_CC: 20/30
OD_CC: 20/20
CORRECTION_TYPE: GLASSES
OS_CC: 20/40

## 2019-10-08 ASSESSMENT — CONF VISUAL FIELD
OD_NORMAL: 1
OS_NORMAL: 1
METHOD: COUNTING FINGERS

## 2019-10-08 ASSESSMENT — TONOMETRY
OS_IOP_MMHG: 29
IOP_METHOD: APPLANATION
OD_IOP_MMHG: 21

## 2019-10-08 ASSESSMENT — EXTERNAL EXAM - RIGHT EYE: OD_EXAM: NORMAL

## 2019-10-08 ASSESSMENT — EXTERNAL EXAM - LEFT EYE: OS_EXAM: NORMAL

## 2019-10-08 NOTE — NURSING NOTE
Chief Complaints and History of Present Illnesses   Patient presents with     Glaucoma Follow-Up     Chief Complaint(s) and History of Present Illness(es)     Glaucoma Follow-Up     Laterality: both eyes    Quality: States va is the same since last visit      Treatment side effects: none    Compliance with Treatment: always    Pain scale: 0/10              Comments     Kriss Russell COT 2:35 PM October 8, 2019

## 2019-10-08 NOTE — PROGRESS NOTES
1)Glc Suspect vs POAG --  K pachy:530/549    Tmax: L20s and OS:29 (10/8/2019)   HVF:Full c fluct      CDR: 0.0/0.0    HRT/OCT:OD:Mild RNFL thinning and OS:Mod RNFL thinning   FHX of Glc: Mother -- gtts,      Gonio: open      Intolerant to:      Asthma/COPD: No, on po BB  Steroid Use: No    Kidney Stones: Yes, in remote past    Sulfa Allergy: No      IOP targets:L20s -- IOP good, could consider further trial off gtts in future  2)ONH Drusen  3)NS OU (OS>OD) -- visually significant by glare testing (9/18) -- pt uses magnifier for reading, difficulty driving at night -- consider Istent and toric IOL  4)LAMBERT  5)Astigmatism -- had Pentacam at [facility] (approx 2.5-3D) -- will need meeting with FC eval prior to toric -- pt leaning towards just wearing glasses  6)Dermatochalasis -- rec oculoplastics eval after CE    MD:pt prefers to wait until after fall for surgery     MD:pt unsure if he took his gtts last PM    Patient will continue on Latanoprost which is a teal top drop at bedtime in both eyes. A long discussion of the risks, benefits, and alternatives including potential treatment and management options were had with patient and a decision was made to proceed with cataract surgery with possible toric IOL and Istent in the left eye followed by the right eye pending repeat IOP check prior to surgery.    Patient will return to clinic in 3-4 weeks with repeat IOP check prior to surgery on the left eye.        Attending Physician Attestation:  Complete documentation of historical and exam elements from today's encounter can be found in the full encounter summary report (not reduplicated in this progress note). I personally obtained the chief complaint(s) and history of present illness.  I confirmed and edited as necessary the review of systems, past medical/surgical history, family history, social history, and examination findings as documented by others; and I examined the patient myself. I personally reviewed the relevant  tests, images, and reports as documented above. I formulated and edited as necessary the assessment and plan and discussed the findings and management plan with the patient and family.  - Shannon Yang MD

## 2019-10-08 NOTE — PATIENT INSTRUCTIONS
Patient will continue on Latanoprost which is a teal top drop at bedtime in both eyes. A long discussion of the risks, benefits, and alternatives including potential treatment and management options were had with patient and a decision was made to proceed with cataract surgery with possible toric IOL and Istent in the left eye followed by the right eye pending repeat IOP check prior to surgery.    Patient will return to clinic in 3-4 weeks with repeat IOP check.

## 2019-10-09 ENCOUNTER — PREP FOR PROCEDURE (OUTPATIENT)
Dept: OPHTHALMOLOGY | Facility: CLINIC | Age: 79
End: 2019-10-09

## 2019-10-09 DIAGNOSIS — H25.13 NUCLEAR SENILE CATARACT OF BOTH EYES: ICD-10-CM

## 2019-10-09 DIAGNOSIS — H40.003 GLAUCOMA SUSPECT OF BOTH EYES: Primary | ICD-10-CM

## 2019-10-10 PROBLEM — H25.13 NUCLEAR SENILE CATARACT OF BOTH EYES: Status: ACTIVE | Noted: 2019-10-10

## 2019-10-10 PROBLEM — H40.003 GLAUCOMA SUSPECT OF BOTH EYES: Status: ACTIVE | Noted: 2019-10-10

## 2019-10-14 ENCOUNTER — ALLIED HEALTH/NURSE VISIT (OUTPATIENT)
Dept: NURSING | Facility: CLINIC | Age: 79
End: 2019-10-14
Payer: COMMERCIAL

## 2019-10-14 DIAGNOSIS — Z23 NEED FOR PROPHYLACTIC VACCINATION AND INOCULATION AGAINST INFLUENZA: Primary | ICD-10-CM

## 2019-10-14 PROCEDURE — 90662 IIV NO PRSV INCREASED AG IM: CPT

## 2019-10-14 PROCEDURE — G0008 ADMIN INFLUENZA VIRUS VAC: HCPCS

## 2019-10-14 NOTE — PROGRESS NOTES
Injectable Influenza Immunization Documentation    1.  Has the patient received the information for the injectable influenza vaccine? YES     2. Is the patient 6 months of age or older? YES     3. Does the patient have any of the following contraindications?         Severe allergy to eggs?  No     Severe allergic reaction to previous influenza vaccines?  No   Severe allergy to latex?  No       History of Guillain-Greensburg syndrome?  No     Currently have a temperature greater than 100.4F? No       Vaccination given by Annie Yates MA 9:00 AM 10/14/2019

## 2019-10-24 ENCOUNTER — OFFICE VISIT (OUTPATIENT)
Dept: PEDIATRICS | Facility: CLINIC | Age: 79
End: 2019-10-24
Payer: COMMERCIAL

## 2019-10-24 VITALS
TEMPERATURE: 97.9 F | HEIGHT: 70 IN | RESPIRATION RATE: 14 BRPM | BODY MASS INDEX: 26.63 KG/M2 | SYSTOLIC BLOOD PRESSURE: 138 MMHG | OXYGEN SATURATION: 99 % | DIASTOLIC BLOOD PRESSURE: 80 MMHG | WEIGHT: 186 LBS | HEART RATE: 56 BPM

## 2019-10-24 DIAGNOSIS — I10 ESSENTIAL HYPERTENSION WITH GOAL BLOOD PRESSURE LESS THAN 140/90: ICD-10-CM

## 2019-10-24 DIAGNOSIS — H25.13 AGE-RELATED NUCLEAR CATARACT OF BOTH EYES: ICD-10-CM

## 2019-10-24 DIAGNOSIS — Z01.818 PREOP GENERAL PHYSICAL EXAM: Primary | ICD-10-CM

## 2019-10-24 PROCEDURE — 99214 OFFICE O/P EST MOD 30 MIN: CPT | Performed by: FAMILY MEDICINE

## 2019-10-24 ASSESSMENT — MIFFLIN-ST. JEOR: SCORE: 1574.94

## 2019-10-24 NOTE — PROGRESS NOTES
[facility]  [address]  [address]  [place] [address]  [phone]  Dept: [phone]    PRE-OP EVALUATION:  Today's date: 10/24/2019    Ciro Almonte (: 1940) presents for pre-operative evaluation assessment as requested by Dr. Yang.  He requires evaluation and anesthesia risk assessment prior to undergoing surgery/procedure for treatment of Right eye cataract extraction with toric intraocular lens implant and Right eye istent insertion. .    Proposed Surgery/ Procedure: Cataract Surgery   Date of Surgery/ Procedure: 2019   Time of Surgery/ Procedure: [facility] Facility: [facility]   [phone]  Primary Physician: Uri Queen  Type of Anesthesia Anticipated: to be determined    Patient has a Health Care Directive or Living Will:  YES     3. NO - Do you have a history of  Heart Failure?  6. NO - Do you have a cough, shortness of breath or wheezing?  7. NO - Do you sometimes get pains in the calves of your legs when you walk?  8. NO - Do you or anyone in your family have previous history of blood clots?  9. NO - Do you or does anyone in your family have a serious bleeding problem such as prolonged bleeding following surgeries or cuts?  10. NO - Have you ever had problems with anemia or been told to take iron pills?  11. NO - Have you had any abnormal blood loss such as black, tarry or bloody stools, or abnormal vaginal bleeding?  12. NO - Have you ever had a blood transfusion?  13. NO - Have you or any of your relatives ever had problems with anesthesia?  1. NO - Do you have a history of heart attack, stroke, stent, bypass or surgery on an artery in the head, neck, heart or legs?2. NO - Do you ever have any pain or discomfort in your chest?  4. NO - Are you troubled by shortness of breath when: walking on the level, up a slight hill or at night?\",  5. NO - Do you currently have a cold, bronchitis or other " respiratory infection?  14. YES - Do you have sleep apnea, excessive snoring or daytime drowsiness?   mild sleep apnea 15.   NO - Do you have any prosthetic heart valves?  16. YES - Do you have prosthetic joints?   both hips   17. NO - Is there any chance that you may be pregnant?      HPI:     HPI related to upcoming procedure:     He has developed decreased vision bilateral. Glare at night.    Has astigmatism and elevated pressure.          No h/o Anesthesia complications other than some slowness waking.    MEDICAL HISTORY:     Patient Active Problem List    Diagnosis Date Noted     Glaucoma suspect of both eyes 10/10/2019     Priority: Medium     Added automatically from request for surgery [id number]       Nuclear senile cataract of both eyes 10/10/2019     Priority: Medium     Added automatically from request for surgery [id number]       Mixed hyperlipidemia 07/07/2017     Priority: Medium     Lumbago 05/22/2017     Priority: Medium     Hip pain, left 05/22/2017     Priority: Medium     Essential hypertension with goal blood pressure less than 140/90 07/05/2016     Priority: Medium     Primary osteoarthritis of right foot 07/05/2016     Priority: Medium     Benign non-nodular prostatic hyperplasia with lower urinary tract symptoms 07/05/2016     Priority: Medium     Chronic low back pain, unspecified back pain laterality, with sciatica presence unspecified 07/05/2016     Priority: Medium     Hereditary and idiopathic peripheral neuropathy 07/02/2015     Priority: Medium     Problem list name updated by automated process. Provider to review       Mitral regurgitation - systolic murmur 05/11/2015     Priority: Medium     Glaucoma 11/07/2014     Priority: Medium     Cervical spine degeneration 07/01/2014     Priority: Medium      Past Medical History:   Diagnosis Date     Cancer (H) 2000    L shoulder melanoma     Glaucoma      Heart murmur      Hypertension      Melanoma (H) 2000    Left shoulder - Dr. Carrillo - follows  q6 months     Neuropathy      Nonsenile cataract      Other premature beats      PVC's (premature ventricular contractions) 5/14/2015     Zoster 2000     Past Surgical History:   Procedure Laterality Date     ARTHROPLASTY HIP ANTERIOR Left 11/14/2014    Procedure: ARTHROPLASTY HIP ANTERIOR;  Surgeon: Rudy Tobias MD;  Location:  OR     ARTHROPLASTY REVISION HIP  3/12/2014     right Procedure: ARTHROPLASTY REVISION HIP;  Surgeon: Rduy Tobias MD;  Location:  OR     ARTHROSCOPY KNEE  2002    meniscus ( side?)     BIOPSY OF PROSTATE,NEEDLE/PUNCH       C TOTAL HIP ARTHROPLASTY Right 2008     DERMATOLOGY REFERRAL  2000    melanoma resection L shoulder     DISCECTOMY LUMBAR POSTERIOR MICROSCOPIC ONE LEVEL N/A 4/11/2016    Procedure: DISCECTOMY LUMBAR POSTERIOR MICROSCOPIC ONE LEVEL;  Surgeon: Ruben Marsh MD;  Location:  OR     ENT SURGERY      Tonsils     ORTHOPEDIC SURGERY  2008    R hip replacement     ORTHOPEDIC SURGERY  2004    big toe in , needed reconstructive surgery     Current Outpatient Medications   Medication Sig Dispense Refill     Acetaminophen (TYLENOL PO) Take 1,000 mg by mouth 2 times daily       amLODIPine (NORVASC) 10 MG tablet TAKE 1 TABLET(10 MG) BY MOUTH DAILY 90 tablet 2     ASPIRIN PO Take 81 mg by mouth       atorvastatin (LIPITOR) 20 MG tablet TAKE 1 TABLET BY MOUTH EVERY DAY 90 tablet 11     Calcium Citrate-Vitamin D (CITRACAL + D PO) Take 2 tablets by mouth daily       diclofenac (VOLTAREN) 75 MG EC tablet Take 2 tablets by mouth as needed   3     gabapentin (NEURONTIN) 300 MG capsule Take 4 capsules (1,200 mg) by mouth At Bedtime AND 2 capsules (600 mg) every morning. 270 capsule 11     latanoprost (XALATAN) 0.005 % ophthalmic solution Place 1 drop into both eyes At Bedtime Call clinic to schedule MD APPT. 7.5 mL 3     lisinopril (PRINIVIL/ZESTRIL) 10 MG tablet TAKE 1 TABLET(10 MG) BY MOUTH DAILY 90 tablet 2     Magnesium 500 MG CAPS        Multiple  "Vitamins-Minerals (MENS MULTIVITAMIN PLUS PO) Take 1 tablet by mouth daily       Nutritional Supplements (PROSTATE 2.4) CAPS Take 2 capsules by mouth daily Prostate cap 2.0       rOPINIRole (REQUIP) 0.25 MG tablet TAKE 1 TO 2 TABLETS(0.25 TO 0.5 MG) BY MOUTH AT BEDTIME 180 tablet 11     OTC products: Aspirin    No Known Allergies   Latex Allergy: NO    Social History     Tobacco Use     Smoking status: Former Smoker     Last attempt to quit: 1976     Years since quittin.3     Smokeless tobacco: Never Used   Substance Use Topics     Alcohol use: No     Alcohol/week: 0.0 standard drinks     Comment: Quit 1014     History   Drug Use No       REVIEW OF SYSTEMS:   Constitutional, neuro, ENT, endocrine, pulmonary, cardiac, gastrointestinal, genitourinary, integument and psychiatric systems are negative, except as otherwise noted.  MS - LBP, night cramps.    EXAM:   /80 (BP Location: Right arm, Patient Position: Chair, Cuff Size: Adult Regular)   Pulse 56   Temp 97.9  F (36.6  C) (Tympanic)   Resp 14   Ht 1.778 m (5' 10\")   Wt 84.4 kg (186 lb)   SpO2 99%   BMI 26.69 kg/m      GENERAL APPEARANCE: healthy, alert and no distress     EYES: EOMI,  PERRL, no redness     HENT:  mouth without ulcers or lesions     NECK: no adenopathy, no asymmetry, masses, or scars and thyroid normal to palpation     RESP: lungs clear to auscultation     CV: regular rates and rhythm, 1/6 FRANKIE     ABDOMEN:  soft, nontender, no HSM or masses      MS: extremities normal- no gross deformities noted, no evidence of inflammation in joints, FROM in all extremities.     SKIN: no suspicious lesions or rashes     NEURO: Normal strength and tone,  mentation intact and speech normal     PSYCH: mentation appears normal. and affect normal/bright     LYMPHATICS: No cervical adenopathy    DIAGNOSTICS:   No labs or EKG required for low risk surgery (cataract, skin procedure, breast biopsy, etc)    Recent Labs   Lab Test 19  1440 " 10/29/18  0824   HGB 12.2* 12.5*    222    144   POTASSIUM 4.2 4.3   CR 0.78 0.80        IMPRESSION:   Diagnosis/reason for consult:       (Z01.818) Preop general physical exam  (primary encounter diagnosis)  Comment: satis  Plan:     (H25.13) Age-related nuclear cataract of both eyes  Comment:   Plan:     (I10) Essential hypertension with goal blood pressure less than 140/90  Comment:   Plan:         The proposed surgical procedure is considered LOW risk.    REVISED CARDIAC RISK INDEX  The patient has the following serious cardiovascular risks for perioperative complications such as (MI, PE, VFib and 3  AV Block):  No serious cardiac risks  INTERPRETATION: 1 risks: Class II (low risk - 0.9% complication rate)    The patient has the following additional risks for perioperative complications:  No identified additional risks      ICD-10-CM    1. Preop general physical exam Z01.818    2. Age-related nuclear cataract of both eyes H25.13    3. Essential hypertension with goal blood pressure less than 140/90 I10        RECOMMENDATIONS:          --Patient is to take all scheduled medications on the day of surgery EXCEPT for modifications listed below.    APPROVAL GIVEN to proceed with proposed procedure, without further diagnostic evaluation       Signed Electronically by: Lior Rmoan MD    Copy of this evaluation report is provided to requesting physician.    [facility] Preop Guidelines    Revised Cardiac Risk Index

## 2019-10-31 ENCOUNTER — OFFICE VISIT (OUTPATIENT)
Dept: OPHTHALMOLOGY | Facility: CLINIC | Age: 79
End: 2019-10-31
Attending: OPHTHALMOLOGY
Payer: COMMERCIAL

## 2019-10-31 DIAGNOSIS — H25.13 NUCLEAR SENILE CATARACT OF BOTH EYES: Primary | ICD-10-CM

## 2019-10-31 ASSESSMENT — GONIOSCOPY
OD_TEMPORAL: 4
OS_SUPERIOR: 5
OD_INFERIOR: 4
OS_TEMPORAL: 5
OD_SUPERIOR: 4
OS_INFERIOR: 4
OS_NASAL: 4
OD_NASAL: 4

## 2019-10-31 ASSESSMENT — TONOMETRY
IOP_METHOD: APPLANATION
OD_IOP_MMHG: 21
OS_IOP_MMHG: 25

## 2019-10-31 ASSESSMENT — VISUAL ACUITY
OS_PH_CC: 20/25
METHOD: SNELLEN - LINEAR
OD_CC: 20/20
OS_CC: 20/40

## 2019-10-31 ASSESSMENT — EXTERNAL EXAM - LEFT EYE: OS_EXAM: NORMAL

## 2019-10-31 ASSESSMENT — SLIT LAMP EXAM - LIDS
COMMENTS: DERMATOCHALASIS
COMMENTS: DERMATOCHALASIS

## 2019-10-31 ASSESSMENT — CONF VISUAL FIELD
OD_NORMAL: 1
OS_NORMAL: 1

## 2019-10-31 ASSESSMENT — EXTERNAL EXAM - RIGHT EYE: OD_EXAM: NORMAL

## 2019-10-31 NOTE — PROGRESS NOTES
1)Glc Suspect vs POAG vs ?Angle recession glc OS --  K pachy:530/549    Tmax: L20s and OS:29 (10/8/2019)   HVF:Full c fluct      CDR: 0.0/0.0    HRT/OCT:OD:Mild RNFL thinning and OS:Mod RNFL thinning   FHX of Glc: Mother -- gtts,      Gonio: open with ?angle recession OS temp and sup      Intolerant to:      Asthma/COPD: No, on po BB  Steroid Use: No    Kidney Stones: Yes, in remote past    Sulfa Allergy: No      IOP targets:L20s   2)ONH Drusen  3)NS OU (OS>OD) -- visually significant by glare testing (9/18) -- pt uses magnifier for reading, difficulty driving at night -- consider Istent and toric IOL  4)LAMBERT  5)Astigmatism -- had Pentacam at [facility] (approx 2.5-3D) -- will need meeting with FC eval prior to toric -- pt wanting to proceed with toric IOL over standard IOL pending intraop findings   6)Dermatochalasis -- rec oculoplastics eval after CE  7)H/O blunt force trauma to ?left side of face while playing flag football in [employer] -- ??etiology of ?angle recession OS        Patient will continue on Latanoprost which is a teal top drop at bedtime in both eyes. A long discussion of the risks, benefits, and alternatives including potential treatment and management options were had with patient and a decision was made to proceed with cataract surgery with possible toric IOL and Istent in the left eye followed by the right eye.      Attending Physician Attestation:  Complete documentation of historical and exam elements from today's encounter can be found in the full encounter summary report (not reduplicated in this progress note). I personally obtained the chief complaint(s) and history of present illness.  I confirmed and edited as necessary the review of systems, past medical/surgical history, family history, social history, and examination findings as documented by others; and I examined the patient myself. I personally reviewed the relevant tests, images, and reports as documented above. I formulated and edited as  necessary the assessment and plan and discussed the findings and management plan with the patient and family.  - Shannon Yang MD

## 2019-11-01 DIAGNOSIS — R97.20 ELEVATED PROSTATE SPECIFIC ANTIGEN (PSA): ICD-10-CM

## 2019-11-01 LAB
ALBUMIN SERPL-MCNC: 3.7 G/DL (ref 3.4–5)
ALP SERPL-CCNC: 100 U/L (ref 40–150)
ALT SERPL W P-5'-P-CCNC: 40 U/L (ref 0–70)
AST SERPL W P-5'-P-CCNC: 24 U/L (ref 0–45)
BASOPHILS # BLD AUTO: 0 10E9/L (ref 0–0.2)
BASOPHILS NFR BLD AUTO: 0.2 %
BILIRUB DIRECT SERPL-MCNC: 0.2 MG/DL (ref 0–0.2)
BILIRUB SERPL-MCNC: 0.6 MG/DL (ref 0.2–1.3)
DIFFERENTIAL METHOD BLD: ABNORMAL
EOSINOPHIL # BLD AUTO: 0.2 10E9/L (ref 0–0.7)
EOSINOPHIL NFR BLD AUTO: 2.9 %
ERYTHROCYTE [DISTWIDTH] IN BLOOD BY AUTOMATED COUNT: 13.5 % (ref 10–15)
HCT VFR BLD AUTO: 39.5 % (ref 40–53)
HGB BLD-MCNC: 12.7 G/DL (ref 13.3–17.7)
LYMPHOCYTES # BLD AUTO: 1 10E9/L (ref 0.8–5.3)
LYMPHOCYTES NFR BLD AUTO: 19.7 %
MCH RBC QN AUTO: 31.3 PG (ref 26.5–33)
MCHC RBC AUTO-ENTMCNC: 32.2 G/DL (ref 31.5–36.5)
MCV RBC AUTO: 97 FL (ref 78–100)
MONOCYTES # BLD AUTO: 0.6 10E9/L (ref 0–1.3)
MONOCYTES NFR BLD AUTO: 10.9 %
NEUTROPHILS # BLD AUTO: 3.5 10E9/L (ref 1.6–8.3)
NEUTROPHILS NFR BLD AUTO: 66.3 %
PLATELET # BLD AUTO: 221 10E9/L (ref 150–450)
PROT SERPL-MCNC: 6.4 G/DL (ref 6.8–8.8)
RBC # BLD AUTO: 4.06 10E12/L (ref 4.4–5.9)
WBC # BLD AUTO: 5.2 10E9/L (ref 4–11)

## 2019-11-01 PROCEDURE — 80076 HEPATIC FUNCTION PANEL: CPT | Performed by: UROLOGY

## 2019-11-01 PROCEDURE — 85025 COMPLETE CBC W/AUTO DIFF WBC: CPT | Performed by: UROLOGY

## 2019-11-01 PROCEDURE — 84403 ASSAY OF TOTAL TESTOSTERONE: CPT | Performed by: UROLOGY

## 2019-11-01 PROCEDURE — 36415 COLL VENOUS BLD VENIPUNCTURE: CPT | Performed by: UROLOGY

## 2019-11-05 ENCOUNTER — ANESTHESIA EVENT (OUTPATIENT)
Dept: SURGERY | Facility: AMBULATORY SURGERY CENTER | Age: 79
End: 2019-11-05

## 2019-11-05 LAB — TESTOST SERPL-MCNC: 233 NG/DL (ref 240–950)

## 2019-11-06 ENCOUNTER — TELEPHONE (OUTPATIENT)
Dept: OPHTHALMOLOGY | Facility: CLINIC | Age: 79
End: 2019-11-06

## 2019-11-06 ENCOUNTER — HOSPITAL ENCOUNTER (OUTPATIENT)
Facility: AMBULATORY SURGERY CENTER | Age: 79
End: 2019-11-06
Attending: OPHTHALMOLOGY
Payer: COMMERCIAL

## 2019-11-06 ENCOUNTER — ANESTHESIA (OUTPATIENT)
Dept: SURGERY | Facility: AMBULATORY SURGERY CENTER | Age: 79
End: 2019-11-06

## 2019-11-06 VITALS
TEMPERATURE: 98.6 F | HEIGHT: 70 IN | RESPIRATION RATE: 16 BRPM | WEIGHT: 186 LBS | HEART RATE: 51 BPM | BODY MASS INDEX: 26.63 KG/M2 | SYSTOLIC BLOOD PRESSURE: 137 MMHG | OXYGEN SATURATION: 95 % | DIASTOLIC BLOOD PRESSURE: 80 MMHG

## 2019-11-06 DIAGNOSIS — H40.1131 PRIMARY OPEN ANGLE GLAUCOMA (POAG) OF BOTH EYES, MILD STAGE: ICD-10-CM

## 2019-11-06 DIAGNOSIS — H25.13 NUCLEAR SENILE CATARACT OF BOTH EYES: Primary | ICD-10-CM

## 2019-11-06 DIAGNOSIS — H40.003 GLAUCOMA SUSPECT OF BOTH EYES: ICD-10-CM

## 2019-11-06 DIAGNOSIS — H25.13 NUCLEAR SENILE CATARACT OF BOTH EYES: ICD-10-CM

## 2019-11-06 DEVICE — IMPLANTABLE DEVICE: Type: IMPLANTABLE DEVICE | Site: EYE | Status: FUNCTIONAL

## 2019-11-06 DEVICE — EYE IMP ISTENT TRABECULAR MICRO-BYPASS LT GTS100L: Type: IMPLANTABLE DEVICE | Site: EYE | Status: FUNCTIONAL

## 2019-11-06 RX ORDER — BALANCED SALT SOLUTION 6.4; .75; .48; .3; 3.9; 1.7 MG/ML; MG/ML; MG/ML; MG/ML; MG/ML; MG/ML
SOLUTION OPHTHALMIC PRN
Status: DISCONTINUED | OUTPATIENT
Start: 2019-11-06 | End: 2019-11-06 | Stop reason: HOSPADM

## 2019-11-06 RX ORDER — CIPROFLOXACIN HYDROCHLORIDE 3.5 MG/ML
1 SOLUTION/ DROPS TOPICAL 4 TIMES DAILY
Qty: 1 BOTTLE | Refills: 0 | Status: SHIPPED | OUTPATIENT
Start: 2019-11-06 | End: 2019-11-20

## 2019-11-06 RX ORDER — PREDNISOLONE ACETATE 10 MG/ML
1 SUSPENSION/ DROPS OPHTHALMIC 4 TIMES DAILY
Qty: 5 ML | Refills: 0 | Status: SHIPPED | OUTPATIENT
Start: 2019-11-06 | End: 2019-11-20

## 2019-11-06 RX ORDER — CYCLOPENTOLATE HYDROCHLORIDE 10 MG/ML
1 SOLUTION/ DROPS OPHTHALMIC
Status: COMPLETED | OUTPATIENT
Start: 2019-11-06 | End: 2019-11-06

## 2019-11-06 RX ORDER — TETRACAINE HYDROCHLORIDE 5 MG/ML
SOLUTION OPHTHALMIC PRN
Status: DISCONTINUED | OUTPATIENT
Start: 2019-11-06 | End: 2019-11-06 | Stop reason: HOSPADM

## 2019-11-06 RX ORDER — FENTANYL CITRATE 50 UG/ML
25-50 INJECTION, SOLUTION INTRAMUSCULAR; INTRAVENOUS
Status: DISCONTINUED | OUTPATIENT
Start: 2019-11-06 | End: 2019-11-06 | Stop reason: HOSPADM

## 2019-11-06 RX ORDER — LIDOCAINE 40 MG/G
CREAM TOPICAL
Status: DISCONTINUED | OUTPATIENT
Start: 2019-11-06 | End: 2019-11-06 | Stop reason: HOSPADM

## 2019-11-06 RX ORDER — OFLOXACIN 3 MG/ML
1 SOLUTION/ DROPS OPHTHALMIC
Status: COMPLETED | OUTPATIENT
Start: 2019-11-06 | End: 2019-11-06

## 2019-11-06 RX ORDER — KETOROLAC TROMETHAMINE 5 MG/ML
1 SOLUTION OPHTHALMIC 3 TIMES DAILY
Qty: 1 BOTTLE | Refills: 0 | Status: SHIPPED | OUTPATIENT
Start: 2019-11-06 | End: 2019-11-20

## 2019-11-06 RX ORDER — FLURBIPROFEN SODIUM 0.3 MG/ML
1 SOLUTION/ DROPS OPHTHALMIC
Status: COMPLETED | OUTPATIENT
Start: 2019-11-06 | End: 2019-11-06

## 2019-11-06 RX ORDER — ACETAMINOPHEN 325 MG/1
975 TABLET ORAL ONCE
Status: COMPLETED | OUTPATIENT
Start: 2019-11-06 | End: 2019-11-06

## 2019-11-06 RX ORDER — FENTANYL CITRATE 50 UG/ML
INJECTION, SOLUTION INTRAMUSCULAR; INTRAVENOUS PRN
Status: DISCONTINUED | OUTPATIENT
Start: 2019-11-06 | End: 2019-11-06

## 2019-11-06 RX ORDER — ONDANSETRON 4 MG/1
4 TABLET, ORALLY DISINTEGRATING ORAL EVERY 30 MIN PRN
Status: DISCONTINUED | OUTPATIENT
Start: 2019-11-06 | End: 2019-11-07 | Stop reason: HOSPADM

## 2019-11-06 RX ORDER — OXYCODONE HYDROCHLORIDE 5 MG/1
5 TABLET ORAL EVERY 4 HOURS PRN
Status: DISCONTINUED | OUTPATIENT
Start: 2019-11-06 | End: 2019-11-07 | Stop reason: HOSPADM

## 2019-11-06 RX ORDER — SODIUM CHLORIDE, SODIUM LACTATE, POTASSIUM CHLORIDE, CALCIUM CHLORIDE 600; 310; 30; 20 MG/100ML; MG/100ML; MG/100ML; MG/100ML
500 INJECTION, SOLUTION INTRAVENOUS CONTINUOUS
Status: DISCONTINUED | OUTPATIENT
Start: 2019-11-06 | End: 2019-11-06 | Stop reason: HOSPADM

## 2019-11-06 RX ORDER — SODIUM CHLORIDE, SODIUM LACTATE, POTASSIUM CHLORIDE, CALCIUM CHLORIDE 600; 310; 30; 20 MG/100ML; MG/100ML; MG/100ML; MG/100ML
INJECTION, SOLUTION INTRAVENOUS CONTINUOUS
Status: DISCONTINUED | OUTPATIENT
Start: 2019-11-06 | End: 2019-11-07 | Stop reason: HOSPADM

## 2019-11-06 RX ORDER — MEPERIDINE HYDROCHLORIDE 25 MG/ML
12.5 INJECTION INTRAMUSCULAR; INTRAVENOUS; SUBCUTANEOUS
Status: DISCONTINUED | OUTPATIENT
Start: 2019-11-06 | End: 2019-11-07 | Stop reason: HOSPADM

## 2019-11-06 RX ORDER — TROPICAMIDE 10 MG/ML
1 SOLUTION/ DROPS OPHTHALMIC
Status: COMPLETED | OUTPATIENT
Start: 2019-11-06 | End: 2019-11-06

## 2019-11-06 RX ORDER — NALOXONE HYDROCHLORIDE 0.4 MG/ML
.1-.4 INJECTION, SOLUTION INTRAMUSCULAR; INTRAVENOUS; SUBCUTANEOUS
Status: DISCONTINUED | OUTPATIENT
Start: 2019-11-06 | End: 2019-11-07 | Stop reason: HOSPADM

## 2019-11-06 RX ORDER — PHENYLEPHRINE HYDROCHLORIDE 25 MG/ML
1 SOLUTION/ DROPS OPHTHALMIC
Status: COMPLETED | OUTPATIENT
Start: 2019-11-06 | End: 2019-11-06

## 2019-11-06 RX ORDER — PROPARACAINE HYDROCHLORIDE 5 MG/ML
1 SOLUTION/ DROPS OPHTHALMIC ONCE
Status: COMPLETED | OUTPATIENT
Start: 2019-11-06 | End: 2019-11-06

## 2019-11-06 RX ORDER — ONDANSETRON 2 MG/ML
4 INJECTION INTRAMUSCULAR; INTRAVENOUS EVERY 30 MIN PRN
Status: DISCONTINUED | OUTPATIENT
Start: 2019-11-06 | End: 2019-11-07 | Stop reason: HOSPADM

## 2019-11-06 RX ORDER — LIDOCAINE HYDROCHLORIDE 10 MG/ML
INJECTION, SOLUTION EPIDURAL; INFILTRATION; INTRACAUDAL; PERINEURAL PRN
Status: DISCONTINUED | OUTPATIENT
Start: 2019-11-06 | End: 2019-11-06 | Stop reason: HOSPADM

## 2019-11-06 RX ADMIN — OFLOXACIN 1 DROP: 3 SOLUTION/ DROPS OPHTHALMIC at 07:52

## 2019-11-06 RX ADMIN — SODIUM CHLORIDE, SODIUM LACTATE, POTASSIUM CHLORIDE, CALCIUM CHLORIDE: 600; 310; 30; 20 INJECTION, SOLUTION INTRAVENOUS at 08:26

## 2019-11-06 RX ADMIN — PROPARACAINE HYDROCHLORIDE 1 DROP: 5 SOLUTION/ DROPS OPHTHALMIC at 07:52

## 2019-11-06 RX ADMIN — FLURBIPROFEN SODIUM 1 DROP: 0.3 SOLUTION/ DROPS OPHTHALMIC at 08:00

## 2019-11-06 RX ADMIN — CYCLOPENTOLATE HYDROCHLORIDE 1 DROP: 10 SOLUTION/ DROPS OPHTHALMIC at 08:00

## 2019-11-06 RX ADMIN — TROPICAMIDE 1 DROP: 10 SOLUTION/ DROPS OPHTHALMIC at 08:05

## 2019-11-06 RX ADMIN — ACETAMINOPHEN 975 MG: 325 TABLET ORAL at 07:51

## 2019-11-06 RX ADMIN — TROPICAMIDE 1 DROP: 10 SOLUTION/ DROPS OPHTHALMIC at 07:52

## 2019-11-06 RX ADMIN — OFLOXACIN 1 DROP: 3 SOLUTION/ DROPS OPHTHALMIC at 08:05

## 2019-11-06 RX ADMIN — PHENYLEPHRINE HYDROCHLORIDE 1 DROP: 25 SOLUTION/ DROPS OPHTHALMIC at 08:00

## 2019-11-06 RX ADMIN — PHENYLEPHRINE HYDROCHLORIDE 1 DROP: 25 SOLUTION/ DROPS OPHTHALMIC at 08:05

## 2019-11-06 RX ADMIN — FLURBIPROFEN SODIUM 1 DROP: 0.3 SOLUTION/ DROPS OPHTHALMIC at 08:05

## 2019-11-06 RX ADMIN — OFLOXACIN 1 DROP: 3 SOLUTION/ DROPS OPHTHALMIC at 08:00

## 2019-11-06 RX ADMIN — CYCLOPENTOLATE HYDROCHLORIDE 1 DROP: 10 SOLUTION/ DROPS OPHTHALMIC at 07:53

## 2019-11-06 RX ADMIN — FENTANYL CITRATE 50 MCG: 50 INJECTION, SOLUTION INTRAMUSCULAR; INTRAVENOUS at 08:56

## 2019-11-06 RX ADMIN — CYCLOPENTOLATE HYDROCHLORIDE 1 DROP: 10 SOLUTION/ DROPS OPHTHALMIC at 08:05

## 2019-11-06 RX ADMIN — FLURBIPROFEN SODIUM 1 DROP: 0.3 SOLUTION/ DROPS OPHTHALMIC at 07:53

## 2019-11-06 RX ADMIN — TROPICAMIDE 1 DROP: 10 SOLUTION/ DROPS OPHTHALMIC at 08:00

## 2019-11-06 RX ADMIN — PHENYLEPHRINE HYDROCHLORIDE 1 DROP: 25 SOLUTION/ DROPS OPHTHALMIC at 07:53

## 2019-11-06 ASSESSMENT — ENCOUNTER SYMPTOMS: DYSRHYTHMIAS: 1

## 2019-11-06 ASSESSMENT — MIFFLIN-ST. JEOR: SCORE: 1574.94

## 2019-11-06 NOTE — ANESTHESIA POSTPROCEDURE EVALUATION
Anesthesia POST Procedure Evaluation    Patient: Ciro Almonte   MRN:     [id number] Gender:   male   Age:    79 year old :      1940        Preoperative Diagnosis: Glaucoma suspect of both eyes [H40.003]  Nuclear senile cataract of both eyes [H25.13]   Procedure(s):  LEFT EYE, CATARACT EXTRACTION WITH TORIC INTRAOCULAR LENS IMPLANT  LEFT EYE, ISTENT INSERTION   Postop Comments: No value filed.       Anesthesia Type:  Not documented  No value filed.    Reportable Event: NO     PAIN: Uncomplicated   Sign Out status: Comfortable, Well controlled pain     PONV: No PONV   Sign Out status:  No Nausea or Vomiting     Neuro/Psych: Uneventful perioperative course   Sign Out Status: Preoperative baseline; Age appropriate mentation     Airway/Resp.: Uneventful perioperative course   Sign Out Status: Non labored breathing, age appropriate RR; Resp. Status within EXPECTED Parameters     CV: Uneventful perioperative course   Sign Out status: Appropriate BP and perfusion indices; Appropriate HR/Rhythm     Disposition:   Sign Out in:  Phase II  Disposition:  Home  Recovery Course: Uneventful  Follow-Up: Not required           Last Anesthesia Record Vitals:  CRNA VITALS  2019 0900 - 2019 1000      2019             Pulse:  65    Ht Rate:  55    SpO2:  98 %    Resp Rate (set):  10          Last PACU Vitals:  Vitals Value Taken Time   /82 2019  9:32 AM   Temp 37.1  C (98.8  F) 2019  9:32 AM   Pulse 59 2019  9:32 AM   Resp 16 2019  9:32 AM   SpO2 97 % 2019  9:32 AM   Temp src     NIBP 139/73 2019  9:26 AM   Pulse 65 2019  9:30 AM   SpO2 98 % 2019  9:30 AM   Resp     Temp     Ht Rate 55 2019  9:30 AM   Temp 2           Electronically Signed By: Jose Roberto Green MD, 2019, 11:01 AM

## 2019-11-06 NOTE — OP NOTE
Pre-operative diagnosis: Left Eye Cataract, Glaucoma and Astigmatism   Post-operative diagnosis Same   Procedure:    Anesthesia: Procedure(s):  Cataract Extraction with Toric Intraocular Lens Implant and istent, Left  MAC/Topical   Surgeon: Shannon Yang MD   Assistants(s): None   Estimated blood loss: Minimal                         Specimens: None   Findings:  Complications: None  None       Indication: Patient was noted to have a visually significant cataract causing blurred vision and glare which affected their activities of daily living. Patient was also noted to have visually significant astigmatism and open angle glaucoma well controlled on topical medications.      Patient had the axis of toric IOL placement marked in the preoperative area.  After informed consent was obtained, the patient was wheeled to the operative suite. Preoperatively, the patient received topical lidocaine ointment to the ocular surface of the operative eye.  Patient was then prepped and draped in the usual sterile fashion. A lid speculum was placed between the upper and lower eyelids.  A Thorton-Fine ring was used to stabilize the globe and a 1mm sideport blade was used to make an inferior paracentesis through the clear cornea.  Nonpreserved lidocaine was then injected through the paracentesis into the anterior chamber augmenting anesthesia.  Viscoat was then injected through the paracentesis.  The Thorton-Fine ring was once again used to stabilize the globe and a 2.5mm steel keratome blade was used to make a temporal clear corneal incision extending 1.5mm prior to anterior chamber entry.  A bent cystitome needle and Utrata forceps were then used to create a continuous curvilinear capsulorrhexis.  BSS on a nicchamin cannula was then used to flush out some of the viscoat and perform hydrodissection and hydrodelineation.  The lens was noted to be freely moveable within the capsular bag.  Phacoemulsification was then used to remove  the lens nucleus and epinucleus.  Irrigation and aspiration was then used to remove the cortical remnants.  Provisc was then instilled through the temporal wound into the capsular bag.  Next, a [id number] 19.5D single piece IOL was injected through the temporal wound and allowed to unfold within the capsular bag.  A sinskey hook was then used to center the IOL within the capsular bag and rotate it just short of the desired axis of placement.  Next, the patient's head was turned and the scope was rotated.  A gonioprism was used to visualize the Trabecular meshwork and an Istent was placed in Schlemm's canal. After confirming the correct location of the Istent, the patient's head was once again turned and the scope was rotated back to its original location.  Irrigation and aspiration was then used to remove the viscoelastic.  The toric IOL was then rotated to its desired axis of placement.  BSS on a 27 gauge cannula was then used to inflate the anterior chamber to normal tension and hydrate the stromal portions of the wounds.  Weck-cells were used to check the wounds and they were found to be well secured.  Miostat was then injected thereby constricting the pupil.  An eye shield was then placed over the operative eye before being wheeled to the recovery area.

## 2019-11-06 NOTE — TELEPHONE ENCOUNTER
Eye note patched post cataract surgery   Ok to start ketorolac 3/day and prednisolone 4/day  post-op drops today along with the antibiotic eye drop pt already started   Pt/wife verbally demonstrated understanding  Reginaldo Wood RN RN 1:31 PM 11/06/19        [facility] Call Center    Phone Message    May a detailed message be left on voicemail: yes    Reason for Call: Medication Question or concern regarding medication   Prescription Clarification  Name of Medication: prednisoLONE acetate (PRED FORTE) 1 % ophthalmic suspension  Prescribing Provider: Shannon Yang   Pharmacy:n/a   What on the order needs clarification? Pts wife is wanting to know which medication Pt should be taking today 11/6/2019, and which medications Pt should be starting tomorrow. Pts wife states she is wanting to get a call back          Action Taken: Message routed to:  Other: [facility]

## 2019-11-06 NOTE — ANESTHESIA PREPROCEDURE EVALUATION
Anesthesia Pre-Procedure Evaluation    Patient: Ciro Almonte   MRN:     [id number] Gender:   male   Age:    79 year old :      1940        Preoperative Diagnosis: Glaucoma suspect of both eyes [H40.003]  Nuclear senile cataract of both eyes [H25.13]   Procedure(s):  LEFT EYE, CATARACT EXTRACTION WITH TORIC INTRAOCULAR LENS IMPLANT  LEFT EYE, ISTENT INSERTION     Past Medical History:   Diagnosis Date     Cancer (H)     L shoulder melanoma     Glaucoma      Heart murmur      Hypertension      Melanoma (H) 2000    Left shoulder - Dr. Carrillo - follows q6 months     Neuropathy      Nonsenile cataract      Other premature beats      PVC's (premature ventricular contractions) 2015     Zoster 2000      Past Surgical History:   Procedure Laterality Date     ARTHROPLASTY HIP ANTERIOR Left 2014    Procedure: ARTHROPLASTY HIP ANTERIOR;  Surgeon: Rudy Tobias MD;  Location:  OR     ARTHROPLASTY REVISION HIP  3/12/2014     right Procedure: ARTHROPLASTY REVISION HIP;  Surgeon: Rudy Tobias MD;  Location:  OR     ARTHROSCOPY KNEE      meniscus ( side?)     BIOPSY OF PROSTATE,NEEDLE/PUNCH       C TOTAL HIP ARTHROPLASTY Right 2008     DERMATOLOGY REFERRAL      melanoma resection L shoulder     DISCECTOMY LUMBAR POSTERIOR MICROSCOPIC ONE LEVEL N/A 2016    Procedure: DISCECTOMY LUMBAR POSTERIOR MICROSCOPIC ONE LEVEL;  Surgeon: Ruben Marsh MD;  Location:  OR     ENT SURGERY      Tonsils     ORTHOPEDIC SURGERY      R hip replacement     ORTHOPEDIC SURGERY      big toe in lawn mower, needed reconstructive surgery          Anesthesia Evaluation     . Pt has had prior anesthetic.     No history of anesthetic complications          ROS/MED HX    ENT/Pulmonary:       Neurologic:     (+)neuropathy     Cardiovascular:     (+) Dyslipidemia, hypertension----. : . . . :. dysrhythmias PVCs, valvular problems/murmurs type: MR and AI .       METS/Exercise Tolerance:     Hematologic:          Musculoskeletal:         GI/Hepatic:         Renal/Genitourinary:         Endo:         Psychiatric:         Infectious Disease:         Malignancy:   (+) Malignancy History of Skin          Other:                         PHYSICAL EXAM:   Mental Status/Neuro: A/A/O   Airway: Facies: Feasible  Mallampati: II  Mouth/Opening: Full  TM distance: > 6 cm  Neck ROM: Full   Respiratory:   Resp. Rate: Normal     Resp. Effort: Normal      CV:    Comments:                      LABS:  CBC:   Lab Results   Component Value Date    WBC 5.2 11/01/2019    WBC 4.4 07/26/2019    HGB 12.7 (L) 11/01/2019    HGB 12.2 (L) 07/26/2019    HCT 39.5 (L) 11/01/2019    HCT 37.3 (L) 07/26/2019     11/01/2019     07/26/2019     BMP:   Lab Results   Component Value Date     07/26/2019     10/29/2018    POTASSIUM 4.2 07/26/2019    POTASSIUM 4.3 10/29/2018    CHLORIDE 109 07/26/2019    CHLORIDE 109 10/29/2018    CO2 30 07/26/2019    CO2 29 10/29/2018    BUN 18 07/26/2019    BUN 18 10/29/2018    CR 0.78 07/26/2019    CR 0.80 10/29/2018    GLC 89 07/26/2019    GLC 89 10/29/2018     COAGS: No results found for: PTT, INR, FIBR  POC:   Lab Results   Component Value Date    BGM 89 05/12/2017     OTHER:   Lab Results   Component Value Date    EDY 8.7 07/26/2019    MAG 2.1 07/23/2014    ALBUMIN 3.7 11/01/2019    PROTTOTAL 6.4 (L) 11/01/2019    ALT 40 11/01/2019    AST 24 11/01/2019    ALKPHOS 100 11/01/2019    BILITOTAL 0.6 11/01/2019    TSH 1.88 07/06/2017    CRP <2.9 07/26/2019    SED 8 07/26/2019        Preop Vitals    BP Readings from Last 3 Encounters:   11/06/19 137/82   10/24/19 138/80   08/06/19 132/64    Pulse Readings from Last 3 Encounters:   11/06/19 59   10/24/19 56   08/06/19 54      Resp Readings from Last 3 Encounters:   11/06/19 16   10/24/19 14   07/26/19 14    SpO2 Readings from Last 3 Encounters:   11/06/19 97%   10/24/19 99%   08/06/19 97%      Temp Readings from Last 1 Encounters:   11/06/19 37.1  C  "(98.8  F) (Temporal)    Ht Readings from Last 1 Encounters:   11/06/19 1.778 m (5' 10\")      Wt Readings from Last 1 Encounters:   11/06/19 84.4 kg (186 lb)    Estimated body mass index is 26.69 kg/m  as calculated from the following:    Height as of this encounter: 1.778 m (5' 10\").    Weight as of this encounter: 84.4 kg (186 lb).     LDA:  Peripheral IV 11/06/19 Right Hand (Active)   Site Assessment WDL 11/6/2019  9:32 AM   Line Status Infusing 11/6/2019  9:32 AM   Phlebitis Scale 0-->no symptoms 11/6/2019  9:32 AM   Infiltration Scale 0 11/6/2019  9:32 AM   Infiltration Site Treatment Method  None 11/6/2019  9:32 AM   Extravasation? No 11/6/2019  9:32 AM   Dressing Intervention New dressing  11/6/2019  7:51 AM   Number of days: 0        Assessment:   ASA SCORE: 3    H&P: History and physical reviewed and following examination; no interval change.   Smoking Status:  Non-Smoker/Unknown   NPO Status: NPO Appropriate     Plan:   Anes. Type:  MAC   Pre-Medication: None   Induction:  N/a   Airway: Native Airway   Access/Monitoring: PIV   Maintenance: N/a     Postop Plan:   Postop Pain: None  Postop Sedation/Airway: Not planned  Disposition: Outpatient     PONV Management:   Adult Risk Factors:, Non-Smoker     CONSENT: Direct conversation   Plan and risks discussed with: Patient   Blood Products: Consent Deferred (Minimal Blood Loss)                   Jose Roberto Green MD  "

## 2019-11-06 NOTE — DISCHARGE INSTRUCTIONS
[facility]     Home Care Following Cataract Surgery    If you have a gauze eye patch on, please do not remove it until it is removed by your doctor at your first appointment after your surgery.  You will start your eye drops the next day.    OR    If you only have a clear eye shield on, you may remove the eye shield when you get home and begin eye drops today as directed by your doctor.      Wear the clear eye shield for protection when sleeping for the next 5 days.      Do not rub the eye that had the operation.      Your eye might be sensitive to light.  Wearing sunglasses may be more comfortable for you.      You may have some discomfort and irritation.  Acetaminophen (Tylenol) or Ibuprofen (Advil) may be taken for discomfort. If pain persists please call your doctor s office.      Keep the eye that had the surgery dry. You may wash your hair, bathe or shower, but keep your eye closed while doing so.       Avoid bending over, strenuous activity or heavy lifting until this activity has been approved by your doctor.      You have a follow-up appointment with your doctor today or tomorrow at the [facility] ([phone]).  Bring all your prescribed eye drops with you to this follow-up appointment.        If you take glaucoma medications, bring them with you to your follow-up appointment tomorrow.      Use medication exactly as prescribed by your doctor. Wait 1 -3 minutes between eye drops.  You may restart your regular home medications.       Occasional blood-tinged tears are normal the day or two after surgery. However, if there is large or persistent bleeding from the eye, that is not normal, and you should contact the clinic.      Call your doctor s office at [phone] if any of the following should occur:    - Any sudden vision changes, including decreased vision  - Nausea or severe headache  - Increase in pain that is not controlled with  Acetaminophen (Tylenol) or Ibuprofen (Advil)  - Signs of infection (pus, increasing redness or tenderness)  - Severe sensitivity to light  - An increase in floaters (black spots in front of your vision)      [facility]  Home Care Following Anesthesia  For 24 hours after surgery:  1. Get plenty of rest.  A responsible adult must stay with you for at least 24 hours after you leave the surgery center.  2. Do not drive or use heavy equipment.  If you have weakness or tingling, don't drive or use heavy equipment until this feeling goes away.   3. Do not drink alcohol.   4. Avoid strenuous or risky activities.  Ask for help when climbing stairs.  5. You may feel lightheaded.  IF so, sit for a few minutes before standing.  Have someone help you get up.   6. If you have nausea (feel sick to your stomach): Drink only clear liquids such as apple juice, ginger ale, broth or 7-Up.  Rest may also help.  Be sure to drink enough fluids.  Move to a regular diet as you feel able.   7. You may have a slight fever.  Call the doctor if your fever is over 100 F (37.7 C) (taken under the tongue) or lasts longer than 24 hours.  8. You may have a dry mouth, a sore throat, muscle aches or trouble sleeping. These should go away after 24 hours.  9. Do not make important or legal decisions.               Tips for taking pain medications  To get the best pain relief possible, remember these points:    Take pain medications as directed, before pain becomes severe.    Pain medication can upset your stomach: taking it with food may help.    Constipation is a common side effect of pain medication. Drink plenty of  fluids.    Eat foods high in fiber. Take a stool softener if recommended by your doctor or pharmacist.    Do not drink alcohol, drive or operate machinery while taking pain medications.    Ask about other ways to control pain, such as with heat, ice or relaxation.    Tylenol/Acetaminophen  Consumption  To help encourage the safe use of acetaminophen, the makers of TYLENOL  have lowered the maximum daily dose for single-ingredient Extra Strength TYLENOL  (acetaminophen) products sold in the [place] from 8 pills per day (4,000 mg) to 6 pills per day (3,000 mg). The dosing interval has also changed from 2 pills every 4-6 hours to 2 pills every 6 hours.    If you feel your pain relief is insufficient, you may take Tylenol/Acetaminophen in addition to your narcotic pain medication.     Be careful not to exceed 3,000 mg of Tylenol/Acetaminophen in a 24 hour period from all sources.    If you are taking extra strength Tylenol/acetaminophen (500 mg), the maximum dose is 6 tablets in 24 hours.    If you are taking regular strength acetaminophen (325 mg), the maximum dose is 9 tablets in 24 hours.    You received 975 mg of Tylenol at 07:51 AM. Okay to take again at 01:51 PM, and follow dosing instructions on bottle.     Call a doctor for any of the followin. Signs of infection (fever, growing tenderness at the surgery site, a large amount of drainage or bleeding, severe pain, foul-smelling drainage, redness, swelling).  2. It has been over 8 to 10 hours since surgery and you are still not able to urinate (pass water).  3. Headache for over 24 hours.    Your doctor is:  Dr. Shannon Yang, Opthalmology: [phone]  Or dial [phone] and ask for the resident on call for:  Ophthalmology  For emergency care, call the:  [facility]:  [phone] (TTY for hearing impaired: [phone])

## 2019-11-06 NOTE — ANESTHESIA CARE TRANSFER NOTE
Patient: Ciro Almonte    Procedure(s):  LEFT EYE, CATARACT EXTRACTION WITH TORIC INTRAOCULAR LENS IMPLANT  LEFT EYE, ISTENT INSERTION    Diagnosis: Glaucoma suspect of both eyes [H40.003]  Nuclear senile cataract of both eyes [H25.13]  Diagnosis Additional Information: No value filed.    Anesthesia Type:   No value filed.     Note:  Airway :Room Air  Patient transferred to:Phase II  Comments: VSS/WNL. Responds well.Handoff Report: Identifed the Patient, Identified the Reponsible Provider, Reviewed the pertinent medical history, Discussed the surgical course, Reviewed Intra-OP anesthesia mangement and issues during anesthesia, Set expectations for post-procedure period and Allowed opportunity for questions and acknowledgement of understanding      Vitals: (Last set prior to Anesthesia Care Transfer)    CRNA VITALS  11/6/2019 0900 - 11/6/2019 0935      11/6/2019             Pulse:  65    Ht Rate:  55    SpO2:  98 %    Resp Rate (set):  10                Electronically Signed By: TACHO Talbert CRNA  November 6, 2019  9:35 AM

## 2019-11-07 ENCOUNTER — OFFICE VISIT (OUTPATIENT)
Dept: OPHTHALMOLOGY | Facility: CLINIC | Age: 79
End: 2019-11-07
Attending: OPHTHALMOLOGY
Payer: COMMERCIAL

## 2019-11-07 DIAGNOSIS — Z96.1 PSEUDOPHAKIA OF LEFT EYE: Primary | ICD-10-CM

## 2019-11-07 PROCEDURE — G0463 HOSPITAL OUTPT CLINIC VISIT: HCPCS | Mod: ZF

## 2019-11-07 ASSESSMENT — VISUAL ACUITY
OS_SC: 20/30
OD_CC+: -2
OS_SC+: -2
METHOD: SNELLEN - LINEAR
OD_CC: 20/20
CORRECTION_TYPE: GLASSES

## 2019-11-07 ASSESSMENT — SLIT LAMP EXAM - LIDS
COMMENTS: DERMATOCHALASIS
COMMENTS: DERMATOCHALASIS

## 2019-11-07 ASSESSMENT — TONOMETRY
OD_IOP_MMHG: 18
IOP_METHOD: APPLANATION
OS_IOP_MMHG: 19

## 2019-11-07 ASSESSMENT — EXTERNAL EXAM - RIGHT EYE: OD_EXAM: NORMAL

## 2019-11-07 ASSESSMENT — EXTERNAL EXAM - LEFT EYE: OS_EXAM: NORMAL

## 2019-11-07 NOTE — PROGRESS NOTES
1)Glc Suspect vs POAG vs ?Angle recession glc OS --  S/p Istent OS (11/6/2019) -- K pachy:530/549    Tmax: L20s and OS:29 (10/8/2019)   HVF:Full c fluct      CDR: 0.0/0.0    HRT/OCT:OD:Mild RNFL thinning and OS:Mod RNFL thinning   FHX of Glc: Mother -- gtts,      Gonio: open with ?angle recession OS temp and sup      Intolerant to:      Asthma/COPD: No, on po BB  Steroid Use: No    Kidney Stones: Yes, in remote past    Sulfa Allergy: No      IOP targets:L20s   2)ONH Drusen  3)NS OD -- visually significant by glare testing (9/18) -- pt uses magnifier for reading, difficulty driving at night -- consider Istent and toric IOL  4)LAMBERT  5)Astigmatism -- had Pentacam at [facility] (approx 2.5-3D) -- will need meeting with FC eval prior to toric -- pt wanting to proceed with toric IOL over standard IOL pending intraop findings   6)Dermatochalasis -- rec oculoplastics eval after [facility]  7)H/O blunt force trauma to ?left side of face while playing flag football in [employer] -- ??etiology of ?angle recession OS  8)PCIOL OS with toric IOL        Patient will continue on Latanoprost which is a teal top drop at bedtime in both eyes. No heavy lifting, bending, stooping, straining, rubbing the eye, or getting water in the eye.  Please wear protective eyewear over the eye at all times including glasses during the day and the protective shield at bedtime.  Patient will return to clinic in 1-2 weeks with refraction (LEFT EYE ONLY) prior to consideration of cataract surgery in the right eye.    Use the following drops (LEFT EYE ONLY):  Antibiotic --  Ciprofloxacin(tan top): 4x/day until finished (use for at least 5-7 days after surgery)    NSAID -- Acular LS (Ketorolac) 3x/day: for approximately 4 weeks after surgery (or until gone)    Steroid -- Pred Forte/Prednisolone Acetate:  4x/day for 1 week then, 3x/day for 1 week then, 2x/day for 1 week then, 1x/day for 1 week then, stop.    Please be sure to call if you have any decrease in vision,  increase in pain, flashing lights, redness, or a lot of drainage.          Attending Physician Attestation:  Complete documentation of historical and exam elements from today's encounter can be found in the full encounter summary report (not reduplicated in this progress note). I personally obtained the chief complaint(s) and history of present illness.  I confirmed and edited as necessary the review of systems, past medical/surgical history, family history, social history, and examination findings as documented by others; and I examined the patient myself. I personally reviewed the relevant tests, images, and reports as documented above. I formulated and edited as necessary the assessment and plan and discussed the findings and management plan with the patient and family.  - Shannon Yang MD

## 2019-11-07 NOTE — PATIENT INSTRUCTIONS
Patient will continue on Latanoprost which is a teal top drop at bedtime in both eyes. No heavy lifting, bending, stooping, straining, rubbing the eye, or getting water in the eye.  Please wear protective eyewear over the eye at all times including glasses during the day and the protective shield at bedtime.  Patient will return to clinic in 1-2 weeks with refraction (LEFT EYE ONLY) prior to consideration of cataract surgery in the right eye.    Use the following drops (LEFT EYE ONLY):  Antibiotic --  Ciprofloxacin(tan top): 4x/day until finished (use for at least 5-7 days after surgery)    NSAID -- Acular LS (Ketorolac) 3x/day: for approximately 4 weeks after surgery (or until gone)    Steroid -- Pred Forte/Prednisolone Acetate:  4x/day for 1 week then, 3x/day for 1 week then, 2x/day for 1 week then, 1x/day for 1 week then, stop.    Please be sure to call if you have any decrease in vision, increase in pain, flashing lights, redness, or a lot of drainage.

## 2019-11-07 NOTE — NURSING NOTE
Chief Complaints and History of Present Illnesses   Patient presents with     Post Op (Ophthalmology) Left Eye     1 day post operative toric IOL left eye with Istent      Chief Complaint(s) and History of Present Illness(es)     Post Op (Ophthalmology) Left Eye     Laterality: left eye    Associated symptoms: pain with eye movement.  Negative for tearing and photophobia    Comments: 1 day post operative toric IOL left eye with Istent               Comments     He state that his vision in the left eye seems okay.  He is having a bit of eye discomfort when he moves his eye.  He has started his post operative drops as directed.    Rosie Bryant, COT 2:42 PM  November 7, 2019

## 2019-11-08 NOTE — PATIENT INSTRUCTIONS
Patient will continue on Latanoprost which is a teal top drop at bedtime in both eyes. A long discussion of the risks, benefits, and alternatives including potential treatment and management options were had with patient and a decision was made to proceed with cataract surgery with possible toric IOL and Istent in the left eye followed by the right eye.

## 2019-11-12 ENCOUNTER — TRANSFERRED RECORDS (OUTPATIENT)
Dept: HEALTH INFORMATION MANAGEMENT | Facility: CLINIC | Age: 79
End: 2019-11-12

## 2019-11-14 ENCOUNTER — OFFICE VISIT (OUTPATIENT)
Dept: PEDIATRICS | Facility: CLINIC | Age: 79
End: 2019-11-14
Payer: COMMERCIAL

## 2019-11-14 ENCOUNTER — OFFICE VISIT (OUTPATIENT)
Dept: OPHTHALMOLOGY | Facility: CLINIC | Age: 79
End: 2019-11-14
Attending: OPHTHALMOLOGY
Payer: COMMERCIAL

## 2019-11-14 VITALS
DIASTOLIC BLOOD PRESSURE: 78 MMHG | SYSTOLIC BLOOD PRESSURE: 136 MMHG | WEIGHT: 185.8 LBS | HEIGHT: 69 IN | RESPIRATION RATE: 20 BRPM | BODY MASS INDEX: 27.52 KG/M2 | HEART RATE: 56 BPM | TEMPERATURE: 98 F

## 2019-11-14 DIAGNOSIS — G62.9 NEUROPATHY: ICD-10-CM

## 2019-11-14 DIAGNOSIS — M19.011 PRIMARY OSTEOARTHRITIS OF RIGHT SHOULDER: ICD-10-CM

## 2019-11-14 DIAGNOSIS — H40.003 GLAUCOMA SUSPECT OF BOTH EYES: ICD-10-CM

## 2019-11-14 DIAGNOSIS — E78.2 MIXED HYPERLIPIDEMIA: ICD-10-CM

## 2019-11-14 DIAGNOSIS — R25.2 CRAMP OF LIMB: ICD-10-CM

## 2019-11-14 DIAGNOSIS — I10 ESSENTIAL HYPERTENSION WITH GOAL BLOOD PRESSURE LESS THAN 140/90: ICD-10-CM

## 2019-11-14 DIAGNOSIS — N52.9 ERECTILE DYSFUNCTION, UNSPECIFIED ERECTILE DYSFUNCTION TYPE: ICD-10-CM

## 2019-11-14 DIAGNOSIS — Z00.00 ENCOUNTER FOR MEDICARE ANNUAL WELLNESS EXAM: Primary | ICD-10-CM

## 2019-11-14 DIAGNOSIS — Z96.1 PSEUDOPHAKIA OF LEFT EYE: Primary | ICD-10-CM

## 2019-11-14 DIAGNOSIS — N40.1 BENIGN NON-NODULAR PROSTATIC HYPERPLASIA WITH LOWER URINARY TRACT SYMPTOMS: ICD-10-CM

## 2019-11-14 DIAGNOSIS — G60.9 HEREDITARY AND IDIOPATHIC PERIPHERAL NEUROPATHY: ICD-10-CM

## 2019-11-14 PROCEDURE — G0463 HOSPITAL OUTPT CLINIC VISIT: HCPCS | Mod: ZF

## 2019-11-14 PROCEDURE — 36415 COLL VENOUS BLD VENIPUNCTURE: CPT | Performed by: INTERNAL MEDICINE

## 2019-11-14 PROCEDURE — 99397 PER PM REEVAL EST PAT 65+ YR: CPT | Performed by: INTERNAL MEDICINE

## 2019-11-14 PROCEDURE — 80061 LIPID PANEL: CPT | Performed by: INTERNAL MEDICINE

## 2019-11-14 PROCEDURE — 80053 COMPREHEN METABOLIC PANEL: CPT | Performed by: INTERNAL MEDICINE

## 2019-11-14 RX ORDER — GABAPENTIN 600 MG/1
TABLET ORAL
Qty: 180 TABLET | Refills: 11 | Status: SHIPPED | OUTPATIENT
Start: 2019-11-14 | End: 2020-11-27

## 2019-11-14 RX ORDER — ROPINIROLE 0.25 MG/1
TABLET, FILM COATED ORAL
Qty: 180 TABLET | Refills: 11 | Status: SHIPPED | OUTPATIENT
Start: 2019-11-14 | End: 2021-02-15

## 2019-11-14 RX ORDER — AMLODIPINE BESYLATE 10 MG/1
10 TABLET ORAL DAILY
Qty: 90 TABLET | Refills: 11 | Status: SHIPPED | OUTPATIENT
Start: 2019-11-14 | End: 2020-11-27

## 2019-11-14 RX ORDER — SILDENAFIL CITRATE 20 MG/1
TABLET ORAL
Qty: 30 TABLET | Refills: 11 | Status: ON HOLD | OUTPATIENT
Start: 2019-11-14 | End: 2020-05-28

## 2019-11-14 RX ORDER — LISINOPRIL 10 MG/1
10 TABLET ORAL DAILY
Qty: 90 TABLET | Refills: 11 | Status: SHIPPED | OUTPATIENT
Start: 2019-11-14 | End: 2020-11-27

## 2019-11-14 RX ORDER — DICLOFENAC SODIUM 75 MG/1
150 TABLET, DELAYED RELEASE ORAL PRN
Qty: 90 TABLET | Refills: 3 | Status: SHIPPED | OUTPATIENT
Start: 2019-11-14 | End: 2020-06-03

## 2019-11-14 RX ORDER — ATORVASTATIN CALCIUM 20 MG/1
20 TABLET, FILM COATED ORAL DAILY
Qty: 90 TABLET | Refills: 11 | Status: SHIPPED | OUTPATIENT
Start: 2019-11-14 | End: 2021-01-20

## 2019-11-14 ASSESSMENT — VISUAL ACUITY
OD_CC+: +3
OS_SC+: -1
OS_PH_SC+: -2
CORRECTION_TYPE: GLASSES
OS_PH_SC: 20/25
OD_CC: 20/30
METHOD: SNELLEN - LINEAR
OS_SC: 20/50

## 2019-11-14 ASSESSMENT — ANXIETY QUESTIONNAIRES
1. FEELING NERVOUS, ANXIOUS, OR ON EDGE: NOT AT ALL
5. BEING SO RESTLESS THAT IT IS HARD TO SIT STILL: NOT AT ALL
6. BECOMING EASILY ANNOYED OR IRRITABLE: NOT AT ALL
2. NOT BEING ABLE TO STOP OR CONTROL WORRYING: NOT AT ALL
GAD7 TOTAL SCORE: 0
3. WORRYING TOO MUCH ABOUT DIFFERENT THINGS: NOT AT ALL
7. FEELING AFRAID AS IF SOMETHING AWFUL MIGHT HAPPEN: NOT AT ALL
IF YOU CHECKED OFF ANY PROBLEMS ON THIS QUESTIONNAIRE, HOW DIFFICULT HAVE THESE PROBLEMS MADE IT FOR YOU TO DO YOUR WORK, TAKE CARE OF THINGS AT HOME, OR GET ALONG WITH OTHER PEOPLE: NOT DIFFICULT AT ALL

## 2019-11-14 ASSESSMENT — TONOMETRY
IOP_METHOD: APPLANATION
OS_IOP_MMHG: 19
OD_IOP_MMHG: 15

## 2019-11-14 ASSESSMENT — MIFFLIN-ST. JEOR: SCORE: 1562.12

## 2019-11-14 ASSESSMENT — SLIT LAMP EXAM - LIDS
COMMENTS: DERMATOCHALASIS
COMMENTS: DERMATOCHALASIS

## 2019-11-14 ASSESSMENT — REFRACTION_MANIFEST
OS_CYLINDER: +2.00
OS_AXIS: 150
OS_SPHERE: -0.75

## 2019-11-14 ASSESSMENT — REFRACTION
OS_SPHERE: -0.50
OS_CYLINDER: +1.75
OS_AXIS: 155

## 2019-11-14 ASSESSMENT — EXTERNAL EXAM - LEFT EYE: OS_EXAM: NORMAL

## 2019-11-14 ASSESSMENT — PATIENT HEALTH QUESTIONNAIRE - PHQ9
5. POOR APPETITE OR OVEREATING: NOT AT ALL
SUM OF ALL RESPONSES TO PHQ QUESTIONS 1-9: 0

## 2019-11-14 ASSESSMENT — EXTERNAL EXAM - RIGHT EYE: OD_EXAM: NORMAL

## 2019-11-14 NOTE — PATIENT INSTRUCTIONS
Labs today.    Think about trying to decrease your gabapentin. Do this slowly.     OK to try home Viagra or Cialis. DON'T TAKE BOTH OF THESE.      Patient Education   Personalized Prevention Plan  You are due for the preventive services outlined below.  Your care team is available to assist you in scheduling these services.  If you have already completed any of these items, please share that information with your care team to update in your medical record.  Health Maintenance Due   Topic Date Due     ANNUAL REVIEW OF HM ORDERS  1940     Zoster (Shingles) Vaccine (2 of 3) 01/05/2017     Anxiety Assessment  11/07/2019     Annual Wellness Visit  11/07/2019     Depression Assessment  11/07/2019

## 2019-11-14 NOTE — LETTER
[facility]  [address]  [place] [address]                  [phone]   November 18, 2019    Ciro Almonte  [address] [place] [address]      Dear Ciro,     Please find your lab results enclosed. All of your lab results look normal. Your cholesterol looks good. Your kidney function, electrolytes, and liver function all looks to be within normal ranges as well. Please let me know if you have any concerns or questions.     Sincerely,     Radha Banks MD   Internal Medicine-Pediatrics        Results for orders placed or performed in visit on 11/14/19   Lipid panel reflex to direct LDL Fasting     Status: None   Result Value Ref Range    Cholesterol 182 <200 mg/dL    Triglycerides 81 <150 mg/dL    HDL Cholesterol 72 >39 mg/dL    LDL Cholesterol Calculated 94 <100 mg/dL    Non HDL Cholesterol 110 <130 mg/dL   Comprehensive metabolic panel (BMP + Alb, Alk Phos, ALT, AST, Total. Bili, TP)     Status: None   Result Value Ref Range    Sodium 142 133 - 144 mmol/L    Potassium 4.5 3.4 - 5.3 mmol/L    Chloride 109 94 - 109 mmol/L    Carbon Dioxide 26 20 - 32 mmol/L    Anion Gap 7 3 - 14 mmol/L    Glucose 99 70 - 99 mg/dL    Urea Nitrogen 24 7 - 30 mg/dL    Creatinine 0.89 0.66 - 1.25 mg/dL    GFR Estimate 82 >60 mL/min/[1.73_m2]    GFR Estimate If Black >90 >60 mL/min/[1.73_m2]    Calcium 9.4 8.5 - 10.1 mg/dL    Bilirubin Total 0.9 0.2 - 1.3 mg/dL    Albumin 4.1 3.4 - 5.0 g/dL    Protein Total 6.8 6.8 - 8.8 g/dL    Alkaline Phosphatase 96 40 - 150 U/L    ALT 49 0 - 70 U/L    AST 27 0 - 45 U/L

## 2019-11-14 NOTE — PATIENT INSTRUCTIONS
Patient will continue on Latanoprost which is a teal top drop at bedtime in both eyes. No heavy lifting, bending, stooping, straining, rubbing the eye, or getting water in the eye.  Please wear protective eyewear over the eye at all times including glasses during the day and the protective shield at bedtime. A long discussion of the risks, benefits, and alternatives including potential treatment and management options were had with patient and a decision was made to proceed with cataract with toric IOL and istent in the right eye.  .    Use the following drops (LEFT EYE ONLY):  Antibiotic --  Ciprofloxacin(tan top): Discontinue    NSAID -- Acular LS (Ketorolac) 3x/day: for approximately 3 more weeks after surgery (or until gone)    Steroid -- Pred Forte/Prednisolone Acetate: 3x/day for 1 week then, 2x/day for 1 week then, 1x/day for 1 week then, stop.    Please be sure to call if you have any decrease in vision, increase in pain, flashing lights, redness, or a lot of drainage.

## 2019-11-14 NOTE — NURSING NOTE
Chief Complaints and History of Present Illnesses   Patient presents with     Post Op (Ophthalmology) Left Eye     S/p Cataract extraction with IOL in the left eye on 11/06/19     Chief Complaint(s) and History of Present Illness(es)     Post Op (Ophthalmology) Left Eye     Laterality: left eye    Associated symptoms: Negative for eye pain, dryness, tearing and redness    Pain scale: 0/10    Comments: S/p Cataract extraction with IOL in the left eye on 11/06/19              Comments     His vision in the left eye is doubled.  He sees a shadow on images with his left eye.    Rosie Bryant, COT 2:52 PM  November 14, 2019

## 2019-11-14 NOTE — PROGRESS NOTES
"  SUBJECTIVE:   Ciro Almonte is a 79 year old male who presents for Preventive Visit.      Are you in the first 12 months of your Medicare Part B coverage?      Physical Health:    In general, how would you rate your overall physical health? good    Outside of work, how many days during the week do you exercise? none    Outside of work, approximately how many minutes a day do you exercise?not applicable    If you drink alcohol do you typically have >3 drinks per day or >7 drinks per week? No    Do you usually eat at least 4 servings of fruit and vegetables a day, include whole grains & fiber and avoid regularly eating high fat or \"junk\" foods? NO    Do you have any problems taking medications regularly?  No    Do you have any side effects from medications? None known    Needs assistance for the following daily activities: no assistance needed    Which of the following safety concerns are present in your home?  none identified     Hearing impairment: Yes, bilateral     In the past 6 months, have you been bothered by leaking of urine? yes    Mental Health:    In general, how would you rate your overall mental or emotional health? good  PHQ-2 Score:  0  Do you feel safe in your environment? Yes    Have you ever done Advance Care Planning? (For example, a Health Directive, POLST, or a discussion with a medical provider or your loved ones about your wishes): Yes, patient states has an Advance Care Planning document and will bring a copy to the clinic.    Additional concerns to address?  YES    Fall risk:  Rash two months all over the body  click delete button to remove this line now  Cognitive Screening: Pt declines to take test    Do you have sleep apnea, excessive snoring or daytime drowsiness?: no    Low testosterone: Following with Urology for this, just had his testosterone gel dose increased. Has some questions about if this is safe for his wife to come in to contact with when doing laundry. Urologist also " monitors his PSA. Was prescribed Cialis for ED, but this is very expensive.    Has a rash on his leg, was given an OTC cream by his dermatologist; now worsening. He has follow-up scheduled with his dermatologist soon.     Had one cataract surgery, still has one to go this year.     Reviewed and updated as needed this visit by clinical staff  Tobacco  Allergies  Meds  Med Hx  Surg Hx  Fam Hx  Soc Hx        Reviewed and updated as needed this visit by Provider  Meds        Social History     Tobacco Use     Smoking status: Former Smoker     Packs/day: 0.00     Last attempt to quit: 1976     Years since quittin.4     Smokeless tobacco: Never Used   Substance Use Topics     Alcohol use: No     Alcohol/week: 0.0 standard drinks     Comment: Quit 1014                           Current providers sharing in care for this patient include:   Patient Care Team:  Uri Queen MD as PCP - General (Internal Medicine)  Radha Banks MD as Assigned PCP    The following health maintenance items are reviewed in Epic and correct as of today:  Health Maintenance   Topic Date Due     ANNUAL REVIEW OF HM ORDERS  1940     ZOSTER IMMUNIZATION (2 of 3) 2017     ANTONI ASSESSMENT  2019     MEDICARE ANNUAL WELLNESS VISIT  2019     PHQ-9  2019     FALL RISK ASSESSMENT  2020     ADVANCE CARE PLANNING  2021     LIPID  10/29/2023     DTAP/TDAP/TD IMMUNIZATION (3 - Td) 2027     INFLUENZA VACCINE  Completed     PNEUMOCOCCAL IMMUNIZATION 65+ LOW/MEDIUM RISK  Completed     IPV IMMUNIZATION  Aged Out     MENINGITIS IMMUNIZATION  Aged Out     Patient Active Problem List   Diagnosis     Cervical spine degeneration     Glaucoma     Mitral regurgitation - systolic murmur     Hereditary and idiopathic peripheral neuropathy     Essential hypertension with goal blood pressure less than 140/90     Primary osteoarthritis of right foot     Benign non-nodular prostatic hyperplasia with  lower urinary tract symptoms     Chronic low back pain, unspecified back pain laterality, with sciatica presence unspecified     Lumbago     Hip pain, left     Mixed hyperlipidemia     Glaucoma suspect of both eyes     Nuclear senile cataract of both eyes     Past Surgical History:   Procedure Laterality Date     ARTHROPLASTY HIP ANTERIOR Left 2014    Procedure: ARTHROPLASTY HIP ANTERIOR;  Surgeon: Rudy Tobias MD;  Location:  OR     ARTHROPLASTY REVISION HIP  3/12/2014     right Procedure: ARTHROPLASTY REVISION HIP;  Surgeon: Rudy Tobias MD;  Location:  OR     ARTHROSCOPY KNEE  2002    meniscus ( side?)     BIOPSY OF PROSTATE,NEEDLE/PUNCH       C TOTAL HIP ARTHROPLASTY Right 2008     DERMATOLOGY REFERRAL      melanoma resection L shoulder     DISCECTOMY LUMBAR POSTERIOR MICROSCOPIC ONE LEVEL N/A 2016    Procedure: DISCECTOMY LUMBAR POSTERIOR MICROSCOPIC ONE LEVEL;  Surgeon: Ruben Marsh MD;  Location:  OR     ENT SURGERY      Tonsils     ORTHOPEDIC SURGERY      R hip replacement     ORTHOPEDIC SURGERY      big toe in , needed reconstructive surgery     PHACOEMULSIFICATION CLEAR CORNEA WITH TORIC INTRAOCULAR LENS IMPLANT Left 2019    Procedure: LEFT EYE, CATARACT EXTRACTION WITH TORIC INTRAOCULAR LENS IMPLANT;  Surgeon: Shannon Yang MD;  Location:  OR     TRABECULAR MICRO-BYPASS WITH ISTENT Left 2019    Procedure: LEFT EYE, ISTENT INSERTION;  Surgeon: Shannon Yang MD;  Location:  OR       Social History     Tobacco Use     Smoking status: Former Smoker     Packs/day: 0.00     Last attempt to quit: 1976     Years since quittin.4     Smokeless tobacco: Never Used   Substance Use Topics     Alcohol use: No     Alcohol/week: 0.0 standard drinks     Comment: Quit 1014     Family History   Problem Relation Age of Onset     Cerebrovascular Disease Mother      Hypertension Mother      Glaucoma Mother      Heart Disease Father 58  "       MI     Cancer Father         lung cancer;  age 91             ROS:  Constitutional, HEENT, cardiovascular, pulmonary, GI, , musculoskeletal, neuro, skin, endocrine and psych systems are negative, except as otherwise noted.    OBJECTIVE:   /78   Pulse 56   Temp 98  F (36.7  C) (Oral)   Resp 20   Ht 1.759 m (5' 9.25\")   Wt 84.3 kg (185 lb 12.8 oz)   BMI 27.24 kg/m   Estimated body mass index is 27.24 kg/m  as calculated from the following:    Height as of this encounter: 1.759 m (5' 9.25\").    Weight as of this encounter: 84.3 kg (185 lb 12.8 oz).  EXAM:   GENERAL: healthy, alert and no distress  EYES: Eyes grossly normal to inspection, PERRL and conjunctivae and sclerae normal  HENT: ear canals and TM's normal, nose and mouth without ulcers or lesions  NECK: no adenopathy, no asymmetry, masses, or scars and thyroid normal to palpation  RESP: lungs clear to auscultation - no rales, rhonchi or wheezes  CV: regular rate and rhythm, normal S1 S2, no S3 or S4, no murmur appreciated on my exam  ABDOMEN: soft, nontender, no hepatosplenomegaly, no masses and bowel sounds normal  MS: no gross musculoskeletal defects noted, no edema  SKIN: no suspicious lesions or rashes  NEURO: Normal strength and tone, mentation intact and speech normal  PSYCH: mentation appears normal, affect normal/bright      ASSESSMENT / PLAN:   1. Encounter for Medicare annual wellness exam    2. Essential hypertension with goal blood pressure less than 140/90  Well controlled on current regimen.   - Lipid panel reflex to direct LDL Fasting  - Comprehensive metabolic panel (BMP + Alb, Alk Phos, ALT, AST, Total. Bili, TP)  - amLODIPine (NORVASC) 10 MG tablet; Take 1 tablet (10 mg) by mouth daily  Dispense: 90 tablet; Refill: 11  - lisinopril (PRINIVIL/ZESTRIL) 10 MG tablet; Take 1 tablet (10 mg) by mouth daily  Dispense: 90 tablet; Refill: 11    3. Mixed hyperlipidemia  - Lipid panel reflex to direct LDL Fasting  - atorvastatin " "(LIPITOR) 20 MG tablet; Take 1 tablet (20 mg) by mouth daily  Dispense: 90 tablet; Refill: 11    4. Neuropathy  Patient doesn't feel gabapentin is helping. Discussed trying to taper down and see if symptoms worsen. He can always take a dose as needed as well.   - gabapentin (NEURONTIN) 600 MG tablet; Take 1 tablet (600 mg) by mouth At Bedtime AND 0.5 tablets (300 mg) every morning.  Dispense: 180 tablet; Refill: 11    5. Cramp of limb  Stable.   - rOPINIRole (REQUIP) 0.25 MG tablet; TAKE 1 TO 2 TABLETS(0.25 TO 0.5 MG) BY MOUTH AT BEDTIME  Dispense: 180 tablet; Refill: 11    6. Primary osteoarthritis of right shoulder  Follows with [facility].   - diclofenac (VOLTAREN) 75 MG EC tablet; Take 2 tablets (150 mg) by mouth as needed for moderate pain  Dispense: 90 tablet; Refill: 3    7. Erectile dysfunction, unspecified erectile dysfunction type  Would like to try sildenafil as Cialis too expensive. Reviewed risks, side effects of the medication.   - sildenafil (REVATIO) 20 MG tablet; Take 3-5 tabs 30-60 minutes prior to intercourse.  Dispense: 30 tablet; Refill: 11    8. Glaucoma suspect of both eyes  Following with Ophthalmology for cataract surgery.     9. Hereditary and idiopathic peripheral neuropathy  As above.     10. Benign non-nodular prostatic hyperplasia with lower urinary tract symptoms  Following with Urology, they manage annual PSAs per patient report.       COUNSELING:  Reviewed preventive health counseling, as reflected in patient instructions       Regular exercise       Healthy diet/nutrition       Vision screening       Hearing screening       Dental care       Aspirin Prophylaxsis       Prostate cancer screening    Estimated body mass index is 27.24 kg/m  as calculated from the following:    Height as of this encounter: 1.759 m (5' 9.25\").    Weight as of this encounter: 84.3 kg (185 lb 12.8 oz).         reports that he quit smoking about 43 years ago. He smoked 0.00 packs per day. He has never used " smokeless tobacco.      Appropriate preventive services were discussed with this patient, including applicable screening as appropriate for cardiovascular disease, diabetes, osteopenia/osteoporosis, and glaucoma.  As appropriate for age/gender, discussed screening for colorectal cancer, prostate cancer, breast cancer, and cervical cancer. Checklist reviewing preventive services available has been given to the patient.    Reviewed patients plan of care and provided an AVS. The Intermediate Care Plan ( asthma action plan, low back pain action plan, and migraine action plan) for Ciro meets the Care Plan requirement. This Care Plan has been established and reviewed with the Patient.    Counseling Resources:  ATP IV Guidelines  Pooled Cohorts Equation Calculator  Breast Cancer Risk Calculator  FRAX Risk Assessment  ICSI Preventive Guidelines  Dietary Guidelines for Americans, 2010  USDA's MyPlate  ASA Prophylaxis  Lung CA Screening    Radha Herrera MD  [facility]

## 2019-11-14 NOTE — PROGRESS NOTES
1)Glc Suspect vs POAG vs ?Angle recession glc OS --  S/p Istent OS (11/6/2019) -- K pachy:530/549    Tmax: L20s and OS:29 (10/8/2019)   HVF:Full c fluct      CDR: 0.0/0.0    HRT/OCT:OD:Mild RNFL thinning and OS:Mod RNFL thinning   FHX of Glc: Mother -- gtts,      Gonio: open with ?angle recession OS temp and sup      Intolerant to:      Asthma/COPD: No, on po BB  Steroid Use: No    Kidney Stones: Yes, in remote past    Sulfa Allergy: No      IOP targets:L20s   2)ONH Drusen  3)NS OD -- visually significant by glare testing (9/18) -- pt uses magnifier for reading, difficulty driving at night -- consider Istent and toric IOL  4)LAMBERT  5)Astigmatism -- had Pentacam at [facility] (approx 2.5-3D) -- will need meeting with [facility] eval prior to toric -- pt wanting to proceed with toric IOL over standard IOL pending intraop findings   6)Dermatochalasis -- rec oculoplastics eval after [facility]  7)H/O blunt force trauma to ?left side of face while playing flag football in [employer] -- ??etiology of ?angle recession OS  8)PCIOL OS with toric IOL      MD:OS -0.50+1.75x155 20/20 (no doubling or shading)    MD:discussed IOL rotation, refractive surgery or glasses -- pt fully aware he would possibly need glasses after surgery and said he would be wearing glasses regardless after cataract surgery    Patient will continue on Latanoprost which is a teal top drop at bedtime in both eyes. No heavy lifting, bending, stooping, straining, rubbing the eye, or getting water in the eye.  Please wear protective eyewear over the eye at all times including glasses during the day and the protective shield at bedtime. A long discussion of the risks, benefits, and alternatives including potential treatment and management options were had with patient and a decision was made to proceed with cataract with toric IOL and istent in the right eye.  .    Use the following drops (LEFT EYE ONLY):  Antibiotic --  Ciprofloxacin(tan top): Discontinue    NSAID -- Acular LS  (Ketorolac) 3x/day: for approximately 3 more weeks after surgery (or until gone)    Steroid -- Pred Forte/Prednisolone Acetate: 3x/day for 1 week then, 2x/day for 1 week then, 1x/day for 1 week then, stop.    Please be sure to call if you have any decrease in vision, increase in pain, flashing lights, redness, or a lot of drainage.          Attending Physician Attestation:  Complete documentation of historical and exam elements from today's encounter can be found in the full encounter summary report (not reduplicated in this progress note). I personally obtained the chief complaint(s) and history of present illness.  I confirmed and edited as necessary the review of systems, past medical/surgical history, family history, social history, and examination findings as documented by others; and I examined the patient myself. I personally reviewed the relevant tests, images, and reports as documented above. I formulated and edited as necessary the assessment and plan and discussed the findings and management plan with the patient and family.  - Shannon Yang MD

## 2019-11-15 LAB
ALBUMIN SERPL-MCNC: 4.1 G/DL (ref 3.4–5)
ALP SERPL-CCNC: 96 U/L (ref 40–150)
ALT SERPL W P-5'-P-CCNC: 49 U/L (ref 0–70)
ANION GAP SERPL CALCULATED.3IONS-SCNC: 7 MMOL/L (ref 3–14)
AST SERPL W P-5'-P-CCNC: 27 U/L (ref 0–45)
BILIRUB SERPL-MCNC: 0.9 MG/DL (ref 0.2–1.3)
BUN SERPL-MCNC: 24 MG/DL (ref 7–30)
CALCIUM SERPL-MCNC: 9.4 MG/DL (ref 8.5–10.1)
CHLORIDE SERPL-SCNC: 109 MMOL/L (ref 94–109)
CHOLEST SERPL-MCNC: 182 MG/DL
CO2 SERPL-SCNC: 26 MMOL/L (ref 20–32)
CREAT SERPL-MCNC: 0.89 MG/DL (ref 0.66–1.25)
GFR SERPL CREATININE-BSD FRML MDRD: 82 ML/MIN/{1.73_M2}
GLUCOSE SERPL-MCNC: 99 MG/DL (ref 70–99)
HDLC SERPL-MCNC: 72 MG/DL
LDLC SERPL CALC-MCNC: 94 MG/DL
NONHDLC SERPL-MCNC: 110 MG/DL
POTASSIUM SERPL-SCNC: 4.5 MMOL/L (ref 3.4–5.3)
PROT SERPL-MCNC: 6.8 G/DL (ref 6.8–8.8)
SODIUM SERPL-SCNC: 142 MMOL/L (ref 133–144)
TRIGL SERPL-MCNC: 81 MG/DL

## 2019-11-15 ASSESSMENT — ANXIETY QUESTIONNAIRES: GAD7 TOTAL SCORE: 0

## 2019-11-19 ENCOUNTER — ANESTHESIA EVENT (OUTPATIENT)
Dept: SURGERY | Facility: AMBULATORY SURGERY CENTER | Age: 79
End: 2019-11-19

## 2019-11-19 NOTE — ANESTHESIA PREPROCEDURE EVALUATION
Anesthesia Pre-Procedure Evaluation    Patient: Ciro Almonte   MRN:     [id number] Gender:   male   Age:    79 year old :      1940        Preoperative Diagnosis: Glaucoma suspect of both eyes [H40.003]  Nuclear senile cataract of both eyes [H25.13]   Procedure(s):  RIGHT EYE, CATARACT EXTRACTION WITH TORIC INTRAOCULAR LENS IMPLANT  RIGHT EYE, ISTENT INSERTION     Past Medical History:   Diagnosis Date     Cancer (H)     L shoulder melanoma     Glaucoma      Heart murmur      Hypertension      Melanoma (H) 2000    Left shoulder - Dr. Carrillo - follows q6 months     Neuropathy      Nonsenile cataract      Other chronic pain     Neuropathy bilateral feet     Other premature beats      PVC's (premature ventricular contractions) 2015     Zoster 2000      Past Surgical History:   Procedure Laterality Date     ARTHROPLASTY HIP ANTERIOR Left 2014    Procedure: ARTHROPLASTY HIP ANTERIOR;  Surgeon: Rudy Tobias MD;  Location:  OR     ARTHROPLASTY REVISION HIP  3/12/2014     right Procedure: ARTHROPLASTY REVISION HIP;  Surgeon: Rudy Tobias MD;  Location:  OR     ARTHROSCOPY KNEE      meniscus ( side?)     BIOPSY OF PROSTATE,NEEDLE/PUNCH       C TOTAL HIP ARTHROPLASTY Right 2008     DERMATOLOGY REFERRAL      melanoma resection L shoulder     DISCECTOMY LUMBAR POSTERIOR MICROSCOPIC ONE LEVEL N/A 2016    Procedure: DISCECTOMY LUMBAR POSTERIOR MICROSCOPIC ONE LEVEL;  Surgeon: Ruben Marsh MD;  Location:  OR     ENT SURGERY      Tonsils     ORTHOPEDIC SURGERY      R hip replacement     ORTHOPEDIC SURGERY  2004    big toe in lawn mower, needed reconstructive surgery     PHACOEMULSIFICATION CLEAR CORNEA WITH TORIC INTRAOCULAR LENS IMPLANT Left 2019    Procedure: LEFT EYE, CATARACT EXTRACTION WITH TORIC INTRAOCULAR LENS IMPLANT;  Surgeon: Shannon Yang MD;  Location: [facility] OR     TRABECULAR MICRO-BYPASS WITH ISTENT Left 2019    Procedure: LEFT EYE, ISTENT  INSERTION;  Surgeon: Shannon Yang MD;  Location: [facility] OR          Anesthesia Evaluation     .             ROS/MED HX    ENT/Pulmonary:       Neurologic:     (+)neuropathy     Cardiovascular:     (+) Dyslipidemia, hypertension----. : . . . :. valvular problems/murmurs type: MR and AI .       METS/Exercise Tolerance:     Hematologic:         Musculoskeletal:         GI/Hepatic:         Renal/Genitourinary:         Endo:         Psychiatric:         Infectious Disease:         Malignancy:   (+) Malignancy History of Skin          Other:                     JZG FV AN PHYSICAL EXAM    LABS:  CBC:   Lab Results   Component Value Date    WBC 5.2 11/01/2019    WBC 4.4 07/26/2019    HGB 12.7 (L) 11/01/2019    HGB 12.2 (L) 07/26/2019    HCT 39.5 (L) 11/01/2019    HCT 37.3 (L) 07/26/2019     11/01/2019     07/26/2019     BMP:   Lab Results   Component Value Date     11/14/2019     07/26/2019    POTASSIUM 4.5 11/14/2019    POTASSIUM 4.2 07/26/2019    CHLORIDE 109 11/14/2019    CHLORIDE 109 07/26/2019    CO2 26 11/14/2019    CO2 30 07/26/2019    BUN 24 11/14/2019    BUN 18 07/26/2019    CR 0.89 11/14/2019    CR 0.78 07/26/2019    GLC 99 11/14/2019    GLC 89 07/26/2019     COAGS: No results found for: PTT, INR, FIBR  POC:   Lab Results   Component Value Date    BGM 89 05/12/2017     OTHER:   Lab Results   Component Value Date    EDY 9.4 11/14/2019    MAG 2.1 07/23/2014    ALBUMIN 4.1 11/14/2019    PROTTOTAL 6.8 11/14/2019    ALT 49 11/14/2019    AST 27 11/14/2019    ALKPHOS 96 11/14/2019    BILITOTAL 0.9 11/14/2019    TSH 1.88 07/06/2017    CRP <2.9 07/26/2019    SED 8 07/26/2019        Preop Vitals    BP Readings from Last 3 Encounters:   11/14/19 136/78   11/06/19 137/80   10/24/19 138/80    Pulse Readings from Last 3 Encounters:   11/14/19 56   11/06/19 51   10/24/19 56      Resp Readings from Last 3 Encounters:   11/14/19 20   11/06/19 16   10/24/19 14    SpO2 Readings from Last 3 Encounters:  "  11/06/19 95%   10/24/19 99%   08/06/19 97%      Temp Readings from Last 1 Encounters:   11/14/19 36.7  C (98  F) (Oral)    Ht Readings from Last 1 Encounters:   11/14/19 1.759 m (5' 9.25\")      Wt Readings from Last 1 Encounters:   11/14/19 84.3 kg (185 lb 12.8 oz)    Estimated body mass index is 27.24 kg/m  as calculated from the following:    Height as of 11/14/19: 1.759 m (5' 9.25\").    Weight as of 11/14/19: 84.3 kg (185 lb 12.8 oz).     LDA:        Assessment:   ASA SCORE: 2    H&P: History and physical reviewed and following examination; no interval change.   Smoking Status:  Non-Smoker/Unknown   NPO Status: NPO Appropriate     Plan:   Anes. Type:  MAC   Pre-Medication: None   Induction:  N/a   Airway: Native Airway   Access/Monitoring: PIV   Maintenance: N/a     Postop Plan:   Postop Pain: None  Postop Sedation/Airway: Not planned  Disposition: Outpatient     PONV Management:   Adult Risk Factors:, Non-Smoker   Prevention: Ondansetron, Dexamethasone, Propofol     CONSENT: Direct conversation   Plan and risks discussed with: Patient                      Mauricio Zaidi MD     ANESTHESIA PREOP EVALUATION    PROCEDURE: Procedure(s):  RIGHT EYE, CATARACT EXTRACTION WITH TORIC INTRAOCULAR LENS IMPLANT  RIGHT EYE, ISTENT INSERTION    HPI: Ciro Almonte is a 79 year old male who presents for above procedure 2/2 glaucoma and cataract.    PAST MEDICAL HISTORY:    Past Medical History:   Diagnosis Date     Cancer (H) 2000    L shoulder melanoma     Glaucoma      Heart murmur      Hypertension      Melanoma (H) 2000    Left shoulder - Dr. Carrillo - follows q6 months     Neuropathy      Nonsenile cataract      Other chronic pain     Neuropathy bilateral feet     Other premature beats      PVC's (premature ventricular contractions) 5/14/2015     Zoster 2000       PAST SURGICAL HISTORY:    Past Surgical History:   Procedure Laterality Date     ARTHROPLASTY HIP ANTERIOR Left 11/14/2014    Procedure: ARTHROPLASTY HIP " ANTERIOR;  Surgeon: Rudy Tobias MD;  Location:  OR     ARTHROPLASTY REVISION HIP  3/12/2014     right Procedure: ARTHROPLASTY REVISION HIP;  Surgeon: Rudy Tobias MD;  Location:  OR     ARTHROSCOPY KNEE      meniscus ( side?)     BIOPSY OF PROSTATE,NEEDLE/PUNCH       C TOTAL HIP ARTHROPLASTY Right 2008     DERMATOLOGY REFERRAL      melanoma resection L shoulder     DISCECTOMY LUMBAR POSTERIOR MICROSCOPIC ONE LEVEL N/A 2016    Procedure: DISCECTOMY LUMBAR POSTERIOR MICROSCOPIC ONE LEVEL;  Surgeon: Ruben Marsh MD;  Location:  OR     ENT SURGERY      Tonsils     ORTHOPEDIC SURGERY  2008    R hip replacement     ORTHOPEDIC SURGERY      big toe in lawn mower, needed reconstructive surgery     PHACOEMULSIFICATION CLEAR CORNEA WITH TORIC INTRAOCULAR LENS IMPLANT Left 2019    Procedure: LEFT EYE, CATARACT EXTRACTION WITH TORIC INTRAOCULAR LENS IMPLANT;  Surgeon: Shannon Yang MD;  Location:  OR     TRABECULAR MICRO-BYPASS WITH ISTENT Left 2019    Procedure: LEFT EYE, ISTENT INSERTION;  Surgeon: Shannon Yang MD;  Location: [facility] OR       SOCIAL HISTORY:       Social History     Tobacco Use     Smoking status: Former Smoker     Packs/day: 0.00     Last attempt to quit: 1976     Years since quittin.4     Smokeless tobacco: Never Used   Substance Use Topics     Alcohol use: No     Alcohol/week: 0.0 standard drinks     Comment: Quit 1014       ALLERGIES:     No Known Allergies    MEDICATIONS:     (Not in a hospital admission)      Current Outpatient Medications   Medication Sig Dispense Refill     Acetaminophen (TYLENOL PO) Take 1,000 mg by mouth 2 times daily       amLODIPine (NORVASC) 10 MG tablet Take 1 tablet (10 mg) by mouth daily 90 tablet 11     ASPIRIN PO Take 81 mg by mouth       atorvastatin (LIPITOR) 20 MG tablet Take 1 tablet (20 mg) by mouth daily 90 tablet 11     Calcium Citrate-Vitamin D (CITRACAL + D PO) Take 2 tablets by mouth daily        ciprofloxacin (CILOXAN) 0.3 % ophthalmic solution Place 1 drop Into the left eye 4 times daily 1 Bottle 0     diclofenac (VOLTAREN) 75 MG EC tablet Take 2 tablets (150 mg) by mouth as needed for moderate pain 90 tablet 3     gabapentin (NEURONTIN) 600 MG tablet Take 1 tablet (600 mg) by mouth At Bedtime AND 0.5 tablets (300 mg) every morning. 180 tablet 11     ketorolac (ACULAR) 0.5 % ophthalmic solution Place 1 drop Into the left eye 3 times daily 1 Bottle 0     latanoprost (XALATAN) 0.005 % ophthalmic solution Place 1 drop into both eyes At Bedtime Call clinic to schedule MD APPT. 7.5 mL 3     lisinopril (PRINIVIL/ZESTRIL) 10 MG tablet Take 1 tablet (10 mg) by mouth daily 90 tablet 11     Magnesium 500 MG CAPS        Multiple Vitamins-Minerals (MENS MULTIVITAMIN PLUS PO) Take 1 tablet by mouth daily       Nutritional Supplements (PROSTATE 2.4) CAPS Take 2 capsules by mouth daily Prostate cap 2.0       prednisoLONE acetate (PRED FORTE) 1 % ophthalmic suspension Place 1 drop Into the left eye 4 times daily 5 mL 0     rOPINIRole (REQUIP) 0.25 MG tablet TAKE 1 TO 2 TABLETS(0.25 TO 0.5 MG) BY MOUTH AT BEDTIME 180 tablet 11     sildenafil (REVATIO) 20 MG tablet Take 3-5 tabs 30-60 minutes prior to intercourse. 30 tablet 11       Current Outpatient Medications Ordered in Epic   Medication Sig Dispense Refill     Acetaminophen (TYLENOL PO) Take 1,000 mg by mouth 2 times daily       amLODIPine (NORVASC) 10 MG tablet Take 1 tablet (10 mg) by mouth daily 90 tablet 11     ASPIRIN PO Take 81 mg by mouth       atorvastatin (LIPITOR) 20 MG tablet Take 1 tablet (20 mg) by mouth daily 90 tablet 11     Calcium Citrate-Vitamin D (CITRACAL + D PO) Take 2 tablets by mouth daily       ciprofloxacin (CILOXAN) 0.3 % ophthalmic solution Place 1 drop Into the left eye 4 times daily 1 Bottle 0     diclofenac (VOLTAREN) 75 MG EC tablet Take 2 tablets (150 mg) by mouth as needed for moderate pain 90 tablet 3     gabapentin (NEURONTIN) 600 MG  tablet Take 1 tablet (600 mg) by mouth At Bedtime AND 0.5 tablets (300 mg) every morning. 180 tablet 11     ketorolac (ACULAR) 0.5 % ophthalmic solution Place 1 drop Into the left eye 3 times daily 1 Bottle 0     latanoprost (XALATAN) 0.005 % ophthalmic solution Place 1 drop into both eyes At Bedtime Call clinic to schedule MD APPT. 7.5 mL 3     lisinopril (PRINIVIL/ZESTRIL) 10 MG tablet Take 1 tablet (10 mg) by mouth daily 90 tablet 11     Magnesium 500 MG CAPS        Multiple Vitamins-Minerals (MENS MULTIVITAMIN PLUS PO) Take 1 tablet by mouth daily       Nutritional Supplements (PROSTATE 2.4) CAPS Take 2 capsules by mouth daily Prostate cap 2.0       prednisoLONE acetate (PRED FORTE) 1 % ophthalmic suspension Place 1 drop Into the left eye 4 times daily 5 mL 0     rOPINIRole (REQUIP) 0.25 MG tablet TAKE 1 TO 2 TABLETS(0.25 TO 0.5 MG) BY MOUTH AT BEDTIME 180 tablet 11     sildenafil (REVATIO) 20 MG tablet Take 3-5 tabs 30-60 minutes prior to intercourse. 30 tablet 11     No current [facility]-ordered facility-administered medications on file.        PHYSICAL EXAM:    Vitals: T Data Unavailable, P Data Unavailable, BP Data Unavailable, R Data Unavailable, SpO2  , Weight   Wt Readings from Last 2 Encounters:   11/14/19 84.3 kg (185 lb 12.8 oz)   11/06/19 84.4 kg (186 lb)       See doc flowsheet    NPO STATUS: see doc flowsheet    LABS:    BMP:  Recent Labs   Lab Test 11/14/19  1154      POTASSIUM 4.5   CHLORIDE 109   CO2 26   BUN 24   CR 0.89   GLC 99   EDY 9.4       LFTs:   Recent Labs   Lab Test 11/14/19  1154   PROTTOTAL 6.8   ALBUMIN 4.1   BILITOTAL 0.9   ALKPHOS 96   AST 27   ALT 49       CBC:   Recent Labs   Lab Test 11/01/19  0958   WBC 5.2   RBC 4.06*   HGB 12.7*   HCT 39.5*   MCV 97   MCH 31.3   MCHC 32.2   RDW 13.5          Coags:  No results for input(s): INR, PTT, FIBR in the last 68665 hours.    Imaging:  No orders to display       Jaime Armstrong MD  Anesthesiology Staff  Pager  [phone]    11/19/2019  7:30 PM

## 2019-11-20 ENCOUNTER — ANESTHESIA (OUTPATIENT)
Dept: SURGERY | Facility: AMBULATORY SURGERY CENTER | Age: 79
End: 2019-11-20

## 2019-11-20 ENCOUNTER — HOSPITAL ENCOUNTER (OUTPATIENT)
Facility: AMBULATORY SURGERY CENTER | Age: 79
End: 2019-11-20
Attending: OPHTHALMOLOGY
Payer: COMMERCIAL

## 2019-11-20 VITALS
SYSTOLIC BLOOD PRESSURE: 144 MMHG | WEIGHT: 186 LBS | HEART RATE: 56 BPM | RESPIRATION RATE: 14 BRPM | BODY MASS INDEX: 26.63 KG/M2 | DIASTOLIC BLOOD PRESSURE: 60 MMHG | OXYGEN SATURATION: 96 % | HEIGHT: 70 IN | TEMPERATURE: 97.7 F

## 2019-11-20 DIAGNOSIS — H25.13 NUCLEAR SENILE CATARACT OF BOTH EYES: Primary | ICD-10-CM

## 2019-11-20 DIAGNOSIS — H40.003 GLAUCOMA SUSPECT OF BOTH EYES: ICD-10-CM

## 2019-11-20 DIAGNOSIS — H25.13 NUCLEAR SENILE CATARACT OF BOTH EYES: ICD-10-CM

## 2019-11-20 DEVICE — EYE IMP ISTENT TRABECULAR MICRO-BYPASS LT GTS100L: Type: IMPLANTABLE DEVICE | Site: EYE | Status: FUNCTIONAL

## 2019-11-20 DEVICE — IMPLANTABLE DEVICE: Type: IMPLANTABLE DEVICE | Site: EYE | Status: FUNCTIONAL

## 2019-11-20 RX ORDER — OXYCODONE HYDROCHLORIDE 5 MG/1
5 TABLET ORAL EVERY 4 HOURS PRN
Status: DISCONTINUED | OUTPATIENT
Start: 2019-11-20 | End: 2019-11-21 | Stop reason: HOSPADM

## 2019-11-20 RX ORDER — FLURBIPROFEN SODIUM 0.3 MG/ML
1 SOLUTION/ DROPS OPHTHALMIC
Status: COMPLETED | OUTPATIENT
Start: 2019-11-20 | End: 2019-11-20

## 2019-11-20 RX ORDER — FENTANYL CITRATE 50 UG/ML
25-50 INJECTION, SOLUTION INTRAMUSCULAR; INTRAVENOUS
Status: DISCONTINUED | OUTPATIENT
Start: 2019-11-20 | End: 2019-11-21 | Stop reason: HOSPADM

## 2019-11-20 RX ORDER — ALBUTEROL SULFATE 0.83 MG/ML
2.5 SOLUTION RESPIRATORY (INHALATION)
Status: DISCONTINUED | OUTPATIENT
Start: 2019-11-20 | End: 2019-11-20 | Stop reason: HOSPADM

## 2019-11-20 RX ORDER — TETRACAINE HYDROCHLORIDE 5 MG/ML
SOLUTION OPHTHALMIC PRN
Status: DISCONTINUED | OUTPATIENT
Start: 2019-11-20 | End: 2019-11-20 | Stop reason: HOSPADM

## 2019-11-20 RX ORDER — SODIUM CHLORIDE, SODIUM LACTATE, POTASSIUM CHLORIDE, CALCIUM CHLORIDE 600; 310; 30; 20 MG/100ML; MG/100ML; MG/100ML; MG/100ML
INJECTION, SOLUTION INTRAVENOUS CONTINUOUS
Status: DISCONTINUED | OUTPATIENT
Start: 2019-11-20 | End: 2019-11-21 | Stop reason: HOSPADM

## 2019-11-20 RX ORDER — KETOROLAC TROMETHAMINE 5 MG/ML
1 SOLUTION OPHTHALMIC 3 TIMES DAILY
Qty: 1 BOTTLE | Refills: 0 | Status: ON HOLD | OUTPATIENT
Start: 2019-11-20 | End: 2020-05-28

## 2019-11-20 RX ORDER — FENTANYL CITRATE 50 UG/ML
INJECTION, SOLUTION INTRAMUSCULAR; INTRAVENOUS PRN
Status: DISCONTINUED | OUTPATIENT
Start: 2019-11-20 | End: 2019-11-20

## 2019-11-20 RX ORDER — FENTANYL CITRATE 50 UG/ML
25-50 INJECTION, SOLUTION INTRAMUSCULAR; INTRAVENOUS
Status: DISCONTINUED | OUTPATIENT
Start: 2019-11-20 | End: 2019-11-20 | Stop reason: HOSPADM

## 2019-11-20 RX ORDER — CYCLOPENTOLATE HYDROCHLORIDE 10 MG/ML
1 SOLUTION/ DROPS OPHTHALMIC
Status: COMPLETED | OUTPATIENT
Start: 2019-11-20 | End: 2019-11-20

## 2019-11-20 RX ORDER — OFLOXACIN 3 MG/ML
1 SOLUTION/ DROPS OPHTHALMIC
Status: COMPLETED | OUTPATIENT
Start: 2019-11-20 | End: 2019-11-20

## 2019-11-20 RX ORDER — NALOXONE HYDROCHLORIDE 0.4 MG/ML
.1-.4 INJECTION, SOLUTION INTRAMUSCULAR; INTRAVENOUS; SUBCUTANEOUS
Status: DISCONTINUED | OUTPATIENT
Start: 2019-11-20 | End: 2019-11-21 | Stop reason: HOSPADM

## 2019-11-20 RX ORDER — CIPROFLOXACIN HYDROCHLORIDE 3.5 MG/ML
1 SOLUTION/ DROPS TOPICAL 4 TIMES DAILY
Qty: 1 BOTTLE | Refills: 0 | Status: SHIPPED | OUTPATIENT
Start: 2019-11-20 | End: 2020-02-13

## 2019-11-20 RX ORDER — TROPICAMIDE 10 MG/ML
1 SOLUTION/ DROPS OPHTHALMIC
Status: COMPLETED | OUTPATIENT
Start: 2019-11-20 | End: 2019-11-20

## 2019-11-20 RX ORDER — LIDOCAINE 40 MG/G
CREAM TOPICAL
Status: DISCONTINUED | OUTPATIENT
Start: 2019-11-20 | End: 2019-11-20 | Stop reason: HOSPADM

## 2019-11-20 RX ORDER — ONDANSETRON 2 MG/ML
4 INJECTION INTRAMUSCULAR; INTRAVENOUS EVERY 30 MIN PRN
Status: DISCONTINUED | OUTPATIENT
Start: 2019-11-20 | End: 2019-11-21 | Stop reason: HOSPADM

## 2019-11-20 RX ORDER — PREDNISOLONE ACETATE 10 MG/ML
1 SUSPENSION/ DROPS OPHTHALMIC 4 TIMES DAILY
Qty: 5 ML | Refills: 0 | Status: SHIPPED | OUTPATIENT
Start: 2019-11-20 | End: 2020-02-13

## 2019-11-20 RX ORDER — SODIUM CHLORIDE, SODIUM LACTATE, POTASSIUM CHLORIDE, CALCIUM CHLORIDE 600; 310; 30; 20 MG/100ML; MG/100ML; MG/100ML; MG/100ML
500 INJECTION, SOLUTION INTRAVENOUS CONTINUOUS
Status: DISCONTINUED | OUTPATIENT
Start: 2019-11-20 | End: 2019-11-20 | Stop reason: HOSPADM

## 2019-11-20 RX ORDER — ONDANSETRON 4 MG/1
4 TABLET, ORALLY DISINTEGRATING ORAL EVERY 30 MIN PRN
Status: DISCONTINUED | OUTPATIENT
Start: 2019-11-20 | End: 2019-11-21 | Stop reason: HOSPADM

## 2019-11-20 RX ORDER — ALBUTEROL SULFATE 0.83 MG/ML
2.5 SOLUTION RESPIRATORY (INHALATION) EVERY 4 HOURS PRN
Status: DISCONTINUED | OUTPATIENT
Start: 2019-11-20 | End: 2019-11-20 | Stop reason: HOSPADM

## 2019-11-20 RX ORDER — BALANCED SALT SOLUTION 6.4; .75; .48; .3; 3.9; 1.7 MG/ML; MG/ML; MG/ML; MG/ML; MG/ML; MG/ML
SOLUTION OPHTHALMIC PRN
Status: DISCONTINUED | OUTPATIENT
Start: 2019-11-20 | End: 2019-11-20 | Stop reason: HOSPADM

## 2019-11-20 RX ORDER — ACETAMINOPHEN 325 MG/1
975 TABLET ORAL ONCE
Status: COMPLETED | OUTPATIENT
Start: 2019-11-20 | End: 2019-11-20

## 2019-11-20 RX ORDER — LIDOCAINE HYDROCHLORIDE 10 MG/ML
INJECTION, SOLUTION EPIDURAL; INFILTRATION; INTRACAUDAL; PERINEURAL PRN
Status: DISCONTINUED | OUTPATIENT
Start: 2019-11-20 | End: 2019-11-20 | Stop reason: HOSPADM

## 2019-11-20 RX ORDER — PHENYLEPHRINE HYDROCHLORIDE 25 MG/ML
1 SOLUTION/ DROPS OPHTHALMIC
Status: COMPLETED | OUTPATIENT
Start: 2019-11-20 | End: 2019-11-20

## 2019-11-20 RX ORDER — PROPARACAINE HYDROCHLORIDE 5 MG/ML
1 SOLUTION/ DROPS OPHTHALMIC ONCE
Status: COMPLETED | OUTPATIENT
Start: 2019-11-20 | End: 2019-11-20

## 2019-11-20 RX ADMIN — CYCLOPENTOLATE HYDROCHLORIDE 1 DROP: 10 SOLUTION/ DROPS OPHTHALMIC at 07:29

## 2019-11-20 RX ADMIN — TROPICAMIDE 1 DROP: 10 SOLUTION/ DROPS OPHTHALMIC at 07:12

## 2019-11-20 RX ADMIN — CYCLOPENTOLATE HYDROCHLORIDE 1 DROP: 10 SOLUTION/ DROPS OPHTHALMIC at 07:19

## 2019-11-20 RX ADMIN — PHENYLEPHRINE HYDROCHLORIDE 1 DROP: 25 SOLUTION/ DROPS OPHTHALMIC at 07:20

## 2019-11-20 RX ADMIN — FLURBIPROFEN SODIUM 1 DROP: 0.3 SOLUTION/ DROPS OPHTHALMIC at 07:29

## 2019-11-20 RX ADMIN — OFLOXACIN 1 DROP: 3 SOLUTION/ DROPS OPHTHALMIC at 07:12

## 2019-11-20 RX ADMIN — TROPICAMIDE 1 DROP: 10 SOLUTION/ DROPS OPHTHALMIC at 07:29

## 2019-11-20 RX ADMIN — FLURBIPROFEN SODIUM 1 DROP: 0.3 SOLUTION/ DROPS OPHTHALMIC at 07:12

## 2019-11-20 RX ADMIN — PHENYLEPHRINE HYDROCHLORIDE 1 DROP: 25 SOLUTION/ DROPS OPHTHALMIC at 07:29

## 2019-11-20 RX ADMIN — CYCLOPENTOLATE HYDROCHLORIDE 1 DROP: 10 SOLUTION/ DROPS OPHTHALMIC at 07:12

## 2019-11-20 RX ADMIN — FENTANYL CITRATE 50 MCG: 50 INJECTION, SOLUTION INTRAMUSCULAR; INTRAVENOUS at 08:39

## 2019-11-20 RX ADMIN — FENTANYL CITRATE 25 MCG: 50 INJECTION, SOLUTION INTRAMUSCULAR; INTRAVENOUS at 08:45

## 2019-11-20 RX ADMIN — OFLOXACIN 1 DROP: 3 SOLUTION/ DROPS OPHTHALMIC at 07:29

## 2019-11-20 RX ADMIN — OFLOXACIN 1 DROP: 3 SOLUTION/ DROPS OPHTHALMIC at 07:19

## 2019-11-20 RX ADMIN — FENTANYL CITRATE 25 MCG: 50 INJECTION, SOLUTION INTRAMUSCULAR; INTRAVENOUS at 09:04

## 2019-11-20 RX ADMIN — ACETAMINOPHEN 975 MG: 325 TABLET ORAL at 07:28

## 2019-11-20 RX ADMIN — TROPICAMIDE 1 DROP: 10 SOLUTION/ DROPS OPHTHALMIC at 07:19

## 2019-11-20 RX ADMIN — PROPARACAINE HYDROCHLORIDE 1 DROP: 5 SOLUTION/ DROPS OPHTHALMIC at 07:11

## 2019-11-20 RX ADMIN — SODIUM CHLORIDE, SODIUM LACTATE, POTASSIUM CHLORIDE, CALCIUM CHLORIDE 500 ML: 600; 310; 30; 20 INJECTION, SOLUTION INTRAVENOUS at 07:32

## 2019-11-20 RX ADMIN — FLURBIPROFEN SODIUM 1 DROP: 0.3 SOLUTION/ DROPS OPHTHALMIC at 07:19

## 2019-11-20 RX ADMIN — PHENYLEPHRINE HYDROCHLORIDE 1 DROP: 25 SOLUTION/ DROPS OPHTHALMIC at 07:12

## 2019-11-20 ASSESSMENT — MIFFLIN-ST. JEOR: SCORE: 1570.97

## 2019-11-20 NOTE — DISCHARGE INSTRUCTIONS
[facility]  Home Care Following Anesthesia  For 24 hours after surgery:  1. Get plenty of rest.  A responsible adult must stay with you for at least 24 hours after you leave the surgery center.  2. Do not drive or use heavy equipment.  If you have weakness or tingling, don't drive or use heavy equipment until this feeling goes away.   3. Do not drink alcohol.   4. Avoid strenuous or risky activities.  Ask for help when climbing stairs.  5. You may feel lightheaded.  IF so, sit for a few minutes before standing.  Have someone help you get up.   6. If you have nausea (feel sick to your stomach): Drink only clear liquids such as apple juice, ginger ale, broth or 7-Up.  Rest may also help.  Be sure to drink enough fluids.  Move to a regular diet as you feel able.   7. You may have a slight fever.  Call the doctor if your fever is over 100 F (37.7 C) (taken under the tongue) or lasts longer than 24 hours.  8. You may have a dry mouth, a sore throat, muscle aches or trouble sleeping. These should go away after 24 hours.  9. Do not make important or legal decisions.               Tips for taking pain medications  To get the best pain relief possible, remember these points:    Take pain medications as directed, before pain becomes severe.    Pain medication can upset your stomach: taking it with food may help.    Constipation is a common side effect of pain medication. Drink plenty of  fluids.    Eat foods high in fiber. Take a stool softener if recommended by your doctor or pharmacist.    Do not drink alcohol, drive or operate machinery while taking pain medications.    Ask about other ways to control pain, such as with heat, ice or relaxation.    Tylenol/Acetaminophen Consumption  To help encourage the safe use of acetaminophen, the makers of TYLENOL  have lowered the maximum daily dose for single-ingredient Extra Strength TYLENOL  (acetaminophen) products sold in the [place] from 8  pills per day (4,000 mg) to 6 pills per day (3,000 mg). The dosing interval has also changed from 2 pills every 4-6 hours to 2 pills every 6 hours.    If you feel your pain relief is insufficient, you may take Tylenol/Acetaminophen in addition to your narcotic pain medication.     Be careful not to exceed 3,000 mg of Tylenol/Acetaminophen in a 24 hour period from all sources.    If you are taking extra strength Tylenol/acetaminophen (500 mg), the maximum dose is 6 tablets in 24 hours.    If you are taking regular strength acetaminophen (325 mg), the maximum dose is 9 tablets in 24 hours.    Call a doctor for any of the followin. Signs of infection (fever, growing tenderness at the surgery site, a large amount of drainage or bleeding, severe pain, foul-smelling drainage, redness, swelling).  2. It has been over 8 to 10 hours since surgery and you are still not able to urinate (pass water).  3. Headache for over 24 hours.  4. Numbness, tingling or weakness the day after surgery (if you had spinal anesthesia).  Your doctor is:  Dr. Shannon Yang, Opthalmology: [phone]                    Or dial [phone] and ask for the resident on call for:  Ophthalmology  For emergency care, call the:  [facility]:  [phone] (TTY for hearing impaired: [phone])      [facility]     Home Care Following Cataract Surgery    If you have a gauze eye patch on, please do not remove it until it is removed by your doctor at your first appointment after your surgery.  You will start your eye drops the next day.    OR    If you only have a clear eye shield on, you may remove the eye shield when you get home and begin eye drops today as directed by your doctor.      Wear the clear eye shield for protection when sleeping for the next 5 days.      Do not rub the eye that had the operation.      Your eye might be sensitive to light.  Wearing sunglasses may be more  comfortable for you.      You may have some discomfort and irritation.  Acetaminophen (Tylenol) or Ibuprofen (Advil) may be taken for discomfort. If pain persists please call your doctor s office.      Keep the eye that had the surgery dry. You may wash your hair, bathe or shower, but keep your eye closed while doing so.       Avoid bending over, strenuous activity or heavy lifting until this activity has been approved by your doctor.      You have a follow-up appointment with your doctor today or tomorrow.  Bring all your prescribed eye drops with you to this follow-up appointment.        If you take glaucoma medications, bring them with you to your follow-up appointment tomorrow.      Use medication exactly as prescribed by your doctor. Wait 3-5 minutes between eye drops.  You may restart your regular home medications.       Occasional blood-tinged tears are normal the day or two after surgery. However, if there is large or persistent bleeding from the eye, that is not normal, and you should contact the clinic.      Call your doctor s office if any of the following should occur:    - Any sudden vision changes, including decreased vision  - Nausea or severe headache  - Increase in pain that is not controlled with Acetaminophen (Tylenol) or Ibuprofen (Advil)  - Signs of infection (pus, increasing redness or tenderness)  - Severe sensitivity to light  - An increase in floaters (black spots in front of your vision)    Your doctor is:  Dr. Shannon Yang, Opthalmology: [phone]

## 2019-11-20 NOTE — ANESTHESIA POSTPROCEDURE EVALUATION
Anesthesia POST Procedure Evaluation    Patient: Ciro Almonte   MRN:     [id number] Gender:   male   Age:    79 year old :      1940        Preoperative Diagnosis: Glaucoma suspect of both eyes [H40.003]  Nuclear senile cataract of both eyes [H25.13]   Procedure(s):  RIGHT EYE, CATARACT EXTRACTION WITH TORIC INTRAOCULAR LENS IMPLANT  RIGHT EYE, ISTENT INSERTION   Postop Comments: No value filed.       Anesthesia Type:  Not documented  MAC    Reportable Event: NO     PAIN: Uncomplicated   Sign Out status: Comfortable, Well controlled pain     PONV: No PONV   Sign Out status:  No Nausea or Vomiting     Neuro/Psych: Uneventful perioperative course   Sign Out Status: Preoperative baseline; Age appropriate mentation     Airway/Resp.: Uneventful perioperative course   Sign Out Status: Non labored breathing, age appropriate RR; Resp. Status within EXPECTED Parameters     CV: Uneventful perioperative course   Sign Out status: Appropriate BP and perfusion indices; Appropriate HR/Rhythm     Disposition:   Sign Out in:  Phase II  Disposition:  Home  Recovery Course: Uneventful  Follow-Up: Not required           Last Anesthesia Record Vitals:  CRNA VITALS  2019 0842 - 2019 0942      2019             Pulse:  63    Ht Rate:  59    SpO2:  99 %    Resp Rate (set):  10          Last PACU Vitals:  Vitals Value Taken Time   BP     Temp     Pulse     Resp     SpO2     Temp src     NIBP 160/84 2019  9:06 AM   Pulse 63 2019  9:12 AM   SpO2 99 % 2019  9:12 AM   Resp     Temp     Ht Rate 59 2019  9:12 AM   Temp 2           Electronically Signed By: Jaime Armstrong MD, 2019, 9:50 AM

## 2019-11-20 NOTE — OP NOTE
Pre-operative diagnosis: Right Eye Cataract, Astigmatism and Glaucoma   Post-operative diagnosis Same   Procedure:      Anesthesia: Procedure(s):  Cataract Extraction with Toric Intraocular Lens Implant and istent, Right  MAC/Topical   Surgeon: Shannon Yang MD   Assistants(s): None   Estimated blood loss: Minimal                         Specimens: None   Findings:  Complications: None  None       Indication: Patient was noted to have a visually significant cataract causing blurred vision and glare which affected their activities of daily living. Patient was also noted to have open angle glaucoma well controlled on topical medications as well as visually significant astigmatism.      After informed consent was obtained, the patient was wheeled to the operative suite. Preoperatively, the patient was marked for toric IOL placement.  The patient then received topical lidocaine ointment to the ocular surface of the operative eye.  Patient was then prepped and draped in the usual sterile fashion. A lid speculum was placed between the upper and lower eyelids.  A Thorton-Fine ring was used to stabilize the globe and a 1mm sideport blade was used to make an superior paracentesis through the clear cornea.  Nonpreserved lidocaine was then injected through the paracentesis into the anterior chamber augmenting anesthesia.  Viscoat was then injected through the paracentesis.  The Thorton-Fine ring was once again used to stabilize the globe and a 2.5mm steel keratome blade was used to make a temporal clear corneal incision extending 1.5mm prior to anterior chamber entry.  A bent cystitome needle and Utrata forceps were then used to create a continuous curvilinear capsulorrhexis.  BSS on a nicchamin cannula was then used to flush out some of the viscoat and perform hydrodissection and hydrodelineation.  The lens was noted to be freely moveable within the capsular bag.  Phacoemulsification was then used to remove the lens  nucleus and epinucleus.  Irrigation and aspiration was then used to remove the cortical remnants.  Provisc was then instilled through the temporal wound into the capsular bag.  Next, a [id number] 19.5D single piece IOL was injected through the temporal wound and allowed to unfold within the capsular bag.  A sinskey hook was then used to center the IOL within the capsular bag and rotate into the correct axis for placement.  Next, the patient's head was turned and the scope was rotated.  A gonioprism was used to visualize the Trabecular meshwork and an Istent was placed in Schlemm's canal. After confirming the correct location of the Istent, the patient's head was once again turned and the scope was rotated back to its original location.  Irrigation and aspiration was then used to remove the viscoelastic.  BSS on a 27 gauge cannula was then used to inflate the anterior chamber to normal tension and hydrate the stromal portions of the wounds.  Weck-cells were used to check the wounds and they were found to be well secured.  Miostat was then injected thereby constricting the pupil.  An eye shield was then placed over the operative eye before being wheeled to the recovery area.

## 2019-11-20 NOTE — ANESTHESIA CARE TRANSFER NOTE
Patient: Ciro Almonte    Procedure(s):  RIGHT EYE, CATARACT EXTRACTION WITH TORIC INTRAOCULAR LENS IMPLANT  RIGHT EYE, ISTENT INSERTION    Diagnosis: Glaucoma suspect of both eyes [H40.003]  Nuclear senile cataract of both eyes [H25.13]  Diagnosis Additional Information: No value filed.    Anesthesia Type:   MAC     Note:  Airway :Room Air  Patient transferred to:Phase II  Comments: VSS/WNL. Responds well.Handoff Report: Identifed the Patient, Identified the Reponsible Provider, Reviewed the pertinent medical history, Discussed the surgical course, Reviewed Intra-OP anesthesia mangement and issues during anesthesia, Set expectations for post-procedure period and Allowed opportunity for questions and acknowledgement of understanding      Vitals: (Last set prior to Anesthesia Care Transfer)    CRNA VITALS  11/20/2019 0842 - 11/20/2019 0916      11/20/2019             Pulse:  63    Ht Rate:  59    SpO2:  99 %    Resp Rate (set):  10                Electronically Signed By: TACHO Talbert CRNA  November 20, 2019  9:16 AM

## 2019-11-21 ENCOUNTER — OFFICE VISIT (OUTPATIENT)
Dept: OPHTHALMOLOGY | Facility: CLINIC | Age: 79
End: 2019-11-21
Attending: OPHTHALMOLOGY
Payer: COMMERCIAL

## 2019-11-21 DIAGNOSIS — Z96.1 PSEUDOPHAKIA OF BOTH EYES: Primary | ICD-10-CM

## 2019-11-21 PROCEDURE — G0463 HOSPITAL OUTPT CLINIC VISIT: HCPCS | Mod: ZF

## 2019-11-21 ASSESSMENT — VISUAL ACUITY
OD_SC: 20/50
OS_PH_SC: 20/25
OD_PH_SC: 20/40
OS_PH_SC+: -1+2
OD_SC+: -2
METHOD: SNELLEN - LINEAR
OS_SC: 20/40
OS_SC+: -2

## 2019-11-21 ASSESSMENT — TONOMETRY
OD_IOP_MMHG: 12
OS_IOP_MMHG: 20
IOP_METHOD: TONOPEN

## 2019-11-21 ASSESSMENT — EXTERNAL EXAM - RIGHT EYE: OD_EXAM: NORMAL

## 2019-11-21 ASSESSMENT — SLIT LAMP EXAM - LIDS
COMMENTS: DERMATOCHALASIS
COMMENTS: DERMATOCHALASIS

## 2019-11-21 ASSESSMENT — EXTERNAL EXAM - LEFT EYE: OS_EXAM: NORMAL

## 2019-11-21 NOTE — PATIENT INSTRUCTIONS
Patient will continue on Latanoprost which is a teal top drop at bedtime in both eyes.  No heavy lifting, bending, stooping, straining, rubbing the eye, or getting water in the eye.  Please wear protective eyewear over the eye at all times including glasses during the day and the protective shield at bedtime.  Patient will return to clinic in 1-2 weeks with refraction for new glasses.    Use the following drops (RIGHT EYE ONLY):  Antibiotic --  Ciprofloxacin (tan top): 4x/day until finished (use for at least 5-7 days after surgery)    NSAID -- Acular LS (Ketorolac) 3x/day: for approximately 4 weeks after surgery (or until gone)    Steroid -- Pred Forte/Prednisolone Acetate:  4x/day for 1 week then, 3x/day for 1 week then, 2x/day for 1 week then, 1x/day for 1 week then, stop.    Please be sure to call if you have any decrease in vision, increase in pain, flashing lights, redness, or a lot of drainage.    .    Use the following drops (LEFT EYE ONLY):  Antibiotic --  Ciprofloxacin(tan top): Discontinue    NSAID -- Acular LS (Ketorolac) 3x/day: for approximately 2 more weeks after surgery (or until gone)    Steroid -- Pred Forte/Prednisolone Acetate:  2x/day for 1 week then, 1x/day for 1 week then, stop.    Please be sure to call if you have any decrease in vision, increase in pain, flashing lights, redness, or a lot of drainage.

## 2019-11-21 NOTE — PROGRESS NOTES
1)Glc Suspect vs POAG vs ?Angle recession glc OS --  S/p Istent OD (11/20/19), S/p Istent OS (11/6/2019) -- K pachy:530/549    Tmax: L20s and OS:29 (10/8/2019)   HVF:Full c fluct      CDR: 0.0/0.0    HRT/OCT:OD:Mild RNFL thinning and OS:Mod RNFL thinning   FHX of Glc: Mother -- gtts,      Gonio: open with ?angle recession OS temp and sup      Intolerant to:      Asthma/COPD: No, on po BB  Steroid Use: No    Kidney Stones: Yes, in remote past    Sulfa Allergy: No      IOP targets:L20s   2)ONH Drusen  3)PCIOL OU with toric IOL  4)LAMBERT  5)Dermatochalasis -- rec oculoplastics eval after CE  7)H/O blunt force trauma to ?left side of face while playing flag football in [employer] -- ??etiology of ?angle recession OS      MD:OS -0.50+1.75x155 20/20 (no doubling or shading)    MD:discussed IOL rotation, refractive surgery or glasses -- pt fully aware he would possibly need glasses after surgery and said he would be wearing glasses regardless after cataract surgery    Patient will continue on Latanoprost which is a teal top drop at bedtime in both eyes.  No heavy lifting, bending, stooping, straining, rubbing the eye, or getting water in the eye.  Please wear protective eyewear over the eye at all times including glasses during the day and the protective shield at bedtime.  Patient will return to clinic in 1-2 weeks with refraction for new glasses.    Use the following drops (RIGHT EYE ONLY):  Antibiotic --  Ciprofloxacin (tan top): 4x/day until finished (use for at least 5-7 days after surgery)    NSAID -- Acular LS (Ketorolac) 3x/day: for approximately 4 weeks after surgery (or until gone)    Steroid -- Pred Forte/Prednisolone Acetate:  4x/day for 1 week then, 3x/day for 1 week then, 2x/day for 1 week then, 1x/day for 1 week then, stop.    Please be sure to call if you have any decrease in vision, increase in pain, flashing lights, redness, or a lot of drainage.    .    Use the following drops (LEFT EYE ONLY):  Antibiotic --   Ciprofloxacin(tan top): Discontinue    NSAID -- Acular LS (Ketorolac) 3x/day: for approximately 2 more weeks after surgery (or until gone)    Steroid -- Pred Forte/Prednisolone Acetate:  2x/day for 1 week then, 1x/day for 1 week then, stop.    Please be sure to call if you have any decrease in vision, increase in pain, flashing lights, redness, or a lot of drainage.          Attending Physician Attestation:  Complete documentation of historical and exam elements from today's encounter can be found in the full encounter summary report (not reduplicated in this progress note). I personally obtained the chief complaint(s) and history of present illness.  I confirmed and edited as necessary the review of systems, past medical/surgical history, family history, social history, and examination findings as documented by others; and I examined the patient myself. I personally reviewed the relevant tests, images, and reports as documented above. I formulated and edited as necessary the assessment and plan and discussed the findings and management plan with the patient and family.  - Shannon Yang MD

## 2019-12-17 ENCOUNTER — OFFICE VISIT (OUTPATIENT)
Dept: OPHTHALMOLOGY | Facility: CLINIC | Age: 79
End: 2019-12-17
Attending: OPHTHALMOLOGY
Payer: COMMERCIAL

## 2019-12-17 DIAGNOSIS — Z96.1 PSEUDOPHAKIA OF BOTH EYES: Primary | ICD-10-CM

## 2019-12-17 PROCEDURE — G0463 HOSPITAL OUTPT CLINIC VISIT: HCPCS | Mod: ZF

## 2019-12-17 PROCEDURE — 92015 DETERMINE REFRACTIVE STATE: CPT | Mod: ZF

## 2019-12-17 RX ORDER — KETOROLAC TROMETHAMINE 5 MG/ML
1 SOLUTION OPHTHALMIC 3 TIMES DAILY
Status: ON HOLD | COMMUNITY
End: 2020-05-28

## 2019-12-17 RX ORDER — PREDNISOLONE ACETATE 10 MG/ML
1-2 SUSPENSION/ DROPS OPHTHALMIC 2 TIMES DAILY
COMMUNITY
End: 2020-02-13

## 2019-12-17 ASSESSMENT — VISUAL ACUITY
OS_SC: 20/50
OD_SC: 20/40
METHOD: SNELLEN - LINEAR
OD_PH_SC+: -2
OS_PH_SC: 20/25
OD_PH_SC: 20/20
OD_SC+: -2
OS_PH_SC+: -2
OS_SC+: +2

## 2019-12-17 ASSESSMENT — REFRACTION_MANIFEST
OD_ADD: +2.50
OD_SPHERE: -1.00
OD_CYLINDER: +1.50
OS_AXIS: 165
OS_CYLINDER: +1.50
OS_SPHERE: -1.25
OD_AXIS: 015
OS_ADD: +2.50

## 2019-12-17 ASSESSMENT — SLIT LAMP EXAM - LIDS
COMMENTS: DERMATOCHALASIS
COMMENTS: DERMATOCHALASIS

## 2019-12-17 ASSESSMENT — TONOMETRY
IOP_METHOD: APPLANATION
OD_IOP_MMHG: 21
OS_IOP_MMHG: 19

## 2019-12-17 ASSESSMENT — REFRACTION_WEARINGRX
OD_SPHERE: -3.50
OD_AXIS: 017
OD_CYLINDER: +4.75
OS_SPHERE: -3.25
OS_AXIS: 165
SPECS_TYPE: PAL
OS_CYLINDER: +4.25
OD_ADD: +2.50
OS_ADD: +2.50

## 2019-12-17 ASSESSMENT — EXTERNAL EXAM - LEFT EYE: OS_EXAM: NORMAL

## 2019-12-17 ASSESSMENT — EXTERNAL EXAM - RIGHT EYE: OD_EXAM: NORMAL

## 2019-12-17 NOTE — NURSING NOTE
.mkChief Complaints and History of Present Illnesses   Patient presents with     Post Op (Ophthalmology) Both Eyes     Chief Complaint(s) and History of Present Illness(es)     Post Op (Ophthalmology) Both Eyes               Comments     POP both eyes CE/IOL and Istent.    The patient states he has a little intermittent discomfort behind the left eye.  He notes dry eyes.  The patient has one more day of surgery drops.  Verónica Villeda, COA, COA 3:44 PM 12/17/2019

## 2019-12-17 NOTE — PROGRESS NOTES
1)Glc Suspect vs POAG vs ?Angle recession glc OS --  S/p Istent OD (11/20/19), S/p Istent OS (11/6/2019) -- K pachy:530/549    Tmax: L20s and OS:29 (10/8/2019)   HVF:Full c fluct      CDR: 0.0/0.0    HRT/OCT:OD:Mild RNFL thinning and OS:Mod RNFL thinning   FHX of Glc: Mother -- gtts,      Gonio: open with ?angle recession OS temp and sup      Intolerant to:      Asthma/COPD: No, on po BB  Steroid Use: No    Kidney Stones: Yes, in remote past    Sulfa Allergy: No      IOP targets:L20s   2)ONH Drusen  3)PCIOL OU with toric IOL  4)LAMBERT  5)Dermatochalasis -- rec oculoplastics eval after CE  7)H/O blunt force trauma to ?left side of face while playing flag football in [employer] -- ??etiology of ?angle recession OS      MD:OS -0.50+1.75x155 20/20 (no doubling or shading)    MD:discussed IOL rotation, refractive surgery or glasses -- pt fully aware he would possibly need glasses after surgery and said he would be wearing glasses regardless after cataract surgery    Patient will continue on Latanoprost which is a teal top drop at bedtime in both eyes.  Patient will return to clinic in 3-4 months with visual field test, dilated eye exam and IOP check.    Use the following drops (RIGHT EYE ONLY):  Antibiotic --  Ciprofloxacin (tan top): Discontinue    NSAID -- Acular LS (Ketorolac) 3x/day: for approximately 2 more weeks after surgery (or until gone)    Steroid -- Pred Forte/Prednisolone Acetate:  2x/day for 1 week then, 1x/day for 1 week then, stop.    Please be sure to call if you have any decrease in vision, increase in pain, flashing lights, redness, or a lot of drainage.    .        Attending Physician Attestation:  Complete documentation of historical and exam elements from today's encounter can be found in the full encounter summary report (not reduplicated in this progress note). I personally obtained the chief complaint(s) and history of present illness.  I confirmed and edited as necessary the review of systems, past  medical/surgical history, family history, social history, and examination findings as documented by others; and I examined the patient myself. I personally reviewed the relevant tests, images, and reports as documented above. I formulated and edited as necessary the assessment and plan and discussed the findings and management plan with the patient and family.  - Shannon Yang MD

## 2019-12-17 NOTE — PATIENT INSTRUCTIONS
Patient will continue on Latanoprost which is a teal top drop at bedtime in both eyes.  Patient will return to clinic in 3-4 months with visual field test, dilated eye exam and IOP check.    Use the following drops (RIGHT EYE ONLY):  Antibiotic --  Ciprofloxacin (tan top): Discontinue    NSAID -- Acular LS (Ketorolac) 3x/day: for approximately 2 more weeks after surgery (or until gone)    Steroid -- Pred Forte/Prednisolone Acetate:  2x/day for 1 week then, 1x/day for 1 week then, stop.    Please be sure to call if you have any decrease in vision, increase in pain, flashing lights, redness, or a lot of drainage.

## 2020-01-18 DIAGNOSIS — E78.2 MIXED HYPERLIPIDEMIA: ICD-10-CM

## 2020-01-20 RX ORDER — ATORVASTATIN CALCIUM 20 MG/1
TABLET, FILM COATED ORAL
Qty: 90 TABLET | Refills: 11 | OUTPATIENT
Start: 2020-01-20

## 2020-01-20 NOTE — TELEPHONE ENCOUNTER
"Refill is not appropriate at this time.   Medication was refilled x 1 year supply on 11/14/19 and sent to same requesting pharmacy.   Denied to pharmacy for this reason, should be on file.    Zahra Pichardo RN  [facility]      Requested Prescriptions   Refused Prescriptions Disp Refills     atorvastatin (LIPITOR) 20 MG tablet [Pharmacy Med Name: ATORVASTATIN 20MG TABLETS] 90 tablet 11     Sig: TAKE 1 TABLET BY MOUTH EVERY DAY       Statins Protocol Failed - 1/18/2020 11:16 AM        Failed - No abnormal creatine kinase in past 12 months     Recent Labs   Lab Test 07/26/19  1440   *                Passed - LDL on file in past 12 months     Recent Labs   Lab Test 11/14/19  1154   LDL 94             Passed - Recent (12 mo) or future (30 days) visit within the authorizing provider's specialty     Patient has had an office visit with the authorizing provider or a provider within the authorizing providers department within the previous 12 mos or has a future within next 30 days. See \"Patient Info\" tab in inbasket, or \"Choose Columns\" in Meds & Orders section of the refill encounter.              Passed - Medication is active on med list        Passed - Patient is age 18 or older        Last Written Prescription Date:  11/14/19  Last Fill Quantity: 90,  # refills: 11   Last office visit: 11/14/2019 with prescribing provider:     Future Office Visit:      "

## 2020-02-06 ENCOUNTER — TELEPHONE (OUTPATIENT)
Dept: PEDIATRICS | Facility: CLINIC | Age: 80
End: 2020-02-06

## 2020-02-06 NOTE — TELEPHONE ENCOUNTER
Prior Authorization Retail Medication Request    Medication/Dose: Sildenafil 20mg tabs  ICD code (if different than what is on RX):  N52.9  Previously Tried and Failed:  none  Rationale:  ED    Insurance Name:  [employer]  Insurance ID:  [id number]      Pharmacy Information (if different than what is on RX)  Name:  [facility]  Phone:  [phone]

## 2020-02-10 NOTE — TELEPHONE ENCOUNTER
Central Prior Authorization Team   Phone: [phone]    PA Initiation    Medication: Sildenafil 20mg tabs  Insurance Company: [employer] - Phone [phone] Fax [phone]  Pharmacy Filling the Rx: [id number] - [place], [place] - [address]  AT [address]  Filling Pharmacy Phone: [phone]  Filling Pharmacy Fax: [phone]  Start Date: 2/10/2020

## 2020-02-11 NOTE — TELEPHONE ENCOUNTER
Central Prior Authorization Team   Phone: [phone]    PRIOR AUTHORIZATION DENIED    Medication: Sildenafil 20mg tabs    Denial Date: 2/10/2020    Denial Rational: Medication is only covered for diagnosis of pulmonary arterial hypertension (PAH) WHO Group 1.        Appeal Information: If provider would like to appeal we will need a detailed letter of medical necessity to start the process. Then re-route this request back to the PA pool.

## 2020-02-13 ENCOUNTER — OFFICE VISIT (OUTPATIENT)
Dept: OPHTHALMOLOGY | Facility: CLINIC | Age: 80
End: 2020-02-13
Attending: OPHTHALMOLOGY
Payer: COMMERCIAL

## 2020-02-13 DIAGNOSIS — H40.003 GLAUCOMA SUSPECT OF BOTH EYES: Primary | ICD-10-CM

## 2020-02-13 DIAGNOSIS — Z96.1 PSEUDOPHAKIA OF BOTH EYES: ICD-10-CM

## 2020-02-13 PROCEDURE — G0463 HOSPITAL OUTPT CLINIC VISIT: HCPCS | Mod: ZF

## 2020-02-13 PROCEDURE — 92083 EXTENDED VISUAL FIELD XM: CPT | Mod: ZF | Performed by: OPHTHALMOLOGY

## 2020-02-13 ASSESSMENT — TONOMETRY
OD_IOP_MMHG: 17
OS_IOP_MMHG: 22
IOP_METHOD: APPLANATION

## 2020-02-13 ASSESSMENT — CONF VISUAL FIELD
OS_NORMAL: 1
OD_NORMAL: 1
METHOD: COUNTING FINGERS

## 2020-02-13 ASSESSMENT — VISUAL ACUITY
OS_CC+: -2
OS_CC: 20/20
OD_CC+: -1
METHOD: SNELLEN - LINEAR
OD_CC: 20/20
CORRECTION_TYPE: GLASSES

## 2020-02-13 ASSESSMENT — SLIT LAMP EXAM - LIDS
COMMENTS: DERMATOCHALASIS
COMMENTS: DERMATOCHALASIS

## 2020-02-13 ASSESSMENT — EXTERNAL EXAM - LEFT EYE: OS_EXAM: NORMAL

## 2020-02-13 ASSESSMENT — EXTERNAL EXAM - RIGHT EYE: OD_EXAM: NORMAL

## 2020-02-13 NOTE — PATIENT INSTRUCTIONS
Patient will continue on Latanoprost which is a teal top drop at bedtime in both eyes.  Patient will return to clinic in 4-6 months with visual field test, dilated eye exam and IOP check.

## 2020-02-13 NOTE — NURSING NOTE
Chief Complaints and History of Present Illnesses   Patient presents with     Glaucoma Follow-Up     Chief Complaint(s) and History of Present Illness(es)     Glaucoma Follow-Up     Laterality: both eyes    Associated symptoms: Negative for eye pain, redness, tearing, floaters and flashes    Treatment side effects: none    Pain scale: 0/10              Comments     Latanoprost at bedtime to BE /  LD @ 10 pm   States no new concerns.  Ellen Chatman, COT COT 2:42 PM 02/13/2020

## 2020-02-13 NOTE — PROGRESS NOTES
1)Glc Suspect vs POAG vs ?Angle recession glc OS --  S/p Istent OD (11/20/19), S/p Istent OS (11/6/2019) -- K pachy:530/549    Tmax: L20s and OS:29 (10/8/2019)   HVF:Full c fluct      CDR: 0.0/0.0    HRT/OCT:OD:Mild RNFL thinning and OS:Mod RNFL thinning   FHX of Glc: Mother -- gtts,      Gonio: open with ?angle recession OS temp and sup      Intolerant to:      Asthma/COPD: No, on po BB  Steroid Use: No    Kidney Stones: Yes, in remote past    Sulfa Allergy: No      IOP targets:L20s   2)ONH Drusen  3)PCIOL OU with toric IOL  4)LAMBERT  5)Dermatochalasis -- rec oculoplastics eval after CE  7)H/O blunt force trauma to ?left side of face while playing flag football in [employer] -- ??etiology of ?angle recession OS      MD:discussed IOL rotation, refractive surgery or glasses -- pt fully aware he would possibly need glasses after surgery and said he would be wearing glasses regardless after cataract surgery    Patient will continue on Latanoprost which is a teal top drop at bedtime in both eyes.  Patient will return to clinic in 4-6 months with visual field test, dilated eye exam and IOP check.        Attending Physician Attestation:  Complete documentation of historical and exam elements from today's encounter can be found in the full encounter summary report (not reduplicated in this progress note). I personally obtained the chief complaint(s) and history of present illness.  I confirmed and edited as necessary the review of systems, past medical/surgical history, family history, social history, and examination findings as documented by others; and I examined the patient myself. I personally reviewed the relevant tests, images, and reports as documented above. I formulated and edited as necessary the assessment and plan and discussed the findings and management plan with the patient and family.  - Shannon Yang MD

## 2020-03-08 DIAGNOSIS — R25.2 CRAMP OF LIMB: ICD-10-CM

## 2020-03-11 RX ORDER — ROPINIROLE 0.25 MG/1
TABLET, FILM COATED ORAL
Qty: 180 TABLET | Refills: 11 | OUTPATIENT
Start: 2020-03-11

## 2020-03-11 NOTE — TELEPHONE ENCOUNTER
"Patient has refills remaining with requesting pharmacy.  Sharlene ALVARADO - Registered Nurse  [facility]  Acute and Diagnostic Services    Requested Prescriptions   Refused Prescriptions Disp Refills     rOPINIRole (REQUIP) 0.25 MG tablet [Pharmacy Med Name: ROPINIROLE 0.25MG TABLETS] 180 tablet 11     Sig: TAKE 1 TO 2 TABLETS(0.25 TO 0.5 MG) BY MOUTH AT BEDTIME       Antiparkinson's Agents Protocol Failed - 3/8/2020  4:39 PM        Failed - Blood pressure under 140/90 in past 12 months     BP Readings from Last 3 Encounters:   11/20/19 (!) 144/60   11/14/19 136/78   11/06/19 137/80                 Passed - CBC on record in past 12 months     Recent Labs   Lab Test 11/01/19  0958   WBC 5.2   RBC 4.06*   HGB 12.7*   HCT 39.5*                    Passed - ALT on record in past 12 months         Recent Labs   Lab Test 11/14/19  1154   ALT 49             Passed - Serum Creatinine on file in past 12 months     Recent Labs   Lab Test 11/14/19  1154 07/26/19  1509   CR 0.89  --    CREAT  --  0.8             Passed - Medication is active on med list        Passed - Patient is age 18 or older        Passed - Recent (6 mo) or future (30 days) visit within the authorizing provider's specialty     Patient had office visit in the last 6 months or has a visit in the next 30 days with authorizing provider or within the authorizing provider's specialty.  See \"Patient Info\" tab in inbasket, or \"Choose Columns\" in Meds & Orders section of the refill encounter.                 "

## 2020-03-13 ENCOUNTER — APPOINTMENT (OUTPATIENT)
Age: 80
Setting detail: DERMATOLOGY
End: 2020-03-13

## 2020-03-13 VITALS — HEIGHT: 70 IN | RESPIRATION RATE: 14 BRPM | WEIGHT: 180 LBS

## 2020-03-13 DIAGNOSIS — L82.1 OTHER SEBORRHEIC KERATOSIS: ICD-10-CM

## 2020-03-13 DIAGNOSIS — L57.0 ACTINIC KERATOSIS: ICD-10-CM

## 2020-03-13 DIAGNOSIS — Z85.828 PERSONAL HISTORY OF OTHER MALIGNANT NEOPLASM OF SKIN: ICD-10-CM

## 2020-03-13 DIAGNOSIS — L20.89 OTHER ATOPIC DERMATITIS: ICD-10-CM

## 2020-03-13 DIAGNOSIS — L57.8 OTHER SKIN CHANGES DUE TO CHRONIC EXPOSURE TO NONIONIZING RADIATION: ICD-10-CM

## 2020-03-13 DIAGNOSIS — L81.4 OTHER MELANIN HYPERPIGMENTATION: ICD-10-CM

## 2020-03-13 DIAGNOSIS — B35.1 TINEA UNGUIUM: ICD-10-CM

## 2020-03-13 PROBLEM — L20.84 INTRINSIC (ALLERGIC) ECZEMA: Status: ACTIVE | Noted: 2020-03-13

## 2020-03-13 PROCEDURE — OTHER REASSURANCE: OTHER

## 2020-03-13 PROCEDURE — OTHER COUNSELING: OTHER

## 2020-03-13 PROCEDURE — 17004 DESTROY PREMAL LESIONS 15/>: CPT

## 2020-03-13 PROCEDURE — OTHER LIQUID NITROGEN: OTHER

## 2020-03-13 PROCEDURE — OTHER PRESCRIPTION: OTHER

## 2020-03-13 PROCEDURE — 99214 OFFICE O/P EST MOD 30 MIN: CPT | Mod: 25

## 2020-03-13 RX ORDER — TRIAMCINOLONE ACETONIDE 1 MG/G
0.1% CREAM TOPICAL BID
Qty: 1 | Refills: 3 | Status: ERX

## 2020-03-13 ASSESSMENT — LOCATION SIMPLE DESCRIPTION DERM
LOCATION SIMPLE: LEFT ZYGOMA
LOCATION SIMPLE: LEFT OCCIPITAL SCALP
LOCATION SIMPLE: POSTERIOR SCALP
LOCATION SIMPLE: RIGHT ANTERIOR NECK
LOCATION SIMPLE: RIGHT ZYGOMA
LOCATION SIMPLE: LEFT FOREHEAD
LOCATION SIMPLE: RIGHT FOREHEAD
LOCATION SIMPLE: LEFT SCALP
LOCATION SIMPLE: RIGHT OCCIPITAL SCALP
LOCATION SIMPLE: LEFT GREAT TOE
LOCATION SIMPLE: RIGHT LOWER BACK
LOCATION SIMPLE: LEFT ANTERIOR NECK
LOCATION SIMPLE: RIGHT TEMPLE
LOCATION SIMPLE: RIGHT NOSE
LOCATION SIMPLE: RIGHT GREAT TOE
LOCATION SIMPLE: LEFT TEMPLE
LOCATION SIMPLE: LEFT PRETIBIAL REGION
LOCATION SIMPLE: RIGHT CHEEK
LOCATION SIMPLE: RIGHT UPPER BACK

## 2020-03-13 ASSESSMENT — LOCATION DETAILED DESCRIPTION DERM
LOCATION DETAILED: LEFT CENTRAL ZYGOMA
LOCATION DETAILED: LEFT MEDIAL TEMPLE
LOCATION DETAILED: RIGHT SUPERIOR LATERAL MIDBACK
LOCATION DETAILED: LEFT SUPERIOR MEDIAL FOREHEAD
LOCATION DETAILED: RIGHT SUPERIOR OCCIPITAL SCALP
LOCATION DETAILED: RIGHT SUPERIOR CENTRAL MALAR CHEEK
LOCATION DETAILED: LEFT MEDIAL FRONTAL SCALP
LOCATION DETAILED: RIGHT CLAVICULAR NECK
LOCATION DETAILED: RIGHT INFERIOR LATERAL UPPER BACK
LOCATION DETAILED: RIGHT DORSAL GREAT TOE
LOCATION DETAILED: RIGHT CENTRAL ZYGOMA
LOCATION DETAILED: LEFT SUPERIOR OCCIPITAL SCALP
LOCATION DETAILED: RIGHT CENTRAL TEMPLE
LOCATION DETAILED: LEFT MEDIAL ZYGOMA
LOCATION DETAILED: RIGHT LATERAL UPPER BACK
LOCATION DETAILED: LEFT CENTRAL TEMPLE
LOCATION DETAILED: RIGHT NASAL SIDEWALL
LOCATION DETAILED: LEFT LATERAL DISTAL PRETIBIAL REGION
LOCATION DETAILED: RIGHT SUPERIOR MEDIAL FOREHEAD
LOCATION DETAILED: MID-OCCIPITAL SCALP
LOCATION DETAILED: LEFT GREAT TOENAIL
LOCATION DETAILED: LEFT CLAVICULAR NECK

## 2020-03-13 ASSESSMENT — LOCATION ZONE DERM
LOCATION ZONE: NECK
LOCATION ZONE: LEG
LOCATION ZONE: SCALP
LOCATION ZONE: FACE
LOCATION ZONE: TOE
LOCATION ZONE: TOENAIL
LOCATION ZONE: TRUNK
LOCATION ZONE: NOSE

## 2020-03-13 NOTE — PROCEDURE: LIQUID NITROGEN
Duration Of Freeze Thaw-Cycle (Seconds): 0
Render Post-Care Instructions In Note?: no
Total Number Of Aks Treated: 20
Consent: The patient's consent was obtained including but not limited to risks of crusting, scabbing, blistering, scarring, darker or lighter pigmentary change, recurrence, incomplete removal and infection.
Number Of Freeze-Thaw Cycles: 3 freeze-thaw cycles
Post-Care Instructions: I reviewed with the patient in detail post-care instructions. Patient is to wear sunprotection, and avoid picking at any of the treated lesions. Pt may apply Vaseline to crusted or scabbing areas.
Detail Level: Zone

## 2020-05-07 ENCOUNTER — TELEPHONE (OUTPATIENT)
Dept: LAB | Facility: CLINIC | Age: 80
End: 2020-05-07

## 2020-05-07 DIAGNOSIS — F52.21 ERECTILE DYSFUNCTION OF NONORGANIC ORIGIN: ICD-10-CM

## 2020-05-07 DIAGNOSIS — N40.1 BENIGN NON-NODULAR PROSTATIC HYPERPLASIA WITH LOWER URINARY TRACT SYMPTOMS: ICD-10-CM

## 2020-05-07 LAB
BASOPHILS # BLD AUTO: 0 10E9/L (ref 0–0.2)
BASOPHILS NFR BLD AUTO: 0.2 %
DIFFERENTIAL METHOD BLD: ABNORMAL
EOSINOPHIL # BLD AUTO: 0.1 10E9/L (ref 0–0.7)
EOSINOPHIL NFR BLD AUTO: 0.9 %
ERYTHROCYTE [DISTWIDTH] IN BLOOD BY AUTOMATED COUNT: 14.2 % (ref 10–15)
HCT VFR BLD AUTO: 39 % (ref 40–53)
HGB BLD-MCNC: 13 G/DL (ref 13.3–17.7)
LYMPHOCYTES # BLD AUTO: 1.4 10E9/L (ref 0.8–5.3)
LYMPHOCYTES NFR BLD AUTO: 22.4 %
MCH RBC QN AUTO: 30.9 PG (ref 26.5–33)
MCHC RBC AUTO-ENTMCNC: 33.3 G/DL (ref 31.5–36.5)
MCV RBC AUTO: 93 FL (ref 78–100)
MONOCYTES # BLD AUTO: 0.7 10E9/L (ref 0–1.3)
MONOCYTES NFR BLD AUTO: 10.2 %
NEUTROPHILS # BLD AUTO: 4.2 10E9/L (ref 1.6–8.3)
NEUTROPHILS NFR BLD AUTO: 66.3 %
PLATELET # BLD AUTO: 212 10E9/L (ref 150–450)
RBC # BLD AUTO: 4.21 10E12/L (ref 4.4–5.9)
WBC # BLD AUTO: 6.4 10E9/L (ref 4–11)

## 2020-05-07 PROCEDURE — 80076 HEPATIC FUNCTION PANEL: CPT | Performed by: UROLOGY

## 2020-05-07 PROCEDURE — 36415 COLL VENOUS BLD VENIPUNCTURE: CPT | Performed by: UROLOGY

## 2020-05-07 PROCEDURE — 82465 ASSAY BLD/SERUM CHOLESTEROL: CPT | Mod: GA | Performed by: UROLOGY

## 2020-05-07 PROCEDURE — 84403 ASSAY OF TOTAL TESTOSTERONE: CPT | Performed by: UROLOGY

## 2020-05-07 PROCEDURE — 85025 COMPLETE CBC W/AUTO DIFF WBC: CPT | Mod: GA | Performed by: UROLOGY

## 2020-05-07 NOTE — TELEPHONE ENCOUNTER
Patient Returning Call  Reason for call:  To schedule lab appointment  Information relayed to patient:  Will get call  Patient has additional questions:  Yes  What are your questions/concerns:  Pt spouse states labs need to be completed as soon as possible. She mentioned it must be today after 2:00, as soon as the orders are received. Please contact pt when faxed orders are received to discuss and advise availability.   Okay to leave a detailed message?: Yes at Home number on file [phone] (home)  Or can call spouses cell at [phone]

## 2020-05-08 LAB
ALBUMIN SERPL-MCNC: 3.8 G/DL (ref 3.4–5)
ALP SERPL-CCNC: 82 U/L (ref 40–150)
ALT SERPL W P-5'-P-CCNC: 31 U/L (ref 0–70)
AST SERPL W P-5'-P-CCNC: 24 U/L (ref 0–45)
BILIRUB DIRECT SERPL-MCNC: 0.2 MG/DL (ref 0–0.2)
BILIRUB SERPL-MCNC: 1 MG/DL (ref 0.2–1.3)
CHOLEST SERPL-MCNC: 127 MG/DL
PROT SERPL-MCNC: 7 G/DL (ref 6.8–8.8)

## 2020-05-09 LAB — TESTOST SERPL-MCNC: 607 NG/DL (ref 240–950)

## 2020-05-18 ENCOUNTER — TELEPHONE (OUTPATIENT)
Dept: PEDIATRICS | Facility: CLINIC | Age: 80
End: 2020-05-18

## 2020-05-18 DIAGNOSIS — Z12.11 SCREENING FOR COLON CANCER: Primary | ICD-10-CM

## 2020-05-18 NOTE — TELEPHONE ENCOUNTER
Order/Referral Request:  Who is requesting: Phoebe Tanner ([facility] on file)  Orders being requested: Colonoscopy   Reason service is needed/diagnosis: Dr. Dawn recommended pt to have colonoscopy done   When are orders needed by: ASAP  Has this been discussed with Provider: Yes steven   Does patient have a preference on a Group/Provider/Facility? no  Does patient have an appointment scheduled: No  Where to send Orders: N/A  Okay to leave detailed message?  Yes at Home number on file [phone] (home)    Routing       Annie Yates MA 11:52 AM 5/18/2020

## 2020-05-20 ENCOUNTER — COMMUNICATION - HEALTHEAST (OUTPATIENT)
Dept: SCHEDULING | Facility: CLINIC | Age: 80
End: 2020-05-20

## 2020-05-20 DIAGNOSIS — Z11.59 ENCOUNTER FOR SCREENING FOR OTHER VIRAL DISEASES: Primary | ICD-10-CM

## 2020-05-26 DIAGNOSIS — Z11.59 ENCOUNTER FOR SCREENING FOR OTHER VIRAL DISEASES: ICD-10-CM

## 2020-05-26 DIAGNOSIS — H40.233 INTERMITTENT PRIMARY ANGLE-CLOSURE GLAUCOMA OF BOTH EYES: ICD-10-CM

## 2020-05-26 LAB
SARS-COV-2 RNA SPEC QL NAA+PROBE: NOT DETECTED
SPECIMEN SOURCE: NORMAL

## 2020-05-26 PROCEDURE — 87635 SARS-COV-2 COVID-19 AMP PRB: CPT | Performed by: INTERNAL MEDICINE

## 2020-05-26 PROCEDURE — 99000 SPECIMEN HANDLING OFFICE-LAB: CPT | Performed by: INTERNAL MEDICINE

## 2020-05-26 PROCEDURE — 99207 ZZC NO BILLABLE SERVICE THIS VISIT: CPT

## 2020-05-26 RX ORDER — LATANOPROST 50 UG/ML
1 SOLUTION/ DROPS OPHTHALMIC AT BEDTIME
Qty: 7.5 ML | Refills: 3 | Status: SHIPPED | OUTPATIENT
Start: 2020-05-26 | End: 2021-05-28

## 2020-05-26 NOTE — TELEPHONE ENCOUNTER
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 - Both Eyes   Current directions on the medication list: Place 1 drop into both eyes At Bedtime Call clinic to schedule MD APPT. - Both Eyes     Last Written Prescription Date:  5/21/2019  Last Fill Quantity: 7.5,   # refills: 3    Last Office Visit:  2/13/2020  Future Office visit:  8/13/2020    Attending Provider:    Shannon Yang MD   Ophthalmology     Last Clinic Note:     Patient will continue on Latanoprost which is a teal top drop at bedtime in both eyes.  Patient will return to clinic in 4-6 months with visual field test, dilated eye exam and IOP check.     7.5 mL, 3 refills  Sent to pharm 5/26/2020    Melodie Brown RN  Central Triage Red Flags/Med Refills

## 2020-05-28 ENCOUNTER — HOSPITAL ENCOUNTER (OUTPATIENT)
Facility: CLINIC | Age: 80
Discharge: HOME | End: 2020-05-28
Attending: INTERNAL MEDICINE | Admitting: INTERNAL MEDICINE
Payer: COMMERCIAL

## 2020-05-28 VITALS
RESPIRATION RATE: 18 BRPM | HEART RATE: 64 BPM | DIASTOLIC BLOOD PRESSURE: 76 MMHG | SYSTOLIC BLOOD PRESSURE: 139 MMHG | WEIGHT: 177 LBS | BODY MASS INDEX: 25.34 KG/M2 | HEIGHT: 70 IN | OXYGEN SATURATION: 98 %

## 2020-05-28 LAB — COLONOSCOPY: NORMAL

## 2020-05-28 PROCEDURE — G0500 MOD SEDAT ENDO SERVICE >5YRS: HCPCS | Performed by: INTERNAL MEDICINE

## 2020-05-28 PROCEDURE — 25000128 H RX IP 250 OP 636: Performed by: INTERNAL MEDICINE

## 2020-05-28 PROCEDURE — 45378 DIAGNOSTIC COLONOSCOPY: CPT | Performed by: INTERNAL MEDICINE

## 2020-05-28 RX ORDER — FLUMAZENIL 0.1 MG/ML
0.2 INJECTION, SOLUTION INTRAVENOUS
Status: DISCONTINUED | OUTPATIENT
Start: 2020-05-28 | End: 2020-05-28 | Stop reason: HOSPADM

## 2020-05-28 RX ORDER — FENTANYL CITRATE 50 UG/ML
INJECTION, SOLUTION INTRAMUSCULAR; INTRAVENOUS PRN
Status: DISCONTINUED | OUTPATIENT
Start: 2020-05-28 | End: 2020-05-28 | Stop reason: HOSPADM

## 2020-05-28 RX ORDER — LIDOCAINE 40 MG/G
CREAM TOPICAL
Status: DISCONTINUED | OUTPATIENT
Start: 2020-05-28 | End: 2020-05-28 | Stop reason: HOSPADM

## 2020-05-28 RX ORDER — ONDANSETRON 2 MG/ML
4 INJECTION INTRAMUSCULAR; INTRAVENOUS EVERY 6 HOURS PRN
Status: DISCONTINUED | OUTPATIENT
Start: 2020-05-28 | End: 2020-05-28 | Stop reason: HOSPADM

## 2020-05-28 RX ORDER — ONDANSETRON 4 MG/1
4 TABLET, ORALLY DISINTEGRATING ORAL EVERY 6 HOURS PRN
Status: DISCONTINUED | OUTPATIENT
Start: 2020-05-28 | End: 2020-05-28 | Stop reason: HOSPADM

## 2020-05-28 RX ORDER — ONDANSETRON 2 MG/ML
4 INJECTION INTRAMUSCULAR; INTRAVENOUS
Status: DISCONTINUED | OUTPATIENT
Start: 2020-05-28 | End: 2020-05-28 | Stop reason: HOSPADM

## 2020-05-28 RX ORDER — NALOXONE HYDROCHLORIDE 0.4 MG/ML
.1-.4 INJECTION, SOLUTION INTRAMUSCULAR; INTRAVENOUS; SUBCUTANEOUS
Status: DISCONTINUED | OUTPATIENT
Start: 2020-05-28 | End: 2020-05-28 | Stop reason: HOSPADM

## 2020-05-28 ASSESSMENT — MIFFLIN-ST. JEOR: SCORE: 1529.12

## 2020-05-28 NOTE — LETTER
May 21, 2020      Ciro Almonte  [address] [place] [address]        Dear Ciro,   Please be aware that coverage of these services is subject to the terms and limitations of your health insurance plan.  Call member services at your health plan with any benefit or coverage questions.    Thank you for choosing [facility]. You are scheduled for the following service(s):    Date: 5/28/2020             Procedure:  COLONOSCOPY  Doctor:    Gina   Arrival Time: 8:30 am  *Check in at Main Hospital entrance  Procedure Time: 9 am     Location:   [facility]        Endoscopy Department, First Floor         [address] [place], [place] [address]      [phone] or [phone] ([facility]) to reschedule      MIRALAX -GATORADE  PREP  Colonoscopy is the most accurate test to detect colon polyps and colon cancer; and the only test where polyps can be removed. During this procedure, a doctor examines the lining of your large intestine and rectum through a flexible tube.   Transportation  You must arrange for a ride for the day of your procedure with a responsible adult. A taxi , Uber, etc, is not an option unless you are accompanied by a responsible adult. If you fail to arrange transportation with a responsible adult, your procedure will be cancelled and rescheduled.    Purchase the  following supplies at your local pharmacy:  - 2 (two) bisacodyl tablets: each tablet contains 5 mg.  (Dulcolax  laxative NOT Dulcolax  stool softener)   - 1 (one) 8.3 oz bottle of Polyethylene Glycol (PEG) 3350 Powder   (MiraLAX , Smooth LAX , ClearLAX  or equivalent)  - 64 oz Gatorade    Regular Gatorade, Gatorade G2 , Powerade , Powerade Zero  or Pedialyte  is acceptable. Red colored flavors are not allowed; all other colors (yellow, green, orange, purple and blue) are okay. It is also okay to buy two 2.12 oz packets of powdered Gatorade that can be mixed with water to a total  volume of 64 oz of liquid.  - 1 (one) 10 oz bottle of Magnesium Citrate (Red colored flavors are not allowed)  It is also okay for you to use a 0.5 oz package of powdered magnesium citrate (17 g) mixed with 10 oz of water.      PREPARATION FOR COLONOSCOPY    7 days before:    Discontinue fiber supplements and medications containing iron. This includes Metamucil  and Fibercon ; and multivitamins with iron.    3 days before:    Begin a low-fiber diet. A low-fiber diet helps making the cleanout more effective.     Examples of a low-fiber diet include (but are not limited to): white bread, white rice, pasta, crackers, fish, chicken, eggs, ground beef, creamy peanut butter, cooked/steamed/boiled vegetables, canned fruit, bananas, melons, milk, plain yogurt cheese, salad dressing and other condiments.     The following are not allowed on a low-fiber diet: seeds, nuts, popcorn, bran, whole wheat, corn, quinoa, raw fruits and vegetables, berries and dried fruit, beans and lentils.    For additional details on low-fiber diet, please refer to the table on the last page.    2 days before:    Continue the low-fiber diet.     Drink at least 8 glasses of water throughout the day.     Stop eating solid foods at 11:45 pm.    1 day before:    In the morning: begin a clear liquid diet (liquids you can see through).     Examples of a clear liquid diet include: water, clear broth or bouillon, Gatorade, Pedialyte or Powerade, carbonated and non-carbonated soft drinks (Sprite , 7-Up , ginger ale), strained fruit juices without pulp (apple, white grape, white cranberry), Jell-O  and popsicles.     The following are not allowed on a clear liquid diet: red liquids, alcoholic beverages, dairy products (milk, creamer, and yogurt), protein shakes, creamy broths, juice with pulp and chewing tobacco.    At noon: take 2 (two) bisacodyl tablets     At 4 (and no later than 6pm): start drinking the Miralax-Gatorade preparation (8.3 oz of Miralax  mixed with 64 oz of Gatorade in a large pitcher). Drink 1(one) 8 oz glass every 15 minutes thereafter, until the mixture is gone.    COLON CLEANSING TIPS: drink adequate amounts of fluids before and after your colon cleansing to prevent dehydration. Stay near a toilet because you will have diarrhea. Even if you are sitting on the toilet, continue to drink the cleansing solution every 15 minutes. If you feel nauseous or vomit, rinse your mouth with water, take a 15 to 30-minute-break and then continue drinking the solution. You will be uncomfortable until the stool has flushed from your colon (in about 2 to 4 hours). You may feel chilled.    Day of your procedure  You may take all of your morning medications including blood pressure medications, blood thinners (if you have not been instructed to stop these by our office), methadone, anti-seizure medications with sips of water 3 hours prior to your procedure or earlier. Do not take insulin or vitamins prior to your procedure. Continue the clear liquid diet.       4 hours prior: drink 10 oz of magnesium citrate. It may be easier to drink it with a straw.    STOP consuming all liquids after that.     Do not take anything by mouth during this time.     Allow extra time to travel to your procedure as you may need to stop and use a restroom along the way.    You are ready for the procedure, if you followed all instructions and your stool is no longer formed, but clear or yellow liquid. If you are unsure whether your colon is clean, please call our office at [phone] before you leave for your appointment.    Bring the following to your procedure:  - Insurance Card/Photo ID.   - List of current medications including over-the-counter medications and supplements.   - Your rescue inhaler if you currently use one to control asthma.    Canceling or rescheduling your appointment:   If you must cancel or reschedule your appointment, please call [phone] as soon as  possible.      COLONOSCOPY PRE-PROCEDURE CHECKLIST    If you have diabetes, ask your regular doctor for diet and medication restrictions.  If you take an anticoagulant or anti-platelet medication (such as Coumadin , Lovenox , Pradaxa , Xarelto , Eliquis , etc.), please call your primary doctor for advice on holding this medication.  If you take aspirin you may continue to do so.  If you are or may be pregnant, please discuss the risks and benefits of this procedure with your doctor.        What happens during a colonoscopy?    Plan to spend up to two hours, starting at registration time, at the endoscopy center the day of your procedure. The colonoscopy takes an average of 15 to 30 minutes. Recovery time is about 30 minutes.      Before the exam:    You will change into a gown.    Your medical history and medication list will be reviewed with you, unless that has been done over the phone prior to the procedure.     A nurse will insert an intravenous (IV) line into your hand or arm.    The doctor will meet with you and will give you a consent form to sign.  During the exam:     Medicine will be given through the IV line to help you relax.     Your heart rate and oxygen levels will be monitored. If your blood pressure is low, you may be given fluids through the IV line.     The doctor will insert a flexible hollow tube, called a colonoscope, into your rectum. The scope will be advanced slowly through the large intestine (colon).    You may have a feeling of fullness or pressure.     If an abnormal tissue or a polyp is found, the doctor may remove it through the endoscope for closer examination, or biopsy. Tissue removal is painless    After the exam:           Any tissue samples removed during the exam will be sent to a lab for evaluation. It may take 5-7 working days for you to be notified of the results.     A nurse will provide you with complete discharge instructions before you leave the endoscopy center. Be sure  to ask the nurse for specific instructions if you take blood thinners such as Aspirin, Coumadin or Plavix.     The doctor will prepare a full report for you and for the physician who referred you for the procedure.     Your doctor will talk with you about the initial results of your exam.      Medication given during the exam will prohibit you from driving for the rest of the day.     Following the exam, you may resume your normal diet. Your first meal should be light, no greasy foods. Avoid alcohol until the next day.     You may resume your regular activities the day after the procedure.         LOW-FIBER DIET    Foods RECOMMENDED Foods to AVOID   Breads, Cereal, Rice and Pasta:   White bread, rolls, biscuits, croissant and husam toast.   Waffles, Slovenian toast and pancakes.   White rice, noodles, pasta, macaroni and peeled cooked potatoes.   Plain crackers and saltines.   Cooked cereals: farina, cream of rice.   Cold cereals: Puffed Rice , Rice Krispies , Corn Flakes  and Special K    Breads, Cereal, Rice and Pasta:   Breads or rolls with nuts, seeds or fruit.   Whole wheat, pumpernickel, rye breads and cornbread.   Potatoes with skin, brown or wild rice, and kasha (buckwheat).     Vegetables:   Tender cooked and canned vegetables without seeds: carrots, asparagus tips, green or wax beans, pumpkin, spinach, lima beans. Vegetables:   Raw or steamed vegetables.   Vegetables with seeds.   Sauerkraut.   Winter squash, peas, broccoli, Brussel sprouts, cabbage, onions, cauliflower, baked beans, peas and corn.   Fruits:   Strained fruit juice.   Canned fruit, except pineapple.   Ripe bananas and melon. Fruits:   Prunes and prune juice.   Raw fruits.   Dried fruits: figs, dates and raisins.   Milk/Dairy:   Milk: plain or flavored.   Yogurt, custard and ice cream.   Cheese and cottage cheese Milk/Dairy:     Meat and other proteins:   ground, well-cooked tender beef, lamb, ham, veal, pork, fish, poultry and organ  meats.   Eggs.   Peanut butter without nuts. Meat and other proteins:   Tough, fibrous meats with gristle.   Dry beans, peas and lentils.   Peanut butter with nuts.   Tofu.   Fats, Snack, Sweets, Condiments and Beverages:   Margarine, butter, oils, mayonnaise, sour cream and salad dressing, plain gravy.   Sugar, hard candy, clear jelly, honey and syrup.   Spices, cooked herbs, bouillon, broth and soups made with allowed vegetable, ketchup and mustard.   Coffee, tea and carbonated drinks.   Plain cakes, cookies and pretzels.   Gelatin, plain puddings, custard, ice cream, sherbet and popsicles. Fats, Snack, Sweets, Condiments and Beverages:   Nuts, seeds and coconut.   Jam, marmalade and preserves.   Pickles, olives, relish and horseradish.   All desserts containing nuts, seeds, dried fruit and coconut; or made from whole grains or bran.   Candy made with nuts or seeds.   Popcorn.           DIRECTIONS TO THE ENDOSCOPY DEPARTMENT     From the north ([facility])  Take [address] South, exit on [address] [address]. Get into the left hand guerrero, turn left (east), go one-half mile to [address] and turn left. Go north to the first stoplight, take a right on [email or web address] and follow it to the Main entrance.    From the south ([facility] [place])  Take 35N to the 35E split and exit on [address] [address]. On [address] [address], turn left (west) to [address]. Turn right (north) on [address]. Go north to the first stoplight, take a right on [address] and follow it to the Main entrance.    From the east via 35E ([facility])  Take 35E south to [address] [address] exit. Turn right on [address] [address]. Go west to [address]. Turn right (north) on [address]. Go to the first stoplight, take a right and follow on [address] to the main entrance.    From the east via [address] 13 ([facility])  Take [address] West to [address]. Turn left (south) on [address] to [address].  Turn left (east) on [address] and follow it to the main entrance.    From the west via Highway 13 ([place])  Take [address] east to [address]. Turn right (south) on [address] to [address]. Turn left (east) on [address] and follow it to the Main entrance.

## 2020-05-28 NOTE — DISCHARGE INSTRUCTIONS
Eating a High-Fiber Diet  Fiber is what gives strength and structure to plants. Most grains, beans, vegetables, and fruits contain fiber. Foods rich in fiber are often low in calories and fat, and they fill you up more. They may also reduce your risks for certain health problems. To find out the amount of fiber in canned, packaged, or frozen foods, read the  Nutrition Facts  label. It tells you how much fiber is in a serving.      Types of Fiber and Their Benefits  There are two types of fiber: insoluble and soluble. They both aid digestion and help you maintain a healthy weight.  Insoluble fiber: This is found in whole grains, cereals, certain fruits and vegetables (such as apple skin, corn, and carrots). Insoluble fiber may prevent constipation and reduce the risk of certain types of cancer.   Soluble fiber: This type of fiber is in oats, beans, and certain fruits and vegetables (such as strawberries and peas). Soluble fiber can reduce cholesterol (which may help lower the risk of heart disease), and helps control blood sugar levels.  Look for High-Fiber Foods  Whole-grain breads and cereals: Try to eat 6-8 ounces a day. Include wheat and oat bran cereals, whole-wheat muffins or toast, and corn tortillas in your meals.  Fruits: Try to eat 2 cups a day. Apples, oranges, strawberries, pears, and bananas are good sources. (Note: Fruit juice is low in fiber.)  Vegetables: Try to eat 3 cups a day. Add asparagus, carrots, broccoli, peas, and corn to your meals.  Legumes (beans): One cup of cooked lentils gives you over 15 grams of fiber. Try navy beans, lentils, and chickpeas.  Seeds:  A small handful of seeds gives you about 3 grams of fiber. Try sunflower seeds.    Keep Track of Your Fiber  A healthy diet includes 31 grams of fiber a day if you have a 2,000-calorie diet. Keep track of how much fiber you eat. Start by reading food labels. Then eat a variety of foods high in fiber. Ask your doctor about supplemental  fiber products.            [id number] [facility], [address] [place] [address], [place] [address]. All rights reserved. This information is not intended as a substitute for professional medical care. Always follow your healthcare professional's instructions.    Understanding Diverticulosis and Diverticulitis     Pouches or diverticula usually occur in the lower part of the colon called the sigmoid.      Diverticulitis occurs when the pouches become inflamed.     The colon (large intestine) is the last part of the digestive tract. It absorbs water from stool and changes it from a liquid to a solid. In certain cases, small pouches called diverticula can form in the colon wall. This condition is called diverticulosis. The pouches can become infected. If this happens, it becomes a more serious problem called diverticulitis. These problems can be painful. But they can be managed.   Managing Your Condition  Diet changes or taking medications are often tried first. These may be enough to bring relief. If the case is bad, surgery may be done. You and your doctor can discuss the plan that is best for you.  If You Have Diverticulosis  Diet changes are often enough to control symptoms. The main changes are adding fiber (roughage) and drinking more water. Fiber absorbs water as it travels through your colon. This helps your stool stay soft and move smoothly. Water helps this process. If needed, you may be told to take over-the-counter stool softeners. To help relieve pain, antispasmodic medications may be prescribed.  If You Have Diverticulitis  Treatment depends on how bad your symptoms are.  For mild symptoms: You may be put on a liquid diet for a short time. You may also be prescribed antibiotics. If these two steps relieve your symptoms, you may then be prescribed a high-fiber diet. If you still have symptoms, your doctor will discuss further treatment options with you.  For severe symptoms: You may need to be admitted to the  hospital. There, you can be given IV antibiotics and fluids. Once symptoms are under control, the above treatments may be tried. If these don t control your condition, your doctor may discuss the option of having surgery with you.  [place] to Colon Health  Help keep your colon healthy with a diet that includes plenty of high-fiber fruits, vegetables, and whole grains. Drink plenty of liquids like water and juice. Your doctor may also recommend avoiding seeds and nuts.          [id number] [facility], [address] [place] [address], [place] [address]. All rights reserved. This information is not intended as a substitute for professional medical care. Always follow your healthcare professional's instructions.

## 2020-05-28 NOTE — H&P
Pre-Endoscopy History and Physical     Ciro Almonte MRN# [id number]   YOB: 1940 Age: 80 year old     Date of Procedure: 5/28/2020  Primary care provider: Uri Queen  Type of Endoscopy: Colonoscopy with possible biopsy, possible polypectomy  Reason for Procedure: pain  Type of Anesthesia Anticipated: Conscious Sedation    HPI:    Ciro is a 80 year old male who will be undergoing the above procedure.      A history and physical has been performed. The patient's medications and allergies have been reviewed. The risks and benefits of the procedure and the sedation options and risks were discussed with the patient.  All questions were answered and informed consent was obtained.      He denies a personal or family history of anesthesia complications or bleeding disorders.     Patient Active Problem List   Diagnosis     Cervical spine degeneration     Glaucoma     Mitral regurgitation - systolic murmur     Hereditary and idiopathic peripheral neuropathy     Essential hypertension with goal blood pressure less than 140/90     Primary osteoarthritis of right foot     Benign non-nodular prostatic hyperplasia with lower urinary tract symptoms     Chronic low back pain, unspecified back pain laterality, with sciatica presence unspecified     Lumbago     Hip pain, left     Mixed hyperlipidemia     Glaucoma suspect of both eyes     Nuclear senile cataract of both eyes     Pseudophakia of both eyes        Past Medical History:   Diagnosis Date     Cancer (H) 2000    L shoulder melanoma     Glaucoma      Heart murmur      Hypertension      Melanoma (H) 2000    Left shoulder - Dr. Carrillo - follows q6 months     Neuropathy      Nonsenile cataract      Other chronic pain     Neuropathy bilateral feet     Other premature beats      PVC's (premature ventricular contractions) 5/14/2015     Zoster 2000        Past Surgical History:   Procedure Laterality Date     ARTHROPLASTY HIP ANTERIOR Left 11/14/2014     Procedure: ARTHROPLASTY HIP ANTERIOR;  Surgeon: Rudy Tobias MD;  Location:  OR     ARTHROPLASTY REVISION HIP  3/12/2014     right Procedure: ARTHROPLASTY REVISION HIP;  Surgeon: Rudy Tobias MD;  Location:  OR     ARTHROSCOPY KNEE      meniscus ( side?)     BIOPSY OF PROSTATE,NEEDLE/PUNCH       both hips replaced       C TOTAL HIP ARTHROPLASTY Right      DERMATOLOGY REFERRAL      melanoma resection L shoulder     DISCECTOMY LUMBAR POSTERIOR MICROSCOPIC ONE LEVEL N/A 2016    Procedure: DISCECTOMY LUMBAR POSTERIOR MICROSCOPIC ONE LEVEL;  Surgeon: Ruben Marsh MD;  Location:  OR     ENT SURGERY      Tonsils     ORTHOPEDIC SURGERY      R hip replacement     ORTHOPEDIC SURGERY      big toe in , needed reconstructive surgery     PHACOEMULSIFICATION CLEAR CORNEA WITH TORIC INTRAOCULAR LENS IMPLANT Left 2019    Procedure: LEFT EYE, CATARACT EXTRACTION WITH TORIC INTRAOCULAR LENS IMPLANT;  Surgeon: Shannon Yang MD;  Location: [facility] OR     PHACOEMULSIFICATION CLEAR CORNEA WITH TORIC INTRAOCULAR LENS IMPLANT Right 2019    Procedure: RIGHT EYE, CATARACT EXTRACTION WITH TORIC INTRAOCULAR LENS IMPLANT;  Surgeon: Shannon Yang MD;  Location: [facility] OR     TRABECULAR MICRO-BYPASS WITH ISTENT Left 2019    Procedure: LEFT EYE, ISTENT INSERTION;  Surgeon: Shannon Yang MD;  Location: [facility] OR     TRABECULAR MICRO-BYPASS WITH ISTENT Right 2019    Procedure: RIGHT EYE, ISTENT INSERTION;  Surgeon: Shannon Yang MD;  Location: [facility] OR       Social History     Tobacco Use     Smoking status: Former Smoker     Packs/day: 0.00     Last attempt to quit: 1976     Years since quittin.9     Smokeless tobacco: Never Used   Substance Use Topics     Alcohol use: No     Alcohol/week: 0.0 standard drinks     Comment: Quit 1014       Family History   Problem Relation Age of Onset     Cerebrovascular Disease Mother      Hypertension Mother       Glaucoma Mother      Heart Disease Father 58        MI     Cancer Father         lung cancer;  age 91     Colon Cancer No family hx of        Prior to Admission medications    Medication Sig Start Date End Date Taking? Authorizing Provider   Acetaminophen (TYLENOL PO) Take 1,000 mg by mouth 2 times daily   Yes Reported, Patient   amLODIPine (NORVASC) 10 MG tablet Take 1 tablet (10 mg) by mouth daily 19  Yes Radha Banks MD   ASPIRIN PO Take 81 mg by mouth   Yes Reported, Patient   atorvastatin (LIPITOR) 20 MG tablet Take 1 tablet (20 mg) by mouth daily 19  Yes Radha Banks MD   Calcium Citrate-Vitamin D (CITRACAL + D PO) Take 2 tablets by mouth daily   Yes Reported, Patient   diclofenac (VOLTAREN) 75 MG EC tablet Take 2 tablets (150 mg) by mouth as needed for moderate pain 19   Radha Banks MD   gabapentin (NEURONTIN) 600 MG tablet Take 1 tablet (600 mg) by mouth At Bedtime AND 0.5 tablets (300 mg) every morning. 19   Radha Banks MD   ketorolac (ACULAR) 0.5 % ophthalmic solution Place 1 drop into the right eye 3 times daily    Reported, Patient   ketorolac (ACULAR) 0.5 % ophthalmic solution Place 1 drop into the right eye 3 times daily 19   Shannon Yang MD   ketorolac (ACULAR) 0.5 % ophthalmic solution Place 1 drop into the right eye 3 times daily 19   Shannon Yang MD   latanoprost (XALATAN) 0.005 % ophthalmic solution Place 1 drop into both eyes At Bedtime Call clinic to schedule MD APPT. 20   Shannon Yang MD   lisinopril (PRINIVIL/ZESTRIL) 10 MG tablet Take 1 tablet (10 mg) by mouth daily 19   Radha Banks MD   Magnesium 500 MG CAPS     Reported, Patient   magnesium citrate solution Take 296 mLs by mouth once for 1 dose 19  Geno Diaz PA-C   Multiple Vitamins-Minerals (MENS MULTIVITAMIN PLUS PO) Take 1 tablet by mouth daily    Reported, Patient   Nutritional Supplements (PROSTATE 2.4) CAPS Take 2  "capsules by mouth daily Prostate cap 2.0    Reported, Patient   rOPINIRole (REQUIP) 0.25 MG tablet TAKE 1 TO 2 TABLETS(0.25 TO 0.5 MG) BY MOUTH AT BEDTIME 11/14/19   Radha Banks MD   sildenafil (REVATIO) 20 MG tablet Take 3-5 tabs 30-60 minutes prior to intercourse. 11/14/19   Radha Banks MD       No Known Allergies     REVIEW OF SYSTEMS:   5 point ROS negative except as noted above in HPI, including Gen., Resp., CV, GI &  system review.    PHYSICAL EXAM:   There were no vitals taken for this visit. Estimated body mass index is 26.88 kg/m  as calculated from the following:    Height as of 11/20/19: 1.772 m (5' 9.75\").    Weight as of 11/20/19: 84.4 kg (186 lb).   GENERAL APPEARANCE: alert, and oriented  MENTAL STATUS: alert  AIRWAY EXAM: Mallampatti Class II (visualization of the soft palate, fauces, and uvula)  RESP: lungs clear to auscultation - no rales, rhonchi or wheezes  CV: regular rates and rhythm  DIAGNOSTICS:    Not indicated    IMPRESSION   ASA Class 2 - Mild systemic disease    PLAN:   Plan for Colonoscopy with possible biopsy, possible polypectomy. We discussed the risks, benefits and alternatives and the patient wished to proceed.    The above has been forwarded to the consulting provider.      Signed Electronically by: Kevin Mccarty MD  May 28, 2020          "

## 2020-06-03 DIAGNOSIS — M19.011 PRIMARY OSTEOARTHRITIS OF RIGHT SHOULDER: ICD-10-CM

## 2020-06-03 RX ORDER — DICLOFENAC SODIUM 75 MG/1
TABLET, DELAYED RELEASE ORAL
Qty: 180 TABLET | Refills: 0 | Status: SHIPPED | OUTPATIENT
Start: 2020-06-03 | End: 2020-09-05

## 2020-06-25 ENCOUNTER — OFFICE VISIT (OUTPATIENT)
Dept: PEDIATRICS | Facility: CLINIC | Age: 80
End: 2020-06-25
Payer: COMMERCIAL

## 2020-06-25 VITALS
SYSTOLIC BLOOD PRESSURE: 132 MMHG | RESPIRATION RATE: 16 BRPM | BODY MASS INDEX: 25.43 KG/M2 | HEIGHT: 70 IN | HEART RATE: 74 BPM | WEIGHT: 177.6 LBS | OXYGEN SATURATION: 98 % | DIASTOLIC BLOOD PRESSURE: 62 MMHG | TEMPERATURE: 98 F

## 2020-06-25 DIAGNOSIS — Z97.4 PRESENCE OF EXTERNAL HEARING AID: ICD-10-CM

## 2020-06-25 DIAGNOSIS — H91.91 DECREASED HEARING OF RIGHT EAR: Primary | ICD-10-CM

## 2020-06-25 PROCEDURE — 99213 OFFICE O/P EST LOW 20 MIN: CPT | Performed by: NURSE PRACTITIONER

## 2020-06-25 ASSESSMENT — MIFFLIN-ST. JEOR: SCORE: 1531.84

## 2020-06-25 NOTE — PROGRESS NOTES
"Subjective     Ciro Almonte is a 80 year old male who presents to clinic today for the following health issues:    HPI   Hearing Problem      Duration: 2-3 weeks ago    Description (location/character/radiation): right ear    Intensity:  moderate    Accompanying signs and symptoms: none    History (similar episodes/previous evaluation): has a hearing loss/wears hearing aid    Precipitating or alleviating factors: None    Therapies tried and outcome: None       [facility]  Today he complains of difficulty hearing from the right ear  Denies pain, drainage or pressure  He does have the sensation of \"water in the ear\"  No recent URI and no known history of allergies  He does have chronic bilateral tinnitus which he has had for many years        Patient Active Problem List   Diagnosis     Cervical spine degeneration     Glaucoma     Mitral regurgitation - systolic murmur     Hereditary and idiopathic peripheral neuropathy     Essential hypertension with goal blood pressure less than 140/90     Primary osteoarthritis of right foot     Benign non-nodular prostatic hyperplasia with lower urinary tract symptoms     Chronic low back pain, unspecified back pain laterality, with sciatica presence unspecified     Lumbago     Hip pain, left     Mixed hyperlipidemia     Glaucoma suspect of both eyes     Nuclear senile cataract of both eyes     Pseudophakia of both eyes     Past Surgical History:   Procedure Laterality Date     ARTHROPLASTY HIP ANTERIOR Left 11/14/2014    Procedure: ARTHROPLASTY HIP ANTERIOR;  Surgeon: uRdy Tobias MD;  Location:  OR     ARTHROPLASTY REVISION HIP  3/12/2014     right Procedure: ARTHROPLASTY REVISION HIP;  Surgeon: Rudy Tobias MD;  Location:  OR     ARTHROSCOPY KNEE  2002    meniscus ( side?)     BIOPSY OF PROSTATE,NEEDLE/PUNCH       both hips replaced       C TOTAL HIP ARTHROPLASTY Right 2008     COLONOSCOPY N/A 5/28/2020    " Procedure: COLONOSCOPY (fv) (please mail packet as soon as possible) fv;  Surgeon: Kevin Mccarty MD;  Location:  GI     DERMATOLOGY REFERRAL      melanoma resection L shoulder     DISCECTOMY LUMBAR POSTERIOR MICROSCOPIC ONE LEVEL N/A 2016    Procedure: DISCECTOMY LUMBAR POSTERIOR MICROSCOPIC ONE LEVEL;  Surgeon: Ruben Marsh MD;  Location: [facility]     ENT SURGERY      Tonsils     ORTHOPEDIC SURGERY  2008    R hip replacement     ORTHOPEDIC SURGERY      big toe in lawn mower, needed reconstructive surgery     PHACOEMULSIFICATION CLEAR CORNEA WITH TORIC INTRAOCULAR LENS IMPLANT Left 2019    Procedure: LEFT EYE, CATARACT EXTRACTION WITH TORIC INTRAOCULAR LENS IMPLANT;  Surgeon: Shannon Yang MD;  [facility]     PHACOEMULSIFICATION CLEAR CORNEA WITH TORIC INTRAOCULAR LENS IMPLANT Right 2019    Procedure: RIGHT EYE, CATARACT EXTRACTION WITH TORIC INTRAOCULAR LENS IMPLANT;  Surgeon: Shannon Yang MD;  [facility]     TRABECULAR MICRO-BYPASS WITH ISTENT Left 2019    Procedure: LEFT EYE, ISTENT INSERTION;  Surgeon: Shannon Yang MD;  [facility]     TRABECULAR MICRO-BYPASS WITH ISTENT Right 2019    Procedure: RIGHT EYE, ISTENT INSERTION;  Surgeon: Shannon Yang MD;  [facility]       Social History     Tobacco Use     Smoking status: Former Smoker     Packs/day: 0.00     Last attempt to quit: 1976     Years since quittin.0     Smokeless tobacco: Never Used   Substance Use Topics     Alcohol use: No     Alcohol/week: 0.0 standard drinks     Comment: Quit 1014     Family History   Problem Relation Age of Onset     Cerebrovascular Disease Mother      Hypertension Mother      Glaucoma Mother      Heart Disease Father 58        MI     Cancer Father         lung cancer;  age 91     Colon Cancer No family hx of          Current Outpatient Medications   Medication Sig Dispense Refill     Acetaminophen (TYLENOL PO) Take 1,000 mg  "by mouth 2 times daily       amLODIPine (NORVASC) 10 MG tablet Take 1 tablet (10 mg) by mouth daily 90 tablet 11     ASPIRIN PO Take 81 mg by mouth       atorvastatin (LIPITOR) 20 MG tablet Take 1 tablet (20 mg) by mouth daily 90 tablet 11     Calcium Citrate-Vitamin D (CITRACAL + D PO) Take 2 tablets by mouth daily       diclofenac (VOLTAREN) 75 MG EC tablet TAKE 2 TABLET BY MOUTH AS NEEDED FOR MODERATE PAIN 180 tablet 0     gabapentin (NEURONTIN) 600 MG tablet Take 1 tablet (600 mg) by mouth At Bedtime AND 0.5 tablets (300 mg) every morning. 180 tablet 11     latanoprost (XALATAN) 0.005 % ophthalmic solution Place 1 drop into both eyes At Bedtime Call clinic to schedule MD APPT. 7.5 mL 3     lisinopril (PRINIVIL/ZESTRIL) 10 MG tablet Take 1 tablet (10 mg) by mouth daily 90 tablet 11     Magnesium 500 MG CAPS        Multiple Vitamins-Minerals (MENS MULTIVITAMIN PLUS PO) Take 1 tablet by mouth daily       Nutritional Supplements (PROSTATE 2.4) CAPS Take 2 capsules by mouth daily Prostate cap 2.0       rOPINIRole (REQUIP) 0.25 MG tablet TAKE 1 TO 2 TABLETS(0.25 TO 0.5 MG) BY MOUTH AT BEDTIME 180 tablet 11       Reviewed and updated as needed this visit by Provider         Review of Systems   Constitutional, HEENT, cardiovascular, pulmonary, gi and gu systems are negative, except as otherwise noted.      Objective    /62 (BP Location: Right arm, Patient Position: Chair, Cuff Size: Adult Regular)   Pulse 74   Temp 98  F (36.7  C) (Oral)   Resp 16   Ht 1.778 m (5' 10\")   Wt 80.6 kg (177 lb 9.6 oz)   SpO2 98%   BMI 25.48 kg/m    Body mass index is 25.48 kg/m .  Physical Exam   GENERAL: healthy, alert and no distress  HENT: normal cephalic/atraumatic, right ear: small clear effusion, left ear: normal: no effusions, no erythema, normal landmarks, nose and mouth without ulcers or lesions, oropharynx clear and oral mucous membranes moist  NECK: no adenopathy, no asymmetry, masses, or scars and thyroid normal to " "palpation  RESP: lungs clear to auscultation - no rales, rhonchi or wheezes  CV: regular rate and rhythm, normal S1 S2, no S3 or S4, no murmur, click or rub, no peripheral edema and peripheral pulses strong    Diagnostic Test Results:  Labs reviewed in Epic        Assessment & Plan       ICD-10-CM    1. Decreased hearing of right ear  H91.91    2. Presence of external hearing aid  Z97.4         BMI:   Estimated body mass index is 25.48 kg/m  as calculated from the following:    Height as of this encounter: 1.778 m (5' 10\").    Weight as of this encounter: 80.6 kg (177 lb 9.6 oz).       He does have a small effusion of the right ear  Advised this often resolves within 1-2 months  Can try to expedite resolution with anti-histamine and/or Flonase  No signs of infection  Ultimately, if hearing not improved in 1 month [facility]  May need hearing aids changed  He stated he thinks hearing has improved this past week so we will watch for continued improvement    Patient Instructions   I don't see any signs of infection  You have a little fluid behind your right ear drum. This can cause temporary decrease in hearing or the sensation of fluid in your ear  This should resolve in 1-2 months  You could try an over the counter anti-histamine such as Claritin or Zyrtec. Take 1 tablet daily  You could also try a steroid nasal spray such as Flonase. 1 spray in each nostril once daily  If these don't help after 2 weeks of use, you can stop using them as they're unlikely to help further  If you don't do anything, it should resolve within about a month  If you don't notice any improvement in hearing in 1 month, then you [facility] and hearing evaluated      Return in about 1 month (around 7/25/2020) for [facility] at [facility] in 1 month if hearing not improved.    TACHO Deleon [facility] [place]    "

## 2020-06-25 NOTE — PATIENT INSTRUCTIONS
I don't see any signs of infection  You have a little fluid behind your right ear drum. This can cause temporary decrease in hearing or the sensation of fluid in your ear  This should resolve in 1-2 months  You could try an over the counter anti-histamine such as Claritin or Zyrtec. Take 1 tablet daily  You could also try a steroid nasal spray such as Flonase. 1 spray in each nostril once daily  If these don't help after 2 weeks of use, you can stop using them as they're unlikely to help further  If you don't do anything, it should resolve within about a month  If you don't notice any improvement in hearing in 1 month, then you [facility] and hearing evaluated

## 2020-08-24 ENCOUNTER — OFFICE VISIT (OUTPATIENT)
Dept: OPHTHALMOLOGY | Facility: CLINIC | Age: 80
End: 2020-08-24
Attending: OPHTHALMOLOGY
Payer: COMMERCIAL

## 2020-08-24 DIAGNOSIS — H40.003 GLAUCOMA SUSPECT OF BOTH EYES: Primary | ICD-10-CM

## 2020-08-24 PROCEDURE — 92083 EXTENDED VISUAL FIELD XM: CPT | Mod: ZF | Performed by: OPHTHALMOLOGY

## 2020-08-24 PROCEDURE — G0463 HOSPITAL OUTPT CLINIC VISIT: HCPCS | Mod: ZF

## 2020-08-24 ASSESSMENT — REFRACTION_WEARINGRX
OS_CYLINDER: +1.25
OD_CYLINDER: +2.25
SPECS_TYPE: PAL
OD_AXIS: 167
OD_ADD: +2.50
OD_SPHERE: -0.50
OS_AXIS: 001
OS_ADD: +2.50
OS_SPHERE: -0.75

## 2020-08-24 ASSESSMENT — CONF VISUAL FIELD
OD_NORMAL: 1
METHOD: COUNTING FINGERS
OS_NORMAL: 1

## 2020-08-24 ASSESSMENT — VISUAL ACUITY
CORRECTION_TYPE: GLASSES
METHOD: SNELLEN - LINEAR
OD_CC: 20/20
OS_CC: 20/20

## 2020-08-24 ASSESSMENT — EXTERNAL EXAM - RIGHT EYE: OD_EXAM: NORMAL

## 2020-08-24 ASSESSMENT — SLIT LAMP EXAM - LIDS
COMMENTS: DERMATOCHALASIS
COMMENTS: DERMATOCHALASIS

## 2020-08-24 ASSESSMENT — TONOMETRY
OS_IOP_MMHG: 17
IOP_METHOD: APPLANATION
OD_IOP_MMHG: 12

## 2020-08-24 ASSESSMENT — CUP TO DISC RATIO
OD_RATIO: 0.0
OS_RATIO: 0.0

## 2020-08-24 ASSESSMENT — EXTERNAL EXAM - LEFT EYE: OS_EXAM: NORMAL

## 2020-08-24 NOTE — NURSING NOTE
Chief Complaints and History of Present Illnesses   Patient presents with     Glaucoma Suspect Follow Up     6 month follow up both eyes.     Chief Complaint(s) and History of Present Illness(es)     Glaucoma Suspect Follow Up     Laterality: both eyes    Comments: 6 month follow up both eyes.              Comments     Pt states vision is slightly more blurry in both eyes over the past 6 months.  Pt now having to use his glasses to read up close.  No eye pain today. Occasional floaters in BE when pt goes to bed at night.    AYANA Padilla August 24, 2020 8:42 AM

## 2020-08-24 NOTE — PROGRESS NOTES
CC: glaucoma follow up   HPI: Ciro Almonte is a  80 year old year-old patient with history of glaucoma suspect; here for follow up.  Reports [facility] not as [facility] as right after Cataract extraction intraocular lens.  Few floaters; no flashes    Retinal Imaging:  OVF 24-2 08/24/20   Good reliability  Some fluctuation compared to prior form 2.13.2020- overall stable    Assessment & Plan:    1)Glc Suspect vs POAG vs ?Angle recession glc OS   --  S/p Istent OD (11/20/19), S/p Istent OS (11/6/2019)   -- K pachy:530/549    Tmax: L20s and OS:29 (10/8/2019)   HVF:Full c fluct      CDR: 0.0/0.0      HRT/OCT:OD:Mild RNFL thinning and OS:Mod RNFL thinning     FHX of Glc: Mother -- gtts,        Gonio: open with ?angle recession OS temp and sup        Asthma/COPD: No, on po BB  Steroid Use: No    Kidney Stones: Yes, in remote past    Sulfa Allergy: No      IOP targets:L20s   intraocular pressure today 12 and 17    2)ONH Drusen  3)PCIOL OU with toric intraocular lens  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    Patient will return to clinic in 4-6 months with ONFL, dilated eye exam and IOP check.  prescreption    ~~~~~~~~~~~~~~~~~~~~~~~~~~~~~~~~~~   Complete documentation of historical and exam elements from today's encounter can be found in the full encounter summary report (not reduplicated in this progress note).  I personally obtained the chief complaint(s) and history of present illness.  I confirmed and edited as necessary the review of systems, past medical/surgical history, family history, social history, and examination findings as documented by others; and I examined the patient  myself.  I personally reviewed the relevant tests, images, and reports as documented above.  I formulated and edited as necessary the assessment and plan and discussed the findings and management plan with the patient and family    Miriam Gonzalez MD   of Ophthalmology.  Retina Service   Department of Ophthalmology and Visual Neurosciences   [facility]  Phone: [phone]   Fax: [phone]

## 2020-09-05 DIAGNOSIS — M19.011 PRIMARY OSTEOARTHRITIS OF RIGHT SHOULDER: ICD-10-CM

## 2020-09-08 NOTE — TELEPHONE ENCOUNTER
Routing refill request to provider for review/approval because:  Labs not current:  CBC  NOT pso for age  Gabriella Vela RN, BSN  Message handled by CLINIC NURSE.

## 2020-09-09 RX ORDER — DICLOFENAC SODIUM 75 MG/1
TABLET, DELAYED RELEASE ORAL
Qty: 180 TABLET | Refills: 0 | Status: SHIPPED | OUTPATIENT
Start: 2020-09-09 | End: 2020-12-15

## 2020-09-25 ENCOUNTER — ALLIED HEALTH/NURSE VISIT (OUTPATIENT)
Dept: NURSING | Facility: CLINIC | Age: 80
End: 2020-09-25
Payer: COMMERCIAL

## 2020-09-25 DIAGNOSIS — Z23 NEED FOR PROPHYLACTIC VACCINATION AND INOCULATION AGAINST INFLUENZA: Primary | ICD-10-CM

## 2020-09-25 PROCEDURE — 90662 IIV NO PRSV INCREASED AG IM: CPT

## 2020-09-25 PROCEDURE — G0008 ADMIN INFLUENZA VIRUS VAC: HCPCS

## 2020-10-02 ENCOUNTER — APPOINTMENT (OUTPATIENT)
Dept: URBAN - METROPOLITAN AREA CLINIC 256 | Age: 80
Setting detail: DERMATOLOGY
End: 2020-10-02

## 2020-10-02 VITALS — HEIGHT: 70 IN | WEIGHT: 167 LBS | RESPIRATION RATE: 16 BRPM

## 2020-10-02 DIAGNOSIS — Z85.820 PERSONAL HISTORY OF MALIGNANT MELANOMA OF SKIN: ICD-10-CM

## 2020-10-02 DIAGNOSIS — L57.8 OTHER SKIN CHANGES DUE TO CHRONIC EXPOSURE TO NONIONIZING RADIATION: ICD-10-CM

## 2020-10-02 DIAGNOSIS — Z85.828 PERSONAL HISTORY OF OTHER MALIGNANT NEOPLASM OF SKIN: ICD-10-CM

## 2020-10-02 DIAGNOSIS — L82.1 OTHER SEBORRHEIC KERATOSIS: ICD-10-CM

## 2020-10-02 DIAGNOSIS — L57.0 ACTINIC KERATOSIS: ICD-10-CM

## 2020-10-02 DIAGNOSIS — L81.4 OTHER MELANIN HYPERPIGMENTATION: ICD-10-CM

## 2020-10-02 PROCEDURE — 99213 OFFICE O/P EST LOW 20 MIN: CPT | Mod: 25

## 2020-10-02 PROCEDURE — OTHER COUNSELING: OTHER

## 2020-10-02 PROCEDURE — OTHER LIQUID NITROGEN: OTHER

## 2020-10-02 PROCEDURE — 17004 DESTROY PREMAL LESIONS 15/>: CPT

## 2020-10-02 PROCEDURE — OTHER REASSURANCE: OTHER

## 2020-10-02 ASSESSMENT — LOCATION DETAILED DESCRIPTION DERM
LOCATION DETAILED: LEFT SUPERIOR POSTERIOR PARIETAL SCALP
LOCATION DETAILED: NASAL SUPRATIP
LOCATION DETAILED: RIGHT CENTRAL ZYGOMA
LOCATION DETAILED: RIGHT LATERAL FOREHEAD
LOCATION DETAILED: LEFT CENTRAL FRONTAL SCALP
LOCATION DETAILED: RIGHT INFERIOR LATERAL FOREHEAD
LOCATION DETAILED: LEFT INFERIOR HELIX
LOCATION DETAILED: RIGHT SUPERIOR LATERAL MALAR CHEEK
LOCATION DETAILED: RIGHT SUPERIOR CENTRAL MALAR CHEEK
LOCATION DETAILED: RIGHT INFERIOR LATERAL UPPER BACK
LOCATION DETAILED: LEFT NASAL ALA
LOCATION DETAILED: RIGHT NASAL SIDEWALL
LOCATION DETAILED: SUPERIOR MID FOREHEAD
LOCATION DETAILED: RIGHT SUPERIOR LATERAL MIDBACK
LOCATION DETAILED: POSTERIOR MID-PARIETAL SCALP
LOCATION DETAILED: LEFT SUPERIOR CENTRAL MALAR CHEEK
LOCATION DETAILED: RIGHT SUPERIOR FOREHEAD
LOCATION DETAILED: LEFT MEDIAL ZYGOMA
LOCATION DETAILED: LEFT MEDIAL FRONTAL SCALP
LOCATION DETAILED: RIGHT SUPERIOR OCCIPITAL SCALP
LOCATION DETAILED: RIGHT CENTRAL FRONTAL SCALP
LOCATION DETAILED: RIGHT LATERAL UPPER BACK
LOCATION DETAILED: LEFT SUPERIOR FRONTAL SCALP
LOCATION DETAILED: LEFT ANTERIOR SHOULDER
LOCATION DETAILED: NASAL DORSUM
LOCATION DETAILED: RIGHT INFERIOR FOREHEAD

## 2020-10-02 ASSESSMENT — LOCATION SIMPLE DESCRIPTION DERM
LOCATION SIMPLE: RIGHT LOWER BACK
LOCATION SIMPLE: RIGHT FOREHEAD
LOCATION SIMPLE: LEFT ZYGOMA
LOCATION SIMPLE: NOSE
LOCATION SIMPLE: RIGHT ZYGOMA
LOCATION SIMPLE: POSTERIOR SCALP
LOCATION SIMPLE: LEFT NOSE
LOCATION SIMPLE: LEFT EAR
LOCATION SIMPLE: RIGHT SCALP
LOCATION SIMPLE: SUPERIOR FOREHEAD
LOCATION SIMPLE: RIGHT NOSE
LOCATION SIMPLE: LEFT CHEEK
LOCATION SIMPLE: RIGHT CHEEK
LOCATION SIMPLE: LEFT SCALP
LOCATION SIMPLE: LEFT SHOULDER
LOCATION SIMPLE: SCALP
LOCATION SIMPLE: RIGHT UPPER BACK

## 2020-10-02 ASSESSMENT — LOCATION ZONE DERM
LOCATION ZONE: EAR
LOCATION ZONE: SCALP
LOCATION ZONE: TRUNK
LOCATION ZONE: ARM
LOCATION ZONE: FACE
LOCATION ZONE: NOSE

## 2020-10-02 NOTE — PROCEDURE: LIQUID NITROGEN
Post-Care Instructions: I reviewed with the patient in detail post-care instructions. Patient is to wear sunprotection, and avoid picking at any of the treated lesions. Pt may apply Vaseline to crusted or scabbing areas.
Detail Level: Detailed
Render Note In Bullet Format When Appropriate: No
Render Post-Care Instructions In Note?: yes
Number Of Freeze-Thaw Cycles: 2 freeze-thaw cycles
Consent: The patient's consent was obtained including but not limited to risks of crusting, scabbing, blistering, scarring, darker or lighter pigmentary change, recurrence, incomplete removal and infection.
Duration Of Freeze Thaw-Cycle (Seconds): 0

## 2020-11-13 ENCOUNTER — MEDICAL CORRESPONDENCE (OUTPATIENT)
Dept: HEALTH INFORMATION MANAGEMENT | Facility: CLINIC | Age: 80
End: 2020-11-13

## 2020-11-16 DIAGNOSIS — R97.20 ELEVATED PROSTATE SPECIFIC ANTIGEN (PSA): Primary | ICD-10-CM

## 2020-11-18 DIAGNOSIS — R97.20 ELEVATED PROSTATE SPECIFIC ANTIGEN (PSA): ICD-10-CM

## 2020-11-18 LAB
BASOPHILS # BLD AUTO: 0 10E9/L (ref 0–0.2)
BASOPHILS NFR BLD AUTO: 0.1 %
DIFFERENTIAL METHOD BLD: ABNORMAL
EOSINOPHIL # BLD AUTO: 0.1 10E9/L (ref 0–0.7)
EOSINOPHIL NFR BLD AUTO: 1.5 %
ERYTHROCYTE [DISTWIDTH] IN BLOOD BY AUTOMATED COUNT: 13.4 % (ref 10–15)
HCT VFR BLD AUTO: 41.4 % (ref 40–53)
HGB BLD-MCNC: 13.7 G/DL (ref 13.3–17.7)
LYMPHOCYTES # BLD AUTO: 1.3 10E9/L (ref 0.8–5.3)
LYMPHOCYTES NFR BLD AUTO: 17.5 %
MCH RBC QN AUTO: 31.8 PG (ref 26.5–33)
MCHC RBC AUTO-ENTMCNC: 33.1 G/DL (ref 31.5–36.5)
MCV RBC AUTO: 96 FL (ref 78–100)
MONOCYTES # BLD AUTO: 0.7 10E9/L (ref 0–1.3)
MONOCYTES NFR BLD AUTO: 9.3 %
NEUTROPHILS # BLD AUTO: 5.4 10E9/L (ref 1.6–8.3)
NEUTROPHILS NFR BLD AUTO: 71.6 %
PLATELET # BLD AUTO: 226 10E9/L (ref 150–450)
RBC # BLD AUTO: 4.31 10E12/L (ref 4.4–5.9)
WBC # BLD AUTO: 7.6 10E9/L (ref 4–11)

## 2020-11-18 PROCEDURE — 84153 ASSAY OF PSA TOTAL: CPT | Mod: 90 | Performed by: UROLOGY

## 2020-11-18 PROCEDURE — 85025 COMPLETE CBC W/AUTO DIFF WBC: CPT | Performed by: UROLOGY

## 2020-11-18 PROCEDURE — 84403 ASSAY OF TOTAL TESTOSTERONE: CPT | Mod: 90 | Performed by: UROLOGY

## 2020-11-18 PROCEDURE — 84154 ASSAY OF PSA FREE: CPT | Mod: 90 | Performed by: UROLOGY

## 2020-11-18 PROCEDURE — 84270 ASSAY OF SEX HORMONE GLOBUL: CPT | Performed by: UROLOGY

## 2020-11-18 PROCEDURE — 36415 COLL VENOUS BLD VENIPUNCTURE: CPT | Performed by: UROLOGY

## 2020-11-18 PROCEDURE — 99000 SPECIMEN HANDLING OFFICE-LAB: CPT | Performed by: UROLOGY

## 2020-11-20 LAB
PSA FREE MFR SERPL: 30 %
PSA FREE SERPL-MCNC: 1 NG/ML
PSA SERPL-MCNC: 3.3 NG/ML (ref 0–4)
SHBG SERPL-SCNC: 32 NMOL/L (ref 11–80)
TESTOST FREE SERPL-MCNC: 17.44 NG/DL (ref 4.7–24.4)
TESTOST SERPL-MCNC: 757 NG/DL (ref 240–950)

## 2020-11-27 DIAGNOSIS — G62.9 NEUROPATHY: ICD-10-CM

## 2020-11-27 DIAGNOSIS — I10 ESSENTIAL HYPERTENSION WITH GOAL BLOOD PRESSURE LESS THAN 140/90: ICD-10-CM

## 2020-11-27 RX ORDER — GABAPENTIN 600 MG/1
TABLET ORAL
Qty: 180 TABLET | Refills: 1 | Status: SHIPPED | OUTPATIENT
Start: 2020-11-27 | End: 2021-02-15

## 2020-11-27 RX ORDER — LISINOPRIL 10 MG/1
10 TABLET ORAL DAILY
Qty: 90 TABLET | Refills: 1 | Status: SHIPPED | OUTPATIENT
Start: 2020-11-27 | End: 2021-02-15

## 2020-11-27 RX ORDER — AMLODIPINE BESYLATE 10 MG/1
10 TABLET ORAL DAILY
Qty: 90 TABLET | Refills: 1 | Status: SHIPPED | OUTPATIENT
Start: 2020-11-27 | End: 2021-02-15

## 2020-11-27 NOTE — TELEPHONE ENCOUNTER
Routing refill request to provider for review/approval because:  Drug not on the [facility] refill protocol     Phoebe Lara RN   [facility] -- Triage Nurse

## 2020-12-03 ENCOUNTER — DOCUMENTATION ONLY (OUTPATIENT)
Dept: PEDIATRICS | Facility: CLINIC | Age: 80
End: 2020-12-03

## 2020-12-03 NOTE — PROGRESS NOTES
Completed medical consultation form from [facility] faxed to [phone].  Copy sent to abstracting.    Jewels Hammond

## 2020-12-07 ENCOUNTER — OFFICE VISIT (OUTPATIENT)
Dept: URGENT CARE | Facility: URGENT CARE | Age: 80
End: 2020-12-07
Payer: COMMERCIAL

## 2020-12-07 VITALS
SYSTOLIC BLOOD PRESSURE: 177 MMHG | BODY MASS INDEX: 24.82 KG/M2 | WEIGHT: 173 LBS | DIASTOLIC BLOOD PRESSURE: 96 MMHG | HEART RATE: 45 BPM | TEMPERATURE: 98.5 F | OXYGEN SATURATION: 99 %

## 2020-12-07 DIAGNOSIS — R00.2 PALPITATIONS: Primary | ICD-10-CM

## 2020-12-07 PROCEDURE — 99215 OFFICE O/P EST HI 40 MIN: CPT | Performed by: PHYSICIAN ASSISTANT

## 2020-12-07 PROCEDURE — 93000 ELECTROCARDIOGRAM COMPLETE: CPT | Performed by: PHYSICIAN ASSISTANT

## 2020-12-07 RX ORDER — TESTOSTERONE 20.25 MG/1.25G
2 GEL TOPICAL DAILY
COMMUNITY
Start: 2020-11-04

## 2020-12-07 NOTE — PROGRESS NOTES
CHIEF COMPLAINT:   Chief Complaint   Patient presents with     Urgent Care     Chest Pain     Pt says felt faint yesterday afternoon at the store. Pt says chest feels heavy all weekend.        HPI: Ciro Almonte is a 80 year old male who presents to clinic today for evaluation.  Patient reports that yesterday he was walking around the grocery store and became lightheaded.  The symptoms lasted for approximately 30 minutes and then went away.  He has a history of dizziness, and this felt different than his normal off balance/dizziness.  Additionally over the weekend he felt an irregular rhythm which he has felt periodically in the past.  He has a history of PVCs, for which she was taking, assumably a Beta blocker, and stopped that a couple years ago.  Since that time he has noted more episodes of irregular heart rhythm.  Additionally he endorses having some chest discomfort.  Does not feel like chest pain, or pressure.  He denies having shortness of breath.  He does not have history of MI, but does have history of hypertension.  He does not smoke.  Family history is significant for MI in his dad at 58.    Past Medical History:   Diagnosis Date     Cancer (H) 2000    L shoulder melanoma     Glaucoma      Heart murmur      Hypertension      Melanoma (H) 2000    Left shoulder - Dr. Carrillo - follows q6 months     Neuropathy      Nonsenile cataract      Other chronic pain     Neuropathy bilateral feet     Other premature beats      PVC's (premature ventricular contractions) 5/14/2015     Zoster 2000     Past Surgical History:   Procedure Laterality Date     ARTHROPLASTY HIP ANTERIOR Left 11/14/2014    Procedure: ARTHROPLASTY HIP ANTERIOR;  Surgeon: Rudy Tobias MD;  Location:  OR     ARTHROPLASTY REVISION HIP  3/12/2014     right Procedure: ARTHROPLASTY REVISION HIP;  Surgeon: Rudy Tobias MD;  Location:  OR     ARTHROSCOPY KNEE  2002    meniscus ( side?)     BIOPSY OF PROSTATE,NEEDLE/PUNCH       both hips  replaced       C TOTAL HIP ARTHROPLASTY Right      COLONOSCOPY N/A 2020    Procedure: COLONOSCOPY (fv) (please mail packet as soon as possible) fv;  Surgeon: Kevin Mccarty MD;  Location:  GI     DERMATOLOGY ADULT REFERRAL      melanoma resection L shoulder     DISCECTOMY LUMBAR POSTERIOR MICROSCOPIC ONE LEVEL N/A 2016    Procedure: DISCECTOMY LUMBAR POSTERIOR MICROSCOPIC ONE LEVEL;  Surgeon: Ruben Marsh MD;  Location: [facility] OR     ENT SURGERY      Tonsils     ORTHOPEDIC SURGERY      R hip replacement     ORTHOPEDIC SURGERY      big toe in lawn mower, needed reconstructive surgery     PHACOEMULSIFICATION CLEAR CORNEA WITH TORIC INTRAOCULAR LENS IMPLANT Left 2019    Procedure: LEFT EYE, CATARACT EXTRACTION WITH TORIC INTRAOCULAR LENS IMPLANT;  Surgeon: Shannon Yang MD;  [facility]     PHACOEMULSIFICATION CLEAR CORNEA WITH TORIC INTRAOCULAR LENS IMPLANT Right 2019    Procedure: RIGHT EYE, CATARACT EXTRACTION WITH TORIC INTRAOCULAR LENS IMPLANT;  Surgeon: Shannon Yang MD;  [facility]     TRABECULAR MICRO-BYPASS WITH ISTENT Left 2019    Procedure: LEFT EYE, ISTENT INSERTION;  Surgeon: Shannon Yang MD;  [facility]     TRABECULAR MICRO-BYPASS WITH ISTENT Right 2019    Procedure: RIGHT EYE, ISTENT INSERTION;  Surgeon: Shannon Yang MD;  [facility]     Social History     Tobacco Use     Smoking status: Former Smoker     Packs/day: 0.00     Quit date: 1976     Years since quittin.4     Smokeless tobacco: Never Used   Substance Use Topics     Alcohol use: No     Alcohol/week: 0.0 standard drinks     Comment: Quit 1014     Current Outpatient Medications   Medication     Acetaminophen (TYLENOL PO)     amLODIPine (NORVASC) 10 MG tablet     ASPIRIN PO     atorvastatin (LIPITOR) 20 MG tablet     Calcium Citrate-Vitamin D (CITRACAL + D PO)     diclofenac (VOLTAREN) 75 MG EC tablet     gabapentin (NEURONTIN) 600 MG  tablet     latanoprost (XALATAN) 0.005 % ophthalmic solution     lisinopril (ZESTRIL) 10 MG tablet     Magnesium 500 MG CAPS     Multiple Vitamins-Minerals (MENS MULTIVITAMIN PLUS PO)     Nutritional Supplements (PROSTATE 2.4) CAPS     rOPINIRole (REQUIP) 0.25 MG tablet     Testosterone 1.62 % GEL     No current facility-administered medications for this visit.      No Known Allergies    10 point ROS of systems including Constitutional, Eyes, Respiratory, Cardiovascular, Gastroenterology, Genitourinary, Integumentary, Muscularskeletal, Psychiatric were all negative except for pertinent positives noted in my HPI.        Exam:  BP (!) 177/96   Pulse (!) 45   Temp 98.5  F (36.9  C) (Tympanic)   Wt 78.5 kg (173 lb)   SpO2 99%   BMI 24.82 kg/m    Constitutional: healthy, alert and no distress  Head: Normocephalic, atraumatic.  Eyes: conjunctiva clear, no drainage  ENT: MMM  Neck: neck is supple, no cervical lymphadenopathy or nuchal rigidity  Cardiovascular: irregular rhythm  Respiratory: CTA bilaterally, no rhonchi or rales  Skin: no rashes  Neurologic: Speech clear, gait normal. Moves all extremities.    EKG -- Sinus rhythm with frequent PAC. Nonspecific ST depression.     ASSESSMENT/PLAN:  1. Palpitations  80 year old male with a 2 days history of erratic heart beat. On exam, frequent PAC noted. EKG also has ST depression, discussed that he needs to be seen in ER for to R/O Mi/ACS. Advised EMS, patient declines. He will have his wife drive him.   - EKG 12-lead complete w/read - Clinics    Christianne Veliz PA-C

## 2020-12-10 ENCOUNTER — TRANSFERRED RECORDS (OUTPATIENT)
Dept: HEALTH INFORMATION MANAGEMENT | Facility: CLINIC | Age: 80
End: 2020-12-10

## 2020-12-13 DIAGNOSIS — M19.011 PRIMARY OSTEOARTHRITIS OF RIGHT SHOULDER: ICD-10-CM

## 2020-12-15 RX ORDER — DICLOFENAC SODIUM 75 MG/1
TABLET, DELAYED RELEASE ORAL
Qty: 180 TABLET | Refills: 0 | Status: SHIPPED | OUTPATIENT
Start: 2020-12-15 | End: 2021-02-15

## 2020-12-15 NOTE — TELEPHONE ENCOUNTER
Routing refill request to provider for review/approval because:  Labs out of range:  CBC  Labs not current:  CR  Elevated BP  Outside of age range    Rosie Meraz RN on 12/15/2020 at 9:33 AM

## 2020-12-18 ENCOUNTER — TELEPHONE (OUTPATIENT)
Dept: PEDIATRICS | Facility: CLINIC | Age: 80
End: 2020-12-18

## 2020-12-18 ENCOUNTER — TRANSFERRED RECORDS (OUTPATIENT)
Dept: HEALTH INFORMATION MANAGEMENT | Facility: CLINIC | Age: 80
End: 2020-12-18

## 2020-12-18 DIAGNOSIS — N52.9 ERECTILE DYSFUNCTION, UNSPECIFIED ERECTILE DYSFUNCTION TYPE: ICD-10-CM

## 2020-12-18 LAB — EJECTION FRACTION: 53 %

## 2020-12-18 RX ORDER — SILDENAFIL CITRATE 20 MG/1
TABLET ORAL
Qty: 30 TABLET | Refills: 2 | Status: SHIPPED | OUTPATIENT
Start: 2020-12-18 | End: 2021-02-15

## 2020-12-18 NOTE — TELEPHONE ENCOUNTER
Sildenafil was discontinued from med list on 5/28/20 when he had GI procedure(Colonoscopy with possible biopsy, possible polypectomy) done. Had an OV with Aline Forman CNP on 6/25/20. Has an upcoming appointment with  for px on 2/15/2021.     So, sent rx for 3 months supply to last till his upcoming appointment.     sildenafil (REVATIO) 20 MG tablet (Discontinued) 30 tablet 11 11/14/2019 5/28/2020 --   Sig: Take 3-5 tabs 30-60 minutes prior to intercourse.     Roxana, RN  Triage Nurse

## 2020-12-18 NOTE — TELEPHONE ENCOUNTER
Reason for call:  Medication   If this is a refill request, has the caller requested the refill from the pharmacy already? Yes  Will the patient be using a [facility]? No  Name of the pharmacy and phone number for the current request: [facility] [address] [phone]    Name of the medication requested: Fildenafil 20 mg 30 tabs    Other request: Patient is looking to get this medication refilled. Patient is out of medication.     Phone number to reach patient:  Cell number on file:    Telephone Information:   Mobile [phone]       Best Time:  As soon as possible    Can we leave a detailed message on this number?  YES    Travel screening: Not Applicable

## 2021-01-19 DIAGNOSIS — E78.2 MIXED HYPERLIPIDEMIA: ICD-10-CM

## 2021-01-20 RX ORDER — ATORVASTATIN CALCIUM 20 MG/1
20 TABLET, FILM COATED ORAL DAILY
Qty: 90 TABLET | Refills: 11 | Status: SHIPPED | OUTPATIENT
Start: 2021-01-20 | End: 2022-02-10

## 2021-01-21 DIAGNOSIS — H40.003 GLAUCOMA SUSPECT OF BOTH EYES: Primary | ICD-10-CM

## 2021-01-25 ENCOUNTER — OFFICE VISIT (OUTPATIENT)
Dept: OPHTHALMOLOGY | Facility: CLINIC | Age: 81
End: 2021-01-25
Attending: OPHTHALMOLOGY
Payer: COMMERCIAL

## 2021-01-25 DIAGNOSIS — H40.003 GLAUCOMA SUSPECT OF BOTH EYES: ICD-10-CM

## 2021-01-25 PROCEDURE — G0463 HOSPITAL OUTPT CLINIC VISIT: HCPCS

## 2021-01-25 PROCEDURE — 92012 INTRM OPH EXAM EST PATIENT: CPT | Performed by: OPHTHALMOLOGY

## 2021-01-25 PROCEDURE — 92133 CPTRZD OPH DX IMG PST SGM ON: CPT | Performed by: OPHTHALMOLOGY

## 2021-01-25 ASSESSMENT — TONOMETRY
OD_IOP_MMHG: 15
IOP_METHOD: APPLANATION
OS_IOP_MMHG: 18

## 2021-01-25 ASSESSMENT — REFRACTION_MANIFEST
OD_ADD: +2.50
OS_SPHERE: -1.00
OD_CYLINDER: +1.25
OD_SPHERE: -0.50
OS_ADD: +2.50
OS_CYLINDER: +2.00
OS_AXIS: 175
OD_AXIS: 002

## 2021-01-25 ASSESSMENT — REFRACTION_WEARINGRX
OD_ADD: +2.50
OS_AXIS: 175
OD_SPHERE: -0.50
OD_CYLINDER: +1.25
OD_AXIS: 002
OS_CYLINDER: +1.25
OS_SPHERE: -0.75
OS_ADD: +2.50
SPECS_TYPE: PAL

## 2021-01-25 ASSESSMENT — EXTERNAL EXAM - LEFT EYE: OS_EXAM: NORMAL

## 2021-01-25 ASSESSMENT — VISUAL ACUITY
OD_CC: 20/20
METHOD: SNELLEN - LINEAR
OS_CC: 20/20
OS_CC+: -1
CORRECTION_TYPE: GLASSES

## 2021-01-25 ASSESSMENT — EXTERNAL EXAM - RIGHT EYE: OD_EXAM: NORMAL

## 2021-01-25 ASSESSMENT — CONF VISUAL FIELD
OD_NORMAL: 1
OS_NORMAL: 1
METHOD: COUNTING FINGERS

## 2021-01-25 ASSESSMENT — SLIT LAMP EXAM - LIDS
COMMENTS: DERMATOCHALASIS
COMMENTS: DERMATOCHALASIS

## 2021-01-25 ASSESSMENT — CUP TO DISC RATIO
OD_RATIO: 0.0
OS_RATIO: 0.0

## 2021-01-25 NOTE — NURSING NOTE
Chief Complaints and History of Present Illnesses   Patient presents with     Follow Up     Glaucoma suspect of both eyes     Chief Complaint(s) and History of Present Illness(es)     Follow Up     Laterality: both eyes    Onset: gradual    Onset: years ago    Course: stable    Associated symptoms: Negative for eye pain, dryness, tearing, flashes, floaters, photophobia, glare and haloes    Treatments tried: eye drops    Pain scale: 0/10    Comments: Glaucoma suspect of both eyes              Comments     Pt reports NVA is worse, DVA is stable.  Pt is compliant with drops.    Latanoprost BE at bedtime.    CAROL ANN Cruz January 25, 2021 8:44 AM

## 2021-01-25 NOTE — PROGRESS NOTES
CC: glaucoma follow up   HPI: Ciro Almonte is a  80 year old year-old patient with history of glaucoma suspect; here for follow up.  Reports [facility] is not as [facility] as right after Cataract extraction intraocular lens.  Few floaters; no flashes    Retinal Imaging:  OVF 24-2 08/24/20   Good reliability  Some fluctuation compared to prior form 2.13.2020- overall stable  Few tiny drusen both eyes; no subretinal fluid     Optical Coherence Tomography ONFL 01/25/21 :OD:Mild RNFL thinning and OS:Mod RNFL thinning worsening inf?    Assessment & Plan:    1)Glc Suspect   vs Primary open angle glaucoma (POAG) vs ?Angle recession glc OS   --  S/p Istent OD (11/20/19), S/p Istent OS (11/6/2019) -- by Dr. Yang  -- K pachy:530/549    Tmax: L20s and OS:29 (10/8/2019)     - HVF:Full c fluct      CDR: 0.0/0.0      - OCT:OD:Mild RNFL thinning and OS:Mod RNFL thinning worsening inf?  - FHX of Glc: Mother -- gtts,        Gonio: open with ?angle recession OS temp and sup        Asthma/COPD: No, on po BB  Steroid Use: No    Kidney Stones: Yes, in remote past    Sulfa Allergy: No      IOP targets:L20s   intraocular pressure today 15 and 18    2)ONH Drusen  3)PCIOL OU with toric intraocular lens  Left eye with posterior capsular opacity (PCO)   Consider yag     4)LAMBERT  5)Dermatochalasis -- rec oculoplastics eval after Cataract extraction    7)H/O blunt force trauma to ?left side of face while playing flag football in the [employer]   -- ??etiology of ?angle recession OS  8) mild dry Age related macular degeneration.  Weekly [employer] use was discussed and training performed.  A diet of leafy green vegetables was encouraged.  Return precautions were given.      Plan:  Patient will continue on Latanoprost which is a teal top drop at bedtime in both eyes.    Patient will Follow up in Feb for yag left eye and dilation left eye only.   Then will follow up in one week after laser for OVF, optic nerve fundus pic and prescription   Plan to repeat OVF  24-2 after yag to assess for progression of glaucoma and consider second eyedrop left eye     ~~~~~~~~~~~~~~~~~~~~~~~~~~~~~~~~~~   Complete documentation of historical and exam elements from today's encounter can be found in the full encounter summary report (not reduplicated in this progress note).  I personally obtained the chief complaint(s) and history of present illness.  I confirmed and edited as necessary the review of systems, past medical/surgical history, family history, social history, and examination findings as documented by others; and I examined the patient myself.  I personally reviewed the relevant tests, images, and reports as documented above.  I formulated and edited as necessary the assessment and plan and discussed the findings and management plan with the patient and family    Miriam Gonzalez MD   of Ophthalmology.  Retina Service   Department of Ophthalmology and Visual Neurosciences   [facility]  Phone: [phone]   Fax: [phone]

## 2021-01-28 DIAGNOSIS — E78.2 MIXED HYPERLIPIDEMIA: ICD-10-CM

## 2021-01-28 LAB
CHOLEST SERPL-MCNC: 151 MG/DL
HDLC SERPL-MCNC: 73 MG/DL
LDLC SERPL CALC-MCNC: 68 MG/DL
NONHDLC SERPL-MCNC: 78 MG/DL
TRIGL SERPL-MCNC: 52 MG/DL

## 2021-01-28 PROCEDURE — 36415 COLL VENOUS BLD VENIPUNCTURE: CPT | Performed by: INTERNAL MEDICINE

## 2021-01-28 PROCEDURE — 80061 LIPID PANEL: CPT | Performed by: INTERNAL MEDICINE

## 2021-02-15 ENCOUNTER — ANCILLARY PROCEDURE (OUTPATIENT)
Dept: GENERAL RADIOLOGY | Facility: CLINIC | Age: 81
End: 2021-02-15
Attending: INTERNAL MEDICINE
Payer: COMMERCIAL

## 2021-02-15 ENCOUNTER — OFFICE VISIT (OUTPATIENT)
Dept: PEDIATRICS | Facility: CLINIC | Age: 81
End: 2021-02-15
Payer: COMMERCIAL

## 2021-02-15 VITALS
WEIGHT: 175 LBS | RESPIRATION RATE: 16 BRPM | OXYGEN SATURATION: 100 % | BODY MASS INDEX: 25.05 KG/M2 | DIASTOLIC BLOOD PRESSURE: 80 MMHG | TEMPERATURE: 97.4 F | SYSTOLIC BLOOD PRESSURE: 142 MMHG | HEART RATE: 58 BPM | HEIGHT: 70 IN

## 2021-02-15 DIAGNOSIS — R25.2 CRAMP OF LIMB: ICD-10-CM

## 2021-02-15 DIAGNOSIS — M54.6 CHRONIC LEFT-SIDED THORACIC BACK PAIN: ICD-10-CM

## 2021-02-15 DIAGNOSIS — I10 ESSENTIAL HYPERTENSION WITH GOAL BLOOD PRESSURE LESS THAN 140/90: ICD-10-CM

## 2021-02-15 DIAGNOSIS — G62.9 NEUROPATHY: ICD-10-CM

## 2021-02-15 DIAGNOSIS — N52.9 ERECTILE DYSFUNCTION, UNSPECIFIED ERECTILE DYSFUNCTION TYPE: ICD-10-CM

## 2021-02-15 DIAGNOSIS — G89.29 CHRONIC LEFT-SIDED THORACIC BACK PAIN: ICD-10-CM

## 2021-02-15 DIAGNOSIS — M19.011 PRIMARY OSTEOARTHRITIS OF RIGHT SHOULDER: ICD-10-CM

## 2021-02-15 DIAGNOSIS — Z00.00 ENCOUNTER FOR MEDICARE ANNUAL WELLNESS EXAM: ICD-10-CM

## 2021-02-15 LAB — HBA1C MFR BLD: 5.4 % (ref 0–5.6)

## 2021-02-15 PROCEDURE — 36415 COLL VENOUS BLD VENIPUNCTURE: CPT | Performed by: INTERNAL MEDICINE

## 2021-02-15 PROCEDURE — 83036 HEMOGLOBIN GLYCOSYLATED A1C: CPT | Performed by: INTERNAL MEDICINE

## 2021-02-15 PROCEDURE — 99397 PER PM REEVAL EST PAT 65+ YR: CPT | Performed by: INTERNAL MEDICINE

## 2021-02-15 PROCEDURE — 80061 LIPID PANEL: CPT | Performed by: INTERNAL MEDICINE

## 2021-02-15 PROCEDURE — 99214 OFFICE O/P EST MOD 30 MIN: CPT | Mod: 25 | Performed by: INTERNAL MEDICINE

## 2021-02-15 PROCEDURE — 72070 X-RAY EXAM THORAC SPINE 2VWS: CPT | Performed by: RADIOLOGY

## 2021-02-15 PROCEDURE — 80053 COMPREHEN METABOLIC PANEL: CPT | Performed by: INTERNAL MEDICINE

## 2021-02-15 PROCEDURE — 71046 X-RAY EXAM CHEST 2 VIEWS: CPT | Performed by: RADIOLOGY

## 2021-02-15 RX ORDER — GABAPENTIN 600 MG/1
TABLET ORAL
Qty: 180 TABLET | Refills: 1 | Status: SHIPPED | OUTPATIENT
Start: 2021-02-15 | End: 2021-08-24

## 2021-02-15 RX ORDER — METOPROLOL SUCCINATE 25 MG/1
12.5 TABLET, EXTENDED RELEASE ORAL DAILY
COMMUNITY
End: 2022-06-08

## 2021-02-15 RX ORDER — AMLODIPINE BESYLATE 10 MG/1
10 TABLET ORAL DAILY
Qty: 90 TABLET | Refills: 3 | Status: SHIPPED | OUTPATIENT
Start: 2021-02-15 | End: 2021-06-24

## 2021-02-15 RX ORDER — LISINOPRIL 10 MG/1
10 TABLET ORAL DAILY
Qty: 90 TABLET | Refills: 1 | Status: SHIPPED | OUTPATIENT
Start: 2021-02-15 | End: 2021-06-24

## 2021-02-15 RX ORDER — ROPINIROLE 0.25 MG/1
TABLET, FILM COATED ORAL
Qty: 180 TABLET | Refills: 11 | Status: SHIPPED | OUTPATIENT
Start: 2021-02-15 | End: 2022-06-08

## 2021-02-15 RX ORDER — SILDENAFIL CITRATE 20 MG/1
TABLET ORAL
Qty: 30 TABLET | Refills: 11 | Status: SHIPPED | OUTPATIENT
Start: 2021-02-15 | End: 2023-11-28

## 2021-02-15 RX ORDER — DICLOFENAC SODIUM 75 MG/1
TABLET, DELAYED RELEASE ORAL
Qty: 180 TABLET | Refills: 0 | Status: SHIPPED | OUTPATIENT
Start: 2021-02-15 | End: 2021-07-21

## 2021-02-15 ASSESSMENT — MIFFLIN-ST. JEOR: SCORE: 1520.04

## 2021-02-15 NOTE — PATIENT INSTRUCTIONS
Call insurance to ask if Cialis (tadalafil) is covered.     Try gradually tapering off gabapentin by 300mg every few days to a week.     You could try vitamin B complex for leg and foot cramps.     We will check X-rays today as well.     Patient Education   Personalized Prevention Plan  You are due for the preventive services outlined below.  Your care team is available to assist you in scheduling these services.  If you have already completed any of these items, please share that information with your care team to update in your medical record.  Health Maintenance Due   Topic Date Due     ANNUAL REVIEW OF HM ORDERS  1940     Zoster (Shingles) Vaccine (2 of 3) 01/05/2017     Annual Wellness Visit  11/14/2020

## 2021-02-15 NOTE — LETTER
February 19, 2021      Ciro Almonte  [address] [place] [address]        Dear Ciro,     Please find your lab results enclosed. All of your lab results look normal. Your cholesterol is within goal range, your kidney function, liver function and electrolytes are normal, and your hemoglobin A1c is in the normal range which means you do not have diabetes. Please let me know if you have any concerns or questions.     Sincerely,     Radha Banks MD   Internal Medicine-Pediatrics     Resulted Orders   Comprehensive metabolic panel (BMP + Alb, Alk Phos, ALT, AST, Total. Bili, TP)   Result Value Ref Range    Sodium 140 133 - 144 mmol/L    Potassium 4.3 3.4 - 5.3 mmol/L    Chloride 107 94 - 109 mmol/L    Carbon Dioxide 27 20 - 32 mmol/L    Anion Gap 6 3 - 14 mmol/L    Glucose 88 70 - 99 mg/dL    Urea Nitrogen 19 7 - 30 mg/dL    Creatinine 0.91 0.66 - 1.25 mg/dL    GFR Estimate 80 >60 mL/min/[1.73_m2]      Comment:      Non  GFR Calc  Starting 12/18/2018, serum creatinine based estimated GFR (eGFR) will be   calculated using the Chronic Kidney Disease Epidemiology Collaboration   (CKD-EPI) equation.      GFR Estimate If Black >90 >60 mL/min/[1.73_m2]      Comment:       GFR Calc  Starting 12/18/2018, serum creatinine based estimated GFR (eGFR) will be   calculated using the Chronic Kidney Disease Epidemiology Collaboration   (CKD-EPI) equation.      Calcium 9.2 8.5 - 10.1 mg/dL    Bilirubin Total 1.1 0.2 - 1.3 mg/dL    Albumin 4.0 3.4 - 5.0 g/dL    Protein Total 7.0 6.8 - 8.8 g/dL    Alkaline Phosphatase 85 40 - 150 U/L    ALT 30 0 - 70 U/L    AST 25 0 - 45 U/L   Lipid Profile (Chol, Trig, HDL, LDL calc)   Result Value Ref Range    Cholesterol 161 <200 mg/dL    Triglycerides 68 <150 mg/dL    HDL Cholesterol 76 >39 mg/dL    LDL Cholesterol Calculated 71 <100 mg/dL      Comment:      Desirable:       <100 mg/dl    Non HDL Cholesterol 85 <130 mg/dL   Hemoglobin A1c   Result Value  Ref Range    Hemoglobin A1C 5.4 0 - 5.6 %      Comment:      Normal <5.7% Prediabetes 5.7-6.4%  Diabetes 6.5% or higher - adopted from ADA   consensus guidelines.

## 2021-02-15 NOTE — PROGRESS NOTES
"  SUBJECTIVE:   Ciro Almonte is a 80 year old male who presents for Preventive Visit.    Patient has been advised of split billing requirements and indicates understanding: Yes  Are you in the first 12 months of your Medicare Part B coverage?  No    Physical Health:    In general, how would you rate your overall physical health? good    Outside of work, how many days during the week do you exercise? none    Outside of work, approximately how many minutes a day do you exercise?not applicable    If you drink alcohol do you typically have >3 drinks per day or >7 drinks per week? No    Do you usually eat at least 4 servings of fruit and vegetables a day, include whole grains & fiber and avoid regularly eating high fat or \"junk\" foods? NO    Do you have any problems taking medications regularly?  YES pt states he forgets his evening doses    Do you have any side effects from medications? none    Needs assistance for the following daily activities: no assistance needed    Which of the following safety concerns are present in your home?  none identified     Hearing impairment: No - pt has hearing aids    In the past 6 months, have you been bothered by leaking of urine? Yes - consulted with urologist    Mental Health:    In general, how would you rate your overall mental or emotional health? good  PHQ-2 Score:      Do you feel safe in your environment? Yes    Have you ever done Advance Care Planning? (For example, a Health Directive, POLST, or a discussion with a medical provider or your loved ones about your wishes): No, advance care planning information given to patient to review.  Patient plans to discuss their wishes with loved ones or provider.      Additional concerns to address?    Fall risk:  Fallen 2 or more times in the past year?: No  Any fall with injury in the past year?: No  Cognitive Screenin) Repeat 3 items (Leader, Season, Table)    2) Clock draw: NORMAL  3) 3 item recall: Recalls 3 " objects  Results: 3 items recalled: COGNITIVE IMPAIRMENT LESS LIKELY    Mini-CogTM Copyright RIKKI Combs. Licensed by the author for use in [facility]; reprinted with permission (ricki@.[email or web address]). All rights reserved.      Do you have sleep apnea, excessive snoring or daytime drowsiness?: wife states more of a jerk like response and only snores when on his back    Balance and neuropathy have been challenging. Doesn't feel like gabapentin is helping very much with neuropathy symptoms.  Doesn't really have pain from neuropathy, just numbness and tingling. Gets severe foot cramps.     Saw cardiologist for PVCs, was put on metoprolol. Planning to follow-up in 6 months. No CP, SOB.     Wondering about getting a prescription for sildenafil daily in addition to prn; apparently this was recommended by his urologist.    Has been dealing with thoracic back pain for some time; usually worst when he sits in a certain position while watching TV. On his L side. No other associated symptoms. Hx of lower L lung nodule which he is worried is contributing.         Reviewed and updated as needed this visit by clinical staff   Allergies               Reviewed and updated as needed this visit by Provider    Meds             Social History     Tobacco Use     Smoking status: Former Smoker     Packs/day: 0.00     Quit date: 1976     Years since quittin.6     Smokeless tobacco: Never Used   Substance Use Topics     Alcohol use: No     Alcohol/week: 0.0 standard drinks     Comment: Quit 1014                           Current providers sharing in care for this patient include:   Patient Care Team:  Radha Banks MD as PCP - General (Internal Medicine)  Clarkfield, Miriam Lizarraga MD as MD (Ophthalmology)  Shannon Yang MD as MD (Ophthalmology)  Aline Forman APRN CNP as Assigned PCP  Shannon Yang MD as Assigned Surgical Provider    The following health maintenance items are reviewed in Epic and  correct as of today:  Health Maintenance   Topic Date Due     ANNUAL REVIEW OF HM ORDERS  1940     ZOSTER IMMUNIZATION (2 of 3) 01/05/2017     MEDICARE ANNUAL WELLNESS VISIT  11/14/2020     COVID-19 Vaccine (2 of 2 - Pfizer series) 02/27/2021     FALL RISK ASSESSMENT  01/25/2022     ADVANCE CARE PLANNING  11/14/2024     LIPID  01/28/2026     DTAP/TDAP/TD IMMUNIZATION (3 - Td) 04/06/2027     HEPATITIS C SCREENING  Completed     PHQ-2  Completed     INFLUENZA VACCINE  Completed     Pneumococcal Vaccine: 65+ Years  Completed     Pneumococcal Vaccine: Pediatrics (0 to 5 Years) and At-Risk Patients (6 to 64 Years)  Aged Out     IPV IMMUNIZATION  Aged Out     MENINGITIS IMMUNIZATION  Aged Out     HEPATITIS B IMMUNIZATION  Aged Out     Patient Active Problem List   Diagnosis     Cervical spine degeneration     Glaucoma     Mitral regurgitation - systolic murmur     Hereditary and idiopathic peripheral neuropathy     Essential hypertension with goal blood pressure less than 140/90     Primary osteoarthritis of right foot     Benign non-nodular prostatic hyperplasia with lower urinary tract symptoms     Chronic low back pain, unspecified back pain laterality, with sciatica presence unspecified     Lumbago     Hip pain, left     Mixed hyperlipidemia     Glaucoma suspect of both eyes     Nuclear senile cataract of both eyes     Pseudophakia of both eyes     Past Surgical History:   Procedure Laterality Date     ARTHROPLASTY HIP ANTERIOR Left 11/14/2014    Procedure: ARTHROPLASTY HIP ANTERIOR;  Surgeon: Rudy Tobias MD;  Location: [facility] OR     ARTHROPLASTY REVISION HIP  3/12/2014     right Procedure: ARTHROPLASTY REVISION HIP;  Surgeon: Rudy Tobias MD;  Location: [facility] OR     ARTHROSCOPY KNEE  2002    meniscus ( side?)     BIOPSY OF PROSTATE,NEEDLE/PUNCH       both hips replaced       C TOTAL HIP ARTHROPLASTY Right 2008     CATARACT IOL, RT/LT Bilateral      COLONOSCOPY N/A 5/28/2020    Procedure: COLONOSCOPY (fv)  (please mail packet as soon as possible) fv;  Surgeon: Kevin Mccarty MD;  Location:  GI     DERMATOLOGY ADULT REFERRAL      melanoma resection L shoulder     DISCECTOMY LUMBAR POSTERIOR MICROSCOPIC ONE LEVEL N/A 2016    Procedure: DISCECTOMY LUMBAR POSTERIOR MICROSCOPIC ONE LEVEL;  Surgeon: Ruben Marsh MD;  Location:  OR     ENT SURGERY      Tonsils     ORTHOPEDIC SURGERY  2008    R hip replacement     ORTHOPEDIC SURGERY  2004    big toe in lawn mower, needed reconstructive surgery     PHACOEMULSIFICATION CLEAR CORNEA WITH TORIC INTRAOCULAR LENS IMPLANT Left 2019    Procedure: LEFT EYE, CATARACT EXTRACTION WITH TORIC INTRAOCULAR LENS IMPLANT;  Surgeon: Shannon Yang MD;  [facility]     PHACOEMULSIFICATION CLEAR CORNEA WITH TORIC INTRAOCULAR LENS IMPLANT Right 2019    Procedure: RIGHT EYE, CATARACT EXTRACTION WITH TORIC INTRAOCULAR LENS IMPLANT;  Surgeon: Shannon Yang MD;  [facility]     TRABECULAR MICRO-BYPASS WITH ISTENT Left 2019    Procedure: LEFT EYE, ISTENT INSERTION;  Surgeon: Shannon Yang MD;  [facility]     TRABECULAR MICRO-BYPASS WITH ISTENT Right 2019    Procedure: RIGHT EYE, ISTENT INSERTION;  Surgeon: Shannon Yang MD;  Location[facility]       Social History     Tobacco Use     Smoking status: Former Smoker     Packs/day: 0.00     Quit date: 1976     Years since quittin.6     Smokeless tobacco: Never Used   Substance Use Topics     Alcohol use: No     Alcohol/week: 0.0 standard drinks     Comment: Quit 1014     Family History   Problem Relation Age of Onset     Cerebrovascular Disease Mother      Hypertension Mother      Glaucoma Mother      Heart Disease Father 58        MI     Cancer Father         lung cancer;  age 91     Colon Cancer No family hx of      Diabetes No family hx of      Macular Degeneration No family hx of              ROS:  Constitutional, HEENT, cardiovascular, pulmonary, GI, ,  "musculoskeletal, neuro, skin, endocrine and psych systems are negative, except as otherwise noted.    OBJECTIVE:   BP (!) 142/80   Pulse 58   Temp 97.4  F (36.3  C) (Tympanic)   Resp 16   Ht 1.778 m (5' 10\")   Wt 79.4 kg (175 lb)   SpO2 100%   BMI 25.11 kg/m   Estimated body mass index is 25.11 kg/m  as calculated from the following:    Height as of this encounter: 1.778 m (5' 10\").    Weight as of this encounter: 79.4 kg (175 lb).  EXAM:   GENERAL: healthy, alert and no distress  EYES: Eyes grossly normal to inspection, PERRL and conjunctivae and sclerae normal  HENT: ear canals and TM's normal, nose and mouth without ulcers or lesions  NECK: no adenopathy, no asymmetry, masses, or scars and thyroid normal to palpation  RESP: lungs clear to auscultation - no rales, rhonchi or wheezes  CV: regular rate and rhythm, normal S1 S2, no S3 or S4, no murmur, click or rub, no peripheral edema and peripheral pulses strong  ABDOMEN: soft, nontender, no hepatosplenomegaly, no masses and bowel sounds normal  MS: no gross musculoskeletal defects noted, no edema. No back tenderness elicited on exam.   SKIN: no suspicious lesions or rashes  NEURO: Normal strength and tone, mentation intact and speech normal  PSYCH: mentation appears normal, affect normal/bright    Diagnostic Test Results:  Labs reviewed in Epic    ASSESSMENT / PLAN:   1. Encounter for Medicare annual wellness exam  Counseling as below. Due for labs.   - Comprehensive metabolic panel (BMP + Alb, Alk Phos, ALT, AST, Total. Bili, TP)  - Lipid Profile (Chol, Trig, HDL, LDL calc)  - Hemoglobin A1c    2. Erectile dysfunction, unspecified erectile dysfunction type  Discussed with patient that I would refill current sildenafil dosing or could switch to daily Cialis, but that I was unfamiliar with daily sildenafil dosing. Encouraged him to look into whether Cialis would be covered or ask his urologist to prescribe recommended sildenafil dose.   - sildenafil " (REVATIO) 20 MG tablet; Take 3-5 tabs 30-60 minutes prior to intercourse.  Dispense: 30 tablet; Refill: 11    3. Neuropathy  Interestingly seems to be not particularly bothersome. As far as I can tell by chart review, this is idiopathic and he has minimal actual pain associated with it. Will trial tapering off gabapentin (discussed how to do this, but patient requesting refill in meantime before he tapers) to see if there is any difference in his symptoms.   - gabapentin (NEURONTIN) 600 MG tablet; Take 1 tablet (600 mg) by mouth At Bedtime AND 0.5 tablets (300 mg) every morning.  Dispense: 180 tablet; Refill: 1    4. Chronic left-sided thoracic back pain  Anticipate likely MSK. Reassuring CXR and thoracic XR; obtained as patient expressed significant concerns for this and requested additional imaging.   - XR Chest 2 Views; Future  - XR Thoracic Spine 2 Views; Future    5. Essential hypertension with goal blood pressure less than 140/90  Elevated in clinic likely due to patient not taking medications this AM.   - lisinopril (ZESTRIL) 10 MG tablet; Take 1 tablet (10 mg) by mouth daily  Dispense: 90 tablet; Refill: 1  - amLODIPine (NORVASC) 10 MG tablet; Take 1 tablet (10 mg) by mouth daily  Dispense: 90 tablet; Refill: 3    6. Cramp of limb  Stable; bothersome. Discussed trial of additional therapies, staying well hydrated, etc.   - rOPINIRole (REQUIP) 0.25 MG tablet; TAKE 1 TO 2 TABLETS(0.25 TO 0.5 MG) BY MOUTH AT BEDTIME  Dispense: 180 tablet; Refill: 11    7. Primary osteoarthritis of right shoulder  - diclofenac (VOLTAREN) 75 MG EC tablet; TAKE 2 TABLET BY MOUTH AS NEEDED FOR MODERATE PAIN  Dispense: 180 tablet; Refill: 0    Patient has been advised of split billing requirements and indicates understanding: Yes    COUNSELING:  Reviewed preventive health counseling, as reflected in patient instructions       Regular exercise       Healthy diet/nutrition       Vision screening       Hearing screening       Colon  "cancer screening       Prostate cancer screening    Estimated body mass index is 25.11 kg/m  as calculated from the following:    Height as of this encounter: 1.778 m (5' 10\").    Weight as of this encounter: 79.4 kg (175 lb).        He reports that he quit smoking about 44 years ago. He smoked 0.00 packs per day. He has never used smokeless tobacco.    Appropriate preventive services were discussed with this patient, including applicable screening as appropriate for cardiovascular disease, diabetes, osteopenia/osteoporosis, and glaucoma.  As appropriate for age/gender, discussed screening for colorectal cancer, prostate cancer, breast cancer, and cervical cancer. Checklist reviewing preventive services available has been given to the patient.    Reviewed patients plan of care and provided an AVS. The Basic Care Plan (routine screening as documented in Health Maintenance) for Ciro meets the Care Plan requirement. This Care Plan has been established and reviewed with the Patient.    Counseling Resources:  ATP IV Guidelines  Pooled Cohorts Equation Calculator  Breast Cancer Risk Calculator  BRCA-Related Cancer Risk Assessment: FHS-7 Tool  FRAX Risk Assessment  ICSI Preventive Guidelines  Dietary Guidelines for Americans, 2010  USDA's MyPlate  ASA Prophylaxis  Lung CA Screening    Radha Herrera MD  [facility] [place]  "

## 2021-02-16 LAB
ALBUMIN SERPL-MCNC: 4 G/DL (ref 3.4–5)
ALP SERPL-CCNC: 85 U/L (ref 40–150)
ALT SERPL W P-5'-P-CCNC: 30 U/L (ref 0–70)
ANION GAP SERPL CALCULATED.3IONS-SCNC: 6 MMOL/L (ref 3–14)
AST SERPL W P-5'-P-CCNC: 25 U/L (ref 0–45)
BILIRUB SERPL-MCNC: 1.1 MG/DL (ref 0.2–1.3)
BUN SERPL-MCNC: 19 MG/DL (ref 7–30)
CALCIUM SERPL-MCNC: 9.2 MG/DL (ref 8.5–10.1)
CHLORIDE SERPL-SCNC: 107 MMOL/L (ref 94–109)
CHOLEST SERPL-MCNC: 161 MG/DL
CO2 SERPL-SCNC: 27 MMOL/L (ref 20–32)
CREAT SERPL-MCNC: 0.91 MG/DL (ref 0.66–1.25)
GFR SERPL CREATININE-BSD FRML MDRD: 80 ML/MIN/{1.73_M2}
GLUCOSE SERPL-MCNC: 88 MG/DL (ref 70–99)
HDLC SERPL-MCNC: 76 MG/DL
LDLC SERPL CALC-MCNC: 71 MG/DL
NONHDLC SERPL-MCNC: 85 MG/DL
POTASSIUM SERPL-SCNC: 4.3 MMOL/L (ref 3.4–5.3)
PROT SERPL-MCNC: 7 G/DL (ref 6.8–8.8)
SODIUM SERPL-SCNC: 140 MMOL/L (ref 133–144)
TRIGL SERPL-MCNC: 68 MG/DL

## 2021-02-25 ENCOUNTER — TELEPHONE (OUTPATIENT)
Dept: PEDIATRICS | Facility: CLINIC | Age: 81
End: 2021-02-25

## 2021-03-02 NOTE — TELEPHONE ENCOUNTER
Central Prior Authorization Team   Phone: [phone]    PA Initiation    Medication: Testosterone 1.62 % GEL  Insurance Company: [employer] - Phone [phone] Fax [phone]  Pharmacy Filling the Rx: [facility] #[id number] - [place], [place] - [address]  AT [address]  Filling Pharmacy Phone: [phone]  Filling Pharmacy Fax: [phone]  Start Date: 3/2/2021

## 2021-03-02 NOTE — TELEPHONE ENCOUNTER
Prior Authorization Approval    Authorization Effective Date: 3/1/2021  Authorization Expiration Date: 11/2/2021  Medication: Testosterone 1.62 % GEL  Approved Dose/Quantity:    Reference #:     Insurance Company: [employer] - Phone [phone] Fax [phone]  Expected CoPay:       CoPay Card Available:      Foundation Assistance Needed:    Which Pharmacy is filling the prescription (Not needed for infusion/clinic administered): [id number] - [place], [place] - [address]  AT [address]  Pharmacy Notified: Yes  Patient Notified: Yes  **Instructed pharmacy to notify patient when script is ready to /ship.**

## 2021-03-09 ENCOUNTER — APPOINTMENT (OUTPATIENT)
Dept: URBAN - METROPOLITAN AREA CLINIC 256 | Age: 81
Setting detail: DERMATOLOGY
End: 2021-03-09

## 2021-03-09 VITALS — RESPIRATION RATE: 14 BRPM | WEIGHT: 170 LBS | HEIGHT: 70 IN

## 2021-03-09 DIAGNOSIS — D18.0 HEMANGIOMA: ICD-10-CM

## 2021-03-09 DIAGNOSIS — L57.0 ACTINIC KERATOSIS: ICD-10-CM

## 2021-03-09 DIAGNOSIS — L82.1 OTHER SEBORRHEIC KERATOSIS: ICD-10-CM

## 2021-03-09 DIAGNOSIS — Z85.828 PERSONAL HISTORY OF OTHER MALIGNANT NEOPLASM OF SKIN: ICD-10-CM

## 2021-03-09 DIAGNOSIS — L57.8 OTHER SKIN CHANGES DUE TO CHRONIC EXPOSURE TO NONIONIZING RADIATION: ICD-10-CM

## 2021-03-09 DIAGNOSIS — L81.4 OTHER MELANIN HYPERPIGMENTATION: ICD-10-CM

## 2021-03-09 DIAGNOSIS — L91.8 OTHER HYPERTROPHIC DISORDERS OF THE SKIN: ICD-10-CM

## 2021-03-09 DIAGNOSIS — D22 MELANOCYTIC NEVI: ICD-10-CM

## 2021-03-09 DIAGNOSIS — L82.0 INFLAMED SEBORRHEIC KERATOSIS: ICD-10-CM

## 2021-03-09 DIAGNOSIS — Z85.820 PERSONAL HISTORY OF MALIGNANT MELANOMA OF SKIN: ICD-10-CM

## 2021-03-09 PROBLEM — D23.71 OTHER BENIGN NEOPLASM OF SKIN OF RIGHT LOWER LIMB, INCLUDING HIP: Status: ACTIVE | Noted: 2021-03-09

## 2021-03-09 PROBLEM — D22.5 MELANOCYTIC NEVI OF TRUNK: Status: ACTIVE | Noted: 2021-03-09

## 2021-03-09 PROBLEM — D18.01 HEMANGIOMA OF SKIN AND SUBCUTANEOUS TISSUE: Status: ACTIVE | Noted: 2021-03-09

## 2021-03-09 PROCEDURE — OTHER REASSURANCE: OTHER

## 2021-03-09 PROCEDURE — OTHER COUNSELING: OTHER

## 2021-03-09 PROCEDURE — 17004 DESTROY PREMAL LESIONS 15/>: CPT

## 2021-03-09 PROCEDURE — OTHER LIQUID NITROGEN: OTHER

## 2021-03-09 PROCEDURE — 17110 DESTRUCT B9 LESION 1-14: CPT | Mod: 59

## 2021-03-09 PROCEDURE — 99212 OFFICE O/P EST SF 10 MIN: CPT | Mod: 25

## 2021-03-09 ASSESSMENT — LOCATION SIMPLE DESCRIPTION DERM
LOCATION SIMPLE: LEFT EAR
LOCATION SIMPLE: RIGHT UPPER BACK
LOCATION SIMPLE: SCALP
LOCATION SIMPLE: RIGHT EAR
LOCATION SIMPLE: RIGHT SCALP
LOCATION SIMPLE: UPPER BACK
LOCATION SIMPLE: LEFT HAND
LOCATION SIMPLE: RIGHT NOSE
LOCATION SIMPLE: LEFT SHOULDER
LOCATION SIMPLE: RIGHT FOREHEAD
LOCATION SIMPLE: LEFT POPLITEAL SKIN
LOCATION SIMPLE: ABDOMEN
LOCATION SIMPLE: RIGHT EYEBROW
LOCATION SIMPLE: CHEST
LOCATION SIMPLE: LEFT CHEEK
LOCATION SIMPLE: LEFT TEMPLE
LOCATION SIMPLE: LEFT FOREHEAD
LOCATION SIMPLE: RIGHT TEMPLE
LOCATION SIMPLE: RIGHT FOREARM

## 2021-03-09 ASSESSMENT — LOCATION DETAILED DESCRIPTION DERM
LOCATION DETAILED: RIGHT SUPERIOR HELIX
LOCATION DETAILED: RIGHT MEDIAL UPPER BACK
LOCATION DETAILED: XIPHOID
LOCATION DETAILED: LEFT MEDIAL FOREHEAD
LOCATION DETAILED: RIGHT SUPERIOR FOREHEAD
LOCATION DETAILED: RIGHT PROXIMAL DORSAL FOREARM
LOCATION DETAILED: RIGHT MEDIAL FRONTAL SCALP
LOCATION DETAILED: LEFT SUPERIOR LATERAL MALAR CHEEK
LOCATION DETAILED: RIGHT SUPERIOR MEDIAL FOREHEAD
LOCATION DETAILED: STERNUM
LOCATION DETAILED: LEFT LATERAL FOREHEAD
LOCATION DETAILED: LEFT SUPERIOR HELIX
LOCATION DETAILED: RIGHT LATERAL EYEBROW
LOCATION DETAILED: RIGHT MEDIAL SUPERIOR CHEST
LOCATION DETAILED: LEFT MID TEMPLE
LOCATION DETAILED: LEFT SUPERIOR PARIETAL SCALP
LOCATION DETAILED: LEFT INFERIOR LATERAL FOREHEAD
LOCATION DETAILED: RIGHT CENTRAL EYEBROW
LOCATION DETAILED: LEFT RADIAL DORSAL HAND
LOCATION DETAILED: RIGHT MID TEMPLE
LOCATION DETAILED: LEFT POPLITEAL SKIN
LOCATION DETAILED: RIGHT MEDIAL INFERIOR CHEST
LOCATION DETAILED: RIGHT LATERAL FOREHEAD
LOCATION DETAILED: EPIGASTRIC SKIN
LOCATION DETAILED: INFERIOR THORACIC SPINE
LOCATION DETAILED: LEFT ANTERIOR SHOULDER
LOCATION DETAILED: RIGHT NASAL SIDEWALL

## 2021-03-09 ASSESSMENT — LOCATION ZONE DERM
LOCATION ZONE: FACE
LOCATION ZONE: EAR
LOCATION ZONE: TRUNK
LOCATION ZONE: ARM
LOCATION ZONE: LEG
LOCATION ZONE: SCALP
LOCATION ZONE: NOSE
LOCATION ZONE: HAND

## 2021-03-09 NOTE — PROCEDURE: LIQUID NITROGEN
Medical Necessity Information: It is in your best interest to select a reason for this procedure from the list below. All of these items fulfill various CMS LCD requirements except the new and changing color options.
Render Post-Care Instructions In Note?: yes
Detail Level: Detailed
Consent: - Discussed that these are a result of cumulative sun exposure.\\n- Verbal and written consent was obtained, and risks were reviewed prior to procedure today. \\n- Risks discussed include but are not limited to pain, crusting, scabbing, blistering, scarring, temporary or permanent darker or lighter pigmentary change, recurrence, incomplete resolution, and infection.
Consent: - Verbal and written consent was obtained, and risks were reviewed prior to procedure today. \\n- Risks discussed include but are not limited to pain, crusting, scabbing, blistering, scarring, temporary or permanent darker or lighter pigmentary change, recurrence, incomplete resolution, and infection.
Duration Of Freeze Thaw-Cycle (Seconds): 0
Include Z78.9 (Other Specified Conditions Influencing Health Status) As An Associated Diagnosis?: No
Post-Care Instructions: - Avoid picking at any of the treated lesions.
Post-Care Instructions: - Patient was instructed to avoid picking at any of the treated lesions.
Medical Necessity Clause: This procedure was medically necessary because the lesions that were treated were:

## 2021-03-09 NOTE — HPI: FULL BODY SKIN EXAMINATION
How Severe Are Your Spot(S)?: moderate
What Is The Reason For Today's Visit?: Full Body Skin Examination
What Is The Reason For Today's Visit? (Being Monitored For X): the re-examination of lesions previously examined
Additional History: Patient believes he may have more AKs.

## 2021-05-09 DIAGNOSIS — Z12.5 ENCOUNTER FOR SCREENING FOR MALIGNANT NEOPLASM OF PROSTATE: ICD-10-CM

## 2021-05-09 DIAGNOSIS — R97.20 RAISED PROSTATE SPECIFIC ANTIGEN: ICD-10-CM

## 2021-05-09 DIAGNOSIS — N52.9 ED (ERECTILE DYSFUNCTION): Primary | ICD-10-CM

## 2021-05-27 DIAGNOSIS — H40.233 INTERMITTENT PRIMARY ANGLE-CLOSURE GLAUCOMA OF BOTH EYES: ICD-10-CM

## 2021-05-28 RX ORDER — LATANOPROST 50 UG/ML
1 SOLUTION/ DROPS OPHTHALMIC AT BEDTIME
Qty: 7.5 ML | Refills: 2 | Status: SHIPPED | OUTPATIENT
Start: 2021-05-28 | End: 2022-05-27

## 2021-05-28 NOTE — TELEPHONE ENCOUNTER
Last Clinic Visit: 1/25/2021  [facility]  last clinic note: Plan:  Patient will continue on Latanoprost which is a teal top drop at bedtime   in both eyes.

## 2021-05-30 ENCOUNTER — RECORDS - HEALTHEAST (OUTPATIENT)
Dept: ADMINISTRATIVE | Facility: CLINIC | Age: 81
End: 2021-05-30

## 2021-05-31 ENCOUNTER — RECORDS - HEALTHEAST (OUTPATIENT)
Dept: ADMINISTRATIVE | Facility: CLINIC | Age: 81
End: 2021-05-31

## 2021-06-08 NOTE — TELEPHONE ENCOUNTER
Patient's wife calls on his behalf. The patient gave verbal consent over the phone that I may discuss his care with his wife.    Has colonoscopy scheduled 5/28/20 at [facility]. Patient states he need to schedule COVID-19 test 2-3 days prior to his surgery. I transferred him to COVID scheduling line to schedule his COVID test    The patient will have COVID test done at [facility] on 5/26/20 at 0900.    Lakeisha Roe RN

## 2021-06-11 DIAGNOSIS — N52.9 ED (ERECTILE DYSFUNCTION): ICD-10-CM

## 2021-06-11 DIAGNOSIS — R97.20 RAISED PROSTATE SPECIFIC ANTIGEN: ICD-10-CM

## 2021-06-11 DIAGNOSIS — Z12.5 ENCOUNTER FOR SCREENING FOR MALIGNANT NEOPLASM OF PROSTATE: ICD-10-CM

## 2021-06-11 LAB
HGB BLD-MCNC: 13.1 G/DL (ref 13.3–17.7)
PSA SERPL-ACNC: 3.64 UG/L (ref 0–4)

## 2021-06-11 PROCEDURE — 36415 COLL VENOUS BLD VENIPUNCTURE: CPT | Performed by: UROLOGY

## 2021-06-11 PROCEDURE — G0103 PSA SCREENING: HCPCS | Performed by: UROLOGY

## 2021-06-11 PROCEDURE — 84403 ASSAY OF TOTAL TESTOSTERONE: CPT | Mod: 90 | Performed by: UROLOGY

## 2021-06-11 PROCEDURE — 99000 SPECIMEN HANDLING OFFICE-LAB: CPT | Performed by: UROLOGY

## 2021-06-11 PROCEDURE — 85018 HEMOGLOBIN: CPT | Performed by: UROLOGY

## 2021-06-14 DIAGNOSIS — I10 ESSENTIAL HYPERTENSION WITH GOAL BLOOD PRESSURE LESS THAN 140/90: ICD-10-CM

## 2021-06-15 LAB — TESTOST SERPL-MCNC: 731 NG/DL (ref 240–950)

## 2021-06-16 RX ORDER — LISINOPRIL 10 MG/1
10 TABLET ORAL DAILY
Qty: 90 TABLET | Refills: 1 | OUTPATIENT
Start: 2021-06-16

## 2021-06-22 ENCOUNTER — MEDICAL CORRESPONDENCE (OUTPATIENT)
Dept: HEALTH INFORMATION MANAGEMENT | Facility: CLINIC | Age: 81
End: 2021-06-22

## 2021-06-22 ENCOUNTER — TRANSFERRED RECORDS (OUTPATIENT)
Dept: HEALTH INFORMATION MANAGEMENT | Facility: CLINIC | Age: 81
End: 2021-06-22

## 2021-06-24 DIAGNOSIS — I10 ESSENTIAL HYPERTENSION WITH GOAL BLOOD PRESSURE LESS THAN 140/90: ICD-10-CM

## 2021-06-24 RX ORDER — AMLODIPINE BESYLATE 10 MG/1
10 TABLET ORAL DAILY
Qty: 90 TABLET | Refills: 1 | Status: SHIPPED | OUTPATIENT
Start: 2021-06-24 | End: 2021-12-23

## 2021-06-24 RX ORDER — LISINOPRIL 10 MG/1
10 TABLET ORAL DAILY
Qty: 90 TABLET | Refills: 1 | Status: SHIPPED | OUTPATIENT
Start: 2021-06-24 | End: 2021-12-23

## 2021-06-24 NOTE — PROGRESS NOTES
The Pt's wife called in, pharmacy states no more refills on lisinopril or amlodipine.     I did resend these (were set to profile).     Phoebe Lara RN   [facility] -- Triage Nurse

## 2021-07-20 DIAGNOSIS — M19.011 PRIMARY OSTEOARTHRITIS OF RIGHT SHOULDER: ICD-10-CM

## 2021-07-20 NOTE — TELEPHONE ENCOUNTER
Routing refill request to provider for review/approval because:  Labs out of range:  CBC  Elevated BP    Phoebe Lara, KAITLIN   [facility] -- Triage Nurse

## 2021-07-21 RX ORDER — DICLOFENAC SODIUM 75 MG/1
TABLET, DELAYED RELEASE ORAL
Qty: 180 TABLET | Refills: 0 | Status: SHIPPED | OUTPATIENT
Start: 2021-07-21 | End: 2021-11-04

## 2021-08-23 DIAGNOSIS — G62.9 NEUROPATHY: ICD-10-CM

## 2021-08-24 ENCOUNTER — APPOINTMENT (OUTPATIENT)
Dept: URBAN - METROPOLITAN AREA CLINIC 256 | Age: 81
Setting detail: DERMATOLOGY
End: 2021-08-24

## 2021-08-24 VITALS — HEIGHT: 70 IN | WEIGHT: 170 LBS | RESPIRATION RATE: 16 BRPM

## 2021-08-24 DIAGNOSIS — Z85.828 PERSONAL HISTORY OF OTHER MALIGNANT NEOPLASM OF SKIN: ICD-10-CM

## 2021-08-24 DIAGNOSIS — L82.0 INFLAMED SEBORRHEIC KERATOSIS: ICD-10-CM

## 2021-08-24 DIAGNOSIS — L57.0 ACTINIC KERATOSIS: ICD-10-CM

## 2021-08-24 DIAGNOSIS — L82.1 OTHER SEBORRHEIC KERATOSIS: ICD-10-CM

## 2021-08-24 DIAGNOSIS — Z85.820 PERSONAL HISTORY OF MALIGNANT MELANOMA OF SKIN: ICD-10-CM

## 2021-08-24 PROCEDURE — OTHER LIQUID NITROGEN: OTHER

## 2021-08-24 PROCEDURE — 17000 DESTRUCT PREMALG LESION: CPT | Mod: 59

## 2021-08-24 PROCEDURE — OTHER COUNSELING: OTHER

## 2021-08-24 PROCEDURE — OTHER REASSURANCE: OTHER

## 2021-08-24 PROCEDURE — 17003 DESTRUCT PREMALG LES 2-14: CPT | Mod: 59

## 2021-08-24 PROCEDURE — 17110 DESTRUCT B9 LESION 1-14: CPT

## 2021-08-24 PROCEDURE — 99212 OFFICE O/P EST SF 10 MIN: CPT | Mod: 25

## 2021-08-24 RX ORDER — GABAPENTIN 600 MG/1
TABLET ORAL
Qty: 180 TABLET | Refills: 1 | Status: SHIPPED | OUTPATIENT
Start: 2021-08-24 | End: 2022-04-26

## 2021-08-24 ASSESSMENT — LOCATION SIMPLE DESCRIPTION DERM
LOCATION SIMPLE: LEFT CHEEK
LOCATION SIMPLE: LEFT TEMPLE
LOCATION SIMPLE: LEFT FOREHEAD
LOCATION SIMPLE: LEFT EAR
LOCATION SIMPLE: LEFT SHOULDER
LOCATION SIMPLE: RIGHT NOSE
LOCATION SIMPLE: RIGHT FOREHEAD

## 2021-08-24 ASSESSMENT — LOCATION DETAILED DESCRIPTION DERM
LOCATION DETAILED: RIGHT NASAL SIDEWALL
LOCATION DETAILED: RIGHT FOREHEAD
LOCATION DETAILED: LEFT FOREHEAD
LOCATION DETAILED: LEFT SUPERIOR HELIX
LOCATION DETAILED: LEFT CENTRAL MALAR CHEEK
LOCATION DETAILED: LEFT ANTERIOR SHOULDER
LOCATION DETAILED: LEFT SUPERIOR FOREHEAD
LOCATION DETAILED: LEFT CENTRAL TEMPLE
LOCATION DETAILED: LEFT INFERIOR FOREHEAD
LOCATION DETAILED: LEFT SUPERIOR CRUS OF ANTIHELIX

## 2021-08-24 ASSESSMENT — LOCATION ZONE DERM
LOCATION ZONE: NOSE
LOCATION ZONE: ARM
LOCATION ZONE: EAR
LOCATION ZONE: FACE

## 2021-08-24 NOTE — PROCEDURE: LIQUID NITROGEN
Show Applicator Variable?: Yes
Render Note In Bullet Format When Appropriate: No
Post-Care Instructions: I reviewed with the patient in detail post-care instructions. Patient is to wear sunprotection, and avoid picking at any of the treated lesions. Pt may apply Vaseline to crusted or scabbing areas.
Detail Level: Simple
Consent: The patient's consent was obtained including but not limited to risks of crusting, scabbing, blistering, scarring, darker or lighter pigmentary change, recurrence, incomplete removal and infection.
Medical Necessity Information: It is in your best interest to select a reason for this procedure from the list below. All of these items fulfill various CMS LCD requirements except the new and changing color options.
Duration Of Freeze Thaw-Cycle (Seconds): 2
Duration Of Freeze Thaw-Cycle (Seconds): 3
Number Of Freeze-Thaw Cycles: 3 freeze-thaw cycles
Medical Necessity Clause: This procedure was medically necessary because the lesions that were treated were: traumatized when grooming

## 2021-09-10 ENCOUNTER — OFFICE VISIT (OUTPATIENT)
Dept: PEDIATRICS | Facility: CLINIC | Age: 81
End: 2021-09-10
Payer: COMMERCIAL

## 2021-09-10 VITALS
DIASTOLIC BLOOD PRESSURE: 73 MMHG | HEART RATE: 63 BPM | TEMPERATURE: 98.5 F | OXYGEN SATURATION: 96 % | SYSTOLIC BLOOD PRESSURE: 130 MMHG

## 2021-09-10 DIAGNOSIS — R05.9 COUGH: Primary | ICD-10-CM

## 2021-09-10 DIAGNOSIS — J34.89 SINUS PRESSURE: ICD-10-CM

## 2021-09-10 DIAGNOSIS — J34.89 RHINORRHEA: ICD-10-CM

## 2021-09-10 DIAGNOSIS — J02.9 ACUTE SORE THROAT: ICD-10-CM

## 2021-09-10 PROCEDURE — U0003 INFECTIOUS AGENT DETECTION BY NUCLEIC ACID (DNA OR RNA); SEVERE ACUTE RESPIRATORY SYNDROME CORONAVIRUS 2 (SARS-COV-2) (CORONAVIRUS DISEASE [COVID-19]), AMPLIFIED PROBE TECHNIQUE, MAKING USE OF HIGH THROUGHPUT TECHNOLOGIES AS DESCRIBED BY CMS-2020-01-R: HCPCS | Performed by: NURSE PRACTITIONER

## 2021-09-10 PROCEDURE — 99213 OFFICE O/P EST LOW 20 MIN: CPT | Performed by: NURSE PRACTITIONER

## 2021-09-10 PROCEDURE — U0005 INFEC AGEN DETEC AMPLI PROBE: HCPCS | Performed by: NURSE PRACTITIONER

## 2021-09-10 NOTE — PATIENT INSTRUCTIONS
It was nice seeing you today.    bentley [id number]    Please let me know if you have any questions regarding today's visit!    Take care,    GLENDA Solorzano, ARIN  Family Medicine

## 2021-09-10 NOTE — PROGRESS NOTES
Assessment & Plan     Cough  Will test for COVID  Follow-up as needed  Isolate until test has resulted  - Symptomatic COVID-19 Virus (Coronavirus) by PCR Nose    Rhinorrhea  Will test for COVID  Follow-up as needed  Isolate until test has resulted    Sinus pressure  Will test for COVID  Follow-up as needed  Isolate until test has resulted    Acute sore throat  Will test for COVID  Follow-up as needed  Isolate until test has resulted  Decline strep test today      I spent a total of 10 minutes on the day of the visit.   Time spent doing chart review, history and exam, documentation and further activities per the note         See Patient Instructions  Return if symptoms worsen or fail to improve.    Maura Solorzano NP  [facility] [place] Tanner is a 81 year old who presents for the following health issues:    HPI     Acute Illness  Acute illness concerns: URI symptoms  Onset/Duration: 2 days   Symptoms:  Fever: not applicable  Chills/Sweats: no  Headache (location?): no  Sinus Pressure: YES  Conjunctivitis:  no  Ear Pain: no  Rhinorrhea: YES- clear   Congestion: YES  Sore Throat: Yes  Yesterday, Improving   Cough: no  Wheeze: no  Decreased Appetite: no  Nausea: no  Vomiting: no  Diarrhea: no  Dysuria/Freq.: no  Dysuria or Hematuria: no  Fatigue/Achiness: no-  No new  Body aches   Sick/Strep Exposure: YES--wife has a cough   Therapies tried and outcome: taking Tylenol,     No COVID exposure  Fully vaccinated with COVID vaccine  Hx tonsillectomy   Requesting a COVID test today    Review of Systems   Constitutional, HEENT, cardiovascular, pulmonary, gi and gu systems are negative, except as otherwise noted.      Objective    /73   Pulse 63   Temp 98.5  F (36.9  C) (Tympanic)   SpO2 96%   There is no height or weight on file to calculate BMI.       Physical Exam   GENERAL: healthy, alert and no distress  EYES: Eyes grossly normal to inspection  HENT: normal cephalic/atraumatic, ear  canals and TM's normal, nose and mouth without ulcers or lesions, oropharynx clear, oral mucous membranes moist and sinuses: not tender  NECK: no adenopathy, no asymmetry, masses, or scars and thyroid normal to palpation  RESP: lungs clear to auscultation - no rales, rhonchi or wheezes  CV: regular rate and rhythm, normal S1 S2, no S3 or S4, no murmur, click or rub  PSYCH: mentation appears normal, affect normal/bright

## 2021-09-11 LAB — SARS-COV-2 RNA RESP QL NAA+PROBE: NEGATIVE

## 2021-10-04 ENCOUNTER — OFFICE VISIT (OUTPATIENT)
Dept: URGENT CARE | Facility: URGENT CARE | Age: 81
End: 2021-10-04
Payer: COMMERCIAL

## 2021-10-04 VITALS
TEMPERATURE: 97.1 F | WEIGHT: 174.6 LBS | SYSTOLIC BLOOD PRESSURE: 129 MMHG | BODY MASS INDEX: 25.05 KG/M2 | HEART RATE: 63 BPM | DIASTOLIC BLOOD PRESSURE: 68 MMHG | OXYGEN SATURATION: 98 %

## 2021-10-04 DIAGNOSIS — M25.532 LEFT WRIST PAIN: Primary | ICD-10-CM

## 2021-10-04 DIAGNOSIS — M77.8 TENDONITIS OF WRIST, LEFT: ICD-10-CM

## 2021-10-04 PROCEDURE — 99214 OFFICE O/P EST MOD 30 MIN: CPT | Performed by: PHYSICIAN ASSISTANT

## 2021-10-04 RX ORDER — MELOXICAM 7.5 MG/1
7.5 TABLET ORAL DAILY
Qty: 10 TABLET | Refills: 0 | Status: SHIPPED | OUTPATIENT
Start: 2021-10-04 | End: 2022-06-08

## 2021-10-05 NOTE — PROGRESS NOTES
"    Assessment & Plan     Left wrist pain  Secondary to overuse injury, tendoniits  RICE treatment: Rest, Ice, compression, elevation   mobic for inflammation    - meloxicam (MOBIC) 7.5 MG tablet; Take 1 tablet (7.5 mg) by mouth daily  - Wrist/Arm/Hand Supplies Order for DME - ONLY FOR DME    Tendonitis of wrist, left  DME wear brace for comfort  RICE treatment: Rest, Ice, compression, elevation   Referred to orthopedics for follow up if needed  - Orthopedic  Referral; Future  - meloxicam (MOBIC) 7.5 MG tablet; Take 1 tablet (7.5 mg) by mouth daily  - Wrist/Arm/Hand Supplies Order for DME - ONLY FOR DME     BMI:   Estimated body mass index is 25.05 kg/m  as calculated from the following:    Height as of 2/15/21: 1.778 m (5' 10\").    Weight as of this encounter: 79.2 kg (174 lb 9.6 oz).       CONSULTATION/REFERRAL to orthopedics      Ariel Patel PA-C  [facility] [place] Tanner is a 81 year old who presents for the following health issues  accompanied by his spouse:    HPI     Left wrist pain  Left forearm tenderness  Overuse injury    Review of Systems   Constitutional, HEENT, cardiovascular, pulmonary, gi and gu systems are negative, except as otherwise noted.      Objective    /68   Pulse 63   Temp 97.1  F (36.2  C)   Wt 79.2 kg (174 lb 9.6 oz)   SpO2 98%   BMI 25.05 kg/m    Body mass index is 25.05 kg/m .  Physical Exam   GENERAL: healthy, alert and no distress  MS: Positive for left wrist forearm tenderness, localized  SKIN: no suspicious lesions or rashes  NEURO: Normal strength and tone, mentation intact and speech normal  PSYCH: mentation appears normal, affect normal/bright                "

## 2021-11-01 ENCOUNTER — TRANSFERRED RECORDS (OUTPATIENT)
Dept: HEALTH INFORMATION MANAGEMENT | Facility: CLINIC | Age: 81
End: 2021-11-01

## 2021-12-14 ENCOUNTER — LAB (OUTPATIENT)
Dept: LAB | Facility: CLINIC | Age: 81
End: 2021-12-14
Payer: COMMERCIAL

## 2021-12-14 ENCOUNTER — DOCUMENTATION ONLY (OUTPATIENT)
Dept: LAB | Facility: CLINIC | Age: 81
End: 2021-12-14

## 2021-12-14 ENCOUNTER — MEDICAL CORRESPONDENCE (OUTPATIENT)
Dept: HEALTH INFORMATION MANAGEMENT | Facility: CLINIC | Age: 81
End: 2021-12-14

## 2021-12-14 DIAGNOSIS — R97.20 ELEVATED PROSTATE SPECIFIC ANTIGEN (PSA): Primary | ICD-10-CM

## 2021-12-14 PROCEDURE — 36415 COLL VENOUS BLD VENIPUNCTURE: CPT | Mod: GA

## 2021-12-14 PROCEDURE — G0103 PSA SCREENING: HCPCS | Mod: GA

## 2021-12-16 LAB — PSA SERPL-MCNC: 3.97 UG/L (ref 0–4)

## 2021-12-21 ENCOUNTER — LAB (OUTPATIENT)
Dept: LAB | Facility: CLINIC | Age: 81
End: 2021-12-21
Payer: COMMERCIAL

## 2021-12-21 DIAGNOSIS — E29.1 TESTICULAR HYPOFUNCTION: Primary | ICD-10-CM

## 2021-12-21 LAB — HGB BLD-MCNC: 14.3 G/DL (ref 13.3–17.7)

## 2021-12-21 PROCEDURE — 85018 HEMOGLOBIN: CPT

## 2021-12-21 PROCEDURE — 84403 ASSAY OF TOTAL TESTOSTERONE: CPT

## 2021-12-21 PROCEDURE — 36415 COLL VENOUS BLD VENIPUNCTURE: CPT

## 2021-12-22 DIAGNOSIS — I10 ESSENTIAL HYPERTENSION WITH GOAL BLOOD PRESSURE LESS THAN 140/90: ICD-10-CM

## 2021-12-22 LAB — TESTOST SERPL-MCNC: 493 NG/DL (ref 240–950)

## 2021-12-23 RX ORDER — LISINOPRIL 10 MG/1
TABLET ORAL
Qty: 90 TABLET | Refills: 0 | Status: SHIPPED | OUTPATIENT
Start: 2021-12-23 | End: 2022-06-08

## 2021-12-23 RX ORDER — AMLODIPINE BESYLATE 10 MG/1
TABLET ORAL
Qty: 90 TABLET | Refills: 0 | Status: SHIPPED | OUTPATIENT
Start: 2021-12-23 | End: 2022-03-30

## 2021-12-23 NOTE — TELEPHONE ENCOUNTER
Prescription approved per [facility] Refill Protocol.   DUSTY refill. Needs appointment.  Geno Palomo RN

## 2021-12-28 ENCOUNTER — TRANSFERRED RECORDS (OUTPATIENT)
Dept: HEALTH INFORMATION MANAGEMENT | Facility: CLINIC | Age: 81
End: 2021-12-28
Payer: COMMERCIAL

## 2022-01-10 ENCOUNTER — TRANSFERRED RECORDS (OUTPATIENT)
Dept: HEALTH INFORMATION MANAGEMENT | Facility: CLINIC | Age: 82
End: 2022-01-10
Payer: COMMERCIAL

## 2022-01-17 ENCOUNTER — APPOINTMENT (OUTPATIENT)
Dept: URBAN - METROPOLITAN AREA CLINIC 256 | Age: 82
Setting detail: DERMATOLOGY
End: 2022-01-17

## 2022-01-17 VITALS — RESPIRATION RATE: 16 BRPM | WEIGHT: 170 LBS | HEIGHT: 70 IN

## 2022-01-17 DIAGNOSIS — L82.1 OTHER SEBORRHEIC KERATOSIS: ICD-10-CM

## 2022-01-17 DIAGNOSIS — Z85.820 PERSONAL HISTORY OF MALIGNANT MELANOMA OF SKIN: ICD-10-CM

## 2022-01-17 DIAGNOSIS — L82.0 INFLAMED SEBORRHEIC KERATOSIS: ICD-10-CM

## 2022-01-17 DIAGNOSIS — L20.89 OTHER ATOPIC DERMATITIS: ICD-10-CM

## 2022-01-17 DIAGNOSIS — Z85.828 PERSONAL HISTORY OF OTHER MALIGNANT NEOPLASM OF SKIN: ICD-10-CM

## 2022-01-17 DIAGNOSIS — L57.0 ACTINIC KERATOSIS: ICD-10-CM

## 2022-01-17 PROBLEM — L20.84 INTRINSIC (ALLERGIC) ECZEMA: Status: ACTIVE | Noted: 2022-01-17

## 2022-01-17 PROCEDURE — OTHER COUNSELING: OTHER

## 2022-01-17 PROCEDURE — OTHER MIPS QUALITY: OTHER

## 2022-01-17 PROCEDURE — 99213 OFFICE O/P EST LOW 20 MIN: CPT | Mod: 25

## 2022-01-17 PROCEDURE — OTHER LIQUID NITROGEN: OTHER

## 2022-01-17 PROCEDURE — 17004 DESTROY PREMAL LESIONS 15/>: CPT

## 2022-01-17 PROCEDURE — 17110 DESTRUCT B9 LESION 1-14: CPT | Mod: 59

## 2022-01-17 ASSESSMENT — LOCATION ZONE DERM
LOCATION ZONE: HAND
LOCATION ZONE: SCALP
LOCATION ZONE: ARM
LOCATION ZONE: LEG
LOCATION ZONE: FACE
LOCATION ZONE: NOSE
LOCATION ZONE: NECK

## 2022-01-17 ASSESSMENT — LOCATION DETAILED DESCRIPTION DERM
LOCATION DETAILED: RIGHT SUPERIOR OCCIPITAL SCALP
LOCATION DETAILED: LEFT SUPERIOR PARIETAL SCALP
LOCATION DETAILED: RIGHT INFERIOR MEDIAL FOREHEAD
LOCATION DETAILED: RIGHT MEDIAL DISTAL PRETIBIAL REGION
LOCATION DETAILED: LEFT INFERIOR MEDIAL FOREHEAD
LOCATION DETAILED: RIGHT SUPERIOR LATERAL FOREHEAD
LOCATION DETAILED: RIGHT NASAL ALA
LOCATION DETAILED: LEFT INFERIOR POSTAURICULAR SKIN
LOCATION DETAILED: LEFT CENTRAL PARIETAL SCALP
LOCATION DETAILED: RIGHT SUPERIOR FOREHEAD
LOCATION DETAILED: LEFT ANTERIOR SHOULDER
LOCATION DETAILED: LEFT DORSAL INDEX METACARPOPHALANGEAL JOINT
LOCATION DETAILED: LEFT RADIAL DORSAL HAND
LOCATION DETAILED: LEFT SUPERIOR LATERAL NECK
LOCATION DETAILED: LEFT SUPERIOR OCCIPITAL SCALP
LOCATION DETAILED: RIGHT SUPERIOR PARIETAL SCALP
LOCATION DETAILED: LEFT SUPERIOR FOREHEAD
LOCATION DETAILED: RIGHT FOREHEAD

## 2022-01-17 ASSESSMENT — LOCATION SIMPLE DESCRIPTION DERM
LOCATION SIMPLE: RIGHT FOREHEAD
LOCATION SIMPLE: RIGHT NOSE
LOCATION SIMPLE: RIGHT OCCIPITAL SCALP
LOCATION SIMPLE: LEFT ANTERIOR NECK
LOCATION SIMPLE: LEFT HAND
LOCATION SIMPLE: LEFT FOREHEAD
LOCATION SIMPLE: LEFT OCCIPITAL SCALP
LOCATION SIMPLE: LEFT SHOULDER
LOCATION SIMPLE: SCALP
LOCATION SIMPLE: RIGHT PRETIBIAL REGION

## 2022-01-17 NOTE — PROCEDURE: LIQUID NITROGEN
Detail Level: Detailed
Duration Of Freeze Thaw-Cycle (Seconds): 2
Number Of Freeze-Thaw Cycles: 3 freeze-thaw cycles
Show Topical Anesthesia Variable?: Yes
Spray Paint Text: The liquid nitrogen was applied to the skin utilizing a spray paint frosting technique.
Render Note In Bullet Format When Appropriate: No
Application Tool (Optional): Liquid Nitrogen Sprayer
Post-Care Instructions: I reviewed with the patient in detail post-care instructions. Patient is to wear sunprotection, and avoid picking at any of the treated lesions. Pt may apply Vaseline to crusted or scabbing areas.
Medical Necessity Information: It is in your best interest to select a reason for this procedure from the list below. All of these items fulfill various CMS LCD requirements except the new and changing color options.
Medical Necessity Clause: This procedure was medically necessary because the lesions that were treated were: traumatized when grooming
Consent: The patient's verbal consent was obtained including but not limited to risks of crusting, scabbing, blistering, scarring, darker or lighter pigmentary change, recurrence, incomplete removal and infection.
Consent: The patient's consent was obtained including but not limited to risks of crusting, scabbing, blistering, scarring, darker or lighter pigmentary change, recurrence, incomplete removal and infection.

## 2022-01-17 NOTE — HPI: SKIN LESION
Is This A New Presentation, Or A Follow-Up?: Skin Lesion
Additional History: Patient notes a new skin lesion that appeared three months ago. This lesion has not been treated before. He has a history of melanoma.

## 2022-01-28 ENCOUNTER — NURSE TRIAGE (OUTPATIENT)
Dept: PEDIATRICS | Facility: CLINIC | Age: 82
End: 2022-01-28
Payer: COMMERCIAL

## 2022-01-28 NOTE — TELEPHONE ENCOUNTER
"S-(situation): Patient and patient's wife calling regarding COVID symptoms.     B-(background): Patient received positive test result for COVID via PCR today. Symptoms began 3 days ago.     A-(assessment): Patient notes symptoms have improved since yesterday. Patient experiencing mild cough, non-productive, runny nose, sneezing. Patient reports \"sometimes i'll have sneezing fits, like someone put pepper up my nose\". Nasal discharge is clear. 02 = 94%, HR = 54bpm (per patient usually runs around this level). Patient notes experienced some mild chest discomfort yesterday near esophagus or sternum, no chest pain/pressure/tightness today. No SOB, no wheezing, no headache, no muscle aches, no fatigue, no chills, no sore throat. Patient notes \"feels like I have a mild head cold\".     R-(recommendations): RN gave information regarding [facility] website and screening process to complete on website for monoclonal antibody treatment.     Please review and advise on medications wife is giving patient. Patient and wife want to know from PCP if ok to continue giving these medications: Patient is taking zinc boost spray, vitamin c 500mg twice daily, mucinex twice daily, D3 2000IU twice a day. ivermectin 32mg once daily started last night, states to take 5-7 days, from \"[facility]\". Full size aspirin 325mg for 14 days.  Patient also using humidifier in room.     Reason for Disposition    [1] COVID-19 diagnosed by positive lab test AND [2] mild symptoms (e.g., cough, fever, others) AND [3] no complications or SOB    Additional Information    Negative: SEVERE difficulty breathing (e.g., struggling for each breath, speaks in single words)    Negative: Difficult to awaken or acting confused (e.g., disoriented, slurred speech)    Negative: Bluish (or gray) lips or face now    Negative: Shock suspected (e.g., cold/pale/clammy skin, too weak to stand, low BP, rapid pulse)    Negative: Sounds like a life-threatening emergency to the " triager    Negative: [1] COVID-19 exposure AND [2] no symptoms    Negative: COVID-19 vaccine reaction suspected (e.g., fever, headache, muscle aches) occurring 1 to 3 days after getting vaccine    Negative: COVID-19 vaccine, questions about    Negative: [1] Lives with someone known to have influenza (flu test positive) AND [2] flu-like symptoms (e.g., cough, runny nose, sore throat, SOB; with or without fever)    Negative: [1] Adult with possible COVID-19 symptoms AND [2] triager concerned about severity of symptoms or other causes    Negative: COVID-19 and breastfeeding, questions about    Negative: SEVERE or constant chest pain or pressure (Exception: mild central chest pain, present only when coughing)    Negative: MODERATE difficulty breathing (e.g., speaks in phrases, SOB even at rest, pulse 100-120)    Negative: [1] Headache AND [2] stiff neck (can't touch chin to chest)    Negative: MILD difficulty breathing (e.g., minimal/no SOB at rest, SOB with walking, pulse <100)    Negative: Chest pain or pressure    Negative: Patient sounds very sick or weak to the triager    Negative: Fever > 103 F (39.4 C)    Negative: [1] Fever > 101 F (38.3 C) AND [2] age > 60 years    Negative: [1] Fever > 100.0 F (37.8 C) AND [2] bedridden (e.g., nursing home patient, CVA, chronic illness, recovering from surgery)    Negative: HIGH RISK for severe COVID complications (e.g., age > 64 years, obesity with BMI > 25, pregnant, chronic lung disease or other chronic medical condition)  (Exception: Already seen by PCP and no new or worsening symptoms.)    Negative: [1] HIGH RISK patient AND [2] influenza is widespread in the community AND [3] ONE OR MORE respiratory symptoms: cough, sore throat, runny or stuffy nose    Negative: [1] HIGH RISK patient AND [2] influenza exposure within the last 7 days AND [3] ONE OR MORE respiratory symptoms: cough, sore throat, runny or stuffy nose    Negative: [1] COVID-19 infection suspected by caller  "or triager AND [2] mild symptoms (cough, fever, or others) AND [3] negative COVID-19 rapid test    Negative: Fever present > 3 days (72 hours)    Negative: [1] Fever returns after gone for over 24 hours AND [2] symptoms worse or not improved    Negative: [1] Continuous (nonstop) coughing interferes with work or school AND [2] no improvement using cough treatment per protocol    Negative: Cough present > 3 weeks    Answer Assessment - Initial Assessment Questions  1. COVID-19 DIAGNOSIS: \"Who made your Coronavirus (COVID-19) diagnosis?\" \"Was it confirmed by a positive lab test?\" If not diagnosed by a HCP, ask \"Are there lots of cases (community spread) where you live?\" (See public health department website, if unsure)      Positive PCR test, today  2. COVID-19 EXPOSURE: \"Was there any known exposure to COVID before the symptoms began?\" CDC Definition of close contact: within 6 feet (2 meters) for a total of 15 minutes or more over a 24-hour period.       No known exposure   3. ONSET: \"When did the COVID-19 symptoms start?\"       3 days ago  4. WORST SYMPTOM: \"What is your worst symptom?\" (e.g., cough, fever, shortness of breath, muscle aches)      Sneezing, runny nose, mild cough. Did have chest pressure yesterday \"uncomfortable\" not today.   5. COUGH: \"Do you have a cough?\" If Yes, ask: \"How bad is the cough?\"        Mild cough, non-productive   6. FEVER: \"Do you have a fever?\" If Yes, ask: \"What is your temperature, how was it measured, and when did it start?\"      Maybe low grade fever, unsure   7. RESPIRATORY STATUS: \"Describe your breathing?\" (e.g., shortness of breath, wheezing, unable to speak)       No SOB, no wheezing   8. BETTER-SAME-WORSE: \"Are you getting better, staying the same or getting worse compared to yesterday?\"  If getting worse, ask, \"In what way?\"      Better   9. HIGH RISK DISEASE: \"Do you have any chronic medical problems?\" (e.g., asthma, heart or lung disease, weak immune system, obesity, " "etc.)      No hx of asthma  10. PREGNANCY: \"Is there any chance you are pregnant?\" \"When was your last menstrual period?\"        NA  11. OTHER SYMPTOMS: \"Do you have any other symptoms?\"  (e.g., chills, fatigue, headache, loss of smell or taste, muscle pain, sore throat; new loss of smell or taste especially support the diagnosis of COVID-19)        Sneezing, runny nose. No headache, no muscle aches, no loss of smell or taste, no fatigue. Feels like light cold in my head    Protocols used: CORONAVIRUS (COVID-19) DIAGNOSED OR [id number] 8.25.2021    Bony MORRIS RN    "

## 2022-01-31 NOTE — TELEPHONE ENCOUNTER
Called and spoke with both wife and patient. Patient reports that he is going good. Patient's oxygenation level 96%. Patient is currently asymptomatic.     Reviewed Dr. Banks's message below with patient and wife. Patient stated that he would resume his baby aspirin dosage and think about stopping the ivermectin use.     Rosie Meraz RN on 1/31/2022 at 8:49 AM

## 2022-01-31 NOTE — TELEPHONE ENCOUNTER
Can we call patient to check in? I think it is probably fine for him to keep taking the medications he is taking with the following changes:    1. No clear indication for ivermectin. While this is currently being studied, there is no good published data to support it's use for outpatients with COVID at this time. There are increased risks for side effects, including bleeding.   2. He likely does not need a full dose aspirin. There isn't good data for this in outpatient management of COVID. I think that if he wants to take aspirin, that it would be fine if he decreases to the baby dose.    Other medications listed should be fine to take and not be associated with significant risks/side effects.    Radha Banks MD  Internal Medicine-Pediatrics

## 2022-02-09 DIAGNOSIS — E78.2 MIXED HYPERLIPIDEMIA: ICD-10-CM

## 2022-02-10 RX ORDER — ATORVASTATIN CALCIUM 20 MG/1
20 TABLET, FILM COATED ORAL DAILY
Qty: 90 TABLET | Refills: 0 | Status: SHIPPED | OUTPATIENT
Start: 2022-02-10 | End: 2022-06-01

## 2022-02-10 NOTE — TELEPHONE ENCOUNTER
Patient has upcoming appointment in 3 months. Prescription approved per [facility] Refill Protocol.  Bony MORRIS RN

## 2022-03-29 DIAGNOSIS — I10 ESSENTIAL HYPERTENSION WITH GOAL BLOOD PRESSURE LESS THAN 140/90: ICD-10-CM

## 2022-03-30 RX ORDER — AMLODIPINE BESYLATE 10 MG/1
TABLET ORAL
Qty: 90 TABLET | Refills: 0 | Status: SHIPPED | OUTPATIENT
Start: 2022-03-30 | End: 2022-06-08

## 2022-03-30 NOTE — TELEPHONE ENCOUNTER
Patient has an upcoming appointment with provider.  Appointments in Next Year    Jun 08, 2022 11:30 AM  (Arrive by 11:10 AM)  Adult Preventative Visit with Radha Banks MD  [facility] ([facility] ) [phone]      Prescription approved per [facility] Refill Protocol.  Yumi Mcfadden RN

## 2022-04-09 ENCOUNTER — HEALTH MAINTENANCE LETTER (OUTPATIENT)
Age: 82
End: 2022-04-09

## 2022-04-26 DIAGNOSIS — G62.9 NEUROPATHY: ICD-10-CM

## 2022-04-26 RX ORDER — GABAPENTIN 600 MG/1
TABLET ORAL
Qty: 180 TABLET | Refills: 0 | Status: SHIPPED | OUTPATIENT
Start: 2022-04-26 | End: 2022-06-08

## 2022-04-29 DIAGNOSIS — E29.1 TESTICULAR HYPOFUNCTION: Primary | ICD-10-CM

## 2022-05-27 DIAGNOSIS — H40.233 INTERMITTENT PRIMARY ANGLE-CLOSURE GLAUCOMA OF BOTH EYES: ICD-10-CM

## 2022-05-27 RX ORDER — LATANOPROST 50 UG/ML
1 SOLUTION/ DROPS OPHTHALMIC AT BEDTIME
Qty: 2.5 ML | Refills: 0 | Status: SHIPPED | OUTPATIENT
Start: 2022-05-27

## 2022-05-27 NOTE — TELEPHONE ENCOUNTER
Last Clinic Visit: 1/25/2021  [facility]  No upcoming appointments  Last clinic note:  Plan:  Patient will continue on Latanoprost which is a teal top drop at bedtime in both eyes.    Patient will Follow up in Feb for yag left eye and dilation left eye only.   Then will follow up in one week after laser for OVF, optic nerve fundus pic and prescription   Plan to repeat OVF 24-2 after yag to assess for progression of glaucoma and consider second eyedrop left eye

## 2022-05-28 DIAGNOSIS — E78.2 MIXED HYPERLIPIDEMIA: ICD-10-CM

## 2022-06-01 RX ORDER — ATORVASTATIN CALCIUM 20 MG/1
TABLET, FILM COATED ORAL
Qty: 90 TABLET | Refills: 0 | Status: SHIPPED | OUTPATIENT
Start: 2022-06-01 | End: 2022-06-08

## 2022-06-01 NOTE — TELEPHONE ENCOUNTER
Medication is being filled for 1 time refill only due to:  Patient needs to be seen because visit/lab.     Next 5 appointments (look out 90 days)    Jun 08, 2022 11:00 AM  (Arrive by 10:40 AM)  Adult Preventative Visit with Radha [facility] MD  [facility] ([facility] ) [address] [place] [address]  [address]  [facility] [id number] [phone]        Mima Kaplan RN

## 2022-06-08 ENCOUNTER — OFFICE VISIT (OUTPATIENT)
Dept: PEDIATRICS | Facility: CLINIC | Age: 82
End: 2022-06-08
Payer: COMMERCIAL

## 2022-06-08 VITALS
WEIGHT: 175 LBS | DIASTOLIC BLOOD PRESSURE: 70 MMHG | SYSTOLIC BLOOD PRESSURE: 160 MMHG | HEIGHT: 70 IN | RESPIRATION RATE: 16 BRPM | HEART RATE: 68 BPM | OXYGEN SATURATION: 98 % | BODY MASS INDEX: 25.05 KG/M2

## 2022-06-08 DIAGNOSIS — G62.9 NEUROPATHY: ICD-10-CM

## 2022-06-08 DIAGNOSIS — I10 ESSENTIAL HYPERTENSION WITH GOAL BLOOD PRESSURE LESS THAN 140/90: ICD-10-CM

## 2022-06-08 DIAGNOSIS — R42 DIZZINESS: ICD-10-CM

## 2022-06-08 DIAGNOSIS — R25.2 CRAMP OF LIMB: ICD-10-CM

## 2022-06-08 DIAGNOSIS — Z00.00 ROUTINE GENERAL MEDICAL EXAMINATION AT A HEALTH CARE FACILITY: Primary | ICD-10-CM

## 2022-06-08 DIAGNOSIS — I42.9 CARDIOMYOPATHY, UNSPECIFIED TYPE (H): ICD-10-CM

## 2022-06-08 DIAGNOSIS — I35.0 AORTIC VALVE STENOSIS, ETIOLOGY OF CARDIAC VALVE DISEASE UNSPECIFIED: ICD-10-CM

## 2022-06-08 DIAGNOSIS — E78.2 MIXED HYPERLIPIDEMIA: ICD-10-CM

## 2022-06-08 LAB
ERYTHROCYTE [DISTWIDTH] IN BLOOD BY AUTOMATED COUNT: 13.6 % (ref 10–15)
HCT VFR BLD AUTO: 41.7 % (ref 40–53)
HGB BLD-MCNC: 14.1 G/DL (ref 13.3–17.7)
MCH RBC QN AUTO: 32.6 PG (ref 26.5–33)
MCHC RBC AUTO-ENTMCNC: 33.8 G/DL (ref 31.5–36.5)
MCV RBC AUTO: 97 FL (ref 78–100)
PLATELET # BLD AUTO: 200 10E3/UL (ref 150–450)
RBC # BLD AUTO: 4.32 10E6/UL (ref 4.4–5.9)
VIT B12 SERPL-MCNC: 637 PG/ML (ref 193–986)
WBC # BLD AUTO: 4.7 10E3/UL (ref 4–11)

## 2022-06-08 PROCEDURE — 36415 COLL VENOUS BLD VENIPUNCTURE: CPT | Performed by: INTERNAL MEDICINE

## 2022-06-08 PROCEDURE — 91305 COVID-19,PF,PFIZER (12+ YRS): CPT | Performed by: INTERNAL MEDICINE

## 2022-06-08 PROCEDURE — 80061 LIPID PANEL: CPT | Performed by: INTERNAL MEDICINE

## 2022-06-08 PROCEDURE — 0054A COVID-19,PF,PFIZER (12+ YRS): CPT | Performed by: INTERNAL MEDICINE

## 2022-06-08 PROCEDURE — 85027 COMPLETE CBC AUTOMATED: CPT | Performed by: INTERNAL MEDICINE

## 2022-06-08 PROCEDURE — 82607 VITAMIN B-12: CPT | Performed by: INTERNAL MEDICINE

## 2022-06-08 PROCEDURE — 99397 PER PM REEVAL EST PAT 65+ YR: CPT | Mod: 25 | Performed by: INTERNAL MEDICINE

## 2022-06-08 PROCEDURE — 84443 ASSAY THYROID STIM HORMONE: CPT | Performed by: INTERNAL MEDICINE

## 2022-06-08 PROCEDURE — 80053 COMPREHEN METABOLIC PANEL: CPT | Performed by: INTERNAL MEDICINE

## 2022-06-08 RX ORDER — ATORVASTATIN CALCIUM 20 MG/1
20 TABLET, FILM COATED ORAL DAILY
Qty: 90 TABLET | Refills: 3 | Status: SHIPPED | OUTPATIENT
Start: 2022-06-08 | End: 2023-06-14

## 2022-06-08 RX ORDER — LISINOPRIL 20 MG/1
20 TABLET ORAL DAILY
Qty: 90 TABLET | Refills: 3 | Status: SHIPPED | OUTPATIENT
Start: 2022-06-08 | End: 2023-05-17

## 2022-06-08 RX ORDER — ROPINIROLE 0.25 MG/1
TABLET, FILM COATED ORAL
Qty: 180 TABLET | Refills: 11 | Status: SHIPPED | OUTPATIENT
Start: 2022-06-08 | End: 2023-06-29

## 2022-06-08 RX ORDER — AMLODIPINE BESYLATE 10 MG/1
10 TABLET ORAL DAILY
Qty: 90 TABLET | Refills: 3 | Status: SHIPPED | OUTPATIENT
Start: 2022-06-08 | End: 2023-06-29

## 2022-06-08 RX ORDER — GABAPENTIN 600 MG/1
TABLET ORAL
Qty: 180 TABLET | Refills: 1 | Status: SHIPPED | OUTPATIENT
Start: 2022-06-08 | End: 2023-06-29

## 2022-06-08 SDOH — ECONOMIC STABILITY: INCOME INSECURITY: HOW HARD IS IT FOR YOU TO PAY FOR THE VERY BASICS LIKE FOOD, HOUSING, MEDICAL CARE, AND HEATING?: NOT HARD AT ALL

## 2022-06-08 SDOH — ECONOMIC STABILITY: FOOD INSECURITY: WITHIN THE PAST 12 MONTHS, YOU WORRIED THAT YOUR FOOD WOULD RUN OUT BEFORE YOU GOT MONEY TO BUY MORE.: NEVER TRUE

## 2022-06-08 SDOH — ECONOMIC STABILITY: FOOD INSECURITY: WITHIN THE PAST 12 MONTHS, THE FOOD YOU BOUGHT JUST DIDN'T LAST AND YOU DIDN'T HAVE MONEY TO GET MORE.: NEVER TRUE

## 2022-06-08 SDOH — ECONOMIC STABILITY: TRANSPORTATION INSECURITY
IN THE PAST 12 MONTHS, HAS LACK OF TRANSPORTATION KEPT YOU FROM MEETINGS, WORK, OR FROM GETTING THINGS NEEDED FOR DAILY LIVING?: NO

## 2022-06-08 SDOH — ECONOMIC STABILITY: TRANSPORTATION INSECURITY
IN THE PAST 12 MONTHS, HAS THE LACK OF TRANSPORTATION KEPT YOU FROM MEDICAL APPOINTMENTS OR FROM GETTING MEDICATIONS?: NO

## 2022-06-08 SDOH — ECONOMIC STABILITY: INCOME INSECURITY: IN THE LAST 12 MONTHS, WAS THERE A TIME WHEN YOU WERE NOT ABLE TO PAY THE MORTGAGE OR RENT ON TIME?: NO

## 2022-06-08 SDOH — HEALTH STABILITY: PHYSICAL HEALTH: ON AVERAGE, HOW MANY DAYS PER WEEK DO YOU ENGAGE IN MODERATE TO STRENUOUS EXERCISE (LIKE A BRISK WALK)?: 7 DAYS

## 2022-06-08 SDOH — HEALTH STABILITY: PHYSICAL HEALTH: ON AVERAGE, HOW MANY MINUTES DO YOU ENGAGE IN EXERCISE AT THIS LEVEL?: 140 MIN

## 2022-06-08 ASSESSMENT — ENCOUNTER SYMPTOMS
SORE THROAT: 0
PARESTHESIAS: 0
ABDOMINAL PAIN: 0
NERVOUS/ANXIOUS: 0
DIZZINESS: 1
CONSTIPATION: 0
FREQUENCY: 1
DIARRHEA: 0
HEADACHES: 0
JOINT SWELLING: 1
NAUSEA: 0
EYE PAIN: 0
PALPITATIONS: 0
WEAKNESS: 0
FEVER: 0
ARTHRALGIAS: 1
HEMATOCHEZIA: 0
DYSURIA: 0
MYALGIAS: 1
HEARTBURN: 0
COUGH: 0
HEMATURIA: 0
CHILLS: 0
SHORTNESS OF BREATH: 0

## 2022-06-08 ASSESSMENT — LIFESTYLE VARIABLES
HOW OFTEN DO YOU HAVE A DRINK CONTAINING ALCOHOL: NEVER
HOW OFTEN DO YOU HAVE SIX OR MORE DRINKS ON ONE OCCASION: NEVER

## 2022-06-08 ASSESSMENT — SOCIAL DETERMINANTS OF HEALTH (SDOH)
DO YOU BELONG TO ANY CLUBS OR ORGANIZATIONS SUCH AS CHURCH GROUPS UNIONS, FRATERNAL OR ATHLETIC GROUPS, OR SCHOOL GROUPS?: YES
HOW OFTEN DO YOU ATTEND CHURCH OR RELIGIOUS SERVICES?: MORE THAN 4 TIMES PER YEAR

## 2022-06-08 ASSESSMENT — ACTIVITIES OF DAILY LIVING (ADL): CURRENT_FUNCTION: NO ASSISTANCE NEEDED

## 2022-06-08 ASSESSMENT — PAIN SCALES - GENERAL: PAINLEVEL: NO PAIN (0)

## 2022-06-08 NOTE — PATIENT INSTRUCTIONS
Cardiology: Dr. Mabry, Dr. Cueto, Dr. Lord. You should call to schedule.     OK to try to taper down on gabapentin if you want to.     Some labs today.     You will be called to schedule an appointment for vestibular physical therapy - I'm hopeful this may help with your dizziness.    Consider using Tylenol more frequently - 3 times per day.

## 2022-06-08 NOTE — PROGRESS NOTES
"SUBJECTIVE:   Ciro Almonte is a 82 year old male who presents for Preventive Visit.      Patient has been advised of split billing requirements and indicates understanding: Yes  Are you in the first 12 months of your Medicare coverage?  No    Healthy Habits:     In general, how would you rate your overall health?  Fair    Frequency of exercise:  None    Do you usually eat at least 4 servings of fruit and vegetables a day, include whole grains    & fiber and avoid regularly eating high fat or \"junk\" foods?  No    Taking medications regularly:  Yes    Barriers to taking medications:  None    Medication side effects:  None    Ability to successfully perform activities of daily living:  No assistance needed    Home Safety:  No safety concerns identified    Hearing Impairment:  Difficulty following a conversation in a noisy restaurant or crowded room, feel that people are mumbling or not speaking clearly, difficulty following dialogue in the theater, difficult to understand a speaker at a public meeting or [employer] service, need to ask people to speak up or repeat themselves, find that men's voices are easier to understand than woman's, difficulty understanding soft or whispered speech, difficulty understanding speech on the telephone and no hearing concerns    In the past 6 months, have you been bothered by leaking of urine? Yes    In general, how would you rate your overall mental or emotional health?  Good      PHQ-2 Total Score: 0    Additional concerns today:  Yes    Do you feel safe in your environment? Yes    Have you ever done Advance Care Planning? (For example, a Health Directive, POLST, or a discussion with a medical provider or your loved ones about your wishes): No, advance care planning information given to patient to review.  Patient plans to discuss their wishes with loved ones or provider.         Fall risk  Fallen 2 or more times in the past year?: Yes  Any fall with injury in the past year?: " No    Cognitive Screening   1) Repeat 3 items (Leader, Season, Table)    2) Clock draw: NORMAL  3) 3 item recall: Recalls 3 objects  Results: 3 items recalled: COGNITIVE IMPAIRMENT LESS LIKELY    Mini-CogTM Copyright S Garret. Licensed by the author for use in [facility]; reprinted with permission (ricki@[facility]). All rights reserved.      Do you have sleep apnea, excessive snoring or daytime drowsiness?: no      Has been struggling with his neuropathy. Struggling with flat feet and muscle cramps which are pretty debilitating.     Has also been dealing with dizziness for some time, seems to be getting worse. Probably going on for about 2 years. Has previously seen dizzy clinic, wasn't helpful. Feels like he tips off to the left.     BP usually is 120s when he takes his medications, but if he misses then can get into the 150s.     Reviewed and updated as needed this visit by clinical staff   Tobacco  Allergies    Med Hx  Surg Hx  Fam Hx  Soc Hx          Reviewed and updated as needed this visit by Provider     Meds               Social History     Tobacco Use     Smoking status: Former Smoker     Packs/day: 0.00     Quit date: 1976     Years since quittin.9     Smokeless tobacco: Never Used   Substance Use Topics     Alcohol use: No     Alcohol/week: 0.0 standard drinks     Comment: Quit 1014     If you drink alcohol do you typically have >3 drinks per day or >7 drinks per week? No    Alcohol Use 2022   Prescreen: >3 drinks/day or >7 drinks/week? Not Applicable   Prescreen: >3 drinks/day or >7 drinks/week? -         Current providers sharing in care for this patient include:   Patient Care Team:  Radha Banks MD as PCP - General (Internal Medicine)  Miriam Gonzalez MD as MD (Ophthalmology)  Shannon Yang MD as MD (Ophthalmology)  Radha Banks MD as Assigned PCP  Miriam Gonzalez MD as Assigned Surgical Provider    The following health maintenance items  "are reviewed in [facility] and correct as of today:  Health Maintenance Due   Topic Date Due     ANNUAL REVIEW OF HM ORDERS  Never done     MEDICARE ANNUAL WELLNESS VISIT  02/15/2022     FALL RISK ASSESSMENT  02/15/2022     COVID-19 Vaccine (4 - Booster for Pfizer series) 04/06/2022         Review of Systems   Constitutional: Negative for chills and fever.   HENT: Positive for hearing loss. Negative for congestion, ear pain and sore throat.    Eyes: Negative for pain and visual disturbance.   Respiratory: Negative for cough and shortness of breath.    Cardiovascular: Positive for peripheral edema. Negative for chest pain and palpitations.   Gastrointestinal: Negative for abdominal pain, constipation, diarrhea, heartburn, hematochezia and nausea.   Genitourinary: Positive for frequency, impotence and urgency. Negative for dysuria, genital sores, hematuria and penile discharge.   Musculoskeletal: Positive for arthralgias, joint swelling and myalgias.   Skin: Negative for rash.   Neurological: Positive for dizziness. Negative for weakness, headaches and paresthesias.   Psychiatric/Behavioral: Negative for mood changes. The patient is not nervous/anxious.        OBJECTIVE:   BP (!) 160/70   Pulse 68   Resp 16   Ht 1.778 m (5' 10\")   Wt 79.4 kg (175 lb)   SpO2 98%   BMI 25.11 kg/m   Estimated body mass index is 25.11 kg/m  as calculated from the following:    Height as of this encounter: 1.778 m (5' 10\").    Weight as of this encounter: 79.4 kg (175 lb).  Physical Exam  GENERAL: healthy, alert and no distress  EYES: Eyes grossly normal to inspection, PERRL and conjunctivae and sclerae normal  HENT: ear canals and TM's normal, nose and mouth without ulcers or lesions  NECK: no adenopathy, no asymmetry, masses, or scars and thyroid normal to palpation  RESP: lungs clear to auscultation - no rales, rhonchi or wheezes  CV: regular rate and rhythm, normal S1 S2, no S3 or S4, no murmur, click or rub, no peripheral edema and " peripheral pulses strong  ABDOMEN: soft, nontender, no hepatosplenomegaly, no masses and bowel sounds normal  MS: no gross musculoskeletal defects noted, no edema  SKIN: no suspicious lesions or rashes  NEURO: Normal strength and tone, mentation intact and speech normal  PSYCH: mentation appears normal, affect normal/bright    Diagnostic Test Results:  Labs reviewed in Epic    ASSESSMENT / PLAN:   1. Routine general medical examination at a health care facility  Counseling as below.     2. Dizziness  By patient's description this seems most consistent with vertigo given dizziness with positional changes. Orthostatic hypotension also possible, particularly as patient's BP is somewhat labile as patient doesn't always remember to take his medications.   - Physical Therapy Referral; Future  - CBC with platelets; Future  - Vitamin B12; Future  - TSH with free T4 reflex; Future    3. Neuropathy  Has previously seen neurology, feels that gabapentin is not particularly helpful. Discussed tapering options if he would like, he does decline Lyrica trial today. Will check basic labs to make sure that these continue to be normal.   - gabapentin (NEURONTIN) 600 MG tablet; TAKE 1/2 TABLETS BY MOUTH EVERY MORNING AND 1 TABLET EVERY NIGHT AT BEDTIME.  Dispense: 180 tablet; Refill: 1  - Vitamin B12  - TSH with free T4 reflex    4. Essential hypertension with goal blood pressure less than 140/90  Elevated today, patient reports he took his medications just prior to appointment.   - amLODIPine (NORVASC) 10 MG tablet; Take 1 tablet (10 mg) by mouth daily  Dispense: 90 tablet; Refill: 3  - lisinopril (ZESTRIL) 20 MG tablet; Take 1 tablet (20 mg) by mouth daily  Dispense: 90 tablet; Refill: 3    5. Mixed hyperlipidemia  Tolerating medications without issue, due for labs.   - atorvastatin (LIPITOR) 20 MG tablet; Take 1 tablet (20 mg) by mouth daily  Dispense: 90 tablet; Refill: 3  - Lipid Profile (Chol, Trig, HDL, LDL calc); Future  -  "Comprehensive metabolic panel (BMP + Alb, Alk Phos, ALT, AST, Total. Bili, TP); Future    6. Cramp of limb  Does well with ropinirole, no side effects.   - TSH with free T4 reflex; Future  - rOPINIRole (REQUIP) 0.25 MG tablet; TAKE 1 TO 2 TABLETS(0.25 TO 0.5 MG) BY MOUTH AT BEDTIME  Dispense: 180 tablet; Refill: 11  - TSH with free T4 reflex    7. Cardiomyopathy, unspecified type (H)  Follows with cardiology, interested in transferring care to [facility] system. Referral placed. Not on beta blocker due to symptomatic bradycardia with this, on statin, ACE-I.   - Adult Cardiology Eval  Referral; Future  - TSH with free T4 reflex    8. Aortic valve stenosis, etiology of cardiac valve disease unspecified  Follows with cardiology.   - Adult Cardiology Eval  Referral; Future  - TSH with free T4 reflex        COUNSELING:  Reviewed preventive health counseling, as reflected in patient instructions       Regular exercise       Healthy diet/nutrition       Vision screening       Hearing screening       Colon cancer screening       Prostate cancer screening    Estimated body mass index is 25.11 kg/m  as calculated from the following:    Height as of this encounter: 1.778 m (5' 10\").    Weight as of this encounter: 79.4 kg (175 lb).        He reports that he quit smoking about 45 years ago. He smoked 0.00 packs per day. He has never used smokeless tobacco.      Appropriate preventive services were discussed with this patient, including applicable screening as appropriate for cardiovascular disease, diabetes, osteopenia/osteoporosis, and glaucoma.  As appropriate for age/gender, discussed screening for colorectal cancer, prostate cancer, breast cancer, and cervical cancer. Checklist reviewing preventive services available has been given to the patient.    Reviewed patients plan of care and provided an AVS. The Basic Care Plan (routine screening as documented in Health Maintenance) for Ciro meets the Care Plan " requirement. This Care Plan has been established and reviewed with the Patient.    Counseling Resources:  ATP IV Guidelines  Pooled Cohorts Equation Calculator  Breast Cancer Risk Calculator  Breast Cancer: Medication to Reduce Risk  FRAX Risk Assessment  ICSI Preventive Guidelines  Dietary Guidelines for Americans, 2010  USDA's MyPlate  ASA Prophylaxis  Lung CA Screening    Radha Herrera MD  [facility] [place]    Identified Health Risks:

## 2022-06-09 LAB
ALBUMIN SERPL-MCNC: 4.1 G/DL (ref 3.4–5)
ALP SERPL-CCNC: 97 U/L (ref 40–150)
ALT SERPL W P-5'-P-CCNC: 59 U/L (ref 0–70)
ANION GAP SERPL CALCULATED.3IONS-SCNC: 2 MMOL/L (ref 3–14)
AST SERPL W P-5'-P-CCNC: 52 U/L (ref 0–45)
BILIRUB SERPL-MCNC: 1.2 MG/DL (ref 0.2–1.3)
BUN SERPL-MCNC: 19 MG/DL (ref 7–30)
CALCIUM SERPL-MCNC: 9 MG/DL (ref 8.5–10.1)
CHLORIDE BLD-SCNC: 107 MMOL/L (ref 94–109)
CHOLEST SERPL-MCNC: 126 MG/DL
CO2 SERPL-SCNC: 29 MMOL/L (ref 20–32)
CREAT SERPL-MCNC: 0.9 MG/DL (ref 0.66–1.25)
FASTING STATUS PATIENT QL REPORTED: NORMAL
GFR SERPL CREATININE-BSD FRML MDRD: 86 ML/MIN/1.73M2
GLUCOSE BLD-MCNC: 94 MG/DL (ref 70–99)
HDLC SERPL-MCNC: 64 MG/DL
LDLC SERPL CALC-MCNC: 51 MG/DL
NONHDLC SERPL-MCNC: 62 MG/DL
POTASSIUM BLD-SCNC: 4.2 MMOL/L (ref 3.4–5.3)
PROT SERPL-MCNC: 6.8 G/DL (ref 6.8–8.8)
SODIUM SERPL-SCNC: 138 MMOL/L (ref 133–144)
TRIGL SERPL-MCNC: 54 MG/DL
TSH SERPL DL<=0.005 MIU/L-ACNC: 1.36 MU/L (ref 0.4–4)

## 2022-06-17 ENCOUNTER — HOSPITAL ENCOUNTER (OUTPATIENT)
Dept: PHYSICAL THERAPY | Facility: CLINIC | Age: 82
Discharge: HOME | End: 2022-06-17
Attending: INTERNAL MEDICINE
Payer: COMMERCIAL

## 2022-06-17 DIAGNOSIS — R42 DIZZINESS: ICD-10-CM

## 2022-06-17 PROCEDURE — 97112 NEUROMUSCULAR REEDUCATION: CPT | Mod: GP

## 2022-06-17 PROCEDURE — 97161 PT EVAL LOW COMPLEX 20 MIN: CPT | Mod: GP

## 2022-06-17 NOTE — PROGRESS NOTES
"   06/17/22 0700   Quick Adds   Quick Adds Certification;Vestibular Eval   Type of Visit Initial OP PT Evaluation   General Information   Start of Care Date 06/17/22   Referring Physician Radha Banks MD   Orders Evaluate and Treat as Indicated   Order Date 06/08/22   Medical Diagnosis Dizziness   Onset of illness/injury or Date of Surgery 06/08/22   Surgical/Medical history reviewed Yes   Pertinent history of current vestibular problem (include personal factors and/or comorbidities that impact the POC)    (hx of migraines in his 30s; have entirely resolved)   Pertinent history of current problem (include personal factors and/or comorbidities that impact the POC) Pt presenting to OP PT at [facility] on 6/17/22; MD referral for inc dizziness. Pt subjectively reporting progressively worsening dizziness when transferring/ambulating. Pt unsure of date of onset; however, it's been going for a few years (indicated 6/8/22 as start - date of MD order). Pt reporting he particularly feels off balance when walking on uneven surfaces, and when closing eyes in the shohwer. Of note, pt has PMHx of neuropathy with limited sensation in B feet/inc pain in feet at end of the day. Pt stating he's had hearing loss and onset of tinnitus over the past few years. Pt also with PMHx of HTN; had not yet taken his AM meds at 8:00am apt time.   Prior level of function comment Pt ambulates with no AD at baseline. Reporting an occasional fall when \"tripping\" over something in his path; however, stating it hasn't happened in a while.   Current Community Support Family/friend caregiver  (wife assists with IADLs/errands)   Patient role/Employment history Retired  ()   Living environment [place]   Assistive Devices Comments Pt reporting no use of AD; however, has crutches 2' previous hip replacement/injuries.   Patient/Family Goals Statement to overcome his \"off-balance\" feelings   Fall Risk Screen   Fall screen completed by " PT   Have you fallen 2 or more times in the past year? Yes   Have you fallen and had an injury in the past year? No   Is patient a fall risk? Yes   Fall screen comments see DGI; pt at inc risk for falling   Abuse Screen (yes response referral indicated)   Feels Unsafe at Home or Work/School no   Feels Threatened by Someone no   Does Anyone Try to Keep You From Having Contact with Others or Doing Things Outside Your Home? no   Physical Signs of Abuse Present no   Patient needs abuse support services and resources No   Pain   Patient currently in pain No   Pain comments worsening pain in feet at night 2' neuropathy   Cognitive Status Examination   Orientation orientation to person, place and time   Cognitive Comment WFL   Observation   Observation Pt remained pleasant, though slightly frustrated 2' inc dizziness/off-balance behaviors.   Integumentary   Integumentary Comments Srape on his forehead; pt stating he walked into his garage door, forgetting he'd moved it half-way down. Stating he did not fall at that time.   Posture   Posture Comments Pt with slight forward head/soulders in static/dynamic stance.   Range of Motion (ROM)   ROM Comment B UEs/LEs WFL   Strength   Strength Comments 4/5 in pt's B UEs/LEs with MMT   Gait   Gait Comments PT observed dynamic gait impairment 2' poor vestibular function and poor sensation in B feet 2' peripheral neuropathy.   Gait Special Tests   Gait Special Tests DYNAMIC GAIT INDEX;OTHER  (gait speed)   Gait Special Tests Dynamic Gait Index   Score out of 24 18   Comments Pt at inc risk for falling with dynamic gait activities; 2' score <19pts.   Balance   Balance other (describe)   Balance Comments PT observed balance impairment 2' poor vestibular function and poor sensation in B feet 2' peripheral neuropathy.   Balance Special Tests   Balance Special Tests Modified CTSIB Conditions   Balance Special Tests Modified CTSIB Conditions   Condition 1, seconds 30 Seconds   Condition 2,  seconds 30 Seconds   Condition 4, seconds 30 Seconds  (in postural sway)   Condition 5, seconds   (inc postural sway; fear of falling)   Modified CTSIB Comments pt with poor vestibular function; further deficits 2' peripheral neuropathy   Sensory Examination   Sensory Perception Comments poor in bottom of feet 2' peripherial neuropathy   Coordination   Coordination Comments WFL   Muscle Tone   Muscle Tone Comments WFL   Cervicogenic Screen   Neck ROM WFL; no deficits with flex/ext or R/L rotation   Vertebral Artery Test Normal   Oculomotor Exam   Smooth Pursuit Normal   Saccades Normal   VOR Normal   VOR Comments symptomatic   VOR Cancellation Normal   VOR Cancellation Comments   (symptomatic)   Convergence Testing Abnormal   Convergence Testing Comments NPC=15cm   Dynamic Visual Acuity (DVA)   Horizontal Head Movement at 2 Hz ([place]) pt with 4 line loss on DVA at 2Hz   DVA Comments demonstrating impaired VOR function   Planned Therapy Interventions   Planned Therapy Interventions balance training;gait training;neuromuscular re-education;ROM;strengthening;stretching  (vestibular rehab)   Clinical Impression   Criteria for Skilled Therapeutic Interventions Met yes, treatment indicated   PT Diagnosis dizziness with movement, imbalance, deconditioning   Influenced by the following impairments dec balance in standing staic/dynamic positions, dec strength in B LEs, dec tolerance to physical activity, sensation changes 2' peripheal neuropathy affecting pt's balance   Functional limitations due to impairments inc risk for falling, poor safety with mobility, dec tolerance to physical activity for completion of ADLs/IADLs   Clinical Presentation Stable/Uncomplicated   Clinical Presentation Rationale stable symptoms; medically managed by MD   Clinical Decision Making (Complexity) Low complexity   Therapy Frequency 1 time/week   Predicted Duration of Therapy Intervention (days/wks) 8wks   Risk & Benefits of therapy have been  explained Yes   Patient, Family & other staff in agreement with plan of care Yes   Clinical Impression Comments Pt will benefit from skilled PT intervention for reduction of dizziness/imbalance and inc dion to physical activity, for inc safety with mobility and dec risk of falling with functional transfers/ambulation.   GOALS   PT Eval Goals 1;2;3;4;5   Goal 1   Goal Identifier vestib HEP   Goal Description Pt will demonstrate IND with gaze stabilization/adaptation, VMS/habituation, and static/dynamic balance exercises, to promote reduction of vestibular symptoms and improve safety with functional mobility.   Goal Progress initiated 6/17/2022   Target Date 08/12/22   Goal 2   Goal Identifier DGI   Goal Description Pt will score 24/24 on DGI; to demonstrate improved dynamic balance and postural control with ambulation, and decreased risk for falling with functional mobility.   Goal Progress baseline: 18/24   Target Date 08/12/22   Goal 3   Goal Identifier DVA   Goal Description Pt will demonstrate </=2 line loss on test of Dynamic Visual Acuity, demonstrating improvement in the function of the pt s VOR with dynamic head movement.   Goal Progress baseline: 4 line loss, without glasses   Target Date 08/12/22   Goal 4   Goal Identifier self reported dizziness/DHI   Goal Description Pt will decrease self-reported dizziness to </=0-2/10 with completion of daily activities and/or vestibular rehabilitation exercise program.   Goal Progress baseline: 58pts   Target Date 08/12/22   Goal 5   Goal Identifier mCTSIB   Goal Description Pt will tolerate 30s of EC/compliant surface, for demonstrated improvement in vestibular function.   Goal Progress baseline: 10s on condition #5   Target Date 08/12/22   Total Evaluation Time   PT Eval, Low Complexity Minutes (44196) 30   Therapy Certification   Certification date from 06/17/22   Certification date to 08/12/22   Medical Diagnosis dizziness   Certification I certify the need for  these services furnished under this plan of treatment and while under my care.  (Physician co-signature of this document indicates review and certification of the therapy plan).

## 2022-06-17 NOTE — PLAN OF CARE
[facility]                                                                                   OUTPATIENT PHYSICAL THERAPY FUNCTIONAL EVALUATION  PLAN OF TREATMENT FOR OUTPATIENT REHABILITATION  (COMPLETE FOR INITIAL CLAIMS ONLY)  Patient's Last Name, First Name, M.I.  YOB: 1940  SuzyCiro  LUPE     Provider's Name   [facility]   Medical Record No.  [id number]     Start of Care Date:  06/17/22   Onset Date:  06/08/22   Type:     _X__PT   ____OT  ____SLP Medical Diagnosis:  dizziness     PT Diagnosis:  dizziness with movement, imbalance, deconditioning Visits from SOC:  1                              __________________________________________________________________________________  Plan of Treatment/Functional Goals:  balance training, gait training, neuromuscular re-education, ROM, strengthening, stretching, vestibular rehab     GOALS  vestib HEP  Pt will demonstrate IND with gaze stabilization/adaptation, VMS/habituation, and static/dynamic balance exercises, to promote reduction of vestibular symptoms and improve safety with functional mobility.  08/12/22    DGI  Pt will score 24/24 on DGI; to demonstrate improved dynamic balance and postural control with ambulation, and decreased risk for falling with functional mobility.  08/12/22    DVA  Pt will demonstrate </=2 line loss on test of Dynamic Visual Acuity, demonstrating improvement in the function of the pt s VOR with dynamic head movement.  08/12/22    self reported dizziness/DHI  Pt will decrease self-reported dizziness to </=0-2/10 with completion of daily activities and/or vestibular rehabilitation exercise program.  08/12/22    mCTSIB  Pt will tolerate 30s of EC/compliant surface, for demonstrated improvement in vestibular function.  08/12/22    Therapy Frequency:  1 time/week   Predicted Duration of Therapy  Intervention:  8wks    Beatrice Jorgensen, PT, DPT                                    I CERTIFY THE NEED FOR THESE SERVICES FURNISHED UNDER        THIS PLAN OF TREATMENT AND WHILE UNDER MY CARE     (Physician co-signature of this document indicates review and certification of the therapy plan).              Certification Date From:  06/17/22   Certification Date To:  08/12/22    Referring Provider:  Radha Banks MD    Initial Assessment  See Epic Evaluation- Start of Care Date: 06/17/22

## 2022-06-17 NOTE — PROGRESS NOTES
06/17/22 1100   Signing Clinician's Name / Credentials   Signing clinician's name / credentials Beatrice Jorgensen, PT, DPT   Dynamic Gait Index (Raghavendra and Fontenot Leona, 1995)   Gait Level Surface 2   Change in Gait Speed 3   Gait and Horizontal Head Turns 1   Gait with Vertical Head Turns 2   Gait and Pivot Turns 2   Step Over Obstacle 3   Step Around Obstacles 3   Steps 2   Total Dynamic Gait Index Score  (A score of 19 or less has been correlated to an increased risk of falls in community dwelling older adults, patients with vestibular disorders, and patients with MS.)   Total Score (out of 24) 18     Dynamic Gait Index (DGI):The DGI is a measure of balance during gait that is reliable and valid for the elderly and individuals with Parkinson's disease, MS, vestibular disorders, or s/p stroke. Gait assistive device used: None    Scores ?19 /24 indicate an increased risk for falls according to Karly et al 2000  Minimal Detectable Change = 2.9 in community dwelling elderly according to Rory et al 2011

## 2022-06-28 ENCOUNTER — HOSPITAL ENCOUNTER (OUTPATIENT)
Dept: PHYSICAL THERAPY | Facility: CLINIC | Age: 82
Discharge: HOME | End: 2022-06-28
Payer: COMMERCIAL

## 2022-06-28 ENCOUNTER — APPOINTMENT (OUTPATIENT)
Dept: URBAN - METROPOLITAN AREA CLINIC 256 | Age: 82
Setting detail: DERMATOLOGY
End: 2022-06-30

## 2022-06-28 VITALS — WEIGHT: 170 LBS | HEIGHT: 70 IN

## 2022-06-28 DIAGNOSIS — R42 DIZZINESS: Primary | ICD-10-CM

## 2022-06-28 DIAGNOSIS — D485 NEOPLASM OF UNCERTAIN BEHAVIOR OF SKIN: ICD-10-CM

## 2022-06-28 DIAGNOSIS — D49.2 NEOPLASM OF UNSPECIFIED BEHAVIOR OF BONE, SOFT TISSUE, AND SKIN: ICD-10-CM

## 2022-06-28 DIAGNOSIS — L60.8 OTHER NAIL DISORDERS: ICD-10-CM

## 2022-06-28 DIAGNOSIS — L57.0 ACTINIC KERATOSIS: ICD-10-CM

## 2022-06-28 PROBLEM — L60.9 NAIL DISORDER, UNSPECIFIED: Status: ACTIVE | Noted: 2022-06-28

## 2022-06-28 PROBLEM — D48.5 NEOPLASM OF UNCERTAIN BEHAVIOR OF SKIN: Status: ACTIVE | Noted: 2022-06-28

## 2022-06-28 PROCEDURE — OTHER COUNSELING: OTHER

## 2022-06-28 PROCEDURE — OTHER LIQUID NITROGEN: OTHER

## 2022-06-28 PROCEDURE — OTHER BIOPSY BY SHAVE METHOD: OTHER

## 2022-06-28 PROCEDURE — 97112 NEUROMUSCULAR REEDUCATION: CPT | Mod: GP

## 2022-06-28 PROCEDURE — 99214 OFFICE O/P EST MOD 30 MIN: CPT | Mod: 25

## 2022-06-28 PROCEDURE — OTHER MIPS QUALITY: OTHER

## 2022-06-28 PROCEDURE — 69100 BIOPSY OF EXTERNAL EAR: CPT | Mod: 59

## 2022-06-28 PROCEDURE — 17000 DESTRUCT PREMALG LESION: CPT | Mod: 59

## 2022-06-28 PROCEDURE — 11102 TANGNTL BX SKIN SINGLE LES: CPT | Mod: 59

## 2022-06-28 PROCEDURE — OTHER PATIENT SPECIFIC COUNSELING: OTHER

## 2022-06-28 PROCEDURE — 17003 DESTRUCT PREMALG LES 2-14: CPT

## 2022-06-28 ASSESSMENT — LOCATION DETAILED DESCRIPTION DERM
LOCATION DETAILED: RIGHT CENTRAL PARIETAL SCALP
LOCATION DETAILED: LEFT MIDDLE FINGER LUNULA
LOCATION DETAILED: LEFT DORSAL WRIST
LOCATION DETAILED: LEFT ULNAR DORSAL HAND
LOCATION DETAILED: RIGHT SUPERIOR FOREHEAD
LOCATION DETAILED: LEFT INFERIOR HELIX
LOCATION DETAILED: LEFT SUPERIOR HELIX
LOCATION DETAILED: SUPERIOR MID FOREHEAD
LOCATION DETAILED: LEFT LATERAL TEMPLE
LOCATION DETAILED: RIGHT CENTRAL MALAR CHEEK
LOCATION DETAILED: LEFT NASAL SIDEWALL

## 2022-06-28 ASSESSMENT — LOCATION SIMPLE DESCRIPTION DERM
LOCATION SIMPLE: RIGHT CHEEK
LOCATION SIMPLE: SCALP
LOCATION SIMPLE: LEFT HAND
LOCATION SIMPLE: LEFT MIDDLE FINGERNAIL
LOCATION SIMPLE: LEFT TEMPLE
LOCATION SIMPLE: RIGHT FOREHEAD
LOCATION SIMPLE: LEFT WRIST
LOCATION SIMPLE: LEFT NOSE
LOCATION SIMPLE: SUPERIOR FOREHEAD
LOCATION SIMPLE: LEFT EAR

## 2022-06-28 ASSESSMENT — LOCATION ZONE DERM
LOCATION ZONE: HAND
LOCATION ZONE: NOSE
LOCATION ZONE: ARM
LOCATION ZONE: FACE
LOCATION ZONE: EAR
LOCATION ZONE: SCALP
LOCATION ZONE: FINGERNAIL

## 2022-06-28 NOTE — PROCEDURE: BIOPSY BY SHAVE METHOD
Post-Care Instructions: I reviewed with the patient in detail post-care instructions. Patient is to keep the biopsy site dry overnight, and then apply bacitracin twice daily until healed. Patient may apply hydrogen peroxide soaks to remove any crusting.
Billing Type: Third-Party Bill
Anesthesia Volume In Cc (Will Not Render If 0): 0.3
Validate That Anesthesia Is Not 0: No
Additional Anesthesia Volume In Cc (Will Not Render If 0): 0
Dressing: bandage
Depth Of Biopsy: dermis
Curettage Text: The wound bed was treated with curettage after the biopsy was performed.
Consent: Written consent was obtained and risks were reviewed including but not limited to scarring, infection, bleeding, scabbing, incomplete removal, nerve damage and allergy to anesthesia.
Biopsy Type: H and E
Was A Bandage Applied: Yes
Electrodesiccation Text: The wound bed was treated with electrodesiccation after the biopsy was performed.
Biopsy Method: Dermablade
Anesthesia Type: 1% lidocaine with epinephrine and a 1:10 solution of 8.4% sodium bicarbonate
Notification Instructions: Patient will be notified of biopsy results. However, patient instructed to call the office if not contacted within 2 weeks.
Information: Selecting Yes will display possible errors in your note based on the variables you have selected. This validation is only offered as a suggestion for you. PLEASE NOTE THAT THE VALIDATION TEXT WILL BE REMOVED WHEN YOU FINALIZE YOUR NOTE. IF YOU WANT TO FAX A PRELIMINARY NOTE YOU WILL NEED TO TOGGLE THIS TO 'NO' IF YOU DO NOT WANT IT IN YOUR FAXED NOTE.
Type Of Destruction Used: Curettage
Detail Level: Detailed
Hemostasis: Electrocautery
Electrodesiccation And Curettage Text: The wound bed was treated with electrodesiccation and curettage after the biopsy was performed.
Path Notes (To The Dermatopathologist): Please check margins
Wound Care: Petrolatum
Cryotherapy Text: The wound bed was treated with cryotherapy after the biopsy was performed.
Silver Nitrate Text: The wound bed was treated with silver nitrate after the biopsy was performed.

## 2022-06-28 NOTE — PROCEDURE: LIQUID NITROGEN
Render Note In Bullet Format When Appropriate: No
Consent: The patient's consent was obtained including but not limited to risks of crusting, scabbing, blistering, scarring, darker or lighter pigmentary change, recurrence, incomplete removal and infection.
Post-Care Instructions: I reviewed with the patient in detail post-care instructions. Patient is to wear sunprotection, and avoid picking at any of the treated lesions. Pt may apply Vaseline to crusted or scabbing areas.
Duration Of Freeze Thaw-Cycle (Seconds): 10
Number Of Freeze-Thaw Cycles: 3 freeze-thaw cycles
Show Aperture Variable?: Yes
Detail Level: Detailed

## 2022-06-28 NOTE — PROCEDURE: PATIENT SPECIFIC COUNSELING
Detail Level: Zone
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 The pigment in the nail plate appears to be hemorrhage. Onychopapilloma is typically associated with longitudinal erythronychia rather than hemorrhage, so I do think that biopsy would be helpful here. I will discuss with WDT. There is a palpable mass under the nail but not really under the proximal nail fold so I would suspect the tumor to be located in the nail bed rather than the matrix.

## 2022-07-02 DIAGNOSIS — I10 ESSENTIAL HYPERTENSION WITH GOAL BLOOD PRESSURE LESS THAN 140/90: ICD-10-CM

## 2022-07-06 DIAGNOSIS — H40.233 INTERMITTENT PRIMARY ANGLE-CLOSURE GLAUCOMA OF BOTH EYES: ICD-10-CM

## 2022-07-06 RX ORDER — AMLODIPINE BESYLATE 10 MG/1
TABLET ORAL
Qty: 90 TABLET | Refills: 3 | OUTPATIENT
Start: 2022-07-06

## 2022-07-07 RX ORDER — LATANOPROST 50 UG/ML
1 SOLUTION/ DROPS OPHTHALMIC AT BEDTIME
Qty: 2.5 ML | Refills: 0 | Status: CANCELLED | OUTPATIENT
Start: 2022-07-07

## 2022-07-07 NOTE — TELEPHONE ENCOUNTER
Call to pharmacy after receiving paper refill request, message left of refill being declined, needs to be seen for further refills.  Routed to clinic scheduling for follow up  Last Clinic Visit: 1/25/2021  [facility]  Was to follow up in February  No upcoming appointments  Received linda refill with last fill

## 2022-07-11 ENCOUNTER — LAB (OUTPATIENT)
Dept: LAB | Facility: CLINIC | Age: 82
End: 2022-07-11
Payer: COMMERCIAL

## 2022-07-11 DIAGNOSIS — E29.1 TESTICULAR HYPOFUNCTION: ICD-10-CM

## 2022-07-11 LAB — HGB BLD-MCNC: 13.7 G/DL (ref 13.3–17.7)

## 2022-07-11 PROCEDURE — 36415 COLL VENOUS BLD VENIPUNCTURE: CPT

## 2022-07-11 PROCEDURE — 85018 HEMOGLOBIN: CPT

## 2022-07-12 ENCOUNTER — HOSPITAL ENCOUNTER (OUTPATIENT)
Dept: PHYSICAL THERAPY | Facility: CLINIC | Age: 82
Discharge: HOME | End: 2022-07-12
Payer: COMMERCIAL

## 2022-07-12 DIAGNOSIS — R42 DIZZINESS: Primary | ICD-10-CM

## 2022-07-12 DIAGNOSIS — R26.89 IMBALANCE: ICD-10-CM

## 2022-07-12 PROCEDURE — 97112 NEUROMUSCULAR REEDUCATION: CPT | Mod: GP

## 2022-07-13 ENCOUNTER — OFFICE VISIT (OUTPATIENT)
Dept: CARDIOLOGY | Facility: CLINIC | Age: 82
End: 2022-07-13
Attending: INTERNAL MEDICINE
Payer: COMMERCIAL

## 2022-07-13 VITALS
HEIGHT: 70 IN | OXYGEN SATURATION: 97 % | SYSTOLIC BLOOD PRESSURE: 128 MMHG | WEIGHT: 169 LBS | DIASTOLIC BLOOD PRESSURE: 66 MMHG | HEART RATE: 83 BPM | BODY MASS INDEX: 24.2 KG/M2

## 2022-07-13 DIAGNOSIS — I35.0 AORTIC VALVE STENOSIS, ETIOLOGY OF CARDIAC VALVE DISEASE UNSPECIFIED: ICD-10-CM

## 2022-07-13 DIAGNOSIS — I42.9 CARDIOMYOPATHY, UNSPECIFIED TYPE (H): ICD-10-CM

## 2022-07-13 PROCEDURE — 93000 ELECTROCARDIOGRAM COMPLETE: CPT | Performed by: INTERNAL MEDICINE

## 2022-07-13 PROCEDURE — 99205 OFFICE O/P NEW HI 60 MIN: CPT | Mod: 25 | Performed by: INTERNAL MEDICINE

## 2022-07-13 NOTE — PROGRESS NOTES
HISTORY:    Ciro Almonte is a pleasant 82-year-old male with a history of palpitations, elevated calcium score, and cardiomyopathy.  He has previously been cared for elsewhere and is transferring care to our system and is here today to establish that care.    Ciro seen for palpitations about 7 years ago and was started on a beta-blocker.  His monitors at that time showed very frequent PACs but no other significant arrhythmias.  The beta-blockers were successful at suppressing his arrhythmias but he did have some bradycardia and these were eventually stopped.  He states that he checks his pulse periodically and he notices some irregularity but otherwise he is not aware of any sense of palpitations.    Ciro reports that he is been having dizziness for many years and is now seeing the dizzy clinic.  His dizziness is if he looks up or closes his eyes, clearly not of cardiac origin.  He reports that he remains physically active.  He has a large garden and says that he walks an average of about 5 miles a day doing various chores.  He does not like to sit and he stays busy all day on his feet most of the time.  He has no difficulties with dyspnea on exertion easy fatigability etc.  He also denies any symptoms of exertional chest arm neck shoulder or jaw discomfort, palpitations, PND/orthopnea, syncope or near syncope, strokelike symptoms, or symptoms of claudication.  He does have some very mild dependent intermittent peripheral edema.    Multiple old records are reviewed including an echo done in 1220 at [facility] showing a normal ejection fraction with grade 2 diastolic dysfunction biatrial enlargement and what was described as mild AS with a mean gradient of 10 but an aortic valve area of 2.7.  He had a Holter monitor done in 1220 showing some bradycardia and chronotropic incompetence with some brief episodes of asymptomatic SVT the longest being just 5 beats.  He had an echocardiogram repeated last December  through [facility] and I reviewed those results.  His ejection fraction on that study was estimated to be 40% and he was again noted to have mild AS with a mean gradient of 12, mild AI, mild MR, and severe left atrial enlargement.  Unfortunately I do not have availability of echo images to review those myself.  However he had a cardiac MRI done which I also reviewed.  It showed a mildly dilated left ventricle with an ejection fraction of 52% no regional wall motion abnormality and no fibrosis or infarction or other myocardial abnormalities.    ECG done today shows sinus rhythm with frequent PACs, otherwise normal.      ASSESSMENT/PLAN:    1.  Idiopathic cardiomyopathy.  Mildly reduced ejection fraction at 52% by MRI.  He is on lisinopril.  Beta-blockers were not used because of previous intolerance.  Patient has an excellent exercise capacity and is completely asymptomatic.  I think is very unlikely that he will ever have progression of his cardiomyopathy but I warned him of potential symptoms should this occur.  I will plan a follow-up echo in 1 year followed by an office visit.  He will contact me if he notices a decrease in his stamina between now and then.  For today no medication changes are needed.  He asked about the possibility of a pacemaker which was mentioned to him by his last cardiologist in the event that his EF drops further and he requires beta-blocker therapy.  I reminded him that  this is very unlikely.  2.  Frequent PACs.  Probably secondary to his left atrial enlargement.  He is at risk of atrial fibrillation in the future, but this is never been documented in the past and I did not discuss this in detail with him today.  No therapy is needed since these are asymptomatic.  3.  Aortic stenosis.  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  The echo in 1 year we will reevaluate that.    Thank you for inviting me to participate in the care of your patient.   Please don't hesitate to call if I can be of further assistance.  66 minutes were spent today reviewing the chart and other records, interviewing and examining the patient, and documenting our visit.    Chart documentation was completed, in part, with [employer] voice-recognition software. Even though reviewed, some grammatical, spelling, and word errors may remain.       Orders Placed This Encounter   Procedures     Follow-Up with Cardiology     EKG 12-lead complete w/read - Clinics     Echocardiogram Complete     No orders of the defined types were placed in this encounter.    There are no discontinued medications.    10 year ASCVD risk: The ASCVD Risk score (Elmira HUGO Jr., et al., 2013) failed to calculate for the following reasons:    The 2013 ASCVD risk score is only valid for ages 40 to 79    Encounter Diagnoses   Name Primary?     Cardiomyopathy, unspecified type (H)      Aortic valve stenosis, etiology of cardiac valve disease unspecified        CURRENT MEDICATIONS:  Current Outpatient Medications   Medication Sig Dispense Refill     Acetaminophen (TYLENOL PO) Take 1,000 mg by mouth 2 times daily       amLODIPine (NORVASC) 10 MG tablet Take 1 tablet (10 mg) by mouth daily 90 tablet 3     ASPIRIN PO Take 81 mg by mouth       atorvastatin (LIPITOR) 20 MG tablet Take 1 tablet (20 mg) by mouth daily 90 tablet 3     Calcium Citrate-Vitamin D (CITRACAL + D PO) Take 2 tablets by mouth daily       diclofenac (VOLTAREN) 75 MG EC tablet TAKE 2 TABLETS BY MOUTH AS NEEDED FOR MODERATE PAIN 180 tablet 0     gabapentin (NEURONTIN) 600 MG tablet TAKE 1/2 TABLETS BY MOUTH EVERY MORNING AND 1 TABLET EVERY NIGHT AT BEDTIME. (Patient taking differently: TAKE 1/2 TABLETS BY MOUTH EVERY MORNING AND 1/2 TABLET EVERY NIGHT AT BEDTIME.) 180 tablet 1     latanoprost (XALATAN) 0.005 % ophthalmic solution Place 1 drop into both eyes At Bedtime For additional refills, please schedule a follow-up appointment at [phone]. 2.5 mL 0      lisinopril (ZESTRIL) 20 MG tablet Take 1 tablet (20 mg) by mouth daily 90 tablet 3     Magnesium 500 MG CAPS        Multiple Vitamins-Minerals (MENS MULTIVITAMIN PLUS PO) Take 1 tablet by mouth daily       Nutritional Supplements (PROSTATE 2.4) CAPS Take 2 capsules by mouth daily Prostate cap 2.0       rOPINIRole (REQUIP) 0.25 MG tablet TAKE 1 TO 2 TABLETS(0.25 TO 0.5 MG) BY MOUTH AT BEDTIME 180 tablet 11     sildenafil (REVATIO) 20 MG tablet Take 3-5 tabs 30-60 minutes prior to intercourse. 30 tablet 11     Testosterone 1.62 % GEL APPLY 2 PUMPS EACH ARM D         ALLERGIES   No Known Allergies    PAST MEDICAL HISTORY:  Past Medical History:   Diagnosis Date     Cancer (H) 2000    L shoulder melanoma     Glaucoma      Heart murmur      Hypertension      Melanoma (H) 2000    Left shoulder - Dr. Carrillo - follows q6 months     Neuropathy      Nonsenile cataract      Other chronic pain     Neuropathy bilateral feet     Other premature beats      PVC's (premature ventricular contractions) 5/14/2015     Zoster 2000       PAST SURGICAL HISTORY:  Past Surgical History:   Procedure Laterality Date     ADULT DERMATOLOGY REFERRAL  2000    melanoma resection L shoulder     ARTHROPLASTY HIP ANTERIOR Left 11/14/2014    Procedure: ARTHROPLASTY HIP ANTERIOR;  Surgeon: Rudy Tobias MD;  Location:  OR     ARTHROPLASTY REVISION HIP  3/12/2014     right Procedure: ARTHROPLASTY REVISION HIP;  Surgeon: Rudy Tobias MD;  Location:  OR     ARTHROSCOPY KNEE  2002    meniscus ( side?)     BIOPSY OF PROSTATE,NEEDLE/PUNCH       both hips replaced       CATARACT IOL, RT/LT Bilateral      COLONOSCOPY N/A 5/28/2020    Procedure: COLONOSCOPY (fv) (please mail packet as soon as possible) fv;  Surgeon: Kevin Mccarty MD;  Location:  GI     DISCECTOMY LUMBAR POSTERIOR MICROSCOPIC ONE LEVEL N/A 4/11/2016    Procedure: DISCECTOMY LUMBAR POSTERIOR MICROSCOPIC ONE LEVEL;  Surgeon: Ruben Marsh MD;  Location:  OR     ENT SURGERY       Tonsils     ORTHOPEDIC SURGERY  2008    R hip replacement     ORTHOPEDIC SURGERY  2004    big toe in lawn mower, needed reconstructive surgery     PHACOEMULSIFICATION CLEAR CORNEA WITH TORIC INTRAOCULAR LENS IMPLANT Left 2019    Procedure: LEFT EYE, CATARACT EXTRACTION WITH TORIC INTRAOCULAR LENS IMPLANT;  Surgeon: Shannon Yang MD;  Location: [facility] OR     PHACOEMULSIFICATION CLEAR CORNEA WITH TORIC INTRAOCULAR LENS IMPLANT Right 2019    Procedure: RIGHT EYE, CATARACT EXTRACTION WITH TORIC INTRAOCULAR LENS IMPLANT;  Surgeon: Shannon Yang MD;  Location: [facility] OR     TRABECULAR MICRO-BYPASS WITH ISTENT Left 2019    Procedure: LEFT EYE, ISTENT INSERTION;  Surgeon: Shannon Yang MD;  Location: [facility] OR     TRABECULAR MICRO-BYPASS WITH ISTENT Right 2019    Procedure: RIGHT EYE, ISTENT INSERTION;  Surgeon: Shannon Yang MD;  Location: [facility] OR     ZZC TOTAL HIP ARTHROPLASTY Right        FAMILY HISTORY:  Family History   Problem Relation Age of Onset     Cerebrovascular Disease Mother      Hypertension Mother      Glaucoma Mother      Heart Disease Father 58        MI     Cancer Father         lung cancer;  age 91     Colon Cancer No family hx of      Diabetes No family hx of      Macular Degeneration No family hx of        SOCIAL HISTORY:  Social History     Socioeconomic History     Marital status:      Spouse name: None     Number of children: 2     Years of education: None     Highest education level: None   Occupational History     Occupation:      Employer: RETIRED   Tobacco Use     Smoking status: Former Smoker     Packs/day: 0.00     Quit date: 1976     Years since quittin.0     Smokeless tobacco: Never Used   Substance and Sexual Activity     Alcohol use: No     Alcohol/week: 0.0 standard drinks     Comment: Quit 1014     Drug use: No     Sexual activity: Not Currently     Partners: Female   Other Topics Concern     Parent/sibling w/  "CABG, MI or angioplasty before 65F 55M? No     Social Determinants of Health     Financial Resource Strain: Low Risk      Difficulty of Paying Living Expenses: Not hard at all   Food Insecurity: No Food Insecurity     Worried About Running Out of Food in the Last Year: Never true     Ran Out of Food in the Last Year: Never true   Transportation Needs: No Transportation Needs     Lack of Transportation (Medical): No     Lack of Transportation (Non-Medical): No   Physical Activity: Sufficiently Active     Days of Exercise per Week: 7 days     Minutes of Exercise per Session: 140 min   Stress: No Stress Concern Present     Feeling of Stress : Only a little   Social Connections: Unknown     Attends Synagogue Services: More than 4 times per year     Active Member of Clubs or Organizations: Yes     Marital Status:    Housing Stability: Low Risk      Unable to Pay for Housing in the Last Year: No     Number of Places Lived in the Last Year: 1     Unstable Housing in the Last Year: No       Review of Systems:  Skin:  Positive for bruising   Eyes:  Positive for glasses;glaucoma  ENT:  Negative    Respiratory:  Negative    Cardiovascular:  Negative for;chest pain fatigue;dizziness;Positive for;palpitations  Gastroenterology: Negative for hematochezia;melena  Genitourinary:  Negative for hematuria  Musculoskeletal:  Positive for back pain;arthritis;joint pain  Neurologic:  Positive for numbness or tingling of hands  Psychiatric:  not assessed    Heme/Lymph/Imm:  Negative for bleeding disorder  Endocrine:  Negative for      Physical Exam:  Vitals: /66   Pulse 83   Ht 1.778 m (5' 10\")   Wt 76.7 kg (169 lb)   SpO2 97%   BMI 24.25 kg/m      Constitutional:  cooperative, alert and oriented, well developed, well nourished, in no acute distress   Fit in appearance    Skin:  warm and dry to the touch, no apparent skin lesions or masses noted        Head:  normocephalic, no masses or lesions        Eyes:  pupils equal " and round, conjunctivae and lids unremarkable, sclera white, no xanthalasma, EOMS intact, no nystagmus        ENT:  no pallor or cyanosis   Masked    Neck:  carotid pulses are full and equal bilaterally, JVP normal, no carotid bruit        Chest:  normal breath sounds, clear to auscultation, normal A-P diameter, normal symmetry, normal respiratory excursion, no use of accessory muscles        Cardiac: regular rhythm;normal S1 and S2;no S3 or S4;apical impulse not displaced       systolic murmur;grade 1;apical          Abdomen:  abdomen soft;BS normoactive        Vascular: pulses full and equal, no bruits auscultated                                      Extremities and Muscular Skeletal:        bilateral LE edema;trace     Neurological:  no gross motor deficits        Psych:  affect appropriate, oriented to time, person and place     Recent Lab Results:  LIPID RESULTS:  Lab Results   Component Value Date    CHOL 126 06/08/2022    CHOL 161 02/15/2021    HDL 64 06/08/2022    HDL 76 02/15/2021    LDL 51 06/08/2022    LDL 71 02/15/2021    TRIG 54 06/08/2022    TRIG 68 02/15/2021    CHOLHDLRATIO 3.1 07/02/2015       LIVER ENZYME RESULTS:  Lab Results   Component Value Date    AST 52 (H) 06/08/2022    AST 25 02/15/2021    ALT 59 06/08/2022    ALT 30 02/15/2021       CBC RESULTS:  Lab Results   Component Value Date    WBC 4.7 06/08/2022    WBC 7.6 11/18/2020    RBC 4.32 (L) 06/08/2022    RBC 4.31 (L) 11/18/2020    HGB 13.7 07/11/2022    HGB 13.1 (L) 06/11/2021    HCT 41.7 06/08/2022    HCT 41.4 11/18/2020    MCV 97 06/08/2022    MCV 96 11/18/2020    MCH 32.6 06/08/2022    MCH 31.8 11/18/2020    MCHC 33.8 06/08/2022    MCHC 33.1 11/18/2020    RDW 13.6 06/08/2022    RDW 13.4 11/18/2020     06/08/2022     11/18/2020       BMP RESULTS:  Lab Results   Component Value Date     06/08/2022     02/15/2021    POTASSIUM 4.2 06/08/2022    POTASSIUM 4.3 02/15/2021    CHLORIDE 107 06/08/2022    CHLORIDE 107  02/15/2021    CO2 29 06/08/2022    CO2 27 02/15/2021    ANIONGAP 2 (L) 06/08/2022    ANIONGAP 6 02/15/2021    GLC 94 06/08/2022    GLC 88 02/15/2021    BUN 19 06/08/2022    BUN 19 02/15/2021    CR 0.90 06/08/2022    CR 0.91 02/15/2021    GFRESTIMATED 86 06/08/2022    GFRESTIMATED 80 02/15/2021    GFRESTBLACK >90 02/15/2021    EDY 9.0 06/08/2022    EDY 9.2 02/15/2021        A1C RESULTS:  Lab Results   Component Value Date    A1C 5.4 02/15/2021       INR RESULTS:  No results found for: INR      Kishor Lord MD, FACC    CC  Radha Banks MD  [address] [place] [address]

## 2022-07-13 NOTE — LETTER
7/13/2022    Radha Herrera MD  [address] [place] [address]    RE: Ciro Almonte       Dear Colleague,     I had the pleasure of seeing Ciro Almonte in the [facility].  HISTORY:    Ciro Almonte is a pleasant 82-year-old male with a history of palpitations, elevated calcium score, and cardiomyopathy.  He has previously been cared for elsewhere and is transferring care to our system and is here today to establish that care.    Ciro seen for palpitations about 7 years ago and was started on a beta-blocker.  His monitors at that time showed very frequent PACs but no other significant arrhythmias.  The beta-blockers were successful at suppressing his arrhythmias but he did have some bradycardia and these were eventually stopped.  He states that he checks his pulse periodically and he notices some irregularity but otherwise he is not aware of any sense of palpitations.    Ciro reports that he is been having dizziness for many years and is now seeing the dizzy clinic.  His dizziness is if he looks up or closes his eyes, clearly not of cardiac origin.  He reports that he remains physically active.  He has a large garden and says that he walks an average of about 5 miles a day doing various chores.  He does not like to sit and he stays busy all day on his feet most of the time.  He has no difficulties with dyspnea on exertion easy fatigability etc.  He also denies any symptoms of exertional chest arm neck shoulder or jaw discomfort, palpitations, PND/orthopnea, syncope or near syncope, strokelike symptoms, or symptoms of claudication.  He does have some very mild dependent intermittent peripheral edema.    Multiple old records are reviewed including an echo done in 1220 at [facility] showing a normal ejection fraction with grade 2 diastolic dysfunction biatrial enlargement and what was described as mild AS with a mean gradient of 10 but an aortic valve area of 2.7.  He had  a Holter monitor done in 1220 showing some bradycardia and chronotropic incompetence with some brief episodes of asymptomatic SVT the longest being just 5 beats.  He had an echocardiogram repeated last December through [facility] and I reviewed those results.  His ejection fraction on that study was estimated to be 40% and he was again noted to have mild AS with a mean gradient of 12, mild AI, mild MR, and severe left atrial enlargement.  Unfortunately I do not have availability of echo images to review those myself.  However he had a cardiac MRI done which I also reviewed.  It showed a mildly dilated left ventricle with an ejection fraction of 52% no regional wall motion abnormality and no fibrosis or infarction or other myocardial abnormalities.    ECG done today shows sinus rhythm with frequent PACs, otherwise normal.      ASSESSMENT/PLAN:    1.  Idiopathic cardiomyopathy.  Mildly reduced ejection fraction at 52% by MRI.  He is on lisinopril.  Beta-blockers were not used because of previous intolerance.  Patient has an excellent exercise capacity and is completely asymptomatic.  I think is very unlikely that he will ever have progression of his cardiomyopathy but I warned him of potential symptoms should this occur.  I will plan a follow-up echo in 1 year followed by an office visit.  He will contact me if he notices a decrease in his stamina between now and then.  For today no medication changes are needed.  He asked about the possibility of a pacemaker which was mentioned to him by his last cardiologist in the event that his EF drops further and he requires beta-blocker therapy.  I reminded him that  this is very unlikely.  2.  Frequent PACs.  Probably secondary to his left atrial enlargement.  He is at risk of atrial fibrillation in the future, but this is never been documented in the past and I did not discuss this in detail with him today.  No therapy is needed since these are asymptomatic.  3.  Aortic stenosis.   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  The echo in 1 year we will reevaluate that.    Thank you for inviting me to participate in the care of your patient.  Please don't hesitate to call if I can be of further assistance.  66 minutes were spent today reviewing the chart and other records, interviewing and examining the patient, and documenting our visit.    Chart documentation was completed, in part, with [employer] voice-recognition software. Even though reviewed, some grammatical, spelling, and word errors may remain.       Orders Placed This Encounter   Procedures     Follow-Up with Cardiology     EKG 12-lead complete w/read - Clinics     Echocardiogram Complete     No orders of the defined types were placed in this encounter.    There are no discontinued medications.    10 year ASCVD risk: The ASCVD Risk score (Elmira DC Jr., et al., 2013) failed to calculate for the following reasons:    The 2013 ASCVD risk score is only valid for ages 40 to 79    Encounter Diagnoses   Name Primary?     Cardiomyopathy, unspecified type (H)      Aortic valve stenosis, etiology of cardiac valve disease unspecified        CURRENT MEDICATIONS:  Current Outpatient Medications   Medication Sig Dispense Refill     Acetaminophen (TYLENOL PO) Take 1,000 mg by mouth 2 times daily       amLODIPine (NORVASC) 10 MG tablet Take 1 tablet (10 mg) by mouth daily 90 tablet 3     ASPIRIN PO Take 81 mg by mouth       atorvastatin (LIPITOR) 20 MG tablet Take 1 tablet (20 mg) by mouth daily 90 tablet 3     Calcium Citrate-Vitamin D (CITRACAL + D PO) Take 2 tablets by mouth daily       diclofenac (VOLTAREN) 75 MG EC tablet TAKE 2 TABLETS BY MOUTH AS NEEDED FOR MODERATE PAIN 180 tablet 0     gabapentin (NEURONTIN) 600 MG tablet TAKE 1/2 TABLETS BY MOUTH EVERY MORNING AND 1 TABLET EVERY NIGHT AT BEDTIME. (Patient taking differently: TAKE 1/2 TABLETS BY MOUTH EVERY MORNING AND 1/2 TABLET EVERY NIGHT AT  BEDTIME.) 180 tablet 1     latanoprost (XALATAN) 0.005 % ophthalmic solution Place 1 drop into both eyes At Bedtime For additional refills, please schedule a follow-up appointment at [phone]. 2.5 mL 0     lisinopril (ZESTRIL) 20 MG tablet Take 1 tablet (20 mg) by mouth daily 90 tablet 3     Magnesium 500 MG CAPS        Multiple Vitamins-Minerals (MENS MULTIVITAMIN PLUS PO) Take 1 tablet by mouth daily       Nutritional Supplements (PROSTATE 2.4) CAPS Take 2 capsules by mouth daily Prostate cap 2.0       rOPINIRole (REQUIP) 0.25 MG tablet TAKE 1 TO 2 TABLETS(0.25 TO 0.5 MG) BY MOUTH AT BEDTIME 180 tablet 11     sildenafil (REVATIO) 20 MG tablet Take 3-5 tabs 30-60 minutes prior to intercourse. 30 tablet 11     Testosterone 1.62 % GEL APPLY 2 PUMPS EACH ARM D         ALLERGIES   No Known Allergies    PAST MEDICAL HISTORY:  Past Medical History:   Diagnosis Date     Cancer (H) 2000    L shoulder melanoma     Glaucoma      Heart murmur      Hypertension      Melanoma (H) 2000    Left shoulder - Dr. Carrillo - follows q6 months     Neuropathy      Nonsenile cataract      Other chronic pain     Neuropathy bilateral feet     Other premature beats      PVC's (premature ventricular contractions) 5/14/2015     Zoster 2000       PAST SURGICAL HISTORY:  Past Surgical History:   Procedure Laterality Date     ADULT DERMATOLOGY REFERRAL  2000    melanoma resection L shoulder     ARTHROPLASTY HIP ANTERIOR Left 11/14/2014    Procedure: ARTHROPLASTY HIP ANTERIOR;  Surgeon: Rudy Tobias MD;  Location:  OR     ARTHROPLASTY REVISION HIP  3/12/2014     right Procedure: ARTHROPLASTY REVISION HIP;  Surgeon: Rudy Tobias MD;  Location: [facility] OR     ARTHROSCOPY KNEE  2002    meniscus ( side?)     BIOPSY OF PROSTATE,NEEDLE/PUNCH       both hips replaced       CATARACT IOL, RT/LT Bilateral      COLONOSCOPY N/A 5/28/2020    Procedure: COLONOSCOPY (fv) (please mail packet as soon as possible) fv;  Surgeon: Kevin Mccarty MD;   Location: [facility] GI     DISCECTOMY LUMBAR POSTERIOR MICROSCOPIC ONE LEVEL N/A 2016    Procedure: DISCECTOMY LUMBAR POSTERIOR MICROSCOPIC ONE LEVEL;  Surgeon: Ruben Marsh MD;  Location: [facility] OR     ENT SURGERY      Tonsils     ORTHOPEDIC SURGERY      R hip replacement     ORTHOPEDIC SURGERY      big toe in lawn mower, needed reconstructive surgery     PHACOEMULSIFICATION CLEAR CORNEA WITH TORIC INTRAOCULAR LENS IMPLANT Left 2019    Procedure: LEFT EYE, CATARACT EXTRACTION WITH TORIC INTRAOCULAR LENS IMPLANT;  Surgeon: Shannon Yang MD;  Location: [facility]     PHACOEMULSIFICATION CLEAR CORNEA WITH TORIC INTRAOCULAR LENS IMPLANT Right 2019    Procedure: RIGHT EYE, CATARACT EXTRACTION WITH TORIC INTRAOCULAR LENS IMPLANT;  Surgeon: Shannon Yang MD;  Location: [facility] OR     TRABECULAR MICRO-BYPASS WITH ISTENT Left 2019    Procedure: LEFT EYE, ISTENT INSERTION;  Surgeon: Shannon Yang MD;  Location: [facility] OR     TRABECULAR MICRO-BYPASS WITH ISTENT Right 2019    Procedure: RIGHT EYE, ISTENT INSERTION;  Surgeon: Shannon Yang MD;  Location: [facility] OR     ZZC TOTAL HIP ARTHROPLASTY Right        FAMILY HISTORY:  Family History   Problem Relation Age of Onset     Cerebrovascular Disease Mother      Hypertension Mother      Glaucoma Mother      Heart Disease Father 58        MI     Cancer Father         lung cancer;  age 91     Colon Cancer No family hx of      Diabetes No family hx of      Macular Degeneration No family hx of        SOCIAL HISTORY:  Social History     Socioeconomic History     Marital status:      Spouse name: None     Number of children: 2     Years of education: None     Highest education level: None   Occupational History     Occupation:      Employer: RETIRED   Tobacco Use     Smoking status: Former Smoker     Packs/day: 0.00     Quit date: 1976     Years since quittin.0     Smokeless tobacco: Never Used   Substance and  "Sexual Activity     Alcohol use: No     Alcohol/week: 0.0 standard drinks     Comment: Quit 8/1014     Drug use: No     Sexual activity: Not Currently     Partners: Female   Other Topics Concern     Parent/sibling w/ CABG, MI or angioplasty before 65F 55M? No     Social Determinants of Health     Financial Resource Strain: Low Risk      Difficulty of Paying Living Expenses: Not hard at all   Food Insecurity: No Food Insecurity     Worried About Running Out of Food in the Last Year: Never true     Ran Out of Food in the Last Year: Never true   Transportation Needs: No Transportation Needs     Lack of Transportation (Medical): No     Lack of Transportation (Non-Medical): No   Physical Activity: Sufficiently Active     Days of Exercise per Week: 7 days     Minutes of Exercise per Session: 140 min   Stress: No Stress Concern Present     Feeling of Stress : Only a little   Social Connections: Unknown     Attends Church Services: More than 4 times per year     Active Member of Clubs or Organizations: Yes     Marital Status:    Housing Stability: Low Risk      Unable to Pay for Housing in the Last Year: No     Number of Places Lived in the Last Year: 1     Unstable Housing in the Last Year: No       Review of Systems:  Skin:  Positive for bruising   Eyes:  Positive for glasses;glaucoma  ENT:  Negative    Respiratory:  Negative    Cardiovascular:  Negative for;chest pain fatigue;dizziness;Positive for;palpitations  Gastroenterology: Negative for hematochezia;melena  Genitourinary:  Negative for hematuria  Musculoskeletal:  Positive for back pain;arthritis;joint pain  Neurologic:  Positive for numbness or tingling of hands  Psychiatric:  not assessed    Heme/Lymph/Imm:  Negative for bleeding disorder  Endocrine:  Negative for      Physical Exam:  Vitals: /66   Pulse 83   Ht 1.778 m (5' 10\")   Wt 76.7 kg (169 lb)   SpO2 97%   BMI 24.25 kg/m      Constitutional:  cooperative, alert and oriented, well " developed, well nourished, in no acute distress   Fit in appearance    Skin:  warm and dry to the touch, no apparent skin lesions or masses noted        Head:  normocephalic, no masses or lesions        Eyes:  pupils equal and round, conjunctivae and lids unremarkable, sclera white, no xanthalasma, EOMS intact, no nystagmus        ENT:  no pallor or cyanosis   Masked    Neck:  carotid pulses are full and equal bilaterally, JVP normal, no carotid bruit        Chest:  normal breath sounds, clear to auscultation, normal A-P diameter, normal symmetry, normal respiratory excursion, no use of accessory muscles        Cardiac: regular rhythm;normal S1 and S2;no S3 or S4;apical impulse not displaced       systolic murmur;grade 1;apical          Abdomen:  abdomen soft;BS normoactive        Vascular: pulses full and equal, no bruits auscultated                                      Extremities and Muscular Skeletal:        bilateral LE edema;trace     Neurological:  no gross motor deficits        Psych:  affect appropriate, oriented to time, person and place     Recent Lab Results:  LIPID RESULTS:  Lab Results   Component Value Date    CHOL 126 06/08/2022    CHOL 161 02/15/2021    HDL 64 06/08/2022    HDL 76 02/15/2021    LDL 51 06/08/2022    LDL 71 02/15/2021    TRIG 54 06/08/2022    TRIG 68 02/15/2021    CHOLHDLRATIO 3.1 07/02/2015       LIVER ENZYME RESULTS:  Lab Results   Component Value Date    AST 52 (H) 06/08/2022    AST 25 02/15/2021    ALT 59 06/08/2022    ALT 30 02/15/2021       CBC RESULTS:  Lab Results   Component Value Date    WBC 4.7 06/08/2022    WBC 7.6 11/18/2020    RBC 4.32 (L) 06/08/2022    RBC 4.31 (L) 11/18/2020    HGB 13.7 07/11/2022    HGB 13.1 (L) 06/11/2021    HCT 41.7 06/08/2022    HCT 41.4 11/18/2020    MCV 97 06/08/2022    MCV 96 11/18/2020    MCH 32.6 06/08/2022    MCH 31.8 11/18/2020    MCHC 33.8 06/08/2022    MCHC 33.1 11/18/2020    RDW 13.6 06/08/2022    RDW 13.4 11/18/2020     06/08/2022      11/18/2020       BMP RESULTS:  Lab Results   Component Value Date     06/08/2022     02/15/2021    POTASSIUM 4.2 06/08/2022    POTASSIUM 4.3 02/15/2021    CHLORIDE 107 06/08/2022    CHLORIDE 107 02/15/2021    CO2 29 06/08/2022    CO2 27 02/15/2021    ANIONGAP 2 (L) 06/08/2022    ANIONGAP 6 02/15/2021    GLC 94 06/08/2022    GLC 88 02/15/2021    BUN 19 06/08/2022    BUN 19 02/15/2021    CR 0.90 06/08/2022    CR 0.91 02/15/2021    GFRESTIMATED 86 06/08/2022    GFRESTIMATED 80 02/15/2021    GFRESTBLACK >90 02/15/2021    EDY 9.0 06/08/2022    EDY 9.2 02/15/2021        A1C RESULTS:  Lab Results   Component Value Date    A1C 5.4 02/15/2021       INR RESULTS:  No results found for: INR      Kishor Lord MD, FAC    CC  Radha Banks MD  [address] [place] [address]      Thank you for allowing me to participate in the care of your patient.      Sincerely,     Kishor Lord MD     [facility]

## 2022-07-19 ENCOUNTER — HOSPITAL ENCOUNTER (OUTPATIENT)
Dept: PHYSICAL THERAPY | Facility: CLINIC | Age: 82
Discharge: HOME | End: 2022-07-19
Payer: COMMERCIAL

## 2022-07-19 DIAGNOSIS — R42 DIZZINESS: Primary | ICD-10-CM

## 2022-07-19 DIAGNOSIS — R26.89 IMBALANCE: ICD-10-CM

## 2022-07-19 PROCEDURE — 97112 NEUROMUSCULAR REEDUCATION: CPT | Mod: GP

## 2022-07-21 ENCOUNTER — LAB (OUTPATIENT)
Dept: LAB | Facility: CLINIC | Age: 82
End: 2022-07-21
Payer: COMMERCIAL

## 2022-07-21 DIAGNOSIS — E29.1 TESTICULAR HYPOFUNCTION: ICD-10-CM

## 2022-07-21 PROCEDURE — 36415 COLL VENOUS BLD VENIPUNCTURE: CPT

## 2022-07-21 PROCEDURE — 84403 ASSAY OF TOTAL TESTOSTERONE: CPT

## 2022-07-24 LAB — TESTOST SERPL-MCNC: 1035 NG/DL (ref 240–950)

## 2022-07-25 ENCOUNTER — APPOINTMENT (OUTPATIENT)
Dept: URBAN - METROPOLITAN AREA CLINIC 255 | Age: 82
Setting detail: DERMATOLOGY
End: 2022-07-31

## 2022-07-25 ENCOUNTER — TRANSFERRED RECORDS (OUTPATIENT)
Dept: HEALTH INFORMATION MANAGEMENT | Facility: CLINIC | Age: 82
End: 2022-07-25

## 2022-07-25 DIAGNOSIS — L60.3 NAIL DYSTROPHY: ICD-10-CM

## 2022-07-25 DIAGNOSIS — S60 SUPERFICIAL INJURY OF WRIST, HAND AND FINGERS: ICD-10-CM

## 2022-07-25 PROBLEM — S60.032A CONTUSION OF LEFT MIDDLE FINGER WITHOUT DAMAGE TO NAIL, INITIAL ENCOUNTER: Status: ACTIVE | Noted: 2022-07-25

## 2022-07-25 PROCEDURE — OTHER MIPS QUALITY: OTHER

## 2022-07-25 PROCEDURE — OTHER COUNSELING: OTHER

## 2022-07-25 PROCEDURE — 99213 OFFICE O/P EST LOW 20 MIN: CPT

## 2022-07-25 ASSESSMENT — LOCATION SIMPLE DESCRIPTION DERM: LOCATION SIMPLE: LEFT MIDDLE FINGER

## 2022-07-25 ASSESSMENT — LOCATION DETAILED DESCRIPTION DERM: LOCATION DETAILED: LEFT MIDDLE FINGERNAIL

## 2022-07-25 ASSESSMENT — LOCATION ZONE DERM: LOCATION ZONE: FINGERNAIL

## 2022-07-25 NOTE — PROCEDURE: COUNSELING
Detail Level: Detailed
Patient Specific Counseling (Will Not Stick From Patient To Patient): Once the subungual hematoma has grown out, RTC for evaluation of the nail dystrophy.  Noted that it is not clear whether this is caused by a subungual tumor or other etiology.
Patient Specific Counseling (Will Not Stick From Patient To Patient): Patient reassured that the nail discoloration is a benign subungual hematoma and is growing out with time in an appropriate fashion.  This should completely grow out, but his antecedent nail dystrophy (\"waviness\") will likely remain.

## 2022-07-25 NOTE — PROCEDURE: MIPS QUALITY
Quality 226: Preventive Care And Screening: Tobacco Use: Screening And Cessation Intervention: Patient screened for tobacco use and is an ex/non-smoker
Quality 110: Preventive Care And Screening: Influenza Immunization: Influenza Immunization previously received during influenza season
Quality 431: Preventive Care And Screening: Unhealthy Alcohol Use - Screening: Patient not identified as an unhealthy alcohol user when screened for unhealthy alcohol use using a systematic screening method
Quality 130: Documentation Of Current Medications In The Medical Record: Current Medications Documented
Detail Level: Detailed
Quality 111:Pneumonia Vaccination Status For Older Adults: Pneumococcal vaccine administered on or after patient’s 60th birthday and before the end of the measurement period

## 2022-07-26 ENCOUNTER — APPOINTMENT (OUTPATIENT)
Dept: URBAN - METROPOLITAN AREA CLINIC 256 | Age: 82
Setting detail: DERMATOLOGY
End: 2022-07-27

## 2022-07-26 ENCOUNTER — HOSPITAL ENCOUNTER (OUTPATIENT)
Dept: PHYSICAL THERAPY | Facility: CLINIC | Age: 82
Discharge: HOME | End: 2022-07-26
Payer: COMMERCIAL

## 2022-07-26 VITALS — WEIGHT: 165 LBS | HEIGHT: 70 IN

## 2022-07-26 DIAGNOSIS — R42 DIZZINESS: Primary | ICD-10-CM

## 2022-07-26 DIAGNOSIS — R26.89 IMBALANCE: ICD-10-CM

## 2022-07-26 DIAGNOSIS — L57.0 ACTINIC KERATOSIS: ICD-10-CM

## 2022-07-26 PROCEDURE — 97112 NEUROMUSCULAR REEDUCATION: CPT | Mod: GP

## 2022-07-26 PROCEDURE — 17003 DESTRUCT PREMALG LES 2-14: CPT

## 2022-07-26 PROCEDURE — 17000 DESTRUCT PREMALG LESION: CPT

## 2022-07-26 PROCEDURE — OTHER LIQUID NITROGEN: OTHER

## 2022-07-26 PROCEDURE — OTHER COUNSELING: OTHER

## 2022-07-26 ASSESSMENT — LOCATION DETAILED DESCRIPTION DERM
LOCATION DETAILED: LEFT MEDIAL ZYGOMA
LOCATION DETAILED: LEFT SUPERIOR HELIX
LOCATION DETAILED: RIGHT DISTAL RADIAL DORSAL FOREARM
LOCATION DETAILED: LEFT INFERIOR HELIX
LOCATION DETAILED: LEFT NASAL DORSUM
LOCATION DETAILED: LEFT SUPERIOR FOREHEAD
LOCATION DETAILED: LEFT INFERIOR FOREHEAD
LOCATION DETAILED: RIGHT SUPERIOR LATERAL MALAR CHEEK

## 2022-07-26 ASSESSMENT — LOCATION SIMPLE DESCRIPTION DERM
LOCATION SIMPLE: LEFT FOREHEAD
LOCATION SIMPLE: RIGHT FOREARM
LOCATION SIMPLE: LEFT EAR
LOCATION SIMPLE: LEFT ZYGOMA
LOCATION SIMPLE: NOSE
LOCATION SIMPLE: RIGHT CHEEK

## 2022-07-26 ASSESSMENT — LOCATION ZONE DERM
LOCATION ZONE: NOSE
LOCATION ZONE: EAR
LOCATION ZONE: ARM
LOCATION ZONE: FACE

## 2022-07-26 NOTE — PROCEDURE: LIQUID NITROGEN
Post-Care Instructions: - Patient was instructed to avoid picking at any of the treated lesions.
Render Post-Care Instructions In Note?: yes
Detail Level: Detailed
Number Of Freeze-Thaw Cycles: 2 freeze-thaw cycles
Duration Of Freeze Thaw-Cycle (Seconds): 10
Consent: - Discussed that these are a result of cumulative sun exposure.\\n- Verbal and written consent was obtained, and risks were reviewed prior to procedure today. \\n- Risks discussed include but are not limited to pain, crusting, scabbing, blistering, scarring, temporary or permanent darker or lighter pigmentary change, recurrence, incomplete resolution, and infection.

## 2022-07-26 NOTE — PROGRESS NOTES
07/26/22 0800   Signing Clinician's Name / Credentials   Signing clinician's name / credentials Beatrice Jorgensen, PT, DPT   Dynamic Gait Index (Raghavendra and Fontenot Leona, 1995)   Gait Level Surface 3  (1.29m/s)   Change in Gait Speed 3   Gait and Horizontal Head Turns 2   Gait with Vertical Head Turns 3   Gait and Pivot Turns 2   Step Over Obstacle 2   Step Around Obstacles 3   Steps 2   Total Dynamic Gait Index Score  (A score of 19 or less has been correlated to an increased risk of falls in community dwelling older adults, patients with vestibular disorders, and patients with MS.)   Total Score (out of 24) 20     Dynamic Gait Index (DGI):The DGI is a measure of balance during gait that is reliable and valid for the elderly and individuals with Parkinson's disease, MS, vestibular disorders, or s/p stroke. Gait assistive device used: None    Scores ?19 /24 indicate an increased risk for falls according to Karly et al 2000  Minimal Detectable Change = 2.9 in community dwelling elderly according to Rory et al 2011

## 2022-08-03 DIAGNOSIS — E29.1 TESTICULAR HYPOFUNCTION: Primary | ICD-10-CM

## 2022-08-09 ENCOUNTER — HOSPITAL ENCOUNTER (OUTPATIENT)
Dept: PHYSICAL THERAPY | Facility: CLINIC | Age: 82
Discharge: HOME | End: 2022-08-09
Payer: COMMERCIAL

## 2022-08-09 DIAGNOSIS — R42 DIZZINESS: Primary | ICD-10-CM

## 2022-08-09 DIAGNOSIS — R26.89 IMBALANCE: ICD-10-CM

## 2022-08-09 PROCEDURE — 97112 NEUROMUSCULAR REEDUCATION: CPT | Mod: GP

## 2022-08-09 NOTE — PROGRESS NOTES
08/09/22 0800   Signing Clinician's Name / Credentials   Signing clinician's name / credentials Beatrice Jorgensen, PT, DPT   Dynamic Gait Index (Raghavendra and Fontenot Leona, 1995)   Gait Level Surface 3   Change in Gait Speed 3   Gait and Horizontal Head Turns 3   Gait with Vertical Head Turns 3   Gait and Pivot Turns 3   Step Over Obstacle 2   Step Around Obstacles 3   Steps 2   Total Dynamic Gait Index Score  (A score of 19 or less has been correlated to an increased risk of falls in community dwelling older adults, patients with vestibular disorders, and patients with MS.)   Total Score (out of 24) 22     Dynamic Gait Index (DGI):The DGI is a measure of balance during gait that is reliable and valid for the elderly and individuals with Parkinson's disease, MS, vestibular disorders, or s/p stroke. Gait assistive device used: None    Scores ?19 /24 indicate an increased risk for falls according to Karly et al 2000  Minimal Detectable Change = 2.9 in community dwelling elderly according to Rory et al 2011

## 2022-09-01 DIAGNOSIS — M19.011 PRIMARY OSTEOARTHRITIS OF RIGHT SHOULDER: ICD-10-CM

## 2022-09-02 RX ORDER — DICLOFENAC SODIUM 75 MG/1
TABLET, DELAYED RELEASE ORAL
Qty: 180 TABLET | Refills: 0 | Status: SHIPPED | OUTPATIENT
Start: 2022-09-02 | End: 2022-12-20

## 2022-09-02 NOTE — TELEPHONE ENCOUNTER
Routing refill request to provider for review/approval because:  Labs not current:  AST and CBC  Pt is greater than 66 y.o.  Kriss DESAI RN, BSN  [facility]

## 2022-09-07 ENCOUNTER — TELEPHONE (OUTPATIENT)
Dept: PEDIATRICS | Facility: CLINIC | Age: 82
End: 2022-09-07

## 2022-09-13 DIAGNOSIS — R25.2 CRAMP OF LIMB: ICD-10-CM

## 2022-09-14 RX ORDER — ROPINIROLE 0.25 MG/1
TABLET, FILM COATED ORAL
Qty: 180 TABLET | Refills: 11 | OUTPATIENT
Start: 2022-09-14

## 2022-10-10 ENCOUNTER — HEALTH MAINTENANCE LETTER (OUTPATIENT)
Age: 82
End: 2022-10-10

## 2022-10-14 NOTE — PLAN OF CARE
[facility]    Outpatient Physical Therapy Discharge Note  Patient: Ciro Almonte  : 1940    Beginning/End Dates of Reporting Period:  2022 to 2022    Referring Provider: Radha Banks MD    Therapy Diagnosis: dizziness with movement, imbalance, deconditioning     Client Self Report: Pt interested in taking a break from PT 2' busy schedule (fixing up house to sell/move) and associated increased stress/anxiety. Plan to continue exercises few times/week; will contact PT to resume in either 2022/Spring 2023 (after house sells).    Objective Measurements:  Objective Measure: mCTSIB  Details: 30s on condition #5; see goal above.  Objective Measure: DGI  Details: 22/24pts; pt remains at dec risk for fallling/see goal above.  Objective Measure: DHI  Details: 38pts; continues to report dizziness near 30-38pts on DHI/see goal above.     Goals:  Goal Identifier MET: vestib HEP   Goal Description Pt will demonstrate IND with gaze stabilization/adaptation, VMS/habituation, and static/dynamic balance exercises, to promote reduction of vestibular symptoms and improve safety with functional mobility.   Target Date 22   Date Met  22   Progress (detail required for progress note): 2022: Pt to complete program (listed below/HEP) with IND prior to resumption of PT in 2022/Spring 2023.     Goal Identifier DGI   Goal Description Pt will score 24/24 on DGI; to demonstrate improved dynamic balance and postural control with ambulation, and decreased risk for falling with functional mobility.   Target Date 22   Date Met      Progress (detail required for progress note): 2022: 22/24pts. Pt now at dec risk for falling with dynamic/complex ambulation activities. However, desire to continue to progress for improved safety with high-level household work/tasks.     Goal Identifier MET:  DVA   Goal Description Pt will demonstrate </=2 line loss on test of Dynamic Visual Acuity, demonstrating improvement in the function of the pt s VOR with dynamic head movement.   Target Date 08/12/22   Date Met  07/26/22   Progress (detail required for progress note): 7/26/2022: 1 line loss, with glasses. Pt's VOR now WFL; has improved from previous assessment on 7/19/2022.     Goal Identifier MET: self reported dizziness/DHI   Goal Description Pt will decrease self-reported dizziness to </=0-2/10 with completion of daily activities and/or vestibular rehabilitation exercise program.   Target Date 08/12/22   Date Met  08/09/22   Progress (detail required for progress note): 8/9/2022: 38pts; has met MCID. Though plateaued near 30-38pts.     Goal Identifier MET: mCTSIB   Goal Description Pt will tolerate 30s of EC/compliant surface, for demonstrated improvement in vestibular function.   Target Date 07/19/22   Date Met  08/09/22   Progress (detail required for progress note): 8/9/2022: 30s on condition #5; however, does continue to demonstrate inc postural sway. Likely ongoing deficits 2' neuropathy and vestibular impairment.       Plan:  Discharge from therapy.    Discharge:    Reason for Discharge: Patient chooses to discontinue therapy; pt requesting no further appointments.       Discharge Plan: Patient to continue home program.

## 2022-10-14 NOTE — ADDENDUM NOTE
Encounter addended by: Beatrice Danielle, PT on: 10/14/2022 2:41 PM   Actions taken: Clinical Note Signed, Episode resolved

## 2022-11-04 ENCOUNTER — APPOINTMENT (OUTPATIENT)
Dept: URBAN - METROPOLITAN AREA CLINIC 255 | Age: 82
Setting detail: DERMATOLOGY
End: 2022-11-07

## 2022-11-04 DIAGNOSIS — L60.3 NAIL DYSTROPHY: ICD-10-CM

## 2022-11-04 DIAGNOSIS — D49.2 NEOPLASM OF UNSPECIFIED BEHAVIOR OF BONE, SOFT TISSUE, AND SKIN: ICD-10-CM

## 2022-11-04 DIAGNOSIS — L72.8 OTHER FOLLICULAR CYSTS OF THE SKIN AND SUBCUTANEOUS TISSUE: ICD-10-CM

## 2022-11-04 DIAGNOSIS — L57.0 ACTINIC KERATOSIS: ICD-10-CM

## 2022-11-04 PROCEDURE — 17000 DESTRUCT PREMALG LESION: CPT | Mod: 59

## 2022-11-04 PROCEDURE — OTHER COUNSELING: OTHER

## 2022-11-04 PROCEDURE — OTHER BIOPSY BY SHAVE METHOD: OTHER

## 2022-11-04 PROCEDURE — 11102 TANGNTL BX SKIN SINGLE LES: CPT

## 2022-11-04 PROCEDURE — OTHER MIPS QUALITY: OTHER

## 2022-11-04 PROCEDURE — 17003 DESTRUCT PREMALG LES 2-14: CPT

## 2022-11-04 PROCEDURE — 99213 OFFICE O/P EST LOW 20 MIN: CPT | Mod: 25

## 2022-11-04 PROCEDURE — OTHER LIQUID NITROGEN: OTHER

## 2022-11-04 ASSESSMENT — LOCATION SIMPLE DESCRIPTION DERM
LOCATION SIMPLE: LEFT MIDDLE FINGER
LOCATION SIMPLE: LEFT EAR
LOCATION SIMPLE: SCALP
LOCATION SIMPLE: LEFT TEMPLE
LOCATION SIMPLE: RIGHT MIDDLE FINGER
LOCATION SIMPLE: LEFT CHEEK

## 2022-11-04 ASSESSMENT — LOCATION DETAILED DESCRIPTION DERM
LOCATION DETAILED: RIGHT MID ULNAR DORSAL MIDDLE FINGER
LOCATION DETAILED: LEFT CENTRAL POSTAURICULAR SKIN
LOCATION DETAILED: LEFT MEDIAL TEMPLE
LOCATION DETAILED: LEFT SUPERIOR LATERAL MALAR CHEEK
LOCATION DETAILED: LEFT MIDDLE FINGERNAIL
LOCATION DETAILED: LEFT INFERIOR HELIX

## 2022-11-04 ASSESSMENT — LOCATION ZONE DERM
LOCATION ZONE: FACE
LOCATION ZONE: FINGERNAIL
LOCATION ZONE: EAR
LOCATION ZONE: FINGER
LOCATION ZONE: SCALP

## 2022-11-04 NOTE — PROCEDURE: BIOPSY BY SHAVE METHOD
Information: Selecting Yes will display possible errors in your note based on the variables you have selected. This validation is only offered as a suggestion for you. PLEASE NOTE THAT THE VALIDATION TEXT WILL BE REMOVED WHEN YOU FINALIZE YOUR NOTE. IF YOU WANT TO FAX A PRELIMINARY NOTE YOU WILL NEED TO TOGGLE THIS TO 'NO' IF YOU DO NOT WANT IT IN YOUR FAXED NOTE.
Post-Care Instructions: I reviewed with the patient in detail post-care instructions. Patient is to keep the biopsy site dry overnight, and then apply bacitracin twice daily until healed. Patient may apply hydrogen peroxide soaks to remove any crusting.
Bill 23412 For Specimen Handling/Conveyance To Laboratory?: no
Wound Care: Petrolatum
Detail Level: Detailed
Anesthesia Volume In Cc (Will Not Render If 0): 0.5
Validate Note Data (See Information Below): Yes
X Size Of Lesion In Cm: 0
Electrodesiccation Text: The wound bed was treated with electrodesiccation after the biopsy was performed.
Hemostasis: Aluminum Chloride
Notification Instructions: Patient will be notified of biopsy results. However, patient instructed to call the office if not contacted within 2 weeks.
Biopsy Method: Personna blade
Anesthesia Type: 1% lidocaine with epinephrine and a 1:10 solution of 8.4% sodium bicarbonate
Body Location Override (Optional - Billing Will Still Be Based On Selected Body Map Location If Applicable): Left Pre-Auricular Cheek
Dressing: bandage
Billing Type: Client Bill
Depth Of Biopsy: dermis
Silver Nitrate Text: The wound bed was treated with silver nitrate after the biopsy was performed.
Cryotherapy Text: The wound bed was treated with cryotherapy after the biopsy was performed.
Type Of Destruction Used: Curettage
Biopsy Type: H and E
Consent: Written consent was obtained and risks were reviewed including but not limited to scarring, infection, bleeding, scabbing, incomplete removal, nerve damage and allergy to anesthesia.
Size Of Lesion In Cm: 0.7
Curettage Text: The wound bed was treated with curettage after the biopsy was performed.
Electrodesiccation And Curettage Text: The wound bed was treated with electrodesiccation and curettage after the biopsy was performed.

## 2022-11-04 NOTE — PROCEDURE: LIQUID NITROGEN
Consent: The patient's consent was obtained including but not limited to risks of crusting, scabbing, blistering, scarring, darker or lighter pigmentary change, recurrence, incomplete removal and infection.
Render Note In Bullet Format When Appropriate: No
Show Aperture Variable?: Yes
Number Of Freeze-Thaw Cycles: 1 freeze-thaw cycle
Duration Of Freeze Thaw-Cycle (Seconds): 10
Detail Level: Detailed
Post-Care Instructions: I reviewed with the patient in detail post-care instructions. Patient is to wear sunprotection, and avoid picking at any of the treated lesions. Pt may apply Vaseline to crusted or scabbing areas.

## 2022-11-04 NOTE — PROCEDURE: COUNSELING
Patient Specific Counseling (Will Not Stick From Patient To Patient): Explained that the previous subungual hematoma present in photos has resolved, leaving an elevated longitudinally ridged nail plate.  It is possible that this is being caused by a subungual neoplasm; e.g. osteoma or enchondroma.  Recommended an xray to assess for subungual neoplasm.  If this fails to detect a lesion, the next assessment would be a biopsy.  The patient concurs with this plan.  Xray (PA & lat) ordered.
Detail Level: Detailed

## 2022-11-14 ENCOUNTER — APPOINTMENT (OUTPATIENT)
Dept: URBAN - METROPOLITAN AREA CLINIC 259 | Age: 82
Setting detail: DERMATOLOGY
End: 2022-11-15

## 2022-11-14 DIAGNOSIS — B35.1 TINEA UNGUIUM: ICD-10-CM

## 2022-11-14 PROBLEM — L60.9 NAIL DISORDER, UNSPECIFIED: Status: ACTIVE | Noted: 2022-11-14

## 2022-11-14 PROBLEM — C44.319 BASAL CELL CARCINOMA OF SKIN OF OTHER PARTS OF FACE: Status: ACTIVE | Noted: 2022-11-14

## 2022-11-14 PROCEDURE — 17311 MOHS 1 STAGE H/N/HF/G: CPT | Mod: 79

## 2022-11-14 PROCEDURE — OTHER PATIENT SPECIFIC COUNSELING: OTHER

## 2022-11-14 PROCEDURE — 99213 OFFICE O/P EST LOW 20 MIN: CPT | Mod: 24,25

## 2022-11-14 PROCEDURE — OTHER MIPS QUALITY: OTHER

## 2022-11-14 PROCEDURE — OTHER NAIL CLIPPING FOR PAS: OTHER

## 2022-11-14 PROCEDURE — 13132 CMPLX RPR F/C/C/M/N/AX/G/H/F: CPT | Mod: 79

## 2022-11-14 PROCEDURE — OTHER MOHS SURGERY: OTHER

## 2022-11-14 ASSESSMENT — LOCATION DETAILED DESCRIPTION DERM: LOCATION DETAILED: LEFT MIDDLE FINGERNAIL

## 2022-11-14 ASSESSMENT — LOCATION ZONE DERM: LOCATION ZONE: FINGERNAIL

## 2022-11-14 ASSESSMENT — LOCATION SIMPLE DESCRIPTION DERM: LOCATION SIMPLE: LEFT MIDDLE FINGER

## 2022-11-14 NOTE — PROCEDURE: MOHS SURGERY
Orbicularis Oris Muscle Flap Text: The defect edges were debeveled with a #15 scalpel blade.  Given that the defect affected the competency of the oral sphincter an obicularis oris muscle flap was deemed most appropriate to restore this competency and normal muscle function.  Using a sterile surgical marker, an appropriate flap was drawn incorporating the defect. The area thus outlined was incised with a #15 scalpel blade. (3) walks occasionally

## 2022-11-14 NOTE — PROCEDURE: PATIENT SPECIFIC COUNSELING
Explained to the patient that his xray did not provide additional clues as to the cause of his nail dystrophy.  Recommended a diagnostic biopsy.  He questioned whether this might be fungal infection.  While possible, I felt this was unlikely.  Agreed to check for fungal infection.  If negative, then we will proceed with a biopsy.  He concurs with this plan.
Detail Level: Zone

## 2022-11-14 NOTE — PROCEDURE: NAIL CLIPPING FOR PAS
Billing Type: Third-Party Bill
Body Location Override (Optional - Billing Will Still Be Based On Selected Body Map Location If Applicable): Left 3rd Fingernail
Detail Level: Detailed
Add 45055 To Bill?: No

## 2022-11-14 NOTE — PROCEDURE: MOHS SURGERY
14-Aug-2017 23:34 Helical Rim Advancement Flap Text: The defect edges were debeveled with a #15 blade scalpel.  Given the location of the defect and the proximity to free margins (helical rim) a double helical rim advancement flap was deemed most appropriate.  Using a sterile surgical marker, the appropriate advancement flaps were drawn incorporating the defect and placing the expected incisions between the helical rim and antihelix where possible.  The area thus outlined was incised through and through with a #15 scalpel blade.  With a skin hook and iris scissors, the flaps were gently and sharply undermined and freed up.

## 2022-11-14 NOTE — PROCEDURE: MOHS SURGERY
OB Discharge Instructions Mohs Rapid Report Verbiage: The area of clinically evident tumor was marked with skin marking ink and appropriately hatched.  The initial incision was made following the Mohs approach through the skin.  The specimen was taken to the lab, divided into the necessary number of pieces, chromacoded and processed according to the Mohs protocol.  This was repeated in successive stages until a tumor free defect was achieved.

## 2022-11-14 NOTE — HPI: SECONDARY COMPLAINT
How Severe Is This Condition?: moderate
Additional History: The patient recently underwent an xray to evaluate for potential subungual enchondroma or osteoma as a potential cause for his longitudinally ridged nail.

## 2022-11-14 NOTE — PROCEDURE: MOHS SURGERY
No Repair Performed By Another Provider Text (Leave Blank If You Do Not Want): After obtaining clear surgical margins the defect was repaired by another provider.

## 2022-11-14 NOTE — PROCEDURE: MIPS QUALITY
Quality 226: Preventive Care And Screening: Tobacco Use: Screening And Cessation Intervention: Patient screened for tobacco use and is an ex/non-smoker
Quality 431: Preventive Care And Screening: Unhealthy Alcohol Use - Screening: Patient not identified as an unhealthy alcohol user when screened for unhealthy alcohol use using a systematic screening method
Quality 130: Documentation Of Current Medications In The Medical Record: Current Medications Documented
Detail Level: Detailed
Quality 143: Oncology: Medical And Radiation- Pain Intensity Quantified: Pain severity quantified, no pain present
Quality 111:Pneumonia Vaccination Status For Older Adults: Pneumococcal vaccine (PPSV23) administered on or after patient’s 60th birthday and before the end of the measurement period
Quality 110: Preventive Care And Screening: Influenza Immunization: Influenza Immunization previously received during influenza season
Quality 265: Biopsy Follow-Up: Biopsy results reviewed, communicated, tracked, and documented

## 2022-11-18 DIAGNOSIS — E29.1 TESTICULAR HYPOFUNCTION: Primary | ICD-10-CM

## 2022-11-21 ENCOUNTER — TRANSFERRED RECORDS (OUTPATIENT)
Dept: HEALTH INFORMATION MANAGEMENT | Facility: CLINIC | Age: 82
End: 2022-11-21

## 2022-11-22 ENCOUNTER — APPOINTMENT (OUTPATIENT)
Dept: URBAN - METROPOLITAN AREA CLINIC 255 | Age: 82
Setting detail: DERMATOLOGY
End: 2022-12-09

## 2022-11-22 DIAGNOSIS — Z48.02 ENCOUNTER FOR REMOVAL OF SUTURES: ICD-10-CM

## 2022-11-22 DIAGNOSIS — L08.9 LOCAL INFECTION OF THE SKIN AND SUBCUTANEOUS TISSUE, UNSPECIFIED: ICD-10-CM

## 2022-11-22 PROCEDURE — OTHER PRESCRIPTION: OTHER

## 2022-11-22 PROCEDURE — OTHER DIAGNOSIS COMMENT: OTHER

## 2022-11-22 PROCEDURE — OTHER COUNSELING: OTHER

## 2022-11-22 PROCEDURE — OTHER MIPS QUALITY: OTHER

## 2022-11-22 PROCEDURE — OTHER SUTURE REMOVAL (GLOBAL PERIOD): OTHER

## 2022-11-22 PROCEDURE — OTHER ORDER TESTS: OTHER

## 2022-11-22 RX ORDER — CEPHALEXIN 500 MG/1
TABLET ORAL BID
Qty: 10 | Refills: 0 | Status: ERX | COMMUNITY
Start: 2022-11-22

## 2022-11-22 ASSESSMENT — LOCATION DETAILED DESCRIPTION DERM: LOCATION DETAILED: LEFT CENTRAL ZYGOMA

## 2022-11-22 ASSESSMENT — LOCATION SIMPLE DESCRIPTION DERM: LOCATION SIMPLE: LEFT ZYGOMA

## 2022-11-22 ASSESSMENT — LOCATION ZONE DERM: LOCATION ZONE: FACE

## 2022-11-22 NOTE — PROCEDURE: MIPS QUALITY
Quality 130: Documentation Of Current Medications In The Medical Record: Current Medications Documented
Quality 111:Pneumonia Vaccination Status For Older Adults: Pneumococcal vaccine (PPSV23) administered on or after patient’s 60th birthday and before the end of the measurement period
Quality 226: Preventive Care And Screening: Tobacco Use: Screening And Cessation Intervention: Patient screened for tobacco use and is an ex/non-smoker
Quality 431: Preventive Care And Screening: Unhealthy Alcohol Use - Screening: Patient not identified as an unhealthy alcohol user when screened for unhealthy alcohol use using a systematic screening method
Detail Level: Detailed
Quality 110: Preventive Care And Screening: Influenza Immunization: Influenza Immunization previously received during influenza season

## 2022-11-22 NOTE — PROCEDURE: SUTURE REMOVAL (GLOBAL PERIOD)
Add 80050 Cpt? (Important Note: In 2017 The Use Of 40508 Is Being Tracked By Cms To Determine Future Global Period Reimbursement For Global Periods): no
Detail Level: Detailed
Body Location Override (Optional - Billing Will Still Be Based On Selected Body Map Location If Applicable): Left Pre-Auricular Cheek

## 2022-11-22 NOTE — PROCEDURE: DIAGNOSIS COMMENT
Comment: S/P MMS for Primary Nodular Basal Cell Carcinoma on (11/14/22)
Detail Level: Simple
Render Risk Assessment In Note?: yes

## 2022-12-01 DIAGNOSIS — E29.1 TESTICULAR HYPOFUNCTION: Primary | ICD-10-CM

## 2022-12-18 DIAGNOSIS — M19.011 PRIMARY OSTEOARTHRITIS OF RIGHT SHOULDER: ICD-10-CM

## 2022-12-19 NOTE — TELEPHONE ENCOUNTER
Routing refill request to provider for review/approval because:  Labs out of range:  AST, CBC  AST   Date Value Ref Range Status   06/08/2022 52 (H) 0 - 45 U/L Final   02/15/2021 25 0 - 45 U/L Final     Lab Results   Component Value Date    WBC 4.7 06/08/2022    WBC 7.6 11/18/2020     Lab Results   Component Value Date    RBC 4.32 06/08/2022    RBC 4.31 11/18/2020     Lab Results   Component Value Date    HGB 13.7 07/11/2022    HGB 13.1 06/11/2021     Lab Results   Component Value Date    HCT 41.7 06/08/2022    HCT 41.4 11/18/2020     Lab Results   Component Value Date    MCV 97 06/08/2022    MCV 96 11/18/2020     Lab Results   Component Value Date    MCH 32.6 06/08/2022    MCH 31.8 11/18/2020     Lab Results   Component Value Date    MCHC 33.8 06/08/2022    MCHC 33.1 11/18/2020     Lab Results   Component Value Date    RDW 13.6 06/08/2022    RDW 13.4 11/18/2020     Lab Results   Component Value Date     06/08/2022     11/18/2020     Not aged 6-64 years old. Age 82    Radha Olea RN  PAL (Patient Advocate Liason)  [facility]

## 2022-12-20 RX ORDER — DICLOFENAC SODIUM 75 MG/1
TABLET, DELAYED RELEASE ORAL
Qty: 180 TABLET | Refills: 0 | Status: SHIPPED | OUTPATIENT
Start: 2022-12-20 | End: 2023-04-11

## 2023-01-19 ENCOUNTER — TELEPHONE (OUTPATIENT)
Dept: PEDIATRICS | Facility: CLINIC | Age: 83
End: 2023-01-19
Payer: COMMERCIAL

## 2023-01-19 NOTE — TELEPHONE ENCOUNTER
Patients wife, Phoebe stopped into the [facility] today to discuss concerns about memory. We made an annual physical appt for June 14th. Phoebe is concerned that the appt is too far out and would like to make an additional appt to discuss the concerns. She would like a phone call to discuss the memory details and possibly schedule that additional appt. She is also asking that we use discretion when calling as this is a sensitive subject.

## 2023-01-19 NOTE — TELEPHONE ENCOUNTER
Call placed to pt's wife to get more information  LM to return call to the clinic    Trinidad Ivan RN on 1/19/2023 at 12:42 PM

## 2023-01-26 ENCOUNTER — APPOINTMENT (OUTPATIENT)
Dept: URBAN - METROPOLITAN AREA CLINIC 256 | Age: 83
Setting detail: DERMATOLOGY
End: 2023-01-28

## 2023-01-26 VITALS — HEIGHT: 70 IN | WEIGHT: 164 LBS

## 2023-01-26 DIAGNOSIS — L57.0 ACTINIC KERATOSIS: ICD-10-CM

## 2023-01-26 PROCEDURE — OTHER LIQUID NITROGEN: OTHER

## 2023-01-26 PROCEDURE — OTHER COUNSELING: OTHER

## 2023-01-26 PROCEDURE — OTHER MIPS QUALITY: OTHER

## 2023-01-26 PROCEDURE — 17004 DESTROY PREMAL LESIONS 15/>: CPT

## 2023-01-26 ASSESSMENT — LOCATION DETAILED DESCRIPTION DERM
LOCATION DETAILED: LEFT LATERAL ZYGOMA
LOCATION DETAILED: LEFT SUPERIOR HELIX
LOCATION DETAILED: RIGHT CENTRAL PARIETAL SCALP
LOCATION DETAILED: RIGHT SUPERIOR HELIX
LOCATION DETAILED: RIGHT SUPERIOR PREAURICULAR CHEEK
LOCATION DETAILED: LEFT DISTAL RADIAL DORSAL FOREARM
LOCATION DETAILED: LEFT SUPERIOR MEDIAL MALAR CHEEK
LOCATION DETAILED: LEFT LATERAL FRONTAL SCALP
LOCATION DETAILED: RIGHT NASAL SIDEWALL
LOCATION DETAILED: LEFT SUPERIOR PARIETAL SCALP
LOCATION DETAILED: RIGHT SUPERIOR FOREHEAD
LOCATION DETAILED: RIGHT SUPERIOR OCCIPITAL SCALP
LOCATION DETAILED: LEFT MEDIAL MALAR CHEEK
LOCATION DETAILED: RIGHT LATERAL DORSAL WRIST
LOCATION DETAILED: RIGHT SUPERIOR LATERAL FOREHEAD
LOCATION DETAILED: SUPERIOR MID FOREHEAD
LOCATION DETAILED: LEFT CENTRAL TEMPLE
LOCATION DETAILED: LEFT SUPERIOR LATERAL FOREHEAD
LOCATION DETAILED: LEFT SUPERIOR CENTRAL MALAR CHEEK

## 2023-01-26 ASSESSMENT — LOCATION SIMPLE DESCRIPTION DERM
LOCATION SIMPLE: SCALP
LOCATION SIMPLE: RIGHT CHEEK
LOCATION SIMPLE: LEFT FOREHEAD
LOCATION SIMPLE: LEFT SCALP
LOCATION SIMPLE: RIGHT OCCIPITAL SCALP
LOCATION SIMPLE: LEFT TEMPLE
LOCATION SIMPLE: LEFT ZYGOMA
LOCATION SIMPLE: RIGHT NOSE
LOCATION SIMPLE: LEFT CHEEK
LOCATION SIMPLE: RIGHT EAR
LOCATION SIMPLE: RIGHT FOREHEAD
LOCATION SIMPLE: SUPERIOR FOREHEAD
LOCATION SIMPLE: RIGHT WRIST
LOCATION SIMPLE: LEFT EAR
LOCATION SIMPLE: LEFT FOREARM

## 2023-01-26 ASSESSMENT — LOCATION ZONE DERM
LOCATION ZONE: SCALP
LOCATION ZONE: NOSE
LOCATION ZONE: FACE
LOCATION ZONE: EAR
LOCATION ZONE: ARM

## 2023-01-26 NOTE — PROCEDURE: COUNSELING
Detail Level: Detailed
Patient Specific Counseling (Will Not Stick From Patient To Patient): - Recommend [facility] treatment for a boarder treatment

## 2023-01-26 NOTE — PROCEDURE: MIPS QUALITY
Quality 431: Preventive Care And Screening: Unhealthy Alcohol Use - Screening: Patient not identified as an unhealthy alcohol user when screened for unhealthy alcohol use using a systematic screening method
Quality 130: Documentation Of Current Medications In The Medical Record: Current Medications Documented
Detail Level: Detailed
Quality 110: Preventive Care And Screening: Influenza Immunization: Influenza Immunization previously received during influenza season
Quality 226: Preventive Care And Screening: Tobacco Use: Screening And Cessation Intervention: Patient screened for tobacco use and is an ex/non-smoker
Quality 111:Pneumonia Vaccination Status For Older Adults: Pneumococcal vaccine (PPSV23) administered on or after patient’s 60th birthday and before the end of the measurement period

## 2023-01-26 NOTE — PROCEDURE: LIQUID NITROGEN
Detail Level: Detailed
Render Note In Bullet Format When Appropriate: Yes
Post-Care Instructions: - Patient was instructed to avoid picking at any of the treated lesions.
Number Of Freeze-Thaw Cycles: 3 freeze-thaw cycles
Duration Of Freeze Thaw-Cycle (Seconds): 5
Consent: - Discussed that these are a result of cumulative sun exposure.\\n- Verbal and written consent was obtained, and risks were reviewed prior to procedure today. \\n- Risks discussed include but are not limited to pain, crusting, scabbing, blistering, scarring, temporary or permanent darker or lighter pigmentary change, recurrence, incomplete resolution, and infection.
Application Tool (Optional): Liquid Nitrogen Sprayer

## 2023-02-01 ENCOUNTER — LAB (OUTPATIENT)
Dept: LAB | Facility: CLINIC | Age: 83
End: 2023-02-01
Payer: COMMERCIAL

## 2023-02-01 DIAGNOSIS — E29.1 TESTICULAR HYPOFUNCTION: ICD-10-CM

## 2023-02-01 LAB — HGB BLD-MCNC: 14.2 G/DL (ref 13.3–17.7)

## 2023-02-01 PROCEDURE — 85018 HEMOGLOBIN: CPT

## 2023-02-01 PROCEDURE — 36415 COLL VENOUS BLD VENIPUNCTURE: CPT

## 2023-02-01 PROCEDURE — 84403 ASSAY OF TOTAL TESTOSTERONE: CPT

## 2023-02-02 NOTE — TELEPHONE ENCOUNTER
Fine to be seen sooner. OK to use DIPIKA but not same day.    Radha Banks MD  Internal Medicine-Pediatrics

## 2023-02-02 NOTE — TELEPHONE ENCOUNTER
"Called and spoke with patient and spouse. Both patient and spouse want appointments to \"get tested\" for memory concerns. Would you advise visit in clinic for both to discuss with you first?     Patient's spouse mentions specific concerns with \"not remember things at all\", more short term, and \"some things that happened a few months back, important things, and he'll say he doesn't remember them at all\". \"I know I forget things, I run downstairs and I can't remember what I went down there for, but then i'll remember, seems to be more of a big deal in him\".     Bony MORRIS RN 2/2/2023 at 1:46 PM    "

## 2023-02-03 LAB — TESTOST SERPL-MCNC: 437 NG/DL (ref 240–950)

## 2023-02-08 ENCOUNTER — OFFICE VISIT (OUTPATIENT)
Dept: PEDIATRICS | Facility: CLINIC | Age: 83
End: 2023-02-08
Payer: COMMERCIAL

## 2023-02-08 VITALS
OXYGEN SATURATION: 98 % | TEMPERATURE: 98.1 F | WEIGHT: 176.7 LBS | DIASTOLIC BLOOD PRESSURE: 66 MMHG | HEIGHT: 70 IN | BODY MASS INDEX: 25.3 KG/M2 | RESPIRATION RATE: 20 BRPM | SYSTOLIC BLOOD PRESSURE: 146 MMHG | HEART RATE: 66 BPM

## 2023-02-08 DIAGNOSIS — R68.89 FORGETFULNESS: ICD-10-CM

## 2023-02-08 DIAGNOSIS — I42.9 CARDIOMYOPATHY, UNSPECIFIED TYPE (H): ICD-10-CM

## 2023-02-08 DIAGNOSIS — R27.9 UNSPECIFIED LACK OF COORDINATION: ICD-10-CM

## 2023-02-08 DIAGNOSIS — E66.89 OTHER OBESITY: ICD-10-CM

## 2023-02-08 DIAGNOSIS — R42 DIZZINESS: Primary | ICD-10-CM

## 2023-02-08 LAB
BASOPHILS # BLD AUTO: 0 10E3/UL (ref 0–0.2)
BASOPHILS NFR BLD AUTO: 0 %
EOSINOPHIL # BLD AUTO: 0.1 10E3/UL (ref 0–0.7)
EOSINOPHIL NFR BLD AUTO: 1 %
ERYTHROCYTE [DISTWIDTH] IN BLOOD BY AUTOMATED COUNT: 12.9 % (ref 10–15)
ERYTHROCYTE [SEDIMENTATION RATE] IN BLOOD BY WESTERGREN METHOD: 6 MM/HR (ref 0–20)
HCT VFR BLD AUTO: 42.5 % (ref 40–53)
HGB BLD-MCNC: 14.1 G/DL (ref 13.3–17.7)
LYMPHOCYTES # BLD AUTO: 0.8 10E3/UL (ref 0.8–5.3)
LYMPHOCYTES NFR BLD AUTO: 16 %
MCH RBC QN AUTO: 32.9 PG (ref 26.5–33)
MCHC RBC AUTO-ENTMCNC: 33.2 G/DL (ref 31.5–36.5)
MCV RBC AUTO: 99 FL (ref 78–100)
MONOCYTES # BLD AUTO: 0.5 10E3/UL (ref 0–1.3)
MONOCYTES NFR BLD AUTO: 11 %
NEUTROPHILS # BLD AUTO: 3.7 10E3/UL (ref 1.6–8.3)
NEUTROPHILS NFR BLD AUTO: 72 %
PLATELET # BLD AUTO: 234 10E3/UL (ref 150–450)
RBC # BLD AUTO: 4.29 10E6/UL (ref 4.4–5.9)
TSH SERPL DL<=0.005 MIU/L-ACNC: 1.9 UIU/ML (ref 0.3–4.2)
VIT B12 SERPL-MCNC: 752 PG/ML (ref 232–1245)
WBC # BLD AUTO: 5.2 10E3/UL (ref 4–11)

## 2023-02-08 PROCEDURE — 82306 VITAMIN D 25 HYDROXY: CPT | Performed by: INTERNAL MEDICINE

## 2023-02-08 PROCEDURE — 99215 OFFICE O/P EST HI 40 MIN: CPT | Performed by: INTERNAL MEDICINE

## 2023-02-08 PROCEDURE — 84443 ASSAY THYROID STIM HORMONE: CPT | Performed by: INTERNAL MEDICINE

## 2023-02-08 PROCEDURE — 82607 VITAMIN B-12: CPT | Performed by: INTERNAL MEDICINE

## 2023-02-08 PROCEDURE — 85652 RBC SED RATE AUTOMATED: CPT | Performed by: INTERNAL MEDICINE

## 2023-02-08 PROCEDURE — 85025 COMPLETE CBC W/AUTO DIFF WBC: CPT | Performed by: INTERNAL MEDICINE

## 2023-02-08 PROCEDURE — 36415 COLL VENOUS BLD VENIPUNCTURE: CPT | Performed by: INTERNAL MEDICINE

## 2023-02-08 ASSESSMENT — PAIN SCALES - GENERAL: PAINLEVEL: NO PAIN (0)

## 2023-02-08 NOTE — PATIENT INSTRUCTIONS
Your screen today was not consistent with dementia/cognitive decline at this time.    However, given other associated symptoms, let's start with a set of labs and brain MRI.     I also want you to restart vestibular physical therapy.     Ok to try tapering off gabapentin. Decrease to once daily for 1 week, then stop second dose after a week.

## 2023-02-08 NOTE — PROGRESS NOTES
"  Assessment & Plan     Dizziness  Forgetfulness  Performed MOCA today and reviewed results with patient - 26/30 is borderline but still within normal range and most of his misses were in delayed recall. Discussed pursuing basic lab evaluation as below. In setting of ongoing significant vestibular symptoms, dizziness and concern for increasing forgetfulness will also pursue MRI. Offered neurology referral but patient and his wife decline. He is interested in tapering off gabapentin as well, so reviewed how to proceed with this. Pending results, may have him follow-up with neurology if they are interested in discussing whether medications would be appropriate for treatment. Meanwhile will restart vestibular physical therapy for balance issues as this had been helpful in the past.  - MR Brain w/o & w Contrast; Future  - Physical Therapy Referral; Future  - Vitamin D Deficiency; Future  - CBC with platelets and differential; Future  - Vitamin B12; Future  - TSH with free T4 reflex; Future  - ESR: Erythrocyte sedimentation rate; Future    Unspecified lack of coordination  As above, planning for vestibular physical therapy.   - CBC with platelets and differential    Other obesity  - Vitamin D Deficiency    Cardiomyopathy, unspecified type (H)  Follows with cardiology for management, no acute issues.         40 minutes spent on the date of the encounter doing chart review, history and exam, documentation and further activities per the note       BMI:   Estimated body mass index is 25.35 kg/m  as calculated from the following:    Height as of this encounter: 1.778 m (5' 10\").    Weight as of this encounter: 80.2 kg (176 lb 11.2 oz).       Patient Instructions   Your screen today was not consistent with dementia/cognitive decline at this time.    However, given other associated symptoms, let's start with a set of labs and brain MRI.     I also want you to restart vestibular physical therapy.     Ok to try tapering off " "gabapentin. Decrease to once daily for 1 week, then stop second dose after a week.       Return in about 6 months (around 8/8/2023).    Radha Herrera MD  [facility] FLORENCIA Tanner is a 82 year old accompanied by his spouse, presenting for the following health issues:  Memory Loss      History of Present Illness       Reason for visit:  Memory test    He eats 0-1 servings of fruits and vegetables daily.He consumes 1 sweetened beverage(s) daily.He exercises with enough effort to increase his heart rate 9 or less minutes per day.  He exercises with enough effort to increase his heart rate 3 or less days per week. He is missing 1 dose(s) of medications per week.       Has been struggling with memory recently. Can't remember names in particular. No issues with things around the house, doesn't get lost. Sometimes will forget what he went into a room to get something. Worried that this level of forgetfulness is not normal and is worrisome. Wondering if gabapentin may be contributing to symptoms. Doesn't feel like this helps his symptoms at all.    Some dizziness - has previously done vestibular physical therapy. This has gotten worse. Particularly if he closes his eyes will feel very dizzy. Had been doing vestibular therapy until a few months ago which seemed perhaps helpful but then got worse after he had to cancel due to his daughter's surgery.     Review of Systems   Constitutional, HEENT, cardiovascular, pulmonary, gi and gu systems are negative, except as otherwise noted.      Objective    BP (!) 146/66 (BP Location: Right arm, Patient Position: Sitting, Cuff Size: Adult Regular)   Pulse 66   Temp 98.1  F (36.7  C) (Temporal)   Resp 20   Ht 1.778 m (5' 10\")   Wt 80.2 kg (176 lb 11.2 oz)   SpO2 98%   BMI 25.35 kg/m    Body mass index is 25.35 kg/m .  Physical Exam   GENERAL: healthy, alert and no distress  NEURO: Normal strength and tone, mentation intact and speech normal. " Significant tipping and balance issues during Romberg but not positive.  PSYCH: mentation appears normal, affect normal/bright      MOCA: 26/30. Missed 3/5 delayed recall.

## 2023-02-09 LAB — DEPRECATED CALCIDIOL+CALCIFEROL SERPL-MC: 54 UG/L (ref 20–75)

## 2023-02-13 ENCOUNTER — TRANSFERRED RECORDS (OUTPATIENT)
Dept: HEALTH INFORMATION MANAGEMENT | Facility: CLINIC | Age: 83
End: 2023-02-13

## 2023-02-22 ENCOUNTER — HOSPITAL ENCOUNTER (OUTPATIENT)
Dept: MRI IMAGING | Facility: CLINIC | Age: 83
Discharge: HOME | End: 2023-02-22
Attending: INTERNAL MEDICINE | Admitting: INTERNAL MEDICINE
Payer: COMMERCIAL

## 2023-02-22 DIAGNOSIS — R42 DIZZINESS: ICD-10-CM

## 2023-02-22 DIAGNOSIS — R68.89 FORGETFULNESS: ICD-10-CM

## 2023-02-22 PROCEDURE — A9585 GADOBUTROL INJECTION: HCPCS | Performed by: INTERNAL MEDICINE

## 2023-02-22 PROCEDURE — 255N000002 HC RX 255 OP 636: Performed by: INTERNAL MEDICINE

## 2023-02-22 PROCEDURE — 70553 MRI BRAIN STEM W/O & W/DYE: CPT

## 2023-02-22 RX ORDER — GADOBUTROL 604.72 MG/ML
7.5 INJECTION INTRAVENOUS ONCE
Status: COMPLETED | OUTPATIENT
Start: 2023-02-22 | End: 2023-02-22

## 2023-02-22 RX ADMIN — GADOBUTROL 7.5 ML: 604.72 INJECTION INTRAVENOUS at 14:01

## 2023-03-22 ENCOUNTER — TELEPHONE (OUTPATIENT)
Dept: PEDIATRICS | Facility: CLINIC | Age: 83
End: 2023-03-22
Payer: COMMERCIAL

## 2023-03-22 NOTE — TELEPHONE ENCOUNTER
Wife, Phoebe-SAVANNAC, requested a call about pt's MRI results. Phoebe stated they haven't heard anything and it has been a month. PCP summarized the results in a message 2/23 but pt hasn't read it.    Please reach out to Phoebe with summary.     Geno Logan on 3/22/2023 at 1:59 PM

## 2023-03-22 NOTE — TELEPHONE ENCOUNTER
Call placed to pt's wife  Relayed MRI results    Pt and wife verbalized understanding and agrees to the plan    Thank you  Trinidad Ivan RN on 3/22/2023 at 4:33 PM

## 2023-05-15 ENCOUNTER — HOSPITAL ENCOUNTER (OUTPATIENT)
Dept: CARDIOLOGY | Facility: CLINIC | Age: 83
Discharge: HOME | End: 2023-05-15
Attending: INTERNAL MEDICINE | Admitting: INTERNAL MEDICINE
Payer: COMMERCIAL

## 2023-05-15 DIAGNOSIS — I42.9 CARDIOMYOPATHY, UNSPECIFIED TYPE (H): ICD-10-CM

## 2023-05-15 DIAGNOSIS — I35.0 AORTIC VALVE STENOSIS, ETIOLOGY OF CARDIAC VALVE DISEASE UNSPECIFIED: ICD-10-CM

## 2023-05-15 LAB — LVEF ECHO: NORMAL

## 2023-05-15 PROCEDURE — 93306 TTE W/DOPPLER COMPLETE: CPT | Mod: 26 | Performed by: INTERNAL MEDICINE

## 2023-05-15 PROCEDURE — 93306 TTE W/DOPPLER COMPLETE: CPT

## 2023-05-17 ENCOUNTER — OFFICE VISIT (OUTPATIENT)
Dept: CARDIOLOGY | Facility: CLINIC | Age: 83
End: 2023-05-17
Payer: COMMERCIAL

## 2023-05-17 VITALS
HEART RATE: 70 BPM | OXYGEN SATURATION: 99 % | SYSTOLIC BLOOD PRESSURE: 142 MMHG | BODY MASS INDEX: 24.2 KG/M2 | HEIGHT: 70 IN | DIASTOLIC BLOOD PRESSURE: 60 MMHG | WEIGHT: 169 LBS

## 2023-05-17 DIAGNOSIS — I42.9 CARDIOMYOPATHY, UNSPECIFIED TYPE (H): ICD-10-CM

## 2023-05-17 DIAGNOSIS — I35.0 AORTIC VALVE STENOSIS, ETIOLOGY OF CARDIAC VALVE DISEASE UNSPECIFIED: Primary | ICD-10-CM

## 2023-05-17 DIAGNOSIS — I10 ESSENTIAL HYPERTENSION WITH GOAL BLOOD PRESSURE LESS THAN 140/90: ICD-10-CM

## 2023-05-17 PROCEDURE — 99214 OFFICE O/P EST MOD 30 MIN: CPT | Performed by: INTERNAL MEDICINE

## 2023-05-17 RX ORDER — LISINOPRIL 40 MG/1
40 TABLET ORAL DAILY
Qty: 90 TABLET | Refills: 3 | Status: ON HOLD | OUTPATIENT
Start: 2023-05-17 | End: 2023-12-14

## 2023-05-17 NOTE — LETTER
5/17/2023    Radha Herrera MD  [address] [place] [address]    RE: Ciro Almonte       Dear Colleague,     I had the pleasure of seeing Ciro Almonte in the [facility].  HISTORY:    Ciro Almonte is a pleasant 83-year-old male with a history of palpitations, elevated calcium score, and very mild cardiomyopathy.  Transferred care to our service last year.    Ciro has a history of PACs and was treated with a beta-blocker for several years.  This has since been discontinued because of some bradycardia.  He reports that he used to be able to feel an irregular pulse but when he checks his pulse more recently he notices very few irregular beats.  He has no sensation of palpitations.    An echocardiogram was recently pleated and I reviewed this with him.  It shows an ejection fraction of 55 to 60% with aortic sclerosis but no significant aortic stenosis.    Today Ciro reports that he feels that his stamina remains excellent.  He is having a lot of problems with balance, neuropathy, and joint pains involving hips knees and shoulders.  As a consequence he is substantially less physically active but he still has a very large garage and 2 acres that he takes care of.  [facility] [place]  He has a home blood pressure machine but does not use it often.    Today the blood pressure is mildly elevated and I rechecked it and found it to be similar at 146/70.  The remainder of his exam is essentially normal except for an audible murmur due to his aortic sclerosis.  Ciro denies PND/orthopnea, syncope or near syncope, exertional chest arm neck shoulder or jaw discomfort, palpitations, peripheral edema, or symptoms of claudication.      ASSESSMENT/PLAN:    1.  History of mild cardiomyopathy.  Recent ejection fraction continues to show a normal ejection fraction (in the low normal range).  The patient is physically active and asymptomatic with  excellent exercise tolerance for age.  2.  Aortic sclerosis.  There is no aortic stenosis at this time but this is likely to eventually evolve into aortic stenosis.  We will plan a 2-year echocardiogram for monitoring.  3.  History of frequent PACs.  I warned him that he is at risk of having atrial fibs at some point but his rhythm today is regular and he has never noticed an irregularly irregular rhythm.  I asked him to keep an eye open for this and contact me if he notices a marked increase frequency and the irregularities of his pulse.  4.  Hypertension.  Blood pressure is a little bit higher than we would like and today I increase his lisinopril to 40 mg daily.  He will be seeing his primary care doctor within the next 2 months and I asked him to checking his blood pressure regularly and keep a log of the values and bring them with him to the next visit.  Either addition of a diuretic or low-dose beta-blocker would be appropriate for the next step if blood pressure remains elevated.    Thank you for inviting me to participate in the care of your patient.  Please don't hesitate to call if I can be of further assistance.  35 minutes were spent today reviewing the chart and other records, interviewing and examining the patient, and documenting our visit.    Chart documentation was completed, in part, with Elance voice-recognition software. Even though reviewed, some grammatical, spelling, and word errors may remain.       Orders Placed This Encounter   Procedures    Follow-Up with Cardiologist     Orders Placed This Encounter   Medications    lisinopril (ZESTRIL) 40 MG tablet     Sig: Take 1 tablet (40 mg) by mouth daily     Dispense:  90 tablet     Refill:  3     Medications Discontinued During This Encounter   Medication Reason    lisinopril (ZESTRIL) 20 MG tablet        10 year ASCVD risk: The ASCVD Risk score (Rothsay DK, et al., 2019) failed to calculate for the following reasons:    The 2019 ASCVD risk score  is only valid for ages 40 to 79    Encounter Diagnoses   Name Primary?    Aortic valve stenosis, etiology of cardiac valve disease unspecified Yes    Cardiomyopathy, unspecified type (H)     Essential hypertension with goal blood pressure less than 140/90        CURRENT MEDICATIONS:  Current Outpatient Medications   Medication Sig Dispense Refill    Acetaminophen (TYLENOL PO) Take 1,000 mg by mouth 2 times daily      amLODIPine (NORVASC) 10 MG tablet Take 1 tablet (10 mg) by mouth daily 90 tablet 3    ASPIRIN PO Take 81 mg by mouth      atorvastatin (LIPITOR) 20 MG tablet Take 1 tablet (20 mg) by mouth daily 90 tablet 3    Calcium Citrate-Vitamin D (CITRACAL + D PO) Take 2 tablets by mouth daily      diclofenac (VOLTAREN) 75 MG EC tablet TAKE 2 TABLETS BY MOUTH AS NEEDED FOR MODERATE PAIN 180 tablet 1    latanoprost (XALATAN) 0.005 % ophthalmic solution Place 1 drop into both eyes At Bedtime For additional refills, please schedule a follow-up appointment at [phone]. 2.5 mL 0    lisinopril (ZESTRIL) 40 MG tablet Take 1 tablet (40 mg) by mouth daily 90 tablet 3    Magnesium 500 MG CAPS       Multiple Vitamins-Minerals (MENS MULTIVITAMIN PLUS PO) Take 1 tablet by mouth daily      Nutritional Supplements (PROSTATE 2.4) CAPS Take 2 capsules by mouth daily Prostate cap 2.0      rOPINIRole (REQUIP) 0.25 MG tablet TAKE 1 TO 2 TABLETS(0.25 TO 0.5 MG) BY MOUTH AT BEDTIME 180 tablet 11    sildenafil (REVATIO) 20 MG tablet Take 3-5 tabs 30-60 minutes prior to intercourse. 30 tablet 11    Testosterone 1.62 % GEL APPLY 2 PUMPS EACH ARM D      gabapentin (NEURONTIN) 600 MG tablet TAKE 1/2 TABLETS BY MOUTH EVERY MORNING AND 1 TABLET EVERY NIGHT AT BEDTIME. (Patient not taking: Reported on 5/17/2023) 180 tablet 1       ALLERGIES   No Known Allergies    PAST MEDICAL HISTORY:  Past Medical History:   Diagnosis Date    Cancer (H) 2000    L shoulder melanoma    Glaucoma     Heart murmur     Hypertension     Melanoma (H) 2000     Left shoulder - Dr. Carrillo - follows q6 months    Neuropathy     Nonsenile cataract     Other chronic pain     Neuropathy bilateral feet    Other premature beats     PVC's (premature ventricular contractions) 5/14/2015    Zoster 2000       PAST SURGICAL HISTORY:  Past Surgical History:   Procedure Laterality Date    ADULT DERMATOLOGY REFERRAL  2000    melanoma resection L shoulder    ARTHROPLASTY HIP ANTERIOR Left 11/14/2014    Procedure: ARTHROPLASTY HIP ANTERIOR;  Surgeon: Rudy Tobias MD;  Location:  OR    ARTHROPLASTY REVISION HIP  3/12/2014     right Procedure: ARTHROPLASTY REVISION HIP;  Surgeon: Rudy Tobias MD;  Location:  OR    ARTHROSCOPY KNEE  2002    meniscus ( side?)    BIOPSY OF PROSTATE,NEEDLE/PUNCH      both hips replaced      CATARACT IOL, RT/LT Bilateral     COLONOSCOPY N/A 5/28/2020    Procedure: COLONOSCOPY (fv) (please mail packet as soon as possible) fv;  Surgeon: Kevin Mccarty MD;  Location:  GI    DISCECTOMY LUMBAR POSTERIOR MICROSCOPIC ONE LEVEL N/A 4/11/2016    Procedure: DISCECTOMY LUMBAR POSTERIOR MICROSCOPIC ONE LEVEL;  Surgeon: Ruben Marsh MD;  Location:  OR    ENT SURGERY      Tonsils    ORTHOPEDIC SURGERY  2008    R hip replacement    ORTHOPEDIC SURGERY  2004    big toe in , needed reconstructive surgery    PHACOEMULSIFICATION CLEAR CORNEA WITH TORIC INTRAOCULAR LENS IMPLANT Left 11/6/2019    Procedure: LEFT EYE, CATARACT EXTRACTION WITH TORIC INTRAOCULAR LENS IMPLANT;  Surgeon: Shannon Yang MD;  Location: [facility] OR    PHACOEMULSIFICATION CLEAR CORNEA WITH TORIC INTRAOCULAR LENS IMPLANT Right 11/20/2019    Procedure: RIGHT EYE, CATARACT EXTRACTION WITH TORIC INTRAOCULAR LENS IMPLANT;  Surgeon: Shannon Yang MD;  Location: [facility] OR    TRABECULAR MICRO-BYPASS WITH ISTENT Left 11/6/2019    Procedure: LEFT EYE, ISTENT INSERTION;  Surgeon: Shannon Yang MD;  Location: [facility] OR    TRABECULAR MICRO-BYPASS WITH ISTENT Right 11/20/2019     Procedure: RIGHT EYE, ISTENT INSERTION;  Surgeon: Shannon Yang MD;  Location:  OR    [facility] [place] TOTAL HIP ARTHROPLASTY Right 2008       FAMILY HISTORY:  Family History   Problem Relation Age of Onset    Cerebrovascular Disease Mother     Hypertension Mother     Glaucoma Mother     Heart Disease Father 58        MI    Cancer Father         lung cancer;  age 91    Colon Cancer No family hx of     Diabetes No family hx of     Macular Degeneration No family hx of        SOCIAL HISTORY:  Social History     Socioeconomic History    Marital status:      Spouse name: None    Number of children: 2    Years of education: None    Highest education level: None   Occupational History    Occupation:      Employer: RETIRED   Tobacco Use    Smoking status: Former     Packs/day: 0.00     Types: Cigarettes     Quit date: 1976     Years since quittin.9    Smokeless tobacco: Never   Substance and Sexual Activity    Alcohol use: No     Alcohol/week: 0.0 standard drinks of alcohol     Comment: Quit 1014    Drug use: No    Sexual activity: Not Currently     Partners: Female   Other Topics Concern    Parent/sibling w/ CABG, MI or angioplasty before 65F 55M? No     Social Determinants of Health     Financial Resource Strain: Low Risk  (2022)    Overall Financial Resource Strain (CARDIA)     Difficulty of Paying Living Expenses: Not hard at all   Food Insecurity: No Food Insecurity (2022)    Hunger Vital Sign     Worried About Running Out of Food in the Last Year: Never true     Ran Out of Food in the Last Year: Never true   Transportation Needs: No Transportation Needs (2022)    PRAPARE - Transportation     Lack of Transportation (Medical): No     Lack of Transportation (Non-Medical): No   Physical Activity: Sufficiently Active (2022)    Exercise Vital Sign     Days of Exercise per Week: 7 days     Minutes of Exercise per Session: 140 min   Stress: No Stress Concern Present (2022)     "[place] [employer] - Occupational Stress Questionnaire     Feeling of Stress : Only a little   Social Connections: Unknown (6/8/2022)    Social Connection and Isolation Panel [NHANES]     Attends Muslim Services: More than 4 times per year     Active Member of Clubs or Organizations: Yes     Marital Status:    Housing Stability: Low Risk  (6/8/2022)    Housing Stability Vital Sign     Unable to Pay for Housing in the Last Year: No     Number of Places Lived in the Last Year: 1     Unstable Housing in the Last Year: No       Review of Systems:  Skin:  Positive for bruising   Eyes:  Positive for glasses;glaucoma  ENT:  Negative    Respiratory:  Negative    Cardiovascular:  Negative for;chest pain fatigue;dizziness;Positive for;palpitations  Gastroenterology: Negative for hematochezia;melena  Genitourinary:  Negative for hematuria  Musculoskeletal:  Positive for back pain;arthritis;joint pain  Neurologic:  Positive for numbness or tingling of hands  Psychiatric:  not assessed    Heme/Lymph/Imm:  Negative for bleeding disorder  Endocrine:  Negative for diabetes    Physical Exam:  Vitals: BP (!) 142/60   Pulse 70   Ht 1.778 m (5' 10\")   Wt 76.7 kg (169 lb)   SpO2 99%   BMI 24.25 kg/m      Constitutional:  cooperative, alert and oriented, well developed, well nourished, in no acute distress   Fit in appearance    Skin:  warm and dry to the touch, no apparent skin lesions or masses noted        Head:  normocephalic, no masses or lesions        Eyes:  pupils equal and round, conjunctivae and lids unremarkable, sclera white, no xanthalasma, EOMS intact, no nystagmus        ENT:  no pallor or cyanosis, dentition good        Neck:  carotid pulses are full and equal bilaterally, JVP normal, no carotid bruit        Chest:  normal breath sounds, clear to auscultation, normal A-P diameter, normal symmetry, normal respiratory excursion, no use of accessory muscles        Cardiac: regular rhythm, " normal S1/S2, no S3 or S4, apical impulse not displaced, no murmurs, gallops or rubs       grade 1;grade 2;systolic ejection murmur          Abdomen:  abdomen soft;BS normoactive        Vascular: pulses full and equal                                      Extremities and Muscular Skeletal:        bilateral LE edema;trace     Neurological:  no gross motor deficits        Psych:  affect appropriate, oriented to time, person and place     Recent Lab Results:  LIPID RESULTS:  Lab Results   Component Value Date    CHOL 126 06/08/2022    CHOL 161 02/15/2021    HDL 64 06/08/2022    HDL 76 02/15/2021    LDL 51 06/08/2022    LDL 71 02/15/2021    TRIG 54 06/08/2022    TRIG 68 02/15/2021    CHOLHDLRATIO 3.1 07/02/2015       LIVER ENZYME RESULTS:  Lab Results   Component Value Date    AST 52 (H) 06/08/2022    AST 25 02/15/2021    ALT 59 06/08/2022    ALT 30 02/15/2021       CBC RESULTS:  Lab Results   Component Value Date    WBC 5.2 02/08/2023    WBC 7.6 11/18/2020    RBC 4.29 (L) 02/08/2023    RBC 4.31 (L) 11/18/2020    HGB 14.1 02/08/2023    HGB 13.1 (L) 06/11/2021    HCT 42.5 02/08/2023    HCT 41.4 11/18/2020    MCV 99 02/08/2023    MCV 96 11/18/2020    MCH 32.9 02/08/2023    MCH 31.8 11/18/2020    MCHC 33.2 02/08/2023    MCHC 33.1 11/18/2020    RDW 12.9 02/08/2023    RDW 13.4 11/18/2020     02/08/2023     11/18/2020       BMP RESULTS:  Lab Results   Component Value Date     06/08/2022     02/15/2021    POTASSIUM 4.2 06/08/2022    POTASSIUM 4.3 02/15/2021    CHLORIDE 107 06/08/2022    CHLORIDE 107 02/15/2021    CO2 29 06/08/2022    CO2 27 02/15/2021    ANIONGAP 2 (L) 06/08/2022    ANIONGAP 6 02/15/2021    GLC 94 06/08/2022    GLC 88 02/15/2021    BUN 19 06/08/2022    BUN 19 02/15/2021    CR 0.90 06/08/2022    CR 0.91 02/15/2021    GFRESTIMATED 86 06/08/2022    GFRESTIMATED 80 02/15/2021    GFRESTBLACK >90 02/15/2021    EDY 9.0 06/08/2022    EDY 9.2 02/15/2021        A1C RESULTS:  Lab Results    Component Value Date    A1C 5.4 02/15/2021       INR RESULTS:  No results found for: INR      Kishor Lord MD, FACC    CC  No referring provider defined for this encounter.        Thank you for allowing me to participate in the care of your patient.      Sincerely,     Kishor Lord MD     [facility]

## 2023-05-17 NOTE — PROGRESS NOTES
HISTORY:    Ciro Almonte is a pleasant 83-year-old male with a history of palpitations, elevated calcium score, and very mild cardiomyopathy.  Transferred care to our service last year.    Ciro has a history of PACs and was treated with a beta-blocker for several years.  This has since been discontinued because of some bradycardia.  He reports that he used to be able to feel an irregular pulse but when he checks his pulse more recently he notices very few irregular beats.  He has no sensation of palpitations.    An echocardiogram was recently pleated and I reviewed this with him.  It shows an ejection fraction of 55 to 60% with aortic sclerosis but no significant aortic stenosis.    Today Ciro reports that he feels that his stamina remains excellent.  He is having a lot of problems with balance, neuropathy, and joint pains involving hips knees and shoulders.  As a consequence he is substantially less physically active but he still has a very large garage and 2 acres that he takes care of.  He is planning on moving to a [employer] in the near future.  He has a home blood pressure machine but does not use it often.    Today the blood pressure is mildly elevated and I rechecked it and found it to be similar at 146/70.  The remainder of his exam is essentially normal except for an audible murmur due to his aortic sclerosis.  Ciro denies PND/orthopnea, syncope or near syncope, exertional chest arm neck shoulder or jaw discomfort, palpitations, peripheral edema, or symptoms of claudication.      ASSESSMENT/PLAN:    1.  History of mild cardiomyopathy.  Recent ejection fraction continues to show a normal ejection fraction (in the low normal range).  The patient is physically active and asymptomatic with excellent exercise tolerance for age.  2.  Aortic sclerosis.  There is no aortic stenosis at this time but this is likely to eventually evolve into aortic stenosis.  We will plan a 2-year echocardiogram for  monitoring.  3.  History of frequent PACs.  I warned him that he is at risk of having atrial fibs at some point but his rhythm today is regular and he has never noticed an irregularly irregular rhythm.  I asked him to keep an eye open for this and contact me if he notices a marked increase frequency and the irregularities of his pulse.  4.  Hypertension.  Blood pressure is a little bit higher than we would like and today I increase his lisinopril to 40 mg daily.  He will be seeing his primary care doctor within the next 2 months and I asked him to checking his blood pressure regularly and keep a log of the values and bring them with him to the next visit.  Either addition of a diuretic or low-dose beta-blocker would be appropriate for the next step if blood pressure remains elevated.    Thank you for inviting me to participate in the care of your patient.  Please don't hesitate to call if I can be of further assistance.  35 minutes were spent today reviewing the chart and other records, interviewing and examining the patient, and documenting our visit.    Chart documentation was completed, in part, with Zen[employer] voice-recognition software. Even though reviewed, some grammatical, spelling, and word errors may remain.       Orders Placed This Encounter   Procedures     Follow-Up with Cardiologist     Orders Placed This Encounter   Medications     lisinopril (ZESTRIL) 40 MG tablet     Sig: Take 1 tablet (40 mg) by mouth daily     Dispense:  90 tablet     Refill:  3     Medications Discontinued During This Encounter   Medication Reason     lisinopril (ZESTRIL) 20 MG tablet        10 year ASCVD risk: The ASCVD Risk score ([place] DK, et al., 2019) failed to calculate for the following reasons:    The 2019 ASCVD risk score is only valid for ages 40 to 79    Encounter Diagnoses   Name Primary?     Aortic valve stenosis, etiology of cardiac valve disease unspecified Yes     Cardiomyopathy, unspecified type (H)      Essential  hypertension with goal blood pressure less than 140/90        CURRENT MEDICATIONS:  Current Outpatient Medications   Medication Sig Dispense Refill     Acetaminophen (TYLENOL PO) Take 1,000 mg by mouth 2 times daily       amLODIPine (NORVASC) 10 MG tablet Take 1 tablet (10 mg) by mouth daily 90 tablet 3     ASPIRIN PO Take 81 mg by mouth       atorvastatin (LIPITOR) 20 MG tablet Take 1 tablet (20 mg) by mouth daily 90 tablet 3     Calcium Citrate-Vitamin D (CITRACAL + D PO) Take 2 tablets by mouth daily       diclofenac (VOLTAREN) 75 MG EC tablet TAKE 2 TABLETS BY MOUTH AS NEEDED FOR MODERATE PAIN 180 tablet 1     latanoprost (XALATAN) 0.005 % ophthalmic solution Place 1 drop into both eyes At Bedtime For additional refills, please schedule a follow-up appointment at [phone]. 2.5 mL 0     lisinopril (ZESTRIL) 40 MG tablet Take 1 tablet (40 mg) by mouth daily 90 tablet 3     Magnesium 500 MG CAPS        Multiple Vitamins-Minerals (MENS MULTIVITAMIN PLUS PO) Take 1 tablet by mouth daily       Nutritional Supplements (PROSTATE 2.4) CAPS Take 2 capsules by mouth daily Prostate cap 2.0       rOPINIRole (REQUIP) 0.25 MG tablet TAKE 1 TO 2 TABLETS(0.25 TO 0.5 MG) BY MOUTH AT BEDTIME 180 tablet 11     sildenafil (REVATIO) 20 MG tablet Take 3-5 tabs 30-60 minutes prior to intercourse. 30 tablet 11     Testosterone 1.62 % GEL APPLY 2 PUMPS EACH ARM D       gabapentin (NEURONTIN) 600 MG tablet TAKE 1/2 TABLETS BY MOUTH EVERY MORNING AND 1 TABLET EVERY NIGHT AT BEDTIME. (Patient not taking: Reported on 5/17/2023) 180 tablet 1       ALLERGIES   No Known Allergies    PAST MEDICAL HISTORY:  Past Medical History:   Diagnosis Date     Cancer (H) 2000    L shoulder melanoma     Glaucoma      Heart murmur      Hypertension      Melanoma (H) 2000    Left shoulder - Dr. Carrillo - follows q6 months     Neuropathy      Nonsenile cataract      Other chronic pain     Neuropathy bilateral feet     Other premature beats      PVC's  (premature ventricular contractions) 5/14/2015     Zoster 2000       PAST SURGICAL HISTORY:  Past Surgical History:   Procedure Laterality Date     ADULT DERMATOLOGY REFERRAL  2000    melanoma resection L shoulder     ARTHROPLASTY HIP ANTERIOR Left 11/14/2014    Procedure: ARTHROPLASTY HIP ANTERIOR;  Surgeon: Rudy Tobias MD;  Location:  OR     ARTHROPLASTY REVISION HIP  3/12/2014     right Procedure: ARTHROPLASTY REVISION HIP;  Surgeon: Rudy Tobias MD;  Location:  OR     ARTHROSCOPY KNEE  2002    meniscus ( side?)     BIOPSY OF PROSTATE,NEEDLE/PUNCH       both hips replaced       CATARACT IOL, RT/LT Bilateral      COLONOSCOPY N/A 5/28/2020    Procedure: COLONOSCOPY (fv) (please mail packet as soon as possible) fv;  Surgeon: Kevin Mccarty MD;  Location:  GI     DISCECTOMY LUMBAR POSTERIOR MICROSCOPIC ONE LEVEL N/A 4/11/2016    Procedure: DISCECTOMY LUMBAR POSTERIOR MICROSCOPIC ONE LEVEL;  Surgeon: Ruben Marsh MD;  Location:  OR     ENT SURGERY      Tonsils     ORTHOPEDIC SURGERY  2008    R hip replacement     ORTHOPEDIC SURGERY  2004    big toe in lawn mower, needed reconstructive surgery     PHACOEMULSIFICATION CLEAR CORNEA WITH TORIC INTRAOCULAR LENS IMPLANT Left 11/6/2019    Procedure: LEFT EYE, CATARACT EXTRACTION WITH TORIC INTRAOCULAR LENS IMPLANT;  Surgeon: Shannon Yang MD;  Location: [facility]     PHACOEMULSIFICATION CLEAR CORNEA WITH TORIC INTRAOCULAR LENS IMPLANT Right 11/20/2019    Procedure: RIGHT EYE, CATARACT EXTRACTION WITH TORIC INTRAOCULAR LENS IMPLANT;  Surgeon: Shannon Yang MD;  Location: [facility] OR     TRABECULAR MICRO-BYPASS WITH ISTENT Left 11/6/2019    Procedure: LEFT EYE, ISTENT INSERTION;  Surgeon: Shannon Yang MD;  Location: [facility]     TRABECULAR MICRO-BYPASS WITH ISTENT Right 11/20/2019    Procedure: RIGHT EYE, ISTENT INSERTION;  Surgeon: Shannon Yang MD;  Location:  OR     ZZC TOTAL HIP ARTHROPLASTY Right 2008       FAMILY  HISTORY:  Family History   Problem Relation Age of Onset     Cerebrovascular Disease Mother      Hypertension Mother      Glaucoma Mother      Heart Disease Father 58        MI     Cancer Father         lung cancer;  age 91     Colon Cancer No family hx of      Diabetes No family hx of      Macular Degeneration No family hx of        SOCIAL HISTORY:  Social History     Socioeconomic History     Marital status:      Spouse name: None     Number of children: 2     Years of education: None     Highest education level: None   Occupational History     Occupation:      Employer: RETIRED   Tobacco Use     Smoking status: Former     Packs/day: 0.00     Types: Cigarettes     Quit date: 1976     Years since quittin.9     Smokeless tobacco: Never   Substance and Sexual Activity     Alcohol use: No     Alcohol/week: 0.0 standard drinks of alcohol     Comment: Quit 1014     Drug use: No     Sexual activity: Not Currently     Partners: Female   Other Topics Concern     Parent/sibling w/ CABG, MI or angioplasty before 65F 55M? No     Social Determinants of Health     Financial Resource Strain: Low Risk  (2022)    Overall Financial Resource Strain (CARDIA)      Difficulty of Paying Living Expenses: Not hard at all   Food Insecurity: No Food Insecurity (2022)    Hunger Vital Sign      Worried About Running Out of Food in the Last Year: Never true      Ran Out of Food in the Last Year: Never true   Transportation Needs: No Transportation Needs (2022)    PRAPARE - Transportation      Lack of Transportation (Medical): No      Lack of Transportation (Non-Medical): No   Physical Activity: Sufficiently Active (2022)    Exercise Vital Sign      Days of Exercise per Week: 7 days      Minutes of Exercise per Session: 140 min   Stress: No Stress Concern Present (2022)    [place] [place] [employer] - Occupational Stress Questionnaire      Feeling of Stress : Only a little  "  Social Connections: Unknown (6/8/2022)    Social Connection and Isolation Panel [NHANES]      Attends [employer] Services: More than 4 times per year      Active Member of Clubs or Organizations: Yes      Marital Status:    Housing Stability: Low Risk  (6/8/2022)    Housing Stability Vital Sign      Unable to Pay for Housing in the Last Year: No      Number of Places Lived in the Last Year: 1      Unstable Housing in the Last Year: No       Review of Systems:  Skin:  Positive for bruising   Eyes:  Positive for glasses;glaucoma  ENT:  Negative    Respiratory:  Negative    Cardiovascular:  Negative for;chest pain fatigue;dizziness;Positive for;palpitations  Gastroenterology: Negative for hematochezia;melena  Genitourinary:  Negative for hematuria  Musculoskeletal:  Positive for back pain;arthritis;joint pain  Neurologic:  Positive for numbness or tingling of hands  Psychiatric:  not assessed    Heme/Lymph/Imm:  Negative for bleeding disorder  Endocrine:  Negative for diabetes    Physical Exam:  Vitals: BP (!) 142/60   Pulse 70   Ht 1.778 m (5' 10\")   Wt 76.7 kg (169 lb)   SpO2 99%   BMI 24.25 kg/m      Constitutional:  cooperative, alert and oriented, well developed, well nourished, in no acute distress   Fit in appearance    Skin:  warm and dry to the touch, no apparent skin lesions or masses noted        Head:  normocephalic, no masses or lesions        Eyes:  pupils equal and round, conjunctivae and lids unremarkable, sclera white, no xanthalasma, EOMS intact, no nystagmus        ENT:  no pallor or cyanosis, dentition good        Neck:  carotid pulses are full and equal bilaterally, JVP normal, no carotid bruit        Chest:  normal breath sounds, clear to auscultation, normal A-P diameter, normal symmetry, normal respiratory excursion, no use of accessory muscles        Cardiac: regular rhythm, normal S1/S2, no S3 or S4, apical impulse not displaced, no murmurs, gallops or rubs       grade 1;grade " 2;systolic ejection murmur          Abdomen:  abdomen soft;BS normoactive        Vascular: pulses full and equal                                      Extremities and Muscular Skeletal:        bilateral LE edema;trace     Neurological:  no gross motor deficits        Psych:  affect appropriate, oriented to time, person and place     Recent Lab Results:  LIPID RESULTS:  Lab Results   Component Value Date    CHOL 126 06/08/2022    CHOL 161 02/15/2021    HDL 64 06/08/2022    HDL 76 02/15/2021    LDL 51 06/08/2022    LDL 71 02/15/2021    TRIG 54 06/08/2022    TRIG 68 02/15/2021    CHOLHDLRATIO 3.1 07/02/2015       LIVER ENZYME RESULTS:  Lab Results   Component Value Date    AST 52 (H) 06/08/2022    AST 25 02/15/2021    ALT 59 06/08/2022    ALT 30 02/15/2021       CBC RESULTS:  Lab Results   Component Value Date    WBC 5.2 02/08/2023    WBC 7.6 11/18/2020    RBC 4.29 (L) 02/08/2023    RBC 4.31 (L) 11/18/2020    HGB 14.1 02/08/2023    HGB 13.1 (L) 06/11/2021    HCT 42.5 02/08/2023    HCT 41.4 11/18/2020    MCV 99 02/08/2023    MCV 96 11/18/2020    MCH 32.9 02/08/2023    MCH 31.8 11/18/2020    MCHC 33.2 02/08/2023    MCHC 33.1 11/18/2020    RDW 12.9 02/08/2023    RDW 13.4 11/18/2020     02/08/2023     11/18/2020       BMP RESULTS:  Lab Results   Component Value Date     06/08/2022     02/15/2021    POTASSIUM 4.2 06/08/2022    POTASSIUM 4.3 02/15/2021    CHLORIDE 107 06/08/2022    CHLORIDE 107 02/15/2021    CO2 29 06/08/2022    CO2 27 02/15/2021    ANIONGAP 2 (L) 06/08/2022    ANIONGAP 6 02/15/2021    GLC 94 06/08/2022    GLC 88 02/15/2021    BUN 19 06/08/2022    BUN 19 02/15/2021    CR 0.90 06/08/2022    CR 0.91 02/15/2021    GFRESTIMATED 86 06/08/2022    GFRESTIMATED 80 02/15/2021    GFRESTBLACK >90 02/15/2021    EDY 9.0 06/08/2022    EDY 9.2 02/15/2021        A1C RESULTS:  Lab Results   Component Value Date    A1C 5.4 02/15/2021       INR RESULTS:  No results found for: MARIBELL COLLINS  MD Pop, [facility]    CC  No referring provider defined for this encounter.

## 2023-06-12 ENCOUNTER — VIRTUAL VISIT (OUTPATIENT)
Dept: URGENT CARE | Facility: CLINIC | Age: 83
End: 2023-06-12
Payer: COMMERCIAL

## 2023-06-12 ENCOUNTER — NURSE TRIAGE (OUTPATIENT)
Dept: PEDIATRICS | Facility: CLINIC | Age: 83
End: 2023-06-12

## 2023-06-12 DIAGNOSIS — U07.1 CLINICAL DIAGNOSIS OF COVID-19: Primary | ICD-10-CM

## 2023-06-12 DIAGNOSIS — E78.2 MIXED HYPERLIPIDEMIA: ICD-10-CM

## 2023-06-12 PROCEDURE — 99213 OFFICE O/P EST LOW 20 MIN: CPT | Mod: 93

## 2023-06-12 NOTE — PROGRESS NOTES
"Ciro is a 83 year old who is being evaluated via a billable phone visit.      COVID + 6/12/2023.  Sx started 6/11/2023.  Wife has been + for a couple of days.  Runny nose, dry cough, ST.  Has had COVID in past.    Assessment & Plan     Clinical diagnosis of COVID-19    - nirmatrelvir and ritonavir (PAXLOVID) 300 mg/100 mg therapy pack; Take 3 tablets by mouth 2 times daily for 5 days (Take 2 Nirmatrelvir tablets and 1 Ritonavir tablet twice daily for 5 days)[id number]}     COVID-19 positive patient.  Encounter for consideration of medication intervention. Patient does qualify for a prescription. Full discussion with patient including medication options, risks and benefits. Potential drug interactions reviewed with patient.     Treatment Planned Paxlovid RX sent to [facility]    Temporary change to home   medications:   Hold Viagra while on Paxlovid.  Hold atorvastatin while on Paxlovid x 7 days.  Restart day # 8.  Reduce amlodipine by 50% taking 1/2 tab once daily x 7 days while on Paxlovid.  Resume full dose on day #8.    Estimated body mass index is 24.25 kg/m  as calculated from the following:    Height as of 5/17/23: 1.778 m (5' 10\").    Weight as of 5/17/23: 76.7 kg (169 lb).  GFR Estimate   Date Value Ref Range Status   06/08/2022 86 >60 mL/min/1.73m2 Final     Comment:     Effective December 21, 2021 eGFRcr in adults is calculated using the 2021 CKD-EPI creatinine equation which includes age and gender (Cleve et al., NEJ, DOI: 10.1056/UARNeb4208005)   02/15/2021 80 >60 mL/min/[1.73_m2] Final     Comment:     Non  GFR Calc  Starting 12/18/2018, serum creatinine based estimated GFR (eGFR) will be   calculated using the Chronic Kidney Disease Epidemiology Collaboration   (CKD-EPI) equation.       Bonita Darby MD  Virtual Urgent Care  [facility] VIRTUAL URGENT CARE    Subjective   Ciro is a 83 year old, presenting for the following health issues:  No chief complaint on file.    HPI "       COVID + 6/12/2023.  Sx started 6/11/2023.  Wife has been + for a couple of days.  Runny nose, dry cough, ST.  Has had COVID in past.    Review of Systems   Constitutional, HEENT, cardiovascular, pulmonary, GI, , musculoskeletal, neuro, skin, endocrine and psych systems are negative, except as otherwise noted.      Objective           Vitals:  No vitals were obtained today due to virtual visit.    Physical Exam   GENERAL: Healthy, alert and no distress  PSYCH: mentation appears normal and affect normal/bright    Phone call duration # 10 minutes.

## 2023-06-12 NOTE — TELEPHONE ENCOUNTER
Patient wife calling as he has tested positive for covid. Asked for a call back due to a phone issue. Called him back.    RN COVID TREATMENT VISIT  06/12/23      The patient has been triaged and does not require a higher level of care.    Ciro Almonte  83 year old  Current weight? 165.0    Has the patient been seen by a primary care provider at an [facility] or [facility] within the past two years? Yes.   Have you been in close proximity to/do you have a known exposure to a person with a confirmed case of influenza? No.     General treatment eligibility:  Date of positive COVID test (PCR or at home)?  06/12/2023    Are you or have you been hospitalized for this COVID-19 infection? No.   Have you received monoclonal antibodies or antiviral treatment for COVID-19 since this positive test? No.   Do you have any of the following conditions that place you at risk of being very sick from COVID-19?   - Age 50 years or older  - Heart conditions such as cardiomyopathies, congenital heart defects, coronary artery disease, heart arrhythmias, heart failure, hypertension, valve disorders   Yes, patient has at least one high risk condition as noted above.     Current COVID symptoms:   - cough  - fatigue  - sore throat  - congestion or runny nose  Yes. Patient has at least one symptom as selected.     How many days since symptoms started? 5 days or less. Established patient, 12 years or older weighing at least 88.2 lbs, who has symptoms that started in the past 5 days, has not been hospitalized nor received treatment already, and is at risk for being very sick from COVID-19.     Treatment eligibility by RN:    Are you currently pregnant or nursing? No    Do you have a clinically significant hypersensitivity to nirmatrelvir or ritonavir, or toxic epidermal necrolysis (TEN) or Packer-Dewey Syndrome? No    Do you have a history of hepatitis, any hepatic impairment on the Problem List (such as  Child-Phillips Class C, cirrhosis, fatty liver disease, alcoholic liver disease), or was the last liver lab (hepatic panel, ALT, AST, ALK Phos, bilirubin) elevated in the past 6 months? No    Do you have any history of severe renal impairment (eGFR < 30mL/min)? No    Is patient eligible to continue?   Yes, patient meets all eligibility requirements for the RN COVID treatment (as denoted by all no responses above).     Current Outpatient Medications   Medication Sig Dispense Refill     Acetaminophen (TYLENOL PO) Take 1,000 mg by mouth 2 times daily       amLODIPine (NORVASC) 10 MG tablet Take 1 tablet (10 mg) by mouth daily 90 tablet 3     ASPIRIN PO Take 81 mg by mouth       atorvastatin (LIPITOR) 20 MG tablet Take 1 tablet (20 mg) by mouth daily 90 tablet 3     Calcium Citrate-Vitamin D (CITRACAL + D PO) Take 2 tablets by mouth daily       diclofenac (VOLTAREN) 75 MG EC tablet TAKE 2 TABLETS BY MOUTH AS NEEDED FOR MODERATE PAIN 180 tablet 1     gabapentin (NEURONTIN) 600 MG tablet TAKE 1/2 TABLETS BY MOUTH EVERY MORNING AND 1 TABLET EVERY NIGHT AT BEDTIME. (Patient not taking: Reported on 5/17/2023) 180 tablet 1     latanoprost (XALATAN) 0.005 % ophthalmic solution Place 1 drop into both eyes At Bedtime For additional refills, please schedule a follow-up appointment at [phone]. 2.5 mL 0     lisinopril (ZESTRIL) 40 MG tablet Take 1 tablet (40 mg) by mouth daily 90 tablet 3     Magnesium 500 MG CAPS        Multiple Vitamins-Minerals (MENS MULTIVITAMIN PLUS PO) Take 1 tablet by mouth daily       Nutritional Supplements (PROSTATE 2.4) CAPS Take 2 capsules by mouth daily Prostate cap 2.0       rOPINIRole (REQUIP) 0.25 MG tablet TAKE 1 TO 2 TABLETS(0.25 TO 0.5 MG) BY MOUTH AT BEDTIME 180 tablet 11     sildenafil (REVATIO) 20 MG tablet Take 3-5 tabs 30-60 minutes prior to intercourse. 30 tablet 11     Testosterone 1.62 % GEL APPLY 2 PUMPS EACH ARM D         Medications from List 1 of the standing order (on medications  "that exclude the use of Paxlovid) that patient is taking: NONE. Is patient taking Dustin's Wort? No  Is patient taking Guido's Wort or any meds from List 1? No.   Medications from List 2 of the standing order (on meds that provider needs to adjust) that patient is taking: amlodipine (Norvasc), explained a provider visit is necessary to discuss medication adjustments while taking Paxlovid. Is patient on any of the meds from List 2? Yes. Patient will be scheduled or transferred to a  at the end of this call.   Hayley Duncan RN                  Reason for Disposition    [1] COVID-19 diagnosed by positive lab test (e.g., PCR, rapid self-test kit) AND [2] mild symptoms (e.g., cough, fever, others) AND [3] no complications or SOB    Answer Assessment - Initial Assessment Questions  1. COVID-19 DIAGNOSIS: \"Who made your COVID-19 diagnosis?\" \"Was it confirmed by a positive lab test or self-test?\" If not diagnosed by a doctor (or NP/PA), ask \"Are there lots of cases (community spread) where you live?\" Note: See public health department website, if unsure.      NA  2. COVID-19 EXPOSURE: \"Was there any known exposure to COVID before the symptoms began?\" CDC Definition of close contact: within 6 feet (2 meters) for a total of 15 minutes or more over a 24-hour period.       Yes wife  3. ONSET: \"When did the COVID-19 symptoms start?\"       Yesterday   4. WORST SYMPTOM: \"What is your worst symptom?\" (e.g., cough, fever, shortness of breath, muscle aches)      Stuffy nose and fatigue  5. COUGH: \"Do you have a cough?\" If Yes, ask: \"How bad is the cough?\"        Yes not bad  6. FEVER: \"Do you have a fever?\" If Yes, ask: \"What is your temperature, how was it measured, and when did it start?\"      No  7. RESPIRATORY STATUS: \"Describe your breathing?\" (e.g., shortness of breath, wheezing, unable to speak)       No shortness of breath, no wheezing  8. BETTER-SAME-WORSE: \"Are you getting better, staying the same or " "getting worse compared to yesterday?\"  If getting worse, ask, \"In what way?\"      Worse stuffy nose  9. HIGH RISK DISEASE: \"Do you have any chronic medical problems?\" (e.g., asthma, heart or lung disease, weak immune system, obesity, etc.)      Heart disease  10. VACCINE: \"Have you had the COVID-19 vaccine?\" If Yes, ask: \"Which one, how many shots, when did you get it?\"        Yes   11. BOOSTER: \"Have you received your COVID-19 booster?\" If Yes, ask: \"Which one and when did you get it?\"        Last booster was 06/08/2022  12. PREGNANCY: \"Is there any chance you are pregnant?\" \"When was your last menstrual period?\"        NA  13. OTHER SYMPTOMS: \"Do you have any other symptoms?\"  (e.g., chills, fatigue, headache, loss of smell or taste, muscle pain, sore throat)        Fatigue, sore throat  14. O2 SATURATION MONITOR:  \"Do you use an oxygen saturation monitor (pulse oximeter) at home?\" If Yes, ask \"What is your reading (oxygen level) today?\" \"What is your usual oxygen saturation reading?\" (e.g., 95%)        97 % RA    Protocols used: CORONAVIRUS (COVID-19) DIAGNOSED OR [id number]      "

## 2023-06-15 RX ORDER — ATORVASTATIN CALCIUM 20 MG/1
TABLET, FILM COATED ORAL
Qty: 90 TABLET | Refills: 0 | Status: SHIPPED | OUTPATIENT
Start: 2023-06-15 | End: 2023-06-29

## 2023-06-29 ENCOUNTER — OFFICE VISIT (OUTPATIENT)
Dept: PEDIATRICS | Facility: CLINIC | Age: 83
End: 2023-06-29
Payer: COMMERCIAL

## 2023-06-29 VITALS
BODY MASS INDEX: 23.61 KG/M2 | HEART RATE: 70 BPM | HEIGHT: 69 IN | DIASTOLIC BLOOD PRESSURE: 62 MMHG | WEIGHT: 159.4 LBS | TEMPERATURE: 97.5 F | SYSTOLIC BLOOD PRESSURE: 122 MMHG | RESPIRATION RATE: 18 BRPM | OXYGEN SATURATION: 98 %

## 2023-06-29 DIAGNOSIS — E78.2 MIXED HYPERLIPIDEMIA: ICD-10-CM

## 2023-06-29 DIAGNOSIS — R26.89 BALANCE PROBLEMS: ICD-10-CM

## 2023-06-29 DIAGNOSIS — Z00.00 ENCOUNTER FOR MEDICARE ANNUAL WELLNESS EXAM: Primary | ICD-10-CM

## 2023-06-29 DIAGNOSIS — R25.2 CRAMP OF LIMB: ICD-10-CM

## 2023-06-29 DIAGNOSIS — I10 ESSENTIAL HYPERTENSION WITH GOAL BLOOD PRESSURE LESS THAN 140/90: ICD-10-CM

## 2023-06-29 LAB
ALBUMIN SERPL BCG-MCNC: 4.3 G/DL (ref 3.5–5.2)
ALP SERPL-CCNC: 92 U/L (ref 40–129)
ALT SERPL W P-5'-P-CCNC: 29 U/L (ref 0–70)
ANION GAP SERPL CALCULATED.3IONS-SCNC: 9 MMOL/L (ref 7–15)
AST SERPL W P-5'-P-CCNC: 31 U/L (ref 0–45)
BILIRUB SERPL-MCNC: 0.5 MG/DL
BUN SERPL-MCNC: 19.7 MG/DL (ref 8–23)
CALCIUM SERPL-MCNC: 9.4 MG/DL (ref 8.8–10.2)
CHLORIDE SERPL-SCNC: 109 MMOL/L (ref 98–107)
CHOLEST SERPL-MCNC: 159 MG/DL
CREAT SERPL-MCNC: 1.04 MG/DL (ref 0.67–1.17)
DEPRECATED HCO3 PLAS-SCNC: 26 MMOL/L (ref 22–29)
GFR SERPL CREATININE-BSD FRML MDRD: 72 ML/MIN/1.73M2
GLUCOSE SERPL-MCNC: 87 MG/DL (ref 70–99)
HDLC SERPL-MCNC: 62 MG/DL
LDLC SERPL CALC-MCNC: 84 MG/DL
NONHDLC SERPL-MCNC: 97 MG/DL
POTASSIUM SERPL-SCNC: 4.5 MMOL/L (ref 3.4–5.3)
PROT SERPL-MCNC: 6.6 G/DL (ref 6.4–8.3)
SODIUM SERPL-SCNC: 144 MMOL/L (ref 136–145)
TRIGL SERPL-MCNC: 64 MG/DL

## 2023-06-29 PROCEDURE — 80053 COMPREHEN METABOLIC PANEL: CPT | Performed by: INTERNAL MEDICINE

## 2023-06-29 PROCEDURE — 0121A COVID-19 BIVALENT 12+ (PFIZER): CPT | Performed by: INTERNAL MEDICINE

## 2023-06-29 PROCEDURE — 80061 LIPID PANEL: CPT | Performed by: INTERNAL MEDICINE

## 2023-06-29 PROCEDURE — 99214 OFFICE O/P EST MOD 30 MIN: CPT | Mod: 25 | Performed by: INTERNAL MEDICINE

## 2023-06-29 PROCEDURE — 36415 COLL VENOUS BLD VENIPUNCTURE: CPT | Performed by: INTERNAL MEDICINE

## 2023-06-29 PROCEDURE — G0439 PPPS, SUBSEQ VISIT: HCPCS | Performed by: INTERNAL MEDICINE

## 2023-06-29 PROCEDURE — 91312 COVID-19 BIVALENT 12+ (PFIZER): CPT | Performed by: INTERNAL MEDICINE

## 2023-06-29 RX ORDER — ROPINIROLE 0.5 MG/1
0.5 TABLET, FILM COATED ORAL AT BEDTIME
Qty: 90 TABLET | Refills: 3 | Status: ON HOLD | OUTPATIENT
Start: 2023-06-29 | End: 2023-12-13

## 2023-06-29 RX ORDER — AMLODIPINE BESYLATE 10 MG/1
10 TABLET ORAL DAILY
Qty: 90 TABLET | Refills: 3 | Status: SHIPPED | OUTPATIENT
Start: 2023-06-29 | End: 2024-08-20

## 2023-06-29 RX ORDER — ROPINIROLE 0.5 MG/1
0.5 TABLET, FILM COATED ORAL AT BEDTIME
Qty: 90 TABLET | Refills: 3 | Status: SHIPPED | OUTPATIENT
Start: 2023-06-29 | End: 2023-06-29

## 2023-06-29 RX ORDER — ATORVASTATIN CALCIUM 20 MG/1
20 TABLET, FILM COATED ORAL DAILY
Qty: 90 TABLET | Refills: 3 | Status: SHIPPED | OUTPATIENT
Start: 2023-06-29 | End: 2024-08-20

## 2023-06-29 RX ORDER — ROPINIROLE 0.5 MG/1
0.5 TABLET, FILM COATED ORAL AT BEDTIME
Qty: 90 TABLET | Refills: 3 | OUTPATIENT
Start: 2023-06-29

## 2023-06-29 ASSESSMENT — ENCOUNTER SYMPTOMS
FEVER: 0
CONSTIPATION: 0
DIARRHEA: 0
COUGH: 0
WEAKNESS: 0
PALPITATIONS: 0
CHILLS: 0
ARTHRALGIAS: 1
FREQUENCY: 1
NERVOUS/ANXIOUS: 0
DYSURIA: 0
MYALGIAS: 1
DIZZINESS: 1
SORE THROAT: 0
JOINT SWELLING: 1
HEMATOCHEZIA: 0
HEADACHES: 0
EYE PAIN: 0
HEMATURIA: 0
PARESTHESIAS: 0
SHORTNESS OF BREATH: 0
ABDOMINAL PAIN: 0
HEARTBURN: 0
NAUSEA: 0

## 2023-06-29 ASSESSMENT — PAIN SCALES - GENERAL: PAINLEVEL: MILD PAIN (2)

## 2023-06-29 ASSESSMENT — ACTIVITIES OF DAILY LIVING (ADL): CURRENT_FUNCTION: NO ASSISTANCE NEEDED

## 2023-06-29 NOTE — PATIENT INSTRUCTIONS
Increase ropinirole to 0.5mg nightly, take 30-60 min before bedtime.    Schedule physical therapy appointment again for balance. Make sure that you are eating weight and staying well hydrated. If not improving you could see a neurologist too.    Patient Education   Personalized Prevention Plan  You are due for the preventive services outlined below.  Your care team is available to assist you in scheduling these services.  If you have already completed any of these items, please share that information with your care team to update in your medical record.  Health Maintenance Due   Topic Date Due    ANNUAL REVIEW OF HM ORDERS  Never done    COVID-19 Vaccine (6 - Pfizer series) 02/03/2023    FALL RISK ASSESSMENT  06/08/2023    Annual Wellness Visit  06/08/2023

## 2023-06-29 NOTE — Clinical Note
Ayse - I didn't see that Ciro's fall risk was entered - I entered it based on my best recollection but please let me know if this is incorrect and I will change it. -Radha

## 2023-06-29 NOTE — PROGRESS NOTES
"SUBJECTIVE:   Ciro is a 83 year old who presents for Preventive Visit.      6/29/2023     1:33 PM   Additional Questions   Roomed by Ayse   Accompanied by spouse         6/29/2023     1:33 PM   Patient Reported Additional Medications   Patient reports taking the following new medications none     Are you in the first 12 months of your Medicare coverage?  No    Healthy Habits:     In general, how would you rate your overall health?  Fair    Frequency of exercise:  None    Do you usually eat at least 4 servings of fruit and vegetables a day, include whole grains    & fiber and avoid regularly eating high fat or \"junk\" foods?  No    Taking medications regularly:  Yes    Medication side effects:  None    Ability to successfully perform activities of daily living:  No assistance needed    Home Safety:  No safety concerns identified    Hearing Impairment:  Difficulty following a conversation in a noisy restaurant or crowded room, feel that people are mumbling or not speaking clearly, difficulty following dialogue in the theater, difficult to understand a speaker at a public meeting or Church service, need to ask people to speak up or repeat themselves, find that men's voices are easier to understand than woman's and difficulty understanding soft or whispered speech    In the past 6 months, have you been bothered by leaking of urine? Yes    In general, how would you rate your overall mental or emotional health?  Good    Additional concerns today:  Yes        Have you ever done Advance Care Planning? (For example, a Health Directive, POLST, or a discussion with a medical provider or your loved ones about your wishes): Yes, advance care planning is on file.       Fall risk  Fallen 2 or more times in last year: Yes  Falls with injury: No       Cognitive Screening   1) Repeat 3 items (Leader, Season, Table)    2) Clock draw: NORMAL  3) 3 item recall: Recalls 3 objects  Results: 3 items recalled: COGNITIVE IMPAIRMENT LESS " LIKELY    Mini-CogTM Copyright S Jaimeminh. Licensed by the author for use in [facility]; reprinted with permission (solexi@[facility]). All rights reserved.      Has been having increased restless leg symptoms at night time. He takes medication at bedtime and only takes 25mg nightly.    Has also been struggling with balance quite a bit. Just feels unsteady when he's walking. Has been too busy to do physical therapy recently. Thought it was perhaps helpful prior to stopping.     Has lost some weight but is trying his best to be more active.     Reviewed and updated as needed this visit by clinical staff   Tobacco  Allergies  Meds              Reviewed and updated as needed this visit by Provider     Meds             Social History     Tobacco Use     Smoking status: Former     Packs/day: 0.00     Types: Cigarettes     Quit date: 1976     Years since quittin.0     Smokeless tobacco: Never   Substance Use Topics     Alcohol use: No     Alcohol/week: 0.0 standard drinks of alcohol     Comment: Quit 2023     1:28 PM   Alcohol Use   Prescreen: >3 drinks/day or >7 drinks/week? Not Applicable          No data to display              Do you have a current opioid prescription? No  Do you use any other controlled substances or medications that are not prescribed by a provider? None              Current providers sharing in care for this patient include:   Patient Care Team:  Radha Banks MD as PCP - General (Internal Medicine)  McLeanMiriam MD as MD (Ophthalmology)  Shannon Yang MD as MD (Ophthalmology)  Radha Banks MD as Assigned PCP  Kishor Lord MD as Assigned Heart and Vascular Provider    The following health maintenance items are reviewed in Epic and correct as of today:  Health Maintenance   Topic Date Due     ANNUAL REVIEW OF HM ORDERS  Never done     COVID-19 Vaccine (6 - Pfizer series) 2023     FALL RISK ASSESSMENT  2023      MEDICARE ANNUAL WELLNESS VISIT  06/08/2023     DTAP/TDAP/TD IMMUNIZATION (2 - Td or Tdap) 04/06/2027     ADVANCE CARE PLANNING  06/09/2027     PHQ-2 (once per calendar year)  Completed     INFLUENZA VACCINE  Completed     Pneumococcal Vaccine: 65+ Years  Completed     ZOSTER IMMUNIZATION  Completed     IPV IMMUNIZATION  Aged Out     MENINGITIS IMMUNIZATION  Aged Out     Patient Active Problem List   Diagnosis     Cervical spine degeneration     Glaucoma     Mitral regurgitation - systolic murmur     Hereditary and idiopathic peripheral neuropathy     Essential hypertension with goal blood pressure less than 140/90     Primary osteoarthritis of right foot     Benign non-nodular prostatic hyperplasia with lower urinary tract symptoms     Chronic low back pain, unspecified back pain laterality, with sciatica presence unspecified     Lumbago     Hip pain, left     Mixed hyperlipidemia     Glaucoma suspect of both eyes     Nuclear senile cataract of both eyes     Pseudophakia of both eyes     Cardiomyopathy, unspecified type (H)     Past Surgical History:   Procedure Laterality Date     ADULT DERMATOLOGY REFERRAL  2000    melanoma resection L shoulder     ARTHROPLASTY HIP ANTERIOR Left 11/14/2014    Procedure: ARTHROPLASTY HIP ANTERIOR;  Surgeon: Rudy Tobias MD;  Location:  OR     ARTHROPLASTY REVISION HIP  3/12/2014     right Procedure: ARTHROPLASTY REVISION HIP;  Surgeon: Rudy Tobias MD;  Location:  OR     ARTHROSCOPY KNEE  2002    meniscus ( side?)     BIOPSY OF PROSTATE,NEEDLE/PUNCH       both hips replaced       CATARACT IOL, RT/LT Bilateral      COLONOSCOPY N/A 5/28/2020    Procedure: COLONOSCOPY (fv) (please mail packet as soon as possible) fv;  Surgeon: Kevin Mccarty MD;  Location:  GI     DISCECTOMY LUMBAR POSTERIOR MICROSCOPIC ONE LEVEL N/A 4/11/2016    Procedure: DISCECTOMY LUMBAR POSTERIOR MICROSCOPIC ONE LEVEL;  Surgeon: Ruben Marsh MD;  Location:  OR     ENT SURGERY       Tonsils     ORTHOPEDIC SURGERY  2008    R hip replacement     ORTHOPEDIC SURGERY  2004    big toe in lawn mower, needed reconstructive surgery     PHACOEMULSIFICATION CLEAR CORNEA WITH TORIC INTRAOCULAR LENS IMPLANT Left 2019    Procedure: LEFT EYE, CATARACT EXTRACTION WITH TORIC INTRAOCULAR LENS IMPLANT;  Surgeon: Shannon Yang MD;  Location: [facility]     PHACOEMULSIFICATION CLEAR CORNEA WITH TORIC INTRAOCULAR LENS IMPLANT Right 2019    Procedure: RIGHT EYE, CATARACT EXTRACTION WITH TORIC INTRAOCULAR LENS IMPLANT;  Surgeon: Shannon Yang MD;  Location: [facility] OR     TRABECULAR MICRO-BYPASS WITH ISTENT Left 2019    Procedure: LEFT EYE, ISTENT INSERTION;  Surgeon: Shannon Yang MD;  Location: [facility]     TRABECULAR MICRO-BYPASS WITH ISTENT Right 2019    Procedure: RIGHT EYE, ISTENT INSERTION;  Surgeon: Shannon Yang MD;  Location: [facility] OR     ZZC TOTAL HIP ARTHROPLASTY Right        Social History     Tobacco Use     Smoking status: Former     Packs/day: 0.00     Types: Cigarettes     Quit date: 1976     Years since quittin.0     Smokeless tobacco: Never   Substance Use Topics     Alcohol use: No     Alcohol/week: 0.0 standard drinks of alcohol     Comment: Quit 1014     Family History   Problem Relation Age of Onset     Cerebrovascular Disease Mother      Hypertension Mother      Glaucoma Mother      Heart Disease Father 58        MI     Cancer Father         lung cancer;  age 91     Colon Cancer No family hx of      Diabetes No family hx of      Macular Degeneration No family hx of                  Review of Systems   Constitutional: Negative for chills and fever.   HENT: Positive for hearing loss. Negative for congestion, ear pain and sore throat.    Eyes: Negative for pain and visual disturbance.   Respiratory: Negative for cough and shortness of breath.    Cardiovascular: Positive for peripheral edema. Negative for chest pain and palpitations.  "  Gastrointestinal: Negative for abdominal pain, constipation, diarrhea, heartburn, hematochezia and nausea.   Genitourinary: Positive for frequency, impotence and urgency. Negative for dysuria, genital sores, hematuria and penile discharge.   Musculoskeletal: Positive for arthralgias, joint swelling and myalgias.   Skin: Negative for rash.   Neurological: Positive for dizziness. Negative for weakness, headaches and paresthesias.   Psychiatric/Behavioral: Negative for mood changes. The patient is not nervous/anxious.          OBJECTIVE:   /62 (BP Location: Right arm, Patient Position: Sitting, Cuff Size: Adult Regular)   Pulse 70   Temp 97.5  F (36.4  C) (Tympanic)   Resp 18   Ht 1.746 m (5' 8.75\")   Wt 72.3 kg (159 lb 6.4 oz)   SpO2 98%   BMI 23.71 kg/m   Estimated body mass index is 23.71 kg/m  as calculated from the following:    Height as of this encounter: 1.746 m (5' 8.75\").    Weight as of this encounter: 72.3 kg (159 lb 6.4 oz).  Physical Exam  GENERAL: healthy, alert and no distress  EYES: Eyes grossly normal to inspection, PERRL and conjunctivae and sclerae normal  HENT: ear canals and TM's normal, nose and mouth without ulcers or lesions  NECK: no adenopathy, no asymmetry, masses, or scars and thyroid normal to palpation  RESP: lungs clear to auscultation - no rales, rhonchi or wheezes  CV: regular rate and rhythm, normal S1 S2, no S3 or S4, no murmur, click or rub, no peripheral edema and peripheral pulses strong  ABDOMEN: soft, nontender, no hepatosplenomegaly, no masses and bowel sounds normal  MS: no gross musculoskeletal defects noted, no edema  SKIN: no suspicious lesions or rashes  NEURO: Normal strength and tone, mentation intact and speech normal  PSYCH: mentation appears normal, affect normal/bright    Diagnostic Test Results:  Labs reviewed in Epic    ASSESSMENT / PLAN:   Encounter for Medicare annual wellness exam  Counseling as below, COVID booster today.     Essential " hypertension with goal blood pressure less than 140/90  BP well controlled on current regimen, due for labs.   - amLODIPine (NORVASC) 10 MG tablet; Take 1 tablet (10 mg) by mouth daily  - Comprehensive metabolic panel (BMP + Alb, Alk Phos, ALT, AST, Total. Bili, TP)    Mixed hyperlipidemia  Medication refilled, due for labs.   - atorvastatin (LIPITOR) 20 MG tablet; Take 1 tablet (20 mg) by mouth daily  - Lipid panel reflex to direct LDL Non-fasting; Future  - Lipid panel reflex to direct LDL Non-fasting    Cramp of limb  Will try increasing ropinirole to 0.5mg at night and taking slightly prior to bedtime.     Balance problems  Recent brain MRI and labs reassuring. Encouraged patient to follow-up with physical therapy, offered neurology referral if he continues to have symptoms that are bothersome after working with physical therapy. Encouraged him to move slowly and carefully, he declines mobility aid such as a cane or walker.   - Physical Therapy Referral; Future  - Adult Neurology  Referral; Future            COUNSELING:  Reviewed preventive health counseling, as reflected in patient instructions       Regular exercise       Healthy diet/nutrition       Vision screening       Hearing screening       Fall risk prevention       Prostate cancer screening        He reports that he quit smoking about 47 years ago. His smoking use included cigarettes. He has never used smokeless tobacco.      Appropriate preventive services were discussed with this patient, including applicable screening as appropriate for cardiovascular disease, diabetes, osteopenia/osteoporosis, and glaucoma.  As appropriate for age/gender, discussed screening for colorectal cancer, prostate cancer, breast cancer, and cervical cancer. Checklist reviewing preventive services available has been given to the patient.    Reviewed patients plan of care and provided an AVS. The Intermediate Care Plan ( asthma action plan, low back pain action  plan, and migraine action plan) for Ciro meets the Care Plan requirement. This Care Plan has been established and reviewed with the Patient.          Radha Herrera MD  [facility]    Identified Health Risks:    I have reviewed Opioid Use Disorder and Substance Use Disorder risk factors and made any needed referrals.

## 2023-06-29 NOTE — LETTER
June 30, 2023      Ciro Almonte  [address] [place] [address]        Dear Ciro,     Here are your lab results. Everything looks good. Your cholesterol levels were up slightly, but I don't think we need to adjust your atorvastatin dose at this time. Please let me know if you have any questions or concerns.     Resulted Orders   Comprehensive metabolic panel (BMP + Alb, Alk Phos, ALT, AST, Total. Bili, TP)   Result Value Ref Range    Sodium 144 136 - 145 mmol/L    Potassium 4.5 3.4 - 5.3 mmol/L    Chloride 109 (H) 98 - 107 mmol/L    Carbon Dioxide (CO2) 26 22 - 29 mmol/L    Anion Gap 9 7 - 15 mmol/L    Urea Nitrogen 19.7 8.0 - 23.0 mg/dL    Creatinine 1.04 0.67 - 1.17 mg/dL    Calcium 9.4 8.8 - 10.2 mg/dL    Glucose 87 70 - 99 mg/dL    Alkaline Phosphatase 92 40 - 129 U/L    AST 31 0 - 45 U/L      Comment:      Reference intervals for this test were updated on 6/12/2023 to more accurately reflect our healthy population. There may be differences in the flagging of prior results with similar values performed with this method. Interpretation of those prior results can be made in the context of the updated reference intervals.    ALT 29 0 - 70 U/L      Comment:      Reference intervals for this test were updated on 6/12/2023 to more accurately reflect our healthy population. There may be differences in the flagging of prior results with similar values performed with this method. Interpretation of those prior results can be made in the context of the updated reference intervals.      Protein Total 6.6 6.4 - 8.3 g/dL    Albumin 4.3 3.5 - 5.2 g/dL    Bilirubin Total 0.5 <=1.2 mg/dL    GFR Estimate 72 >60 mL/min/1.73m2   Lipid panel reflex to direct LDL Non-fasting   Result Value Ref Range    Cholesterol 159 <200 mg/dL    Triglycerides 64 <150 mg/dL    Direct Measure HDL 62 >=40 mg/dL    LDL Cholesterol Calculated 84 <=100 mg/dL    Non HDL Cholesterol 97 <130 mg/dL    Narrative    Cholesterol  Desirable:  <200  mg/dL    Triglycerides  Normal:  Less than 150 mg/dL  Borderline High:  150-199 mg/dL  High:  200-499 mg/dL  Very High:  Greater than or equal to 500 mg/dL    Direct Measure HDL  Female:  Greater than or equal to 50 mg/dL   Male:  Greater than or equal to 40 mg/dL    LDL Cholesterol  Desirable:  <100mg/dL  Above Desirable:  100-129 mg/dL   Borderline High:  130-159 mg/dL   High:  160-189 mg/dL   Very High:  >= 190 mg/dL    Non HDL Cholesterol  Desirable:  130 mg/dL  Above Desirable:  130-159 mg/dL  Borderline High:  160-189 mg/dL  High:  190-219 mg/dL  Very High:  Greater than or equal to 220 mg/dL       Radha Watt MD   Internal Medicine-Pediatrics

## 2023-07-05 DIAGNOSIS — R25.2 CRAMP OF LIMB: ICD-10-CM

## 2023-07-06 RX ORDER — ROPINIROLE 0.5 MG/1
TABLET, FILM COATED ORAL
Qty: 180 TABLET | OUTPATIENT
Start: 2023-07-06

## 2023-07-19 ENCOUNTER — TELEPHONE (OUTPATIENT)
Dept: PEDIATRICS | Facility: CLINIC | Age: 83
End: 2023-07-19
Payer: COMMERCIAL

## 2023-07-25 NOTE — TELEPHONE ENCOUNTER
1st attempt: called wife's number, no answer and unable to leave VM due to full VM box.    Annie Yates MA 4:28 PM 7/19/2023    
2nd attempt: sent MYC.    Geno Logna on 7/20/2023 at 1:53 PM    
3rd attempt: called, no answer, no vm    Geno Logan on 7/25/2023 at 8:53 AM    
I am not personally familiar with this physician, but I do think very highly of the group at the [facility], so I think he is likely in very good hands!    Radha Banks MD  Internal Medicine-Pediatrics    
Patient's wife wants to know if you approve of neurologist, Alec Lion with [facility]?    Has seen neurology in the past for balance and foot pain. Has already seen a podiatrist with [facility].    Please call wife at [phone].    Geno Palomo RN       
squat pivot transfer to elevated toilet height with grab bar

## 2023-08-14 ENCOUNTER — THERAPY VISIT (OUTPATIENT)
Dept: PHYSICAL THERAPY | Facility: CLINIC | Age: 83
End: 2023-08-14
Attending: INTERNAL MEDICINE
Payer: COMMERCIAL

## 2023-08-14 DIAGNOSIS — R26.89 BALANCE PROBLEMS: ICD-10-CM

## 2023-08-14 DIAGNOSIS — R26.89 IMBALANCE: Primary | ICD-10-CM

## 2023-08-14 PROCEDURE — 97112 NEUROMUSCULAR REEDUCATION: CPT | Mod: GP

## 2023-08-14 PROCEDURE — 97161 PT EVAL LOW COMPLEX 20 MIN: CPT | Mod: GP

## 2023-08-14 NOTE — PROGRESS NOTES
08/14/23 0800   Signing Clinician's Name / Credentials   Signing clinician's name / credentials Beatrice Jorgensen, PT, DPT, NCS   Dynamic Gait Index (Raghavendra and [place], 1995)   Gait Level Surface 2   Change in Gait Speed 3   Gait and Horizontal Head Turns 1   Gait with Vertical Head Turns 2   Gait and Pivot Turns 2   Step Over Obstacle 1   Step Around Obstacles 2   Steps 1   Total Dynamic Gait Index Score  (A score of 19 or less has been correlated to an increased risk of falls in community dwelling older adults, patients with vestibular disorders, and patients with MS.)   Total Score (out of 24) 14       Dynamic Gait Index (DGI):The DGI is a measure of balance during gait that is reliable and valid for the elderly and individuals with Parkinson's disease, MS, vestibular disorders, or s/p stroke. Gait assistive device used: None    Scores ?19 /24 indicate an increased risk for falls according to Karly et al 2000  Minimal Detectable Change = 2.9 in community dwelling elderly according to Valadez et al 2011    Assessment (rationale for performing, application to patient s function & care plan): Pt at inc risk for falling 2' poor dynamic stability. Will target with PT intervention.

## 2023-08-14 NOTE — PROGRESS NOTES
"PHYSICAL THERAPY EVALUATION  Type of Visit: Evaluation    See electronic medical record for Abuse and Falls Screening details.    Subjective       Presenting condition or subjective complaint:     Pt is an 84 y/o M, presenting to PT for evaluation of balance.  He last visited clinic on 8/9/2022, for previous balance impairment. Pt reporting he feels his balance continues to worsen 2' not-doing his exercises, as well as his \"flat feet.\" He has recently had 2-steroid injections in the L foot 2' arthritis and foot pain. He's continued house/yard work, in attempt to sell their home. The home is currently listed, and it's a project to keep it up. He's taking tylenol 1,000mg of Tylenol in both the AM and PM to manage his pain. He'd like to improve his balance and foot pain - in attempt to improve his function. He's lost 15lbs in the past year, feels like it is a limited appetite.       Date of onset: 08/07/23 (Order date chosen 2' ongoing balance problems.)    Relevant medical history:     Past Medical History:   Diagnosis Date    Cancer (H) 2000    L shoulder melanoma    Glaucoma     Heart murmur     Hypertension     Melanoma (H) 2000    Left shoulder - Dr. Carrillo - follows q6 months    Neuropathy     Nonsenile cataract     Other chronic pain     Neuropathy bilateral feet    Other premature beats     PVC's (premature ventricular contractions) 5/14/2015    [place] 2000     Dates & types of surgery:    Past Surgical History:   Procedure Laterality Date    ADULT DERMATOLOGY REFERRAL  2000    melanoma resection L shoulder    ARTHROPLASTY HIP ANTERIOR Left 11/14/2014    Procedure: ARTHROPLASTY HIP ANTERIOR;  Surgeon: Rudy Tobias MD;  Location:  OR    ARTHROPLASTY REVISION HIP  3/12/2014     right Procedure: ARTHROPLASTY REVISION HIP;  Surgeon: Rudy Tobias MD;  Location:  OR    ARTHROSCOPY KNEE  2002    meniscus ( side?)    BIOPSY OF PROSTATE,NEEDLE/PUNCH      both hips replaced      CATARACT IOL, RT/LT " Bilateral     COLONOSCOPY N/A 5/28/2020    Procedure: COLONOSCOPY (fv) (please mail packet as soon as possible) fv;  Surgeon: Kevin Mccarty MD;  Location:  GI    DISCECTOMY LUMBAR POSTERIOR MICROSCOPIC ONE LEVEL N/A 4/11/2016    Procedure: DISCECTOMY LUMBAR POSTERIOR MICROSCOPIC ONE LEVEL;  Surgeon: Ruben Marsh MD;  Location: [facility] OR    ENT SURGERY      Tonsils    ORTHOPEDIC SURGERY  2008    R hip replacement    ORTHOPEDIC SURGERY  2004    big toe in lawn mower, needed reconstructive surgery    PHACOEMULSIFICATION CLEAR CORNEA WITH TORIC INTRAOCULAR LENS IMPLANT Left 11/6/2019    Procedure: LEFT EYE, CATARACT EXTRACTION WITH TORIC INTRAOCULAR LENS IMPLANT;  Surgeon: Shannon Yang MD;  Location: [facility]    PHACOEMULSIFICATION CLEAR CORNEA WITH TORIC INTRAOCULAR LENS IMPLANT Right 11/20/2019    Procedure: RIGHT EYE, CATARACT EXTRACTION WITH TORIC INTRAOCULAR LENS IMPLANT;  Surgeon: Shannon Yang MD;  Location: [facility]    TRABECULAR MICRO-BYPASS WITH ISTENT Left 11/6/2019    Procedure: LEFT EYE, ISTENT INSERTION;  Surgeon: Shannon Yang MD;  Location: [facility]    TRABECULAR MICRO-BYPASS WITH ISTENT Right 11/20/2019    Procedure: RIGHT EYE, ISTENT INSERTION;  Surgeon: Shannon Yang MD;  Location: [facility] OR    ZZC TOTAL HIP ARTHROPLASTY Right 2008     Prior diagnostic imaging/testing results:     Hx of head MRI, with small vessel ischemia and cerebral matter loss - typical for age.  Prior therapy history for the same diagnosis, illness or injury:    Yes, pt completed balance intervention with this PT/at this clinic in Summer 2022.    Prior Level of Function  Transfers: Independent  Ambulation: Independent  ADL: Independent  IADL: Driving, Finances, Yard work    Living Environment  Social support:   Pt lives with his wife.  Type of home:   Home.  Stairs to enter the home:       x4 BARRY; uses railing.  Stairs inside the home:       Pt uses railing for both up/down to take pressure off of the  feet/to balance.   Help at home:  Pt's wife with meals, laundry, chores, etc.  Equipment owned:   N/A.   Employment:    N/A; pt retired.  Hobbies/Interests:   House/yard work.     Patient goals for therapy:   To improve his balance.    Pain assessment: Pain present in his feet with upright activity.     Objective   Cognitive Status Examination  Pt with no cognitive deficits.    OBSERVATION: Pt ambulating with antalgic gait; 2' L foot pain with weightbearing. He wears inserts in his shoes; feel like they push him into an anterior lean.  PALPATION: N/A  RANGE OF MOTION: LE ROM WNL  UE ROM WNL  STRENGTH:  Pt with 4/5 B UE/LE strength, observed with functional mobility.     BED MOBILITY: Independent    TRANSFERS: Independent    WHEELCHAIR MOBILITY: N/A    GAIT:   Level of [place]: Independent  Assistive Device(s): None  Gait Deviations: Antalgic  Base of support decreased  Nano decreased  Gait Distance: >200ft during PT session; plan to discuss SPC and/or walking stick at upcoming session.   Stairs: Pt completed with U HR during PT session; see FGA.     BALANCE: Standing Balance (static):Fair  Standing Balance (dynamic):Poor    SPECIAL TESTS  10 Meter Walk Test (Comfortable)  0.86m/s with no device; community ambulator. Inc risk for falling 2' velocity of <1.0m/s.     Dynamic Gait Index (DGI) Total Score (out of 24): 1414/24 points; at inc risk for falling. Previously was at 22/24 - at presentation to clinic in 08/2022.   Romberg  (sec) 12s - then PT with CGAx1 needed for safety.      SENSATION:  Pt with numbness/tingling in the toes; he has hx of peripheral neuropathy.    COORDINATION: Deferred; not likely impaired 2' etiology of pt's condition.    Assessment & Plan   CLINICAL IMPRESSIONS  Medical Diagnosis: balance problems    Treatment Diagnosis: impairment of balance with household/community activities   Impression/Assessment: Patient is a 83 year old male with impaired functional mobility and imbalance  complaints.  The following significant findings have been identified: Pain, Impaired balance, Decreased proprioception, Impaired sensation, Impaired gait, Decreased activity tolerance, and Instability. These impairments interfere with their ability to perform household mobility and community mobility as compared to previous level of function.     Clinical Decision Making (Complexity):  Clinical Presentation: Stable/Uncomplicated  Clinical Presentation Rationale: based on medical and personal factors listed in PT evaluation  Clinical Decision Making (Complexity): Low complexity    PLAN OF CARE  Treatment Interventions:  Interventions: Gait Training, Neuromuscular Re-education, Therapeutic Activity, Therapeutic Exercise, Self-Care/Home Management    Long Term Goals     PT Goal 1  Goal Identifier: HEP  Goal Description: Pt will demonstrate IND with strength, balance, and muscular and aerobic endurance HEP, while working to improve capacity for functional mobility.  Goal Progress: initiated 7/6/2023  Target Date: 10/12/23  PT Goal 2  Goal Identifier: 5xSTS  Goal Description: Pt will complete x5 STS in <15s, without use of B UEs, to demonstrate improvement in B LE strength.  Target Date: 10/12/23  PT Goal 3  Goal Identifier: DGI  Goal Description: Pt will score >19/24pts on DGI, to demonstrate improved dynamic balance and postural control with ambulation, and decreased risk for falling with functional mobility.  Goal Progress: baseline: 14/24 points; at inc risk for falling.  Target Date: 10/12/23  PT Goal 4  Goal Identifier: 10 MWT  Goal Description: Pt will ambulate at comfortable velocity of >/=1.0m/s, to demonstrate reduced risk for falling with functional household/community ambulation.  Goal Progress: baseline: comf=0.86m/s; safe for community ambulation - though not at speed of healthy adult. He's at inc risk for falling.  Target Date: 10/12/23  PT Goal 5  Goal Identifier: mCTSIB  Goal Description: Pt will improve  tolerance to static stance on firm/compliant surfaces with both eyes open/closed; goal to achieve at least 20s of upright balance with each condition for improved sensory integration/dec postural sway.  Goal Progress: baseline: 30s, 12s - conditions #4, 5 not tested.  Target Date: 10/12/23      Frequency of Treatment: 1x/week  Duration of Treatment: 60 days    Education Assessment:   Learner/Method: Patient;Listening;Demonstration;Pictures/Video    Risks and benefits of evaluation/treatment have been explained.   Patient/Family/caregiver agrees with Plan of Care.     Evaluation Time:     PT Eval, Low Complexity Minutes (56625): 20     Signing Clinician: Beatrice Jorgensen PT, DPT, NCS      [facility]                                                                                   OUTPATIENT PHYSICAL THERAPY      PLAN OF TREATMENT FOR OUTPATIENT REHABILITATION   Patient's Last Name, First Name, Ciro García YOB: 1940   Provider's Name   [facility]   Medical Record No.  [id number]     Onset Date: 08/07/23 (Order date chosen 2' ongoing balance problems.)  Start of Care Date: 08/14/23     Medical Diagnosis:  balance problems      PT Treatment Diagnosis:  impairment of balance with household/community activities Plan of Treatment  Frequency/Duration: 1x/week/ 60 days    Certification date from 08/14/23 to 10/12/23         See note for plan of treatment details and functional goals     Beatrice Jorgensen PT, DPT, NCS                          I CERTIFY THE NEED FOR THESE SERVICES FURNISHED UNDER        THIS PLAN OF TREATMENT AND WHILE UNDER MY CARE     (Physician attestation of this document indicates review and certification of the therapy plan).                Referring Provider:  Radha Banks MD      Initial Assessment  See Epic Evaluation- Start of Care Date: 08/14/23

## 2023-08-21 ENCOUNTER — THERAPY VISIT (OUTPATIENT)
Dept: PHYSICAL THERAPY | Facility: CLINIC | Age: 83
End: 2023-08-21
Attending: INTERNAL MEDICINE
Payer: COMMERCIAL

## 2023-08-21 DIAGNOSIS — R26.89 IMBALANCE: Primary | ICD-10-CM

## 2023-08-21 PROCEDURE — 97116 GAIT TRAINING THERAPY: CPT | Mod: GP

## 2023-08-21 PROCEDURE — 97112 NEUROMUSCULAR REEDUCATION: CPT | Mod: GP

## 2023-08-31 ENCOUNTER — TELEPHONE (OUTPATIENT)
Dept: NEUROLOGY | Facility: CLINIC | Age: 83
End: 2023-08-31
Payer: COMMERCIAL

## 2023-09-05 ENCOUNTER — THERAPY VISIT (OUTPATIENT)
Dept: PHYSICAL THERAPY | Facility: CLINIC | Age: 83
End: 2023-09-05
Payer: COMMERCIAL

## 2023-09-05 DIAGNOSIS — R26.89 IMBALANCE: Primary | ICD-10-CM

## 2023-09-05 DIAGNOSIS — R26.89 BALANCE PROBLEMS: ICD-10-CM

## 2023-09-05 PROCEDURE — 97112 NEUROMUSCULAR REEDUCATION: CPT | Mod: GP

## 2023-09-06 ENCOUNTER — APPOINTMENT (OUTPATIENT)
Dept: URBAN - METROPOLITAN AREA CLINIC 256 | Age: 83
Setting detail: DERMATOLOGY
End: 2023-09-06

## 2023-09-06 VITALS — WEIGHT: 160 LBS | HEIGHT: 69.5 IN

## 2023-09-06 DIAGNOSIS — Z71.89 OTHER SPECIFIED COUNSELING: ICD-10-CM

## 2023-09-06 DIAGNOSIS — L57.8 OTHER SKIN CHANGES DUE TO CHRONIC EXPOSURE TO NONIONIZING RADIATION: ICD-10-CM

## 2023-09-06 DIAGNOSIS — Z85.820 PERSONAL HISTORY OF MALIGNANT MELANOMA OF SKIN: ICD-10-CM

## 2023-09-06 DIAGNOSIS — D18.0 HEMANGIOMA: ICD-10-CM

## 2023-09-06 DIAGNOSIS — L72.8 OTHER FOLLICULAR CYSTS OF THE SKIN AND SUBCUTANEOUS TISSUE: ICD-10-CM

## 2023-09-06 DIAGNOSIS — D22 MELANOCYTIC NEVI: ICD-10-CM

## 2023-09-06 DIAGNOSIS — L57.0 ACTINIC KERATOSIS: ICD-10-CM

## 2023-09-06 DIAGNOSIS — L82.1 OTHER SEBORRHEIC KERATOSIS: ICD-10-CM

## 2023-09-06 PROBLEM — D22.5 MELANOCYTIC NEVI OF TRUNK: Status: ACTIVE | Noted: 2023-09-06

## 2023-09-06 PROBLEM — D22.61 MELANOCYTIC NEVI OF RIGHT UPPER LIMB, INCLUDING SHOULDER: Status: ACTIVE | Noted: 2023-09-06

## 2023-09-06 PROBLEM — D18.01 HEMANGIOMA OF SKIN AND SUBCUTANEOUS TISSUE: Status: ACTIVE | Noted: 2023-09-06

## 2023-09-06 PROBLEM — D22.62 MELANOCYTIC NEVI OF LEFT UPPER LIMB, INCLUDING SHOULDER: Status: ACTIVE | Noted: 2023-09-06

## 2023-09-06 PROBLEM — D22.39 MELANOCYTIC NEVI OF OTHER PARTS OF FACE: Status: ACTIVE | Noted: 2023-09-06

## 2023-09-06 PROCEDURE — 99213 OFFICE O/P EST LOW 20 MIN: CPT | Mod: 25

## 2023-09-06 PROCEDURE — 17004 DESTROY PREMAL LESIONS 15/>: CPT

## 2023-09-06 PROCEDURE — OTHER PATIENT SPECIFIC COUNSELING: OTHER

## 2023-09-06 PROCEDURE — OTHER COUNSELING: OTHER

## 2023-09-06 PROCEDURE — OTHER LIQUID NITROGEN: OTHER

## 2023-09-06 PROCEDURE — OTHER MIPS QUALITY: OTHER

## 2023-09-06 ASSESSMENT — LOCATION ZONE DERM
LOCATION ZONE: FACE
LOCATION ZONE: NOSE
LOCATION ZONE: EAR
LOCATION ZONE: SCALP
LOCATION ZONE: TRUNK
LOCATION ZONE: ARM

## 2023-09-06 ASSESSMENT — LOCATION DETAILED DESCRIPTION DERM
LOCATION DETAILED: RIGHT LATERAL MALAR CHEEK
LOCATION DETAILED: LEFT INFERIOR CENTRAL MALAR CHEEK
LOCATION DETAILED: LEFT ANTECUBITAL SKIN
LOCATION DETAILED: RIGHT CENTRAL FRONTAL SCALP
LOCATION DETAILED: RIGHT VENTRAL PROXIMAL FOREARM
LOCATION DETAILED: NASAL DORSUM
LOCATION DETAILED: RIGHT MEDIAL FOREHEAD
LOCATION DETAILED: LEFT MID TEMPLE
LOCATION DETAILED: RIGHT SUPERIOR FOREHEAD
LOCATION DETAILED: NASAL SUPRATIP
LOCATION DETAILED: LEFT SUPERIOR MEDIAL UPPER BACK
LOCATION DETAILED: LEFT LATERAL FOREHEAD
LOCATION DETAILED: LEFT VENTRAL PROXIMAL FOREARM
LOCATION DETAILED: SUPERIOR MID FOREHEAD
LOCATION DETAILED: LEFT INFERIOR TEMPLE
LOCATION DETAILED: LEFT ANTERIOR PROXIMAL UPPER ARM
LOCATION DETAILED: LEFT CENTRAL MALAR CHEEK
LOCATION DETAILED: RIGHT DISTAL RADIAL DORSAL FOREARM
LOCATION DETAILED: SUPERIOR THORACIC SPINE
LOCATION DETAILED: RIGHT INFERIOR POSTERIOR HELIX
LOCATION DETAILED: RIGHT SUPERIOR LATERAL FOREHEAD
LOCATION DETAILED: LEFT INFERIOR LATERAL MALAR CHEEK
LOCATION DETAILED: NASAL TIP
LOCATION DETAILED: RIGHT LATERAL DORSAL WRIST

## 2023-09-06 ASSESSMENT — LOCATION SIMPLE DESCRIPTION DERM
LOCATION SIMPLE: UPPER BACK
LOCATION SIMPLE: LEFT TEMPLE
LOCATION SIMPLE: NOSE
LOCATION SIMPLE: LEFT CHEEK
LOCATION SIMPLE: LEFT FOREARM
LOCATION SIMPLE: LEFT FOREHEAD
LOCATION SIMPLE: LEFT UPPER BACK
LOCATION SIMPLE: RIGHT WRIST
LOCATION SIMPLE: RIGHT EAR
LOCATION SIMPLE: SUPERIOR FOREHEAD
LOCATION SIMPLE: LEFT UPPER ARM
LOCATION SIMPLE: RIGHT FOREARM
LOCATION SIMPLE: RIGHT CHEEK
LOCATION SIMPLE: RIGHT SCALP
LOCATION SIMPLE: RIGHT FOREHEAD

## 2023-09-06 NOTE — PROCEDURE: PATIENT SPECIFIC COUNSELING
Patient opted to not get this excised, he stated he will RTC if he plans to in the future.
Detail Level: Zone

## 2023-09-06 NOTE — PROCEDURE: LIQUID NITROGEN
Post-Care Instructions: I reviewed with the patient in detail post-care instructions. Patient is to wear sunprotection, and avoid picking at any of the treated lesions. Pt may apply Vaseline to crusted or scabbing areas.
Show Aperture Variable?: Yes
Number Of Freeze-Thaw Cycles: 1 freeze-thaw cycle
Duration Of Freeze Thaw-Cycle (Seconds): 3
Render Post-Care Instructions In Note?: no
Detail Level: Simple
Consent: The patient's consent was obtained including but not limited to risks of crusting, scabbing, blistering, scarring, darker or lighter pigmentary change, recurrence, incomplete removal and infection.

## 2023-09-06 NOTE — HPI: FULL BODY SKIN EXAMINATION
What Is The Reason For Today's Visit?: Full Body Skin Examination
What Is The Reason For Today's Visit? (Being Monitored For X): concerning skin lesions on an annual basis
Additional History: The patient states his main concern are red and scaly lesions on his scalp and explains how he typically has LN treatment done on them.

## 2023-09-11 ENCOUNTER — THERAPY VISIT (OUTPATIENT)
Dept: PHYSICAL THERAPY | Facility: CLINIC | Age: 83
End: 2023-09-11
Payer: COMMERCIAL

## 2023-09-11 DIAGNOSIS — R26.89 IMBALANCE: Primary | ICD-10-CM

## 2023-09-11 PROCEDURE — 97112 NEUROMUSCULAR REEDUCATION: CPT | Mod: GP

## 2023-09-11 NOTE — PROGRESS NOTES
09/11/23 1200   Signing Clinician's Name / Credentials   Signing clinician's name / credentials Beatrice Jorgensen, PT, DPT, NCS   Dynamic Gait Index (Raghavendra and Fontenot [place], 1995)   Gait Level Surface 2   Change in Gait Speed 3   Gait and Horizontal Head Turns 2   Gait with Vertical Head Turns 2   Gait and Pivot Turns 2   Step Over Obstacle 2   Step Around Obstacles 2   Steps 1   Total Dynamic Gait Index Score  (A score of 19 or less has been correlated to an increased risk of falls in community dwelling older adults, patients with vestibular disorders, and patients with MS.)   Total Score (out of 24) 16       Dynamic Gait Index (DGI):The DGI is a measure of balance during gait that is reliable and valid for the elderly and individuals with Parkinson's disease, MS, vestibular disorders, or s/p stroke. Gait assistive device used: None    Patient score: 16/24  Scores ?19/24 indicate an increased risk for falls according to Karly et al 2000  Minimal Detectable Change = 2.9 in community dwelling elderly according to Rory et al 2011    Assessment (rationale for performing, application to patient s function & care plan): Pt remains at inc risk for falling; however, balance control with movement is improving from baseline.

## 2023-09-18 ENCOUNTER — THERAPY VISIT (OUTPATIENT)
Dept: PHYSICAL THERAPY | Facility: CLINIC | Age: 83
End: 2023-09-18
Payer: COMMERCIAL

## 2023-09-18 DIAGNOSIS — R26.89 IMBALANCE: Primary | ICD-10-CM

## 2023-09-18 PROCEDURE — 97112 NEUROMUSCULAR REEDUCATION: CPT | Mod: GP

## 2023-09-25 ENCOUNTER — THERAPY VISIT (OUTPATIENT)
Dept: PHYSICAL THERAPY | Facility: CLINIC | Age: 83
End: 2023-09-25
Payer: COMMERCIAL

## 2023-09-25 DIAGNOSIS — R26.89 BALANCE PROBLEMS: ICD-10-CM

## 2023-09-25 DIAGNOSIS — R26.89 IMBALANCE: Primary | ICD-10-CM

## 2023-09-25 PROCEDURE — 97112 NEUROMUSCULAR REEDUCATION: CPT | Mod: GP

## 2023-10-02 ENCOUNTER — THERAPY VISIT (OUTPATIENT)
Dept: PHYSICAL THERAPY | Facility: CLINIC | Age: 83
End: 2023-10-02
Payer: COMMERCIAL

## 2023-10-02 DIAGNOSIS — R26.89 IMBALANCE: Primary | ICD-10-CM

## 2023-10-02 PROCEDURE — 97112 NEUROMUSCULAR REEDUCATION: CPT | Mod: GP

## 2023-10-02 NOTE — PROGRESS NOTES
10/02/23 0900   Signing Clinician's Name / Credentials   Signing clinician's name / credentials Beatrice Jorgensen, PT, DPT, NCS   Dynamic Gait Index (Raghavendra and Fontenot [place], 1995)   Gait Level Surface 3   Change in Gait Speed 3   Gait and Horizontal Head Turns 3   Gait with Vertical Head Turns 2   Gait and Pivot Turns 3   Step Over Obstacle 2   Step Around Obstacles 3   Steps 2   Total Dynamic Gait Index Score  (A score of 19 or less has been correlated to an increased risk of falls in community dwelling older adults, patients with vestibular disorders, and patients with MS.)   Total Score (out of 24) 21       Dynamic Gait Index (DGI):The DGI is a measure of balance during gait that is reliable and valid for the elderly and individuals with Parkinson's disease, MS, vestibular disorders, or s/p stroke. Gait assistive device used: None    Patient score: 21/24  Scores ?19/24 indicate an increased risk for falls according to Karly et al 2000  Minimal Detectable Change = 2.9 in community dwelling elderly according to Rory et al 2011    Assessment (rationale for performing, application to patient s function & care plan): See discharge note/goal attached.

## 2023-10-02 NOTE — PROGRESS NOTES
10/02/23 0500   Appointment Info   Signing clinician's name / credentials Beatrice Jorgensen, PT, DPT, NCS   Total/Authorized Visits 7/7 in PT POC   Visits Used 7   Medical Diagnosis balance problems   PT Tx Diagnosis impairment of balance with household/community activities   Quick Adds Certification   Progress Note/Certification   Start of Care Date 08/14/23   Onset of illness/injury or Date of Surgery 08/07/23  (Order date chosen 2' ongoing balance problems.)   Therapy Frequency 1x/week   Predicted Duration 60 days   Certification date from 08/14/23   Certification date to 10/12/23   Progress Note Due Date   (at 10th)   GOALS   PT Goals 2;3;4;5   PT Goal 1   Goal Identifier MET: HEP   Goal Description Pt will demonstrate IND with strength, balance, and muscular and aerobic endurance HEP, while working to improve capacity for functional mobility.   Goal Progress 10/2/2023: Pt has demonstrated IND with HEP. He's to continue daily, as well as yard work/projects.   Target Date 10/12/23   Date Met 10/02/23   PT Goal 2   Goal Identifier MET: 5xSTS   Goal Description Pt will complete x5 STS in <15s, without use of B UEs, to demonstrate improvement in B LE strength.   Goal Progress 9/11/2023: Pt with 14.05 with 5xSTS; inc stability despite increased velocity of movement. Pt reporting he felt controlled with movement.   Target Date 10/12/23   Date Met 09/11/23   PT Goal 3   Goal Identifier MET: DGI   Goal Description Pt will score >19/24pts on DGI, to demonstrate improved dynamic balance and postural control with ambulation, and decreased risk for falling with functional mobility.   Goal Progress 10/2/2023: 21/24 points. Pt no longer at inc risk for falling. His balance control with movement has significantly improved from baseline.   Target Date 10/12/23   Date Met 10/02/23   PT Goal 4   Goal Identifier MET: 10 MWT   Goal Description Pt will ambulate at comfortable velocity of >/=1.0m/s, to demonstrate reduced risk for  "falling with functional household/community ambulation.   Goal Progress 10/2/2023: Pt amb at 1.04m/s with no AD and no instability demonstrated. He's not at inc risk for falling with ambulation; however, potential for instability at times of inc L LE pain. Pt aware. PT recommending use of walking-stick for times of pain, fatigue, and/or on very unstable surface - even if this means he has to reduce his workload. Pt stating understanding and reporting that, he too, feels it's increased his stability.   Target Date 10/12/23   Date Met 10/02/23   PT Goal 5   Goal Identifier NEARLY MET: mCTSIB   Goal Description Pt will improve tolerance to static stance on firm/compliant surfaces with both eyes open/closed; goal to achieve at least 20s of upright balance with each condition for improved sensory integration/dec postural sway.   Goal Progress 9/25/2023: 30s, 30s, 30s, 28s; pt with improving vestibular and somatosensory organization. Will continue to address with HEP.   Target Date 10/12/23   Subjective Report   Subjective Report Pt reporting two occurances of imbalance this week. Happened on Thursday with \"wobbliness of the legs,\" and increased dizziness feeling in the head. He reports it improved as the day went on. He again reported feelings of imbalance this morning, happening when going down the stairs 2' inc pain in the left foot. He continues his HEP daily. Remains frustrated by both good/bad days. He understands his balance impairment is multifactorial.   Treatment Interventions (PT)   Interventions Neuromuscular Re-education   Neuromuscular Re-education   Neuromuscular re-ed of mvmt, balance, coord, kinesthetic sense, posture, proprioception minutes (01218) 45   Skilled Intervention 10MWT; DGI; HEP; pt edu   Patient Response/Progress Completed 10MWT and DGI; see goals above. Pt no longer at inc risk for falling with household/community mobility. However, does continue to demonstrate balance impairment 2' poor " sensory integeration. He's achieved IND with HEP. He's to continue to perform the HEP daily to manage his condition. Pt reporting he's aware his condition is both neurologic and orthopedic in nature. He's aware of benefit of walking-stick PRN for improved stability of movement, reduced pain in the L LE, and safety/fall prevention. Reviewed the exericses on pt's HEP. He demonstrated IND with all. Adding EC on unstable surface (ie. towels, outdoor/unstable surface, foam cushion, etc.) to further challenge his sensory integration. To perform for 30s x3. He's to complete the program daily, continue yard-work, and continue seeking orthopedic pain management for his L LE conditions. Pt has PT's contact information if need arises for further PT intervention. Pt in support of discharge.   Education   Learner/Method Patient;Listening;Demonstration;Pictures/Video   Plan   Home program see PTRx   Plan for next session N/A; pt discharged.   Total Session Time   Timed Code Treatment Minutes 45   Total Treatment Time (sum of timed and untimed services) 45         DISCHARGE  Reason for Discharge: Patient has met nearly all goals.    Equipment Issued: PT recommending walking-stick on unstable surfaces and/or with pain/fatigue.    Discharge Plan: Patient to continue home program daily for further balance improvement and maintenance of his condition.    Referring Provider:  Radha Banks MD

## 2023-10-03 NOTE — PROCEDURE: MOHS SURGERY
Special Stains Stage 1 - Results: Base On Clearance Noted Above Xenograft Text: The defect edges were debeveled with a #15 scalpel blade.  Given the location of the defect, shape of the defect and the proximity to free margins a xenograft was deemed most appropriate.  The graft was then trimmed to fit the size of the defect.  The graft was then placed in the primary defect and oriented appropriately.

## 2023-10-10 ENCOUNTER — TELEPHONE (OUTPATIENT)
Dept: NEUROLOGY | Facility: CLINIC | Age: 83
End: 2023-10-10
Payer: COMMERCIAL

## 2023-10-20 DIAGNOSIS — E29.1 TESTICULAR HYPOFUNCTION: Primary | ICD-10-CM

## 2023-10-23 ENCOUNTER — TELEPHONE (OUTPATIENT)
Dept: PEDIATRICS | Facility: CLINIC | Age: 83
End: 2023-10-23

## 2023-10-23 DIAGNOSIS — M19.011 PRIMARY OSTEOARTHRITIS OF RIGHT SHOULDER: ICD-10-CM

## 2023-10-23 RX ORDER — DICLOFENAC SODIUM 75 MG/1
TABLET, DELAYED RELEASE ORAL
Qty: 180 TABLET | Refills: 1 | OUTPATIENT
Start: 2023-10-23

## 2023-10-23 RX ORDER — DICLOFENAC SODIUM 75 MG/1
TABLET, DELAYED RELEASE ORAL
Qty: 180 TABLET | Refills: 1 | Status: ON HOLD | OUTPATIENT
Start: 2023-10-23 | End: 2023-12-14

## 2023-10-23 NOTE — TELEPHONE ENCOUNTER
Patient's wife states patient has been using her old prescription for Voltaren gel in addition to the diclofenac tablets.    Suggested patient stop using the gel.    Patient does want oral diclofenac filled for now.    Geno Palomo RN

## 2023-11-06 ENCOUNTER — TRANSFERRED RECORDS (OUTPATIENT)
Dept: HEALTH INFORMATION MANAGEMENT | Facility: CLINIC | Age: 83
End: 2023-11-06
Payer: COMMERCIAL

## 2023-11-28 ENCOUNTER — OFFICE VISIT (OUTPATIENT)
Dept: PEDIATRICS | Facility: CLINIC | Age: 83
End: 2023-11-28
Payer: COMMERCIAL

## 2023-11-28 VITALS
RESPIRATION RATE: 16 BRPM | HEIGHT: 69 IN | HEART RATE: 62 BPM | DIASTOLIC BLOOD PRESSURE: 80 MMHG | WEIGHT: 158.6 LBS | TEMPERATURE: 97.5 F | BODY MASS INDEX: 23.49 KG/M2 | OXYGEN SATURATION: 99 % | SYSTOLIC BLOOD PRESSURE: 149 MMHG

## 2023-11-28 DIAGNOSIS — Z01.818 PREOP GENERAL PHYSICAL EXAM: Primary | ICD-10-CM

## 2023-11-28 DIAGNOSIS — I42.9 CARDIOMYOPATHY, UNSPECIFIED TYPE (H): ICD-10-CM

## 2023-11-28 DIAGNOSIS — N52.9 ERECTILE DYSFUNCTION, UNSPECIFIED ERECTILE DYSFUNCTION TYPE: ICD-10-CM

## 2023-11-28 DIAGNOSIS — G25.81 RESTLESS LEGS SYNDROME (RLS): ICD-10-CM

## 2023-11-28 DIAGNOSIS — R35.1 NOCTURIA: ICD-10-CM

## 2023-11-28 DIAGNOSIS — I34.0 MITRAL VALVE INSUFFICIENCY, UNSPECIFIED ETIOLOGY: ICD-10-CM

## 2023-11-28 DIAGNOSIS — G47.62 NOCTURNAL LEG CRAMPS: ICD-10-CM

## 2023-11-28 DIAGNOSIS — M17.0 OSTEOARTHRITIS OF BOTH KNEES, UNSPECIFIED OSTEOARTHRITIS TYPE: ICD-10-CM

## 2023-11-28 DIAGNOSIS — E78.2 MIXED HYPERLIPIDEMIA: ICD-10-CM

## 2023-11-28 LAB
ALBUMIN UR-MCNC: NEGATIVE MG/DL
APPEARANCE UR: CLEAR
BILIRUB UR QL STRIP: NEGATIVE
COLOR UR AUTO: YELLOW
GLUCOSE UR STRIP-MCNC: NEGATIVE MG/DL
HGB UR QL STRIP: NEGATIVE
KETONES UR STRIP-MCNC: NEGATIVE MG/DL
LEUKOCYTE ESTERASE UR QL STRIP: NEGATIVE
NITRATE UR QL: NEGATIVE
PH UR STRIP: 7 [PH] (ref 5–7)
SP GR UR STRIP: 1.02 (ref 1–1.03)
UROBILINOGEN UR STRIP-ACNC: 0.2 E.U./DL

## 2023-11-28 PROCEDURE — 91320 SARSCV2 VAC 30MCG TRS-SUC IM: CPT | Performed by: INTERNAL MEDICINE

## 2023-11-28 PROCEDURE — 90480 ADMN SARSCOV2 VAC 1/ONLY CMP: CPT | Performed by: INTERNAL MEDICINE

## 2023-11-28 PROCEDURE — 93000 ELECTROCARDIOGRAM COMPLETE: CPT | Performed by: INTERNAL MEDICINE

## 2023-11-28 PROCEDURE — 99215 OFFICE O/P EST HI 40 MIN: CPT | Mod: 25 | Performed by: INTERNAL MEDICINE

## 2023-11-28 PROCEDURE — 81003 URINALYSIS AUTO W/O SCOPE: CPT | Performed by: INTERNAL MEDICINE

## 2023-11-28 RX ORDER — METOPROLOL SUCCINATE 25 MG/1
0.5 TABLET, EXTENDED RELEASE ORAL DAILY
COMMUNITY
End: 2023-11-28 | Stop reason: SINTOL

## 2023-11-28 RX ORDER — RESPIRATORY SYNCYTIAL VIRUS VACCINE 120MCG/0.5
0.5 KIT INTRAMUSCULAR ONCE
Qty: 1 EACH | Refills: 0 | Status: CANCELLED | OUTPATIENT
Start: 2023-11-28 | End: 2023-11-28

## 2023-11-28 RX ORDER — TADALAFIL 5 MG/1
1 TABLET ORAL DAILY PRN
COMMUNITY

## 2023-11-28 RX ORDER — CEPHALEXIN 500 MG/1
CAPSULE ORAL
COMMUNITY
End: 2023-11-28

## 2023-11-28 RX ORDER — MELOXICAM 7.5 MG/1
TABLET ORAL
COMMUNITY
End: 2023-11-28

## 2023-11-28 RX ORDER — MELOXICAM 15 MG/1
0.5 TABLET ORAL DAILY
COMMUNITY
Start: 2022-04-21 | End: 2023-11-28

## 2023-11-28 ASSESSMENT — PAIN SCALES - GENERAL: PAINLEVEL: NO PAIN (1)

## 2023-11-28 NOTE — PATIENT INSTRUCTIONS
Preparing for Your Surgery  Getting started  A nurse will call you to review your health history and instructions. They will give you an arrival time based on your scheduled surgery time. Please be ready to share:  Your doctor's clinic name and phone number  Your medical, surgical, and anesthesia history  A list of allergies and sensitivities  A list of medicines, including herbal treatments and over-the-counter drugs  Whether the patient has a legal guardian (ask how to send us the papers in advance)  Please tell us if you're pregnant--or if there's any chance you might be pregnant. Some surgeries may injure a fetus (unborn baby), so they require a pregnancy test. Surgeries that are safe for a fetus don't always need a test, and you can choose whether to have one.   If you have a child who's having surgery, please ask for a copy of Preparing for Your Child's Surgery.    Preparing for surgery  Within 10 to 30 days of surgery: Have a pre-op exam (sometimes called an H&P, or History and Physical). This can be done at a clinic or pre-operative center.  If you're having a , you may not need this exam. Talk to your care team.  At your pre-op exam, talk to your care team about all medicines you take. If you need to stop any medicines before surgery, ask when to start taking them again.  We do this for your safety. Many medicines can make you bleed too much during surgery. Some change how well surgery (anesthesia) drugs work.  Call your insurance company to let them know you're having surgery. (If you don't have insurance, call [phone].)  Call your clinic if there's any change in your health. This includes signs of a cold or flu (sore throat, runny nose, cough, rash, fever). It also includes a scrape or scratch near the surgery site.  If you have questions on the day of surgery, call your hospital or surgery center.  Eating and drinking guidelines  For your safety: Unless your surgeon tells you otherwise,  follow the guidelines below.  Eat and drink as usual until 8 hours before you arrive for surgery. After that, no food or milk.  Drink clear liquids until 2 hours before you arrive. These are liquids you can see through, like water, Gatorade, and Propel Water. They also include plain black coffee and tea (no cream or milk), candy, and breath mints. You can spit out gum when you arrive.  If you drink alcohol: Stop drinking it the night before surgery.  If your care team tells you to take medicine on the morning of surgery, it's okay to take it with a sip of water.  Preventing infection  Shower or bathe the night before and morning of your surgery. Follow the instructions your clinic gave you. (If no instructions, use regular soap.)  Don't shave or clip hair near your surgery site. We'll remove the hair if needed.  Don't smoke or vape the morning of surgery. You may chew nicotine gum up to 2 hours before surgery. A nicotine patch is okay.  Note: Some surgeries require you to completely quit smoking and nicotine. Check with your surgeon.  Your care team will make every effort to keep you safe from infection. We will:  Clean our hands often with soap and water (or an alcohol-based hand rub).  Clean the skin at your surgery site with a special soap that kills germs.  Give you a special gown to keep you warm. (Cold raises the risk of infection.)  Wear special hair covers, masks, gowns and gloves during surgery.  Give antibiotic medicine, if prescribed. Not all surgeries need antibiotics.  What to bring on the day of surgery  Photo ID and insurance card  Copy of your health care directive, if you have one  Glasses and hearing aids (bring cases)  You can't wear contacts during surgery  Inhaler and eye drops, if you use them (tell us about these when you arrive)  CPAP machine or breathing device, if you use them  A few personal items, if spending the night  If you have . . .  A pacemaker, ICD (cardiac defibrillator) or other  implant: Bring the ID card.  An implanted stimulator: Bring the remote control.  A legal guardian: Bring a copy of the certified (court-stamped) guardianship papers.  Please remove any jewelry, including body piercings. Leave jewelry and other valuables at home.  If you're going home the day of surgery  You must have a responsible adult drive you home. They should stay with you overnight as well.  If you don't have someone to stay with you, and you aren't safe to go home alone, we may keep you overnight. Insurance often won't pay for this.  After surgery  If it's hard to control your pain or you need more pain medicine, please call your surgeon's office.  Questions?   If you have any questions for your care team, list them here: _________________________________________________________________________________________________________________________________________________________________________ ____________________________________ ____________________________________ ____________________________________  For informational purposes only. Not to replace the advice of your health care provider. Copyright   2003, 2019 [employer]. All rights reserved. Clinically reviewed by Zara Donahue MD. SMART[employer] 615726 - REV 12/22.    How to Take Your Medication Before Surgery  - HOLD (do not take) Aspirin for 7 days  - HOLD (do not take) lisinopril the morning of surgery.

## 2023-11-28 NOTE — PROGRESS NOTES
[facility] [place]  [address]  [address]  [place] [address]  Phone: [phone]  Fax: [phone]  Primary Provider: Radha Banks  Pre-op Performing Provider: DIONI BERNAL      PREOPERATIVE EVALUATION:  Today's date: 2023    Lupe is a 83 year old, presenting for the following:  Pre-Op Exam        2023    10:49 AM   Additional Questions   Roomed by MINI Trujillo   Accompanied by Wife, Phoebe         2023    10:49 AM   Patient Reported Additional Medications   Patient reports taking the following new medications na     CONCERNS: Kneeling, balance and walking after surgery    Surgical Information:  Surgery/Procedure:   LEFT TOTAL KNEE ARTHROPLASTY  RIGHT KNEE CORTISONE INJECTION    Surgery Location: [facility]  Surgeon: Kevin Zamarripa MD   Surgery Date: 2023  Time of Surgery: 7am  Where patient plans to recover: Other: Overnight at the hospital and the rest at home with family  Fax number for surgical facility: Note does not need to be faxed, will be available electronically in Epic.    Assessment & Plan     The proposed surgical procedure is considered INTERMEDIATE risk.    Preop general physical exam    - EKG 12-lead complete w/read - Clinics  - UA Macroscopic with reflex to Microscopic and Culture - Lab Collect; Future  - UA Macroscopic with reflex to Microscopic and Culture - Lab Collect    Osteoarthritis of both knees, unspecified osteoarthritis type      Mixed hyperlipidemia  On stati    Cardiomyopathy, unspecified type (H)  Most recent echo:  Name: LUPE TURNER  MRN: [id number]  : 1940  Study Date: 05/15/2023 10:00 AM    ______________________________________________________________________________  Procedure  Complete Echo Adult. Technically difficult study.  ______________________________________________________________________________  Interpretation Summary     The visual ejection fraction is  55-60%.  The right ventricle is normal in size and function.  Aortic valve is likely trileaflet. Good leaflet excursion. MG is 11 mm Hg. No  significant AS. There is 1-2+ AI.  The inferior vena cava was normal in size with preserved respiratory  variability.    Mitral valve insufficiency, unspecified etiology  Followed by cardiology    Nocturia  Stable. UA unremarkable. He would likely benefit from follow up with urology postoperatively.   - UA Macroscopic with reflex to Microscopic and Culture - Lab Collect; Future  - UA Macroscopic with reflex to Microscopic and Culture - Lab Collect      Possible Sleep Apnea: STOP-Bang score 3.  Had sleep study years ago. Told he didn't need CPAP. No change in status since then.      RLS: treated with requip.    Nocturnal leg cramps  Very concerned about this being an issue right after surgery. Recommended he stay hydrated. I don't think this will be a big issue right after surgery as he will not be doing his usual triggers such as working in the yard. He will also have pain medication in the first few days which should lessen risk of this. He is not worried about this once he is able to get up and move around more. Handout given on conservative management.     Erectile dysfunction:  Not currently taking cialis. Left on med list as he may want to use in future. Discussed not restarting until recovered from surgery. Should not take 3 days prior to procedure.      - No identified additional risk factors other than previously addressed    Antiplatelet or Anticoagulation Medication Instructions:   - aspirin: Discontinue aspirin 7-10 days prior to procedure to reduce bleeding risk. It should be resumed postoperatively.     Additional Medication Instructions:   - ACE/ARB: HOLD on day of surgery (minimum 11 hours for general anesthesia).   - tadalafil: HOLD for 3 days (72 hours) prior to surgery.   - requip: take as usual    RECOMMENDATION:  APPROVAL GIVEN to proceed with proposed  procedure, without further diagnostic evaluation.    64 minutes spent by me on the date of the encounter doing chart review, history and exam, documentation and further activities per the note      Subjective       HPI related to upcoming procedure: ongoing knee pain      Nocturnal leg cramps. Using topical theraworks relief. This seems to work, doesn't always work great.         11/28/2023    10:22 AM   Preop Questions   1. Have you ever had a heart attack or stroke? No   2. Have you ever had surgery on your heart or blood vessels, such as a stent placement, a coronary artery bypass, or surgery on an artery in your head, neck, heart, or legs? No   3. Do you have chest pain with activity? No   4. Do you have a history of  heart failure? No   5. Do you currently have a cold, bronchitis or symptoms of other infection? No   6. Do you have a cough, shortness of breath, or wheezing? No   7. Do you or anyone in your family have previous history of blood clots? No   8. Do you or does anyone in your family have a serious bleeding problem such as prolonged bleeding following surgeries or cuts? No   9. Have you ever had problems with anemia or been told to take iron pills? No   10. Have you had any abnormal blood loss such as black, tarry or bloody stools? No   11. Have you ever had a blood transfusion? No   12. Are you willing to have a blood transfusion if it is medically needed before, during, or after your surgery? Yes   13. Have you or any of your relatives ever had problems with anesthesia? No   14. Do you have sleep apnea, excessive snoring or daytime drowsiness? UNKNOWN   15. Do you have any artifical heart valves or other implanted medical devices like a pacemaker, defibrillator, or continuous glucose monitor? No   16. Do you have artificial joints? YES   17. Are you allergic to latex? No       Health Care Directive:  Patient has a Health Care Directive on file      Preoperative Review of :   reviewed -  controlled substances reflected in medication list.    Status of Chronic Conditions:  See problem list for active medical problems.  Problems all longstanding and stable, except as noted/documented.  See ROS for pertinent symptoms related to these conditions.    HYPERLIPIDEMIA - Patient has a long history of significant Hyperlipidemia requiring medication for treatment with recent good control. Patient reports no problems or side effects with the medication.     HYPERTENSION - Patient has longstanding history of HTN , currently denies any symptoms referable to elevated blood pressure. Specifically denies chest pain, palpitations, dyspnea, orthopnea, PND or peripheral edema. Blood pressure readings have been in normal range. Current medication regimen is as listed below. Patient denies any side effects of medication.     Tells me he was diagnosed with mild GOYO years ago, not enough to need CPAP.    A few nights ago woke up completely sweating. No other episodes of this. Fine since then. Just wondering.     Review of Systems  CONSTITUTIONAL:POSITIVE  for 10 lb weight loss over the last 16 months and NEGATIVE  for chills, fever  , and sweats  INTEGUMENTARY/SKIN: NEGATIVE for worrisome rashes, moles or lesions  EYES: NEGATIVE for vision changes or irritation  ENT/MOUTH: NEGATIVE for ear, mouth and throat problems  RESP: NEGATIVE for significant cough or SOB  CV: NEGATIVE for chest pain, palpitations or peripheral edema  GI: NEGATIVE for nausea, abdominal pain, heartburn, or change in bowel habits   male :frequency, incontinence, and nocturia x 5  MUSCULOSKELETAL: NEGATIVE for significant arthralgias or myalgia  NEURO: NEGATIVE for weakness, dizziness or paresthesias  ENDOCRINE: NEGATIVE for temperature intolerance, skin/hair changes  HEME: NEGATIVE for bleeding problems  PSYCHIATRIC: NEGATIVE for changes in mood or affect    Patient Active Problem List    Diagnosis Date Noted    Cardiomyopathy, unspecified type (H)  02/08/2023     Priority: Medium    Pseudophakia of both eyes 11/21/2019     Priority: Medium    Glaucoma suspect of both eyes 10/10/2019     Priority: Medium     Added automatically from request for surgery [id number]      Nuclear senile cataract of both eyes 10/10/2019     Priority: Medium     Added automatically from request for surgery [id number]      Mixed hyperlipidemia 07/07/2017     Priority: Medium    Lumbago 05/22/2017     Priority: Medium    Hip pain, left 05/22/2017     Priority: Medium    Essential hypertension with goal blood pressure less than 140/90 07/05/2016     Priority: Medium    Primary osteoarthritis of right foot 07/05/2016     Priority: Medium    Benign non-nodular prostatic hyperplasia with lower urinary tract symptoms 07/05/2016     Priority: Medium    Chronic low back pain, unspecified back pain laterality, with sciatica presence unspecified 07/05/2016     Priority: Medium    Hereditary and idiopathic peripheral neuropathy 07/02/2015     Priority: Medium     Problem list name updated by automated process. Provider to review      Mitral regurgitation - systolic murmur 05/11/2015     Priority: Medium    Glaucoma 11/07/2014     Priority: Medium    Cervical spine degeneration 07/01/2014     Priority: Medium      Past Medical History:   Diagnosis Date    Cancer (H) 2000    L shoulder melanoma    Glaucoma     Heart murmur     Hypertension     Irregular heart beat     Melanoma (H) 2000    Left shoulder - Dr. Carrillo - follows q6 months    Neuropathy     Nonsenile cataract     Other chronic pain     Neuropathy bilateral feet    Other premature beats     PVC's (premature ventricular contractions) 05/14/2015    Sleep apnea     Zoster 2000     Past Surgical History:   Procedure Laterality Date    ADULT DERMATOLOGY  REFERRAL  2000    melanoma resection L shoulder    ARTHROPLASTY HIP ANTERIOR Left 11/14/2014    Procedure: ARTHROPLASTY HIP ANTERIOR;  Surgeon: Rudy Tobias MD;  Location:  OR     ARTHROPLASTY REVISION HIP  3/12/2014     right Procedure: ARTHROPLASTY REVISION HIP;  Surgeon: Rudy Tobias MD;  Location:  OR    ARTHROSCOPY KNEE  2002    meniscus ( side?)    BIOPSY OF PROSTATE,NEEDLE/PUNCH      both hips replaced      CATARACT IOL, RT/LT Bilateral     COLONOSCOPY N/A 5/28/2020    Procedure: COLONOSCOPY (fv) (please mail packet as soon as possible) fv;  Surgeon: Kevin Mccarty MD;  Location:  GI    DISCECTOMY LUMBAR POSTERIOR MICROSCOPIC ONE LEVEL N/A 4/11/2016    Procedure: DISCECTOMY LUMBAR POSTERIOR MICROSCOPIC ONE LEVEL;  Surgeon: Ruben Marsh MD;  Location:  OR    ENT SURGERY      Tonsils    ORTHOPEDIC SURGERY  2008    R hip replacement    ORTHOPEDIC SURGERY  2004    big toe in lawn mower, needed reconstructive surgery    PHACOEMULSIFICATION CLEAR CORNEA WITH TORIC INTRAOCULAR LENS IMPLANT Left 11/6/2019    Procedure: LEFT EYE, CATARACT EXTRACTION WITH TORIC INTRAOCULAR LENS IMPLANT;  Surgeon: Shannon Yang MD;  Location: [facility] OR    PHACOEMULSIFICATION CLEAR CORNEA WITH TORIC INTRAOCULAR LENS IMPLANT Right 11/20/2019    Procedure: RIGHT EYE, CATARACT EXTRACTION WITH TORIC INTRAOCULAR LENS IMPLANT;  Surgeon: Shannon Yang MD;  Location: [facility] OR    TRABECULAR MICRO-BYPASS WITH ISTENT Left 11/6/2019    Procedure: LEFT EYE, ISTENT INSERTION;  Surgeon: Shannon Yang MD;  Location: [facility] OR    TRABECULAR MICRO-BYPASS WITH ISTENT Right 11/20/2019    Procedure: RIGHT EYE, ISTENT INSERTION;  Surgeon: Shannon Yang MD;  Location:  OR    ZZC TOTAL HIP ARTHROPLASTY Right 2008     Current Outpatient Medications   Medication Sig Dispense Refill    Acetaminophen (TYLENOL PO) Take 1,000 mg by mouth 2 times daily      amLODIPine (NORVASC) 10 MG tablet Take 1 tablet (10 mg) by mouth daily 90 tablet 3    ASPIRIN PO Take 81 mg by mouth      atorvastatin (LIPITOR) 20 MG tablet Take 1 tablet (20 mg) by mouth daily 90 tablet 3    Calcium Citrate-Vitamin D (CITRACAL + D PO)  "Take 2 tablets by mouth daily      diclofenac (VOLTAREN) 75 MG EC tablet TAKE 2 TABLETS BY MOUTH AS NEEDED FOR MODERATE PAIN 180 tablet 1    latanoprost (XALATAN) 0.005 % ophthalmic solution Place 1 drop into both eyes At Bedtime For additional refills, please schedule a follow-up appointment at [phone]. 2.5 mL 0    lisinopril (ZESTRIL) 40 MG tablet Take 1 tablet (40 mg) by mouth daily 90 tablet 3    Magnesium 500 MG CAPS       Multiple Vitamins-Minerals (MENS MULTIVITAMIN PLUS PO) Take 1 tablet by mouth daily      Nutritional Supplements (PROSTATE 2.4) CAPS Take 2 capsules by mouth daily Prostate cap 2.0      rOPINIRole (REQUIP) 0.5 MG tablet Take 1 tablet (0.5 mg) by mouth At Bedtime TAKE 1 TO 2 TABLETS(0.25 TO 0.5 MG) BY MOUTH AT BEDTIME 90 tablet 3    Testosterone 1.62 % GEL APPLY 2 PUMPS EACH ARM D      tadalafil (CIALIS) 5 MG tablet Take 1 tablet by mouth daily (Patient not taking: Reported on 2023)         No Known Allergies     Social History     Tobacco Use    Smoking status: Former     Packs/day: 0     Types: Cigarettes     Quit date: 1976     Years since quittin.4    Smokeless tobacco: Never   Substance Use Topics    Alcohol use: No     Alcohol/week: 0.0 standard drinks of alcohol     Comment: Quit 1014     Family History   Problem Relation Age of Onset    Cerebrovascular Disease Mother     Hypertension Mother     Glaucoma Mother     Heart Disease Father 58        MI    Cancer Father         lung cancer;  age 91    Colon Cancer No family hx of     Diabetes No family hx of     Macular Degeneration No family hx of      History   Drug Use No         Objective     BP (!) 158/67 (BP Location: Right arm, Patient Position: Sitting, Cuff Size: Adult Regular)   Pulse 62   Temp 97.5  F (36.4  C) (Oral)   Resp 16   Ht 1.742 m (5' 8.58\")   Wt 71.9 kg (158 lb 9.6 oz)   SpO2 99%   BMI 23.71 kg/m      Physical Exam    GENERAL APPEARANCE: healthy, alert and no distress     EYES: EOMI,  " PERRL     HENT: ear canals and TM's normal and nose and mouth without ulcers or lesions     NECK: no adenopathy, no asymmetry, masses, or scars and thyroid normal to palpation     RESP: few scattered basilar rales that clear with repeated deep breaths. no rhonchi or wheezes     CV: normal S1 S2, no S3 or S4, peripheral pulses strong, frequent extasystoles with compensatory pause, and grade 2/6 systolic ejection murmur heard best over the upper sternum     ABDOMEN:  soft, nontender, no HSM or masses and bowel sounds normal     MS: extremities normal- no gross deformities noted, no evidence of inflammation in joints, FROM in all extremities.     SKIN: no suspicious lesions or rashes     NEURO: Normal strength and tone, sensory exam grossly normal, mentation intact and speech normal     PSYCH: mentation appears normal. and affect normal/bright     LYMPHATICS: No cervical adenopathy    Recent Labs   Lab Test 06/29/23  1503 02/08/23  1119 02/01/23  0909 07/11/22  1410 06/08/22  1200   HGB  --  14.1 14.2   < > 14.1   PLT  --  234  --   --  200     --   --   --  138   POTASSIUM 4.5  --   --   --  4.2   CR 1.04  --   --   --  0.90    < > = values in this interval not displayed.        Diagnostics:  Labs pending at this time.  Results will be reviewed when available.   EKG: appears normal, NSR, normal axis, normal intervals, no acute ST/T changes c/w ischemia, no LVH by voltage criteria, Premature Atrial Contractions (PAC) noted, unchanged from previous tracings    Revised Cardiac Risk Index (RCRI):  The patient has the following serious cardiovascular risks for perioperative complications:   - No serious cardiac risks = 0 points     RCRI Interpretation: 0 points: Class I (very low risk - 0.4% complication rate)         Signed Electronically by: Sushila Alvarado MD  Copy of this evaluation report is provided to requesting physician.

## 2023-11-28 NOTE — H&P (VIEW-ONLY)
[facility] [place]  [address]  [address]  [place] [address]  Phone: [phone]  Fax: [phone]  Primary Provider: Radha Banks  Pre-op Performing Provider: DIONI BERNAL      PREOPERATIVE EVALUATION:  Today's date: 2023    Lupe is a 83 year old, presenting for the following:  Pre-Op Exam        2023    10:49 AM   Additional Questions   Roomed by MINI Trujillo   Accompanied by Wife, Phoebe         2023    10:49 AM   Patient Reported Additional Medications   Patient reports taking the following new medications na     CONCERNS: Kneeling, balance and walking after surgery    Surgical Information:  Surgery/Procedure:   LEFT TOTAL KNEE ARTHROPLASTY  RIGHT KNEE CORTISONE INJECTION    Surgery Location: [facility]  Surgeon: Kevin Zamarripa MD   Surgery Date: 2023  Time of Surgery: 7am  Where patient plans to recover: Other: Overnight at the hospital and the rest at home with family  Fax number for surgical facility: Note does not need to be faxed, will be available electronically in Epic.    Assessment & Plan     The proposed surgical procedure is considered INTERMEDIATE risk.    Preop general physical exam    - EKG 12-lead complete w/read - Clinics  - UA Macroscopic with reflex to Microscopic and Culture - Lab Collect; Future  - UA Macroscopic with reflex to Microscopic and Culture - Lab Collect    Osteoarthritis of both knees, unspecified osteoarthritis type      Mixed hyperlipidemia  On stati    Cardiomyopathy, unspecified type (H)  Most recent echo:  Name: LUPE TURNER  MRN: [id number]  : 1940  Study Date: 05/15/2023 10:00 AM    ______________________________________________________________________________  Procedure  Complete Echo Adult. Technically difficult study.  ______________________________________________________________________________  Interpretation Summary     The visual ejection fraction is  55-60%.  The right ventricle is normal in size and function.  Aortic valve is likely trileaflet. Good leaflet excursion. MG is 11 mm Hg. No  significant AS. There is 1-2+ AI.  The inferior vena cava was normal in size with preserved respiratory  variability.    Mitral valve insufficiency, unspecified etiology  Followed by cardiology    Nocturia  Stable. UA unremarkable. He would likely benefit from follow up with urology postoperatively.   - UA Macroscopic with reflex to Microscopic and Culture - Lab Collect; Future  - UA Macroscopic with reflex to Microscopic and Culture - Lab Collect      Possible Sleep Apnea: STOP-Bang score 3.  Had sleep study years ago. Told he didn't need CPAP. No change in status since then.      RLS: treated with requip.    Nocturnal leg cramps  Very concerned about this being an issue right after surgery. Recommended he stay hydrated. I don't think this will be a big issue right after surgery as he will not be doing his usual triggers such as working in the yard. He will also have pain medication in the first few days which should lessen risk of this. He is not worried about this once he is able to get up and move around more. Handout given on conservative management.     Erectile dysfunction:  Not currently taking cialis. Left on med list as he may want to use in future. Discussed not restarting until recovered from surgery. Should not take 3 days prior to procedure.      - No identified additional risk factors other than previously addressed    Antiplatelet or Anticoagulation Medication Instructions:   - aspirin: Discontinue aspirin 7-10 days prior to procedure to reduce bleeding risk. It should be resumed postoperatively.     Additional Medication Instructions:   - ACE/ARB: HOLD on day of surgery (minimum 11 hours for general anesthesia).   - tadalafil: HOLD for 3 days (72 hours) prior to surgery.   - requip: take as usual    RECOMMENDATION:  APPROVAL GIVEN to proceed with proposed  procedure, without further diagnostic evaluation.    64 minutes spent by me on the date of the encounter doing chart review, history and exam, documentation and further activities per the note      Subjective       HPI related to upcoming procedure: ongoing knee pain      Nocturnal leg cramps. Using topical theraworks relief. This seems to work, doesn't always work great.         11/28/2023    10:22 AM   Preop Questions   1. Have you ever had a heart attack or stroke? No   2. Have you ever had surgery on your heart or blood vessels, such as a stent placement, a coronary artery bypass, or surgery on an artery in your head, neck, heart, or legs? No   3. Do you have chest pain with activity? No   4. Do you have a history of  heart failure? No   5. Do you currently have a cold, bronchitis or symptoms of other infection? No   6. Do you have a cough, shortness of breath, or wheezing? No   7. Do you or anyone in your family have previous history of blood clots? No   8. Do you or does anyone in your family have a serious bleeding problem such as prolonged bleeding following surgeries or cuts? No   9. Have you ever had problems with anemia or been told to take iron pills? No   10. Have you had any abnormal blood loss such as black, tarry or bloody stools? No   11. Have you ever had a blood transfusion? No   12. Are you willing to have a blood transfusion if it is medically needed before, during, or after your surgery? Yes   13. Have you or any of your relatives ever had problems with anesthesia? No   14. Do you have sleep apnea, excessive snoring or daytime drowsiness? UNKNOWN   15. Do you have any artifical heart valves or other implanted medical devices like a pacemaker, defibrillator, or continuous glucose monitor? No   16. Do you have artificial joints? YES   17. Are you allergic to latex? No       Health Care Directive:  Patient has a Health Care Directive on file      Preoperative Review of :   reviewed -  controlled substances reflected in medication list.    Status of Chronic Conditions:  See problem list for active medical problems.  Problems all longstanding and stable, except as noted/documented.  See ROS for pertinent symptoms related to these conditions.    HYPERLIPIDEMIA - Patient has a long history of significant Hyperlipidemia requiring medication for treatment with recent good control. Patient reports no problems or side effects with the medication.     HYPERTENSION - Patient has longstanding history of HTN , currently denies any symptoms referable to elevated blood pressure. Specifically denies chest pain, palpitations, dyspnea, orthopnea, PND or peripheral edema. Blood pressure readings have been in normal range. Current medication regimen is as listed below. Patient denies any side effects of medication.     Tells me he was diagnosed with mild GOYO years ago, not enough to need CPAP.    A few nights ago woke up completely sweating. No other episodes of this. Fine since then. Just wondering.     Review of Systems  CONSTITUTIONAL:POSITIVE  for 10 lb weight loss over the last 16 months and NEGATIVE  for chills, fever  , and sweats  INTEGUMENTARY/SKIN: NEGATIVE for worrisome rashes, moles or lesions  EYES: NEGATIVE for vision changes or irritation  ENT/MOUTH: NEGATIVE for ear, mouth and throat problems  RESP: NEGATIVE for significant cough or SOB  CV: NEGATIVE for chest pain, palpitations or peripheral edema  GI: NEGATIVE for nausea, abdominal pain, heartburn, or change in bowel habits   male :frequency, incontinence, and nocturia x 5  MUSCULOSKELETAL: NEGATIVE for significant arthralgias or myalgia  NEURO: NEGATIVE for weakness, dizziness or paresthesias  ENDOCRINE: NEGATIVE for temperature intolerance, skin/hair changes  HEME: NEGATIVE for bleeding problems  PSYCHIATRIC: NEGATIVE for changes in mood or affect    Patient Active Problem List    Diagnosis Date Noted    Cardiomyopathy, unspecified type (H)  02/08/2023     Priority: Medium    Pseudophakia of both eyes 11/21/2019     Priority: Medium    Glaucoma suspect of both eyes 10/10/2019     Priority: Medium     Added automatically from request for surgery [id number]      Nuclear senile cataract of both eyes 10/10/2019     Priority: Medium     Added automatically from request for surgery [id number]      Mixed hyperlipidemia 07/07/2017     Priority: Medium    Lumbago 05/22/2017     Priority: Medium    Hip pain, left 05/22/2017     Priority: Medium    Essential hypertension with goal blood pressure less than 140/90 07/05/2016     Priority: Medium    Primary osteoarthritis of right foot 07/05/2016     Priority: Medium    Benign non-nodular prostatic hyperplasia with lower urinary tract symptoms 07/05/2016     Priority: Medium    Chronic low back pain, unspecified back pain laterality, with sciatica presence unspecified 07/05/2016     Priority: Medium    Hereditary and idiopathic peripheral neuropathy 07/02/2015     Priority: Medium     Problem list name updated by automated process. Provider to review      Mitral regurgitation - systolic murmur 05/11/2015     Priority: Medium    Glaucoma 11/07/2014     Priority: Medium    Cervical spine degeneration 07/01/2014     Priority: Medium      Past Medical History:   Diagnosis Date    Cancer (H) 2000    L shoulder melanoma    Glaucoma     Heart murmur     Hypertension     Irregular heart beat     Melanoma (H) 2000    Left shoulder - Dr. Carrillo - follows q6 months    Neuropathy     Nonsenile cataract     Other chronic pain     Neuropathy bilateral feet    Other premature beats     PVC's (premature ventricular contractions) 05/14/2015    Sleep apnea     Zoster 2000     Past Surgical History:   Procedure Laterality Date    ADULT DERMATOLOGY  REFERRAL  2000    melanoma resection L shoulder    ARTHROPLASTY HIP ANTERIOR Left 11/14/2014    Procedure: ARTHROPLASTY HIP ANTERIOR;  Surgeon: Rudy Tobias MD;  Location:  OR     ARTHROPLASTY REVISION HIP  3/12/2014     right Procedure: ARTHROPLASTY REVISION HIP;  Surgeon: Rudy Tobias MD;  Location:  OR    ARTHROSCOPY KNEE  2002    meniscus ( side?)    BIOPSY OF PROSTATE,NEEDLE/PUNCH      both hips replaced      CATARACT IOL, RT/LT Bilateral     COLONOSCOPY N/A 5/28/2020    Procedure: COLONOSCOPY (fv) (please mail packet as soon as possible) fv;  Surgeon: Kevin Mccarty MD;  Location:  GI    DISCECTOMY LUMBAR POSTERIOR MICROSCOPIC ONE LEVEL N/A 4/11/2016    Procedure: DISCECTOMY LUMBAR POSTERIOR MICROSCOPIC ONE LEVEL;  Surgeon: Ruben Marsh MD;  Location:  OR    ENT SURGERY      Tonsils    ORTHOPEDIC SURGERY  2008    R hip replacement    ORTHOPEDIC SURGERY  2004    big toe in lawn mower, needed reconstructive surgery    PHACOEMULSIFICATION CLEAR CORNEA WITH TORIC INTRAOCULAR LENS IMPLANT Left 11/6/2019    Procedure: LEFT EYE, CATARACT EXTRACTION WITH TORIC INTRAOCULAR LENS IMPLANT;  Surgeon: Shannon Yang MD;  Location: [facility] OR    PHACOEMULSIFICATION CLEAR CORNEA WITH TORIC INTRAOCULAR LENS IMPLANT Right 11/20/2019    Procedure: RIGHT EYE, CATARACT EXTRACTION WITH TORIC INTRAOCULAR LENS IMPLANT;  Surgeon: Shannon Yang MD;  Location: [facility] OR    TRABECULAR MICRO-BYPASS WITH ISTENT Left 11/6/2019    Procedure: LEFT EYE, ISTENT INSERTION;  Surgeon: Shannon Ynag MD;  Location: [facility] OR    TRABECULAR MICRO-BYPASS WITH ISTENT Right 11/20/2019    Procedure: RIGHT EYE, ISTENT INSERTION;  Surgeon: Shannon Yang MD;  Location:  OR    ZZC TOTAL HIP ARTHROPLASTY Right 2008     Current Outpatient Medications   Medication Sig Dispense Refill    Acetaminophen (TYLENOL PO) Take 1,000 mg by mouth 2 times daily      amLODIPine (NORVASC) 10 MG tablet Take 1 tablet (10 mg) by mouth daily 90 tablet 3    ASPIRIN PO Take 81 mg by mouth      atorvastatin (LIPITOR) 20 MG tablet Take 1 tablet (20 mg) by mouth daily 90 tablet 3    Calcium Citrate-Vitamin D (CITRACAL + D PO)  "Take 2 tablets by mouth daily      diclofenac (VOLTAREN) 75 MG EC tablet TAKE 2 TABLETS BY MOUTH AS NEEDED FOR MODERATE PAIN 180 tablet 1    latanoprost (XALATAN) 0.005 % ophthalmic solution Place 1 drop into both eyes At Bedtime For additional refills, please schedule a follow-up appointment at [phone]. 2.5 mL 0    lisinopril (ZESTRIL) 40 MG tablet Take 1 tablet (40 mg) by mouth daily 90 tablet 3    Magnesium 500 MG CAPS       Multiple Vitamins-Minerals (MENS MULTIVITAMIN PLUS PO) Take 1 tablet by mouth daily      Nutritional Supplements (PROSTATE 2.4) CAPS Take 2 capsules by mouth daily Prostate cap 2.0      rOPINIRole (REQUIP) 0.5 MG tablet Take 1 tablet (0.5 mg) by mouth At Bedtime TAKE 1 TO 2 TABLETS(0.25 TO 0.5 MG) BY MOUTH AT BEDTIME 90 tablet 3    Testosterone 1.62 % GEL APPLY 2 PUMPS EACH ARM D      tadalafil (CIALIS) 5 MG tablet Take 1 tablet by mouth daily (Patient not taking: Reported on 2023)         No Known Allergies     Social History     Tobacco Use    Smoking status: Former     Packs/day: 0     Types: Cigarettes     Quit date: 1976     Years since quittin.4    Smokeless tobacco: Never   Substance Use Topics    Alcohol use: No     Alcohol/week: 0.0 standard drinks of alcohol     Comment: Quit 1014     Family History   Problem Relation Age of Onset    Cerebrovascular Disease Mother     Hypertension Mother     Glaucoma Mother     Heart Disease Father 58        MI    Cancer Father         lung cancer;  age 91    Colon Cancer No family hx of     Diabetes No family hx of     Macular Degeneration No family hx of      History   Drug Use No         Objective     BP (!) 158/67 (BP Location: Right arm, Patient Position: Sitting, Cuff Size: Adult Regular)   Pulse 62   Temp 97.5  F (36.4  C) (Oral)   Resp 16   Ht 1.742 m (5' 8.58\")   Wt 71.9 kg (158 lb 9.6 oz)   SpO2 99%   BMI 23.71 kg/m      Physical Exam    GENERAL APPEARANCE: healthy, alert and no distress     EYES: EOMI,  " PERRL     HENT: ear canals and TM's normal and nose and mouth without ulcers or lesions     NECK: no adenopathy, no asymmetry, masses, or scars and thyroid normal to palpation     RESP: few scattered basilar rales that clear with repeated deep breaths. no rhonchi or wheezes     CV: normal S1 S2, no S3 or S4, peripheral pulses strong, frequent extasystoles with compensatory pause, and grade 2/6 systolic ejection murmur heard best over the upper sternum     ABDOMEN:  soft, nontender, no HSM or masses and bowel sounds normal     MS: extremities normal- no gross deformities noted, no evidence of inflammation in joints, FROM in all extremities.     SKIN: no suspicious lesions or rashes     NEURO: Normal strength and tone, sensory exam grossly normal, mentation intact and speech normal     PSYCH: mentation appears normal. and affect normal/bright     LYMPHATICS: No cervical adenopathy    Recent Labs   Lab Test 06/29/23  1503 02/08/23  1119 02/01/23  0909 07/11/22  1410 06/08/22  1200   HGB  --  14.1 14.2   < > 14.1   PLT  --  234  --   --  200     --   --   --  138   POTASSIUM 4.5  --   --   --  4.2   CR 1.04  --   --   --  0.90    < > = values in this interval not displayed.        Diagnostics:  Labs pending at this time.  Results will be reviewed when available.   EKG: appears normal, NSR, normal axis, normal intervals, no acute ST/T changes c/w ischemia, no LVH by voltage criteria, Premature Atrial Contractions (PAC) noted, unchanged from previous tracings    Revised Cardiac Risk Index (RCRI):  The patient has the following serious cardiovascular risks for perioperative complications:   - No serious cardiac risks = 0 points     RCRI Interpretation: 0 points: Class I (very low risk - 0.4% complication rate)         Signed Electronically by: Sushila Alvarado MD  Copy of this evaluation report is provided to requesting physician.

## 2023-11-29 PROBLEM — G25.81 RESTLESS LEGS SYNDROME (RLS): Status: ACTIVE | Noted: 2023-11-29

## 2023-12-04 NOTE — PROGRESS NOTES
Discharge plan according to [facility]:       11/13/23 1044   Discharge Planning   Patient/Family Anticipates Transition to home   Concerns to be Addressed all concerns addressed in this encounter   Living Arrangements   People in Home spouse   Type of Residence Private Residence   Is your private residence a single family home or apartment? Single family home   Stair Railings, Within Home, Primary railings safe and in good condition   Bathroom Shower/Tub Walk-in shower   Equipment Currently Used at Home none   Support System   Support Systems Spouse/Significant Other   Do you have someone available to stay with you one or two nights once you are home? Yes

## 2023-12-12 ENCOUNTER — ANESTHESIA EVENT (OUTPATIENT)
Dept: SURGERY | Facility: CLINIC | Age: 83
End: 2023-12-12
Payer: COMMERCIAL

## 2023-12-12 NOTE — TREATMENT PLAN
Orthopedic Surgery Pre-Op Plan: Ciro Almonte  pre-op review. This is NOT an H&P   Surgeon: Dr. Zamarripa   [facility]: [facility]  Name of Surgery: Left Total Knee Arthroplasty, Right Knee Cortisone Injection  Date of Surgery: 12/13/23  H&P: Completed on 11/28/23 by Dr. Sushila Alvarado at [facility].   History of ASA, NSAIDS, vitamin and/or herbal supplements, GLP-1 Agonist medication taken within 10 days?: Yes- ASA 81 mg, diclofenac, multivitamins-patient instructed to hold these medications and vitamins for 7 days before surgery.  History of blood thinners?: No    Plan:   1) Discharge Plan: Home POD 1 with assist of Spouse. Please see Discharge Planning section near bottom of this note for further details.     2) Skin Tear: near left wrist. Patient notified preop RN that skin tear appears to be healing and no current signs of infection. Dr. Zamarripa notified and is ok proceeding but they will take a look at the area again in preop on day of surgery.     3) History of Mild Cardiomyopathy: Improved. Echo 12/2020 showed grade 2 left ventricular diastolic dysfunction with LVEF 53%. Most recent Echo 5/15/23 showed EF 55-60%. Per most recent Cardiology visit notes with Dr. Lord [facility] on 5/17/23: The patient is physically active and asymptomatic with excellent exercise tolerance for age.    4) Aortic Sclerosis: with no evidence of aortic stenosis per most recent Echo . Cardiology will continue to monitor this with repeat Echo planned in 2 years.   Echo 5/15/23:  Interpretation Summary   The visual ejection fraction is 55-60%.  The right ventricle is normal in size and function.  Aortic valve is likely trileaflet. Good leaflet excursion. MG is 11 mm Hg. No  significant AS. There is 1-2+ AI.  The inferior vena cava was normal in size with preserved respiratory  variability.    5) History of PACs: will continue to monitor. Patient will contact Cardiology if he notices more frequent  palpitations or irregular hear rhythm.     6) Hypertension: BP elevated at 158/67 at preop exam.  Will monitor BPs during hospital stay.  If BP is consistently elevated, nursing to please notify Hospitalist.  On amlodipine and lisinopril.  Patient instructed to hold lisinopril on the morning of surgery but to continue taking amlodipine.    7) Hyperlipidemia: On atorvastatin.    8) Possible Sleep Apnea: Mild: No CPAP.  Reports he had sleep study a few years ago that showed mild sleep apnea that did not require CPAP.  I recommend close monitoring of postop respiratory status.  If frequent O2 desats or apneic episodes, nursing to please notify Hospitalist.    9) Restless Leg Syndrome: On ropinirole.    10) Nocturnal Leg Cramps: Patient is concerned about cramps postop.  Typically cramps are triggered after he is out working in the yard.  PCP recommends that he remains well-hydrated. We will monitor for these post-op. If patient has issues with post-op cramps, could consider low dose muscle relaxant.     Patient appears medically optimized for upcoming surgery. I would recommend Hospitalist Consult to assist with medical management. Please call me below with any questions on this patient.       Review of Systems Notable for: Skin tear-near left wrist, history of mild cardiomyopathy-improved, aortic sclerosis but no aortic stenosis on recent echo 5/20/2023, history of PACs, hypertension, hyperlipidemia, possible sleep apnea-mild, no CPAP, restless leg syndrome, nocturnal leg cramps.    Past Medical History:   Past Medical History:   Diagnosis Date    Aortic valve stenosis     Cancer (H) 2000    L shoulder melanoma    Cardiomyopathy (H)     Glaucoma     Heart murmur     Hypertension     Irregular heart beat     Melanoma (H) 2000    Left shoulder - Dr. Carrillo - follows q6 months    Mitral valve insufficiency     Neuropathy     Nonsenile cataract     Other chronic pain     Neuropathy bilateral feet    Other premature beats      PVC's (premature ventricular contractions) 05/14/2015    Sleep apnea     Zoster 2000     Past Surgical History:   Procedure Laterality Date    ADULT DERMATOLOGY  REFERRAL  2000    melanoma resection L shoulder    ARTHROPLASTY HIP ANTERIOR Left 11/14/2014    Procedure: ARTHROPLASTY HIP ANTERIOR;  Surgeon: Rudy Tobias MD;  Location: [facility] OR    ARTHROPLASTY REVISION HIP  3/12/2014     right Procedure: ARTHROPLASTY REVISION HIP;  Surgeon: Rudy Tobias MD;  Location:  OR    ARTHROSCOPY KNEE  2002    meniscus ( side?)    BIOPSY OF PROSTATE,NEEDLE/PUNCH      both hips replaced      CATARACT IOL, RT/LT Bilateral     COLONOSCOPY N/A 5/28/2020    Procedure: COLONOSCOPY (fv) (please mail packet as soon as possible) fv;  Surgeon: Kevin Mccarty MD;  Location:  GI    DISCECTOMY LUMBAR POSTERIOR MICROSCOPIC ONE LEVEL N/A 4/11/2016    Procedure: DISCECTOMY LUMBAR POSTERIOR MICROSCOPIC ONE LEVEL;  Surgeon: Ruben Marsh MD;  Location:  OR    ENT SURGERY      Tonsils    ORTHOPEDIC SURGERY  2008    R hip replacement    ORTHOPEDIC SURGERY  2004    big toe in lawn mower, needed reconstructive surgery    PHACOEMULSIFICATION CLEAR CORNEA WITH TORIC INTRAOCULAR LENS IMPLANT Left 11/6/2019    Procedure: LEFT EYE, CATARACT EXTRACTION WITH TORIC INTRAOCULAR LENS IMPLANT;  Surgeon: Shannon Yang MD;  [facility]: [facility]    PHACOEMULSIFICATION CLEAR CORNEA WITH TORIC INTRAOCULAR LENS IMPLANT Right 11/20/2019    Procedure: RIGHT EYE, CATARACT EXTRACTION WITH TORIC INTRAOCULAR LENS IMPLANT;  Surgeon: Shannon Yang MD;  Location: [facility]    TRABECULAR MICRO-BYPASS WITH ISTENT Left 11/6/2019    Procedure: LEFT EYE, ISTENT INSERTION;  Surgeon: Shannon Yang MD;  [facility]    TRABECULAR MICRO-BYPASS WITH ISTENT Right 11/20/2019    Procedure: RIGHT EYE, ISTENT INSERTION;  Surgeon: Shannon Yang MD;  Location: [facility] OR    ZZC TOTAL HIP ARTHROPLASTY Right 2008       Current  Medications:  Patient's Medications   New Prescriptions    No medications on file   Previous Medications    ACETAMINOPHEN (TYLENOL PO)    Take 1,000 mg by mouth 2 times daily    AMLODIPINE (NORVASC) 10 MG TABLET    Take 1 tablet (10 mg) by mouth daily    ASPIRIN PO    Take 81 mg by mouth    ATORVASTATIN (LIPITOR) 20 MG TABLET    Take 1 tablet (20 mg) by mouth daily    CALCIUM CITRATE-VITAMIN D (CITRACAL + D PO)    Take 2 tablets by mouth daily    DICLOFENAC (VOLTAREN) 75 MG EC TABLET    TAKE 2 TABLETS BY MOUTH AS NEEDED FOR MODERATE PAIN    LATANOPROST (XALATAN) 0.005 % OPHTHALMIC SOLUTION    Place 1 drop into both eyes At Bedtime For additional refills, please schedule a follow-up appointment at [phone].    LISINOPRIL (ZESTRIL) 40 MG TABLET    Take 1 tablet (40 mg) by mouth daily    MAGNESIUM 500 MG CAPS        MULTIPLE VITAMINS-MINERALS (MENS MULTIVITAMIN PLUS PO)    Take 1 tablet by mouth daily    NUTRITIONAL SUPPLEMENTS (PROSTATE 2.4) CAPS    Take 2 capsules by mouth daily Prostate cap 2.0    ROPINIROLE (REQUIP) 0.5 MG TABLET    Take 1 tablet (0.5 mg) by mouth At Bedtime TAKE 1 TO 2 TABLETS(0.25 TO 0.5 MG) BY MOUTH AT BEDTIME    TADALAFIL (CIALIS) 5 MG TABLET    Take 1 tablet by mouth daily    TESTOSTERONE 1.62 % GEL    APPLY 2 PUMPS EACH ARM D   Modified Medications    No medications on file   Discontinued Medications    No medications on file       ALLERGIES:  No Known Allergies    Social History  Social History     Tobacco Use    Smoking status: Former     Packs/day: 0     Types: Cigarettes     Quit date: 1976     Years since quittin.4    Smokeless tobacco: Never   Vaping Use    Vaping Use: Never used   Substance Use Topics    Alcohol use: No     Alcohol/week: 0.0 standard drinks of alcohol     Comment: Quit 1014    Drug use: No       Any Abnormal Recent Diagnostics? No  No recent hemoglobin, CBC or BMP.  I do not anticipate any issues but will check CBC with platelets and BMP in preop on day  of surgery.     Discharge Planning:   Discharge plan according to [facility]:     Home POD 1 with assist of Spouse    11/13/23 1045   Discharge Planning   Patient/Family Anticipates Transition to home   Concerns to be Addressed all concerns addressed in this encounter   Living Arrangements   People in Home spouse   Type of Residence Private Residence   Is your private residence a single family home or apartment? Single family home   Stair Railings, Within Home, Primary railings safe and in good condition   Bathroom Shower/Tub Walk-in shower   Equipment Currently Used at Home none   Support System   Support Systems Spouse/Significant Other   Do you have someone available to stay with you one or two nights once you are home? Yes       TACHO Callejas, CNP   Advanced Practice Nurse Navigator- Orthopedics  [facility]   Phone: [phone]

## 2023-12-13 ENCOUNTER — HOSPITAL ENCOUNTER (OUTPATIENT)
Facility: CLINIC | Age: 83
Discharge: HOME | End: 2023-12-14
Attending: ORTHOPAEDIC SURGERY | Admitting: ORTHOPAEDIC SURGERY
Payer: COMMERCIAL

## 2023-12-13 ENCOUNTER — ANESTHESIA (OUTPATIENT)
Dept: SURGERY | Facility: CLINIC | Age: 83
End: 2023-12-13
Payer: COMMERCIAL

## 2023-12-13 ENCOUNTER — APPOINTMENT (OUTPATIENT)
Dept: RADIOLOGY | Facility: CLINIC | Age: 83
End: 2023-12-13
Attending: PHYSICIAN ASSISTANT
Payer: COMMERCIAL

## 2023-12-13 DIAGNOSIS — Z96.652 STATUS POST TOTAL LEFT KNEE REPLACEMENT: Primary | ICD-10-CM

## 2023-12-13 DIAGNOSIS — I10 ESSENTIAL HYPERTENSION WITH GOAL BLOOD PRESSURE LESS THAN 140/90: ICD-10-CM

## 2023-12-13 DIAGNOSIS — N40.1 BENIGN NON-NODULAR PROSTATIC HYPERPLASIA WITH LOWER URINARY TRACT SYMPTOMS: ICD-10-CM

## 2023-12-13 LAB
ANION GAP SERPL CALCULATED.3IONS-SCNC: 9 MMOL/L (ref 7–15)
BUN SERPL-MCNC: 19 MG/DL (ref 8–23)
CALCIUM SERPL-MCNC: 9.1 MG/DL (ref 8.8–10.2)
CHLORIDE SERPL-SCNC: 107 MMOL/L (ref 98–107)
CREAT SERPL-MCNC: 0.86 MG/DL (ref 0.67–1.17)
DEPRECATED HCO3 PLAS-SCNC: 26 MMOL/L (ref 22–29)
EGFRCR SERPLBLD CKD-EPI 2021: 87 ML/MIN/1.73M2
ERYTHROCYTE [DISTWIDTH] IN BLOOD BY AUTOMATED COUNT: 13.1 % (ref 10–15)
GLUCOSE SERPL-MCNC: 97 MG/DL (ref 70–99)
HCT VFR BLD AUTO: 39.2 % (ref 40–53)
HGB BLD-MCNC: 13.3 G/DL (ref 13.3–17.7)
MCH RBC QN AUTO: 32.6 PG (ref 26.5–33)
MCHC RBC AUTO-ENTMCNC: 33.9 G/DL (ref 31.5–36.5)
MCV RBC AUTO: 96 FL (ref 78–100)
PLATELET # BLD AUTO: 225 10E3/UL (ref 150–450)
POTASSIUM SERPL-SCNC: 4.1 MMOL/L (ref 3.4–5.3)
RBC # BLD AUTO: 4.08 10E6/UL (ref 4.4–5.9)
SODIUM SERPL-SCNC: 142 MMOL/L (ref 135–145)
WBC # BLD AUTO: 6.1 10E3/UL (ref 4–11)

## 2023-12-13 PROCEDURE — 250N000011 HC RX IP 250 OP 636: Mod: JZ | Performed by: ORTHOPAEDIC SURGERY

## 2023-12-13 PROCEDURE — 250N000013 HC RX MED GY IP 250 OP 250 PS 637

## 2023-12-13 PROCEDURE — 250N000013 HC RX MED GY IP 250 OP 250 PS 637: Performed by: UROLOGY

## 2023-12-13 PROCEDURE — 250N000009 HC RX 250: Performed by: PHYSICIAN ASSISTANT

## 2023-12-13 PROCEDURE — 250N000011 HC RX IP 250 OP 636: Mod: JZ | Performed by: NURSE ANESTHETIST, CERTIFIED REGISTERED

## 2023-12-13 PROCEDURE — 80048 BASIC METABOLIC PNL TOTAL CA: CPT | Performed by: NURSE PRACTITIONER

## 2023-12-13 PROCEDURE — 99222 1ST HOSP IP/OBS MODERATE 55: CPT | Performed by: FAMILY MEDICINE

## 2023-12-13 PROCEDURE — 250N000009 HC RX 250: Performed by: NURSE ANESTHETIST, CERTIFIED REGISTERED

## 2023-12-13 PROCEDURE — 258N000003 HC RX IP 258 OP 636: Performed by: ANESTHESIOLOGY

## 2023-12-13 PROCEDURE — 250N000013 HC RX MED GY IP 250 OP 250 PS 637: Performed by: PHYSICIAN ASSISTANT

## 2023-12-13 PROCEDURE — 250N000011 HC RX IP 250 OP 636: Performed by: PHYSICIAN ASSISTANT

## 2023-12-13 PROCEDURE — 250N000009 HC RX 250: Performed by: NURSE PRACTITIONER

## 2023-12-13 PROCEDURE — 258N000003 HC RX IP 258 OP 636: Performed by: NURSE ANESTHETIST, CERTIFIED REGISTERED

## 2023-12-13 PROCEDURE — 999N000065 XR KNEE PORT LEFT 1/2 VIEWS: Mod: LT

## 2023-12-13 PROCEDURE — 250N000011 HC RX IP 250 OP 636: Performed by: ANESTHESIOLOGY

## 2023-12-13 PROCEDURE — 36415 COLL VENOUS BLD VENIPUNCTURE: CPT | Performed by: NURSE PRACTITIONER

## 2023-12-13 PROCEDURE — 999N000141 HC STATISTIC PRE-PROCEDURE NURSING ASSESSMENT: Performed by: ORTHOPAEDIC SURGERY

## 2023-12-13 PROCEDURE — 85027 COMPLETE CBC AUTOMATED: CPT | Performed by: NURSE PRACTITIONER

## 2023-12-13 PROCEDURE — 258N000001 HC RX 258: Performed by: ORTHOPAEDIC SURGERY

## 2023-12-13 PROCEDURE — 258N000003 HC RX IP 258 OP 636: Performed by: PHYSICIAN ASSISTANT

## 2023-12-13 PROCEDURE — 250N000013 HC RX MED GY IP 250 OP 250 PS 637: Performed by: FAMILY MEDICINE

## 2023-12-13 PROCEDURE — C1713 ANCHOR/SCREW BN/BN,TIS/BN: HCPCS | Performed by: ORTHOPAEDIC SURGERY

## 2023-12-13 PROCEDURE — 272N000001 HC OR GENERAL SUPPLY STERILE: Performed by: ORTHOPAEDIC SURGERY

## 2023-12-13 PROCEDURE — C1776 JOINT DEVICE (IMPLANTABLE): HCPCS | Performed by: ORTHOPAEDIC SURGERY

## 2023-12-13 PROCEDURE — 370N000017 HC ANESTHESIA TECHNICAL FEE, PER MIN: Performed by: ORTHOPAEDIC SURGERY

## 2023-12-13 PROCEDURE — 360N000077 HC SURGERY LEVEL 4, PER MIN: Performed by: ORTHOPAEDIC SURGERY

## 2023-12-13 PROCEDURE — 710N000010 HC RECOVERY PHASE 1, LEVEL 2, PER MIN: Performed by: ORTHOPAEDIC SURGERY

## 2023-12-13 DEVICE — CRUCIATE RETAINING FEMORAL
Type: IMPLANTABLE DEVICE | Site: KNEE | Status: FUNCTIONAL
Brand: TRIATHLON

## 2023-12-13 DEVICE — TIBIAL BEARING INSERT - CS
Type: IMPLANTABLE DEVICE | Site: KNEE | Status: FUNCTIONAL
Brand: TRIATHLON

## 2023-12-13 DEVICE — PRIMARY TIBIAL BASEPLATE
Type: IMPLANTABLE DEVICE | Site: KNEE | Status: FUNCTIONAL
Brand: TRIATHLON

## 2023-12-13 DEVICE — FULL DOSE BONE CEMENT, 10 PACK CATALOG NUMBER IS 6191-1-010
Type: IMPLANTABLE DEVICE | Site: KNEE | Status: FUNCTIONAL
Brand: SIMPLEX

## 2023-12-13 DEVICE — PATELLA
Type: IMPLANTABLE DEVICE | Site: KNEE | Status: FUNCTIONAL
Brand: TRIATHLON

## 2023-12-13 RX ORDER — BUPIVACAINE HYDROCHLORIDE 5 MG/ML
INJECTION, SOLUTION EPIDURAL; INTRACAUDAL
Status: COMPLETED | OUTPATIENT
Start: 2023-12-13 | End: 2023-12-13

## 2023-12-13 RX ORDER — PROPOFOL 10 MG/ML
INJECTION, EMULSION INTRAVENOUS CONTINUOUS PRN
Status: DISCONTINUED | OUTPATIENT
Start: 2023-12-13 | End: 2023-12-13

## 2023-12-13 RX ORDER — PROPOFOL 10 MG/ML
INJECTION, EMULSION INTRAVENOUS PRN
Status: DISCONTINUED | OUTPATIENT
Start: 2023-12-13 | End: 2023-12-13

## 2023-12-13 RX ORDER — ONDANSETRON 2 MG/ML
4 INJECTION INTRAMUSCULAR; INTRAVENOUS EVERY 6 HOURS PRN
Status: DISCONTINUED | OUTPATIENT
Start: 2023-12-13 | End: 2023-12-14 | Stop reason: HOSPADM

## 2023-12-13 RX ORDER — BISACODYL 10 MG
10 SUPPOSITORY, RECTAL RECTAL DAILY PRN
Status: DISCONTINUED | OUTPATIENT
Start: 2023-12-13 | End: 2023-12-14 | Stop reason: HOSPADM

## 2023-12-13 RX ORDER — ROPINIROLE 0.5 MG/1
.5-1 TABLET, FILM COATED ORAL
Status: DISCONTINUED | OUTPATIENT
Start: 2023-12-13 | End: 2023-12-14 | Stop reason: HOSPADM

## 2023-12-13 RX ORDER — TAMSULOSIN HYDROCHLORIDE 0.4 MG/1
0.4 CAPSULE ORAL DAILY
Status: DISCONTINUED | OUTPATIENT
Start: 2023-12-13 | End: 2023-12-13

## 2023-12-13 RX ORDER — NALOXONE HYDROCHLORIDE 0.4 MG/ML
0.2 INJECTION, SOLUTION INTRAMUSCULAR; INTRAVENOUS; SUBCUTANEOUS
Status: DISCONTINUED | OUTPATIENT
Start: 2023-12-13 | End: 2023-12-14 | Stop reason: HOSPADM

## 2023-12-13 RX ORDER — SODIUM CHLORIDE, SODIUM LACTATE, POTASSIUM CHLORIDE, CALCIUM CHLORIDE 600; 310; 30; 20 MG/100ML; MG/100ML; MG/100ML; MG/100ML
INJECTION, SOLUTION INTRAVENOUS CONTINUOUS
Status: DISCONTINUED | OUTPATIENT
Start: 2023-12-13 | End: 2023-12-13 | Stop reason: HOSPADM

## 2023-12-13 RX ORDER — ATORVASTATIN CALCIUM 10 MG/1
20 TABLET, FILM COATED ORAL DAILY
Status: DISCONTINUED | OUTPATIENT
Start: 2023-12-13 | End: 2023-12-14 | Stop reason: HOSPADM

## 2023-12-13 RX ORDER — OXYCODONE HYDROCHLORIDE 5 MG/1
5 TABLET ORAL EVERY 4 HOURS PRN
Qty: 20 TABLET | Refills: 0 | Status: SHIPPED | OUTPATIENT
Start: 2023-12-13 | End: 2024-08-20

## 2023-12-13 RX ORDER — FENTANYL CITRATE 50 UG/ML
25-100 INJECTION, SOLUTION INTRAMUSCULAR; INTRAVENOUS
Status: DISCONTINUED | OUTPATIENT
Start: 2023-12-13 | End: 2023-12-13 | Stop reason: HOSPADM

## 2023-12-13 RX ORDER — METHYLPREDNISOLONE SODIUM SUCCINATE 40 MG/ML
INJECTION, POWDER, LYOPHILIZED, FOR SOLUTION INTRAMUSCULAR; INTRAVENOUS PRN
Status: DISCONTINUED | OUTPATIENT
Start: 2023-12-13 | End: 2023-12-13 | Stop reason: HOSPADM

## 2023-12-13 RX ORDER — ASPIRIN 81 MG/1
81 TABLET ORAL 2 TIMES DAILY
Status: DISCONTINUED | OUTPATIENT
Start: 2023-12-13 | End: 2023-12-14 | Stop reason: HOSPADM

## 2023-12-13 RX ORDER — PROCHLORPERAZINE MALEATE 5 MG
5 TABLET ORAL EVERY 6 HOURS PRN
Status: DISCONTINUED | OUTPATIENT
Start: 2023-12-13 | End: 2023-12-14 | Stop reason: HOSPADM

## 2023-12-13 RX ORDER — OXYCODONE HYDROCHLORIDE 5 MG/1
10 TABLET ORAL EVERY 4 HOURS PRN
Status: DISCONTINUED | OUTPATIENT
Start: 2023-12-13 | End: 2023-12-14 | Stop reason: HOSPADM

## 2023-12-13 RX ORDER — ACETAMINOPHEN 325 MG/1
650 TABLET ORAL EVERY 4 HOURS PRN
Status: DISCONTINUED | OUTPATIENT
Start: 2023-12-16 | End: 2023-12-14 | Stop reason: HOSPADM

## 2023-12-13 RX ORDER — LISINOPRIL 40 MG/1
40 TABLET ORAL DAILY
Status: DISCONTINUED | OUTPATIENT
Start: 2023-12-14 | End: 2023-12-14 | Stop reason: HOSPADM

## 2023-12-13 RX ORDER — HYDROMORPHONE HCL IN WATER/PF 6 MG/30 ML
0.4 PATIENT CONTROLLED ANALGESIA SYRINGE INTRAVENOUS
Status: DISCONTINUED | OUTPATIENT
Start: 2023-12-13 | End: 2023-12-14 | Stop reason: HOSPADM

## 2023-12-13 RX ORDER — ONDANSETRON 4 MG/1
4 TABLET, ORALLY DISINTEGRATING ORAL EVERY 30 MIN PRN
Status: CANCELLED | OUTPATIENT
Start: 2023-12-13

## 2023-12-13 RX ORDER — MAGNESIUM OXIDE 400 MG/1
400 TABLET ORAL DAILY
Status: DISCONTINUED | OUTPATIENT
Start: 2023-12-13 | End: 2023-12-14 | Stop reason: HOSPADM

## 2023-12-13 RX ORDER — CEFAZOLIN SODIUM 1 G/3ML
1 INJECTION, POWDER, FOR SOLUTION INTRAMUSCULAR; INTRAVENOUS EVERY 8 HOURS
Qty: 10 ML | Refills: 0 | Status: COMPLETED | OUTPATIENT
Start: 2023-12-13 | End: 2023-12-14

## 2023-12-13 RX ORDER — CEFAZOLIN SODIUM/WATER 2 G/20 ML
2 SYRINGE (ML) INTRAVENOUS SEE ADMIN INSTRUCTIONS
Status: DISCONTINUED | OUTPATIENT
Start: 2023-12-13 | End: 2023-12-13 | Stop reason: HOSPADM

## 2023-12-13 RX ORDER — TRANEXAMIC ACID 650 MG/1
1950 TABLET ORAL ONCE
Status: COMPLETED | OUTPATIENT
Start: 2023-12-13 | End: 2023-12-13

## 2023-12-13 RX ORDER — LIDOCAINE 40 MG/G
CREAM TOPICAL
Status: DISCONTINUED | OUTPATIENT
Start: 2023-12-13 | End: 2023-12-14 | Stop reason: HOSPADM

## 2023-12-13 RX ORDER — FENTANYL CITRATE 50 UG/ML
50 INJECTION, SOLUTION INTRAMUSCULAR; INTRAVENOUS EVERY 5 MIN PRN
Status: DISCONTINUED | OUTPATIENT
Start: 2023-12-13 | End: 2023-12-13 | Stop reason: HOSPADM

## 2023-12-13 RX ORDER — GLYCOPYRROLATE 0.2 MG/ML
INJECTION, SOLUTION INTRAMUSCULAR; INTRAVENOUS PRN
Status: DISCONTINUED | OUTPATIENT
Start: 2023-12-13 | End: 2023-12-13

## 2023-12-13 RX ORDER — LIDOCAINE 40 MG/G
CREAM TOPICAL
Status: DISCONTINUED | OUTPATIENT
Start: 2023-12-13 | End: 2023-12-13 | Stop reason: HOSPADM

## 2023-12-13 RX ORDER — OXYCODONE HYDROCHLORIDE 5 MG/1
5 TABLET ORAL
Status: CANCELLED | OUTPATIENT
Start: 2023-12-13

## 2023-12-13 RX ORDER — HYDROMORPHONE HCL IN WATER/PF 6 MG/30 ML
0.2 PATIENT CONTROLLED ANALGESIA SYRINGE INTRAVENOUS EVERY 5 MIN PRN
Status: DISCONTINUED | OUTPATIENT
Start: 2023-12-13 | End: 2023-12-13 | Stop reason: HOSPADM

## 2023-12-13 RX ORDER — LIDOCAINE HYDROCHLORIDE 20 MG/ML
JELLY TOPICAL ONCE
Status: COMPLETED | OUTPATIENT
Start: 2023-12-13 | End: 2023-12-13

## 2023-12-13 RX ORDER — BUPIVACAINE HYDROCHLORIDE 7.5 MG/ML
INJECTION, SOLUTION INTRASPINAL
Status: COMPLETED | OUTPATIENT
Start: 2023-12-13 | End: 2023-12-13

## 2023-12-13 RX ORDER — AMLODIPINE BESYLATE 10 MG/1
10 TABLET ORAL DAILY
Status: DISCONTINUED | OUTPATIENT
Start: 2023-12-14 | End: 2023-12-14 | Stop reason: HOSPADM

## 2023-12-13 RX ORDER — ONDANSETRON 2 MG/ML
INJECTION INTRAMUSCULAR; INTRAVENOUS PRN
Status: DISCONTINUED | OUTPATIENT
Start: 2023-12-13 | End: 2023-12-13

## 2023-12-13 RX ORDER — ONDANSETRON 2 MG/ML
4 INJECTION INTRAMUSCULAR; INTRAVENOUS EVERY 30 MIN PRN
Status: CANCELLED | OUTPATIENT
Start: 2023-12-13

## 2023-12-13 RX ORDER — FENTANYL CITRATE 50 UG/ML
25 INJECTION, SOLUTION INTRAMUSCULAR; INTRAVENOUS EVERY 5 MIN PRN
Status: DISCONTINUED | OUTPATIENT
Start: 2023-12-13 | End: 2023-12-13 | Stop reason: HOSPADM

## 2023-12-13 RX ORDER — ONDANSETRON 2 MG/ML
4 INJECTION INTRAMUSCULAR; INTRAVENOUS EVERY 30 MIN PRN
Status: DISCONTINUED | OUTPATIENT
Start: 2023-12-13 | End: 2023-12-13 | Stop reason: HOSPADM

## 2023-12-13 RX ORDER — TAMSULOSIN HYDROCHLORIDE 0.4 MG/1
0.4 CAPSULE ORAL EVERY EVENING
Status: DISCONTINUED | OUTPATIENT
Start: 2023-12-13 | End: 2023-12-14 | Stop reason: HOSPADM

## 2023-12-13 RX ORDER — ACETAMINOPHEN 325 MG/1
975 TABLET ORAL EVERY 8 HOURS
Status: DISCONTINUED | OUTPATIENT
Start: 2023-12-13 | End: 2023-12-14 | Stop reason: HOSPADM

## 2023-12-13 RX ORDER — LATANOPROST 50 UG/ML
1 SOLUTION/ DROPS OPHTHALMIC AT BEDTIME
Status: DISCONTINUED | OUTPATIENT
Start: 2023-12-13 | End: 2023-12-14 | Stop reason: HOSPADM

## 2023-12-13 RX ORDER — DEXAMETHASONE SODIUM PHOSPHATE 10 MG/ML
INJECTION, SOLUTION INTRAMUSCULAR; INTRAVENOUS PRN
Status: DISCONTINUED | OUTPATIENT
Start: 2023-12-13 | End: 2023-12-13

## 2023-12-13 RX ORDER — HYDROMORPHONE HCL IN WATER/PF 6 MG/30 ML
0.4 PATIENT CONTROLLED ANALGESIA SYRINGE INTRAVENOUS EVERY 5 MIN PRN
Status: DISCONTINUED | OUTPATIENT
Start: 2023-12-13 | End: 2023-12-13 | Stop reason: HOSPADM

## 2023-12-13 RX ORDER — HYDROMORPHONE HCL IN WATER/PF 6 MG/30 ML
0.2 PATIENT CONTROLLED ANALGESIA SYRINGE INTRAVENOUS
Status: DISCONTINUED | OUTPATIENT
Start: 2023-12-13 | End: 2023-12-14 | Stop reason: HOSPADM

## 2023-12-13 RX ORDER — ROPINIROLE 0.5 MG/1
.5-1 TABLET, FILM COATED ORAL
COMMUNITY

## 2023-12-13 RX ORDER — CEFAZOLIN SODIUM/WATER 2 G/20 ML
2 SYRINGE (ML) INTRAVENOUS
Status: COMPLETED | OUTPATIENT
Start: 2023-12-13 | End: 2023-12-13

## 2023-12-13 RX ORDER — NALOXONE HYDROCHLORIDE 0.4 MG/ML
0.4 INJECTION, SOLUTION INTRAMUSCULAR; INTRAVENOUS; SUBCUTANEOUS
Status: DISCONTINUED | OUTPATIENT
Start: 2023-12-13 | End: 2023-12-14 | Stop reason: HOSPADM

## 2023-12-13 RX ORDER — ONDANSETRON 4 MG/1
4 TABLET, ORALLY DISINTEGRATING ORAL EVERY 30 MIN PRN
Status: DISCONTINUED | OUTPATIENT
Start: 2023-12-13 | End: 2023-12-13 | Stop reason: HOSPADM

## 2023-12-13 RX ORDER — OXYCODONE HYDROCHLORIDE 5 MG/1
10 TABLET ORAL
Status: CANCELLED | OUTPATIENT
Start: 2023-12-13

## 2023-12-13 RX ORDER — METHOCARBAMOL 500 MG/1
500 TABLET, FILM COATED ORAL 4 TIMES DAILY
Status: DISCONTINUED | OUTPATIENT
Start: 2023-12-13 | End: 2023-12-14 | Stop reason: HOSPADM

## 2023-12-13 RX ORDER — BUPIVACAINE HYDROCHLORIDE 5 MG/ML
INJECTION, SOLUTION PERINEURAL PRN
Status: DISCONTINUED | OUTPATIENT
Start: 2023-12-13 | End: 2023-12-13 | Stop reason: HOSPADM

## 2023-12-13 RX ORDER — POLYETHYLENE GLYCOL 3350 17 G/17G
17 POWDER, FOR SOLUTION ORAL DAILY
Status: DISCONTINUED | OUTPATIENT
Start: 2023-12-14 | End: 2023-12-14 | Stop reason: HOSPADM

## 2023-12-13 RX ORDER — OXYCODONE HYDROCHLORIDE 5 MG/1
5 TABLET ORAL EVERY 4 HOURS PRN
Status: DISCONTINUED | OUTPATIENT
Start: 2023-12-13 | End: 2023-12-14 | Stop reason: HOSPADM

## 2023-12-13 RX ORDER — ONDANSETRON 4 MG/1
4 TABLET, ORALLY DISINTEGRATING ORAL EVERY 6 HOURS PRN
Status: DISCONTINUED | OUTPATIENT
Start: 2023-12-13 | End: 2023-12-14 | Stop reason: HOSPADM

## 2023-12-13 RX ORDER — LIDOCAINE HYDROCHLORIDE 10 MG/ML
INJECTION, SOLUTION INFILTRATION; PERINEURAL PRN
Status: DISCONTINUED | OUTPATIENT
Start: 2023-12-13 | End: 2023-12-13

## 2023-12-13 RX ORDER — SODIUM CHLORIDE, SODIUM LACTATE, POTASSIUM CHLORIDE, CALCIUM CHLORIDE 600; 310; 30; 20 MG/100ML; MG/100ML; MG/100ML; MG/100ML
INJECTION, SOLUTION INTRAVENOUS CONTINUOUS
Status: DISCONTINUED | OUTPATIENT
Start: 2023-12-13 | End: 2023-12-14 | Stop reason: HOSPADM

## 2023-12-13 RX ORDER — AMOXICILLIN 250 MG
1 CAPSULE ORAL 2 TIMES DAILY
Status: DISCONTINUED | OUTPATIENT
Start: 2023-12-13 | End: 2023-12-14 | Stop reason: HOSPADM

## 2023-12-13 RX ADMIN — CEFAZOLIN 1 G: 1 INJECTION, POWDER, FOR SOLUTION INTRAMUSCULAR; INTRAVENOUS at 17:33

## 2023-12-13 RX ADMIN — SODIUM CHLORIDE, POTASSIUM CHLORIDE, SODIUM LACTATE AND CALCIUM CHLORIDE: 600; 310; 30; 20 INJECTION, SOLUTION INTRAVENOUS at 08:51

## 2023-12-13 RX ADMIN — ATORVASTATIN CALCIUM 20 MG: 10 TABLET, FILM COATED ORAL at 12:21

## 2023-12-13 RX ADMIN — METHOCARBAMOL 500 MG: 500 TABLET ORAL at 14:08

## 2023-12-13 RX ADMIN — ACETAMINOPHEN 975 MG: 325 TABLET ORAL at 20:08

## 2023-12-13 RX ADMIN — DEXAMETHASONE SODIUM PHOSPHATE 5 MG: 10 INJECTION, SOLUTION INTRAMUSCULAR; INTRAVENOUS at 07:23

## 2023-12-13 RX ADMIN — ASPIRIN 81 MG: 81 TABLET, COATED ORAL at 12:20

## 2023-12-13 RX ADMIN — PROPOFOL 40 MG: 10 INJECTION, EMULSION INTRAVENOUS at 07:23

## 2023-12-13 RX ADMIN — SENNOSIDES AND DOCUSATE SODIUM 1 TABLET: 8.6; 5 TABLET ORAL at 12:20

## 2023-12-13 RX ADMIN — ACETAMINOPHEN 975 MG: 325 TABLET ORAL at 12:20

## 2023-12-13 RX ADMIN — BUPIVACAINE HYDROCHLORIDE IN DEXTROSE 1.8 ML: 7.5 INJECTION, SOLUTION SUBARACHNOID at 07:21

## 2023-12-13 RX ADMIN — LIDOCAINE HYDROCHLORIDE 5 ML: 10 INJECTION, SOLUTION INFILTRATION; PERINEURAL at 07:23

## 2023-12-13 RX ADMIN — SODIUM CHLORIDE, POTASSIUM CHLORIDE, SODIUM LACTATE AND CALCIUM CHLORIDE: 600; 310; 30; 20 INJECTION, SOLUTION INTRAVENOUS at 12:19

## 2023-12-13 RX ADMIN — SENNOSIDES AND DOCUSATE SODIUM 1 TABLET: 8.6; 5 TABLET ORAL at 20:08

## 2023-12-13 RX ADMIN — PROPOFOL 125 MCG/KG/MIN: 10 INJECTION, EMULSION INTRAVENOUS at 07:23

## 2023-12-13 RX ADMIN — BUPIVACAINE HYDROCHLORIDE 15 ML: 5 INJECTION, SOLUTION EPIDURAL; INTRACAUDAL; PERINEURAL at 07:01

## 2023-12-13 RX ADMIN — PHENYLEPHRINE HYDROCHLORIDE 100 MCG: 10 INJECTION INTRAVENOUS at 08:46

## 2023-12-13 RX ADMIN — TRANEXAMIC ACID 1950 MG: 650 TABLET ORAL at 06:25

## 2023-12-13 RX ADMIN — Medication 400 MG: at 12:20

## 2023-12-13 RX ADMIN — METHOCARBAMOL 500 MG: 500 TABLET ORAL at 23:23

## 2023-12-13 RX ADMIN — Medication 2 G: at 07:15

## 2023-12-13 RX ADMIN — SODIUM CHLORIDE, POTASSIUM CHLORIDE, SODIUM LACTATE AND CALCIUM CHLORIDE: 600; 310; 30; 20 INJECTION, SOLUTION INTRAVENOUS at 06:31

## 2023-12-13 RX ADMIN — ONDANSETRON 4 MG: 2 INJECTION INTRAMUSCULAR; INTRAVENOUS at 07:23

## 2023-12-13 RX ADMIN — GLYCOPYRROLATE 0.2 MG: 0.2 INJECTION INTRAMUSCULAR; INTRAVENOUS at 07:28

## 2023-12-13 RX ADMIN — METHOCARBAMOL 500 MG: 500 TABLET ORAL at 17:40

## 2023-12-13 RX ADMIN — FENTANYL CITRATE 25 MCG: 50 INJECTION, SOLUTION INTRAMUSCULAR; INTRAVENOUS at 07:01

## 2023-12-13 RX ADMIN — LIDOCAINE HYDROCHLORIDE: 20 JELLY TOPICAL at 17:41

## 2023-12-13 RX ADMIN — TAMSULOSIN HYDROCHLORIDE 0.4 MG: 0.4 CAPSULE ORAL at 20:08

## 2023-12-13 RX ADMIN — ASPIRIN 81 MG: 81 TABLET, COATED ORAL at 20:07

## 2023-12-13 ASSESSMENT — ACTIVITIES OF DAILY LIVING (ADL)
ADLS_ACUITY_SCORE: 23
ADLS_ACUITY_SCORE: 27
ADLS_ACUITY_SCORE: 27
ADLS_ACUITY_SCORE: 23
ADLS_ACUITY_SCORE: 27
ADLS_ACUITY_SCORE: 23
ADLS_ACUITY_SCORE: 23
ADLS_ACUITY_SCORE: 27
ADLS_ACUITY_SCORE: 27

## 2023-12-13 NOTE — PROGRESS NOTES
Patient vital signs are at baseline: Yes  Patient able to ambulate as they were prior to admission or with assist devices provided by therapies during their stay:  Yes  Patient MUST void prior to discharge:  No,  Reason:  Straight cath attempted x2, unsuccessful. Urology consulted for to[place]o placement.  Patient able to tolerate oral intake:  Yes  Pain has adequate pain control using Oral analgesics:  Yes  Does patient have an identified :  Yes  Has goal D/C date and time been discussed with patient:  Yes

## 2023-12-13 NOTE — PHARMACY-ADMISSION MEDICATION HISTORY
Pharmacist Admission Medication History    Admission medication history is complete. The information provided in this note is only as accurate as the sources available at the time of the update.    Information Source(s): Patient and CareEverywhere/SureScripts via in-person    Pertinent Information: n/a     Allergies reviewed with patient and updates made in EHR: yes    Medication History Completed By: Sofya Andrews PharmD 12/13/2023 6:55 AM    Prior to Admission medications    Medication Sig Last Dose Taking? Auth Provider Long Term End Date   Acetaminophen (TYLENOL PO) Take 1,000 mg by mouth 2 times daily 12/12/2023 Yes Reported, Patient     amLODIPine (NORVASC) 10 MG tablet Take 1 tablet (10 mg) by mouth daily 12/13/2023 at am Yes Radha Banks MD Yes    ASPIRIN PO Take 81 mg by mouth daily Past Week Yes Reported, Patient     atorvastatin (LIPITOR) 20 MG tablet Take 1 tablet (20 mg) by mouth daily 12/9/2023 Yes Radha Banks MD Yes    Calcium Citrate-Vitamin D (CITRACAL + D PO) Take 2 tablets by mouth daily Past Week Yes Reported, Patient     diclofenac (VOLTAREN) 75 MG EC tablet TAKE 2 TABLETS BY MOUTH AS NEEDED FOR MODERATE PAIN 12/12/2023 at am Yes Radha Banks MD Yes    latanoprost (XALATAN) 0.005 % ophthalmic solution Place 1 drop into both eyes At Bedtime For additional refills, please schedule a follow-up appointment at [phone]. 12/12/2023 at has with Yes Miriam Gonzalez MD Yes    lisinopril (ZESTRIL) 40 MG tablet Take 1 tablet (40 mg) by mouth daily 12/12/2023 Yes Kishor Lord MD Yes    Magnesium 500 MG CAPS Take 500 mg by mouth daily Past Week Yes Reported, Patient     Multiple Vitamins-Minerals (MENS MULTIVITAMIN PLUS PO) Take 1 tablet by mouth daily Past Week Yes Reported, Patient     Nutritional Supplements (PROSTATE 2.4) CAPS Take 2 capsules by mouth daily Prostate cap 2.0 Past Week Yes Reported, Patient     rOPINIRole (REQUIP) 0.5 MG tablet Take 0.5-1 mg by  mouth nightly as needed 12/12/2023 Yes Unknown, Entered By History No    tadalafil (CIALIS) 5 MG tablet Take 1 tablet by mouth daily as needed More than a month at prn Yes Reported, Patient Yes    Testosterone 1.62 % GEL Place 2 Pump onto the skin daily 12/12/2023 at ok with missing Yes Reported, Patient Yes    UNABLE TO FIND Apply topically at bedtime MEDICATION NAME: Theraworx Relief Foam (magnesium sulfate) for muscle cramps 12/12/2023 at [facility] with Yes Unknown, Entered By History

## 2023-12-13 NOTE — ANESTHESIA PROCEDURE NOTES
Adductor canal Procedure Note    Pre-Procedure   Staff -        Anesthesiologist:  Radha Montanez MD       Performed By: anesthesiologist       Location: pre-op       Procedure Start/Stop Times: 12/13/2023 7:01 AM and 12/13/2023 7:04 AM       Pre-Anesthestic Checklist: patient identified, IV checked, site marked, risks and benefits discussed, informed consent, monitors and equipment checked, pre-op evaluation, at physician/surgeon's request and post-op pain management  Timeout:       Correct Patient: Yes        Correct Procedure: Yes        Correct Site: Yes        Correct Position: Yes        Correct Laterality: Yes        Site Marked: Yes  Procedure Documentation  Procedure: Adductor canal       Laterality: left       Patient Position: supine       Patient Prep/Sterile Barriers: sterile gloves, mask       Skin prep: Chloraprep       Needle Type: short bevel       Needle Gauge: 21.        Needle Length (Inches): 4        Ultrasound guided       1. Ultrasound was used to identify targeted nerve, plexus, vascular marker, or fascial plane and place a needle adjacent to it in real-time.       2. Ultrasound was used to visualize the spread of anesthetic in close proximity to the above referenced structure.       3. A permanent image is entered into the patient's record.       4. The visualized anatomic structures appeared normal.       5. There were no apparent abnormal pathologic findings.    Assessment/Narrative         The placement was negative for: blood aspirated, painful injection and site bleeding       Paresthesias: No.       Bolus given via needle..        Secured via.        Insertion/Infusion Method: Single Shot       Complications: none       Injection made incrementally with aspirations every 5 mL.    Medication(s) Administered   Bupivacaine 0.5% PF (Infiltration) - Infiltration   15 mL - 12/13/2023 7:01:00 AM  Medication Administration Time: 12/13/2023 7:01 AM      FOR [facility] ([facility]) ONLY:   Pain  "Team Contact information: please page the Pain Team Via [facility]. Search \"Pain\". During daytime hours, please page the attending first. At night please page the resident first.      "

## 2023-12-13 NOTE — ANESTHESIA PREPROCEDURE EVALUATION
Anesthesia Pre-Procedure Evaluation    Patient: Ciro Almonte   MRN: [id number] : 1940        Procedure : Procedure(s):  LEFT TOTAL KNEE ARTHROPLASTY  RIGHT KNEE CORTISONE INJECTION          Past Medical History:   Diagnosis Date    Aortic valve stenosis     Cancer (H)     L shoulder melanoma    Cardiomyopathy (H)     Glaucoma     Heart murmur     Hypertension     Irregular heart beat     Melanoma (H)     Left shoulder - Dr. Carrillo - follows q6 months    Mitral valve insufficiency     Neuropathy     Nonsenile cataract     Other chronic pain     Neuropathy bilateral feet    Other premature beats     PVC's (premature ventricular contractions) 2015    Sleep apnea     Zoster       Past Surgical History:   Procedure Laterality Date    ADULT DERMATOLOGY  REFERRAL      melanoma resection L shoulder    ARTHROPLASTY HIP ANTERIOR Left 2014    Procedure: ARTHROPLASTY HIP ANTERIOR;  Surgeon: Rudy Tobias MD;  Location:  OR    ARTHROPLASTY REVISION HIP  3/12/2014     right Procedure: ARTHROPLASTY REVISION HIP;  Surgeon: Rudy Tobias MD;  Location:  OR    ARTHROSCOPY KNEE  2002    meniscus ( side?)    BIOPSY OF PROSTATE,NEEDLE/PUNCH      both hips replaced      CATARACT IOL, RT/LT Bilateral     COLONOSCOPY N/A 2020    Procedure: COLONOSCOPY (fv) (please mail packet as soon as possible) fv;  Surgeon: Kevin Mccarty MD;  Location:  GI    DISCECTOMY LUMBAR POSTERIOR MICROSCOPIC ONE LEVEL N/A 2016    Procedure: DISCECTOMY LUMBAR POSTERIOR MICROSCOPIC ONE LEVEL;  Surgeon: Ruben Marsh MD;  Location:  OR    ENT SURGERY      Tonsils    ORTHOPEDIC SURGERY      R hip replacement    ORTHOPEDIC SURGERY  2004    big toe in lawn mower, needed reconstructive surgery    PHACOEMULSIFICATION CLEAR CORNEA WITH TORIC INTRAOCULAR LENS IMPLANT Left 2019    Procedure: LEFT EYE, CATARACT EXTRACTION WITH TORIC INTRAOCULAR LENS IMPLANT;  Surgeon: Shannon Yang  MD Juan Luis;  Location: [facility] OR    PHACOEMULSIFICATION CLEAR CORNEA WITH TORIC INTRAOCULAR LENS IMPLANT Right 2019    Procedure: RIGHT EYE, CATARACT EXTRACTION WITH TORIC INTRAOCULAR LENS IMPLANT;  Surgeon: Shannon Yang MD;  [facility]    TRABECULAR MICRO-BYPASS WITH ISTENT Left 2019    Procedure: LEFT EYE, ISTENT INSERTION;  Surgeon: Shannon Yang MD;  [facility]    TRABECULAR MICRO-BYPASS WITH ISTENT Right 2019    Procedure: RIGHT EYE, ISTENT INSERTION;  Surgeon: Shannon Yang MD;  [facility] OR    ZZC TOTAL HIP ARTHROPLASTY Right       No Known Allergies   Social History     Tobacco Use    Smoking status: Former     Packs/day: 0     Types: Cigarettes     Quit date: 1976     Years since quittin.4    Smokeless tobacco: Never   Substance Use Topics    Alcohol use: No     Alcohol/week: 0.0 standard drinks of alcohol     Comment: Quit 1014      Wt Readings from Last 1 Encounters:   23 70.9 kg (156 lb 6.4 oz)        Anesthesia Evaluation            ROS/MED HX  ENT/Pulmonary:     (+) sleep apnea,                                      Neurologic:    (-) no CVA and no TIA   Cardiovascular: Comment: TTE :  Interpretation Summary     The visual ejection fraction is 55-60%.  The right ventricle is normal in size and function.  Aortic valve is likely trileaflet. Good leaflet excursion. MG is 11 mm Hg. No  significant AS. There is 1-2+ AI.  The inferior vena cava was normal in size with preserved respiratory  variability.      (+)  hypertension- -   -  - -                          Irregular Heartbeat/Palpitations (pvcs), valvular problems/murmurs type: AI and MR Mild.         METS/Exercise Tolerance:     Hematologic:       Musculoskeletal: Comment: S/p discectomy  (+)  arthritis,             GI/Hepatic:    (-) GERD and liver disease   Renal/Genitourinary:     (+) renal disease, type: CRI,            Endo:    (-) Type II DM and obesity  "  Psychiatric/Substance Use:       Infectious Disease:       Malignancy:       Other:      (+)  , H/O Chronic Pain,            OUTSIDE LABS:  CBC:   Lab Results   Component Value Date    WBC 5.2 02/08/2023    WBC 4.7 06/08/2022    HGB 14.1 02/08/2023    HGB 14.2 02/01/2023    HCT 42.5 02/08/2023    HCT 41.7 06/08/2022     02/08/2023     06/08/2022     BMP:   Lab Results   Component Value Date     06/29/2023     06/08/2022    POTASSIUM 4.5 06/29/2023    POTASSIUM 4.2 06/08/2022    CHLORIDE 109 (H) 06/29/2023    CHLORIDE 107 06/08/2022    CO2 26 06/29/2023    CO2 29 06/08/2022    BUN 19.7 06/29/2023    BUN 19 06/08/2022    CR 1.04 06/29/2023    CR 0.90 06/08/2022    GLC 87 06/29/2023    GLC 94 06/08/2022     COAGS: No results found for: \"PTT\", \"INR\", \"FIBR\"  POC:   Lab Results   Component Value Date    BGM 89 05/12/2017     HEPATIC:   Lab Results   Component Value Date    ALBUMIN 4.3 06/29/2023    PROTTOTAL 6.6 06/29/2023    ALT 29 06/29/2023    AST 31 06/29/2023    ALKPHOS 92 06/29/2023    BILITOTAL 0.5 06/29/2023     OTHER:   Lab Results   Component Value Date    A1C 5.4 02/15/2021    EDY 9.4 06/29/2023    MAG 2.1 07/23/2014    TSH 1.90 02/08/2023    CRP <2.9 07/26/2019    SED 6 02/08/2023       Anesthesia Plan    ASA Status:  3    NPO Status:  NPO Appropriate    Anesthesia Type: Spinal.              Consents    Anesthesia Plan(s) and associated risks, benefits, and realistic alternatives discussed. Questions answered and patient/representative(s) expressed understanding.     - Discussed: Risks, Benefits and Alternatives for BOTH SEDATION and the PROCEDURE were discussed     - Discussed with:             Postoperative Care    Pain management: Peripheral nerve block (Single Shot), IV analgesics, Oral pain medications, Multi-modal analgesia.   PONV prophylaxis: Ondansetron (or other 5HT-3), Dexamethasone or Solumedrol     Comments:               Radha Montanez MD    I have reviewed the " pertinent notes and labs in the chart from the past 30 days and (re)examined the patient.  Any updates or changes from those notes are reflected in this note.             # Drug Induced Platelet Defect: home medication list includes an antiplatelet medication

## 2023-12-13 NOTE — ANESTHESIA POSTPROCEDURE EVALUATION
Patient: Ciro Almonte    Procedure: Procedure(s):  LEFT TOTAL KNEE ARTHROPLASTY  RIGHT KNEE CORTISONE INJECTION       Anesthesia Type:  No value filed.    Note:  Disposition: Admission   Postop Pain Control: Uneventful            Sign Out: Well controlled pain   PONV: No   Neuro/Psych: Uneventful            Sign Out: Acceptable/Baseline neuro status   Airway/Respiratory: Uneventful            Sign Out: Acceptable/Baseline resp. status   CV/Hemodynamics: Uneventful            Sign Out: Acceptable CV status; No obvious hypovolemia; No obvious fluid overload   Other NRE: NONE   DID A NON-ROUTINE EVENT OCCUR? No           Last vitals:  Vitals Value Taken Time   /70 12/13/23 1034   Temp     Pulse 65 12/13/23 1052   Resp 12 12/13/23 0949   SpO2 96 % 12/13/23 1052   Vitals shown include unfiled device data.    Electronically Signed By: Radha Montanez MD  December 13, 2023  11:26 AM

## 2023-12-13 NOTE — CONSULTS
CONSULTATION NOTE  Ciro Almonte   [address] [place] [address]  83 year old  male  Admission Date/Time: 12/13/2023  5:35 AM  Primary Care Provider:  Radha Banks was asked to see this patient by Dr. Mahoney for evaluation of post-op retention and inability to pass a catheter.     HPI: 83 year old male with a history of BPH, prostate nodule, and elevated PSA (sees Dr.Mark Dawn with [facility]) who had a left knee replacement and right knee steroid injection by Dr. Zamarripa earlier today.  He is unable to void. His bladder scan is 600 mL. The nurses are unable to get a catheter in place.    His wife is present. He is on OTC prostate medication but has never been on any prescription prostate medications.  No prior prostate surgery.    REVIEW OF SYSTEMS:  Pertinent items are noted in HPI.    Past Medical History:   Diagnosis Date    Aortic valve stenosis     Cancer (H) 2000    L shoulder melanoma    Cardiomyopathy (H)     Glaucoma     Heart murmur     Hypertension     Irregular heart beat     Melanoma (H) 2000    Left shoulder - Dr. Carrillo - follows q6 months    Mitral valve insufficiency     Neuropathy     Nonsenile cataract     Other chronic pain     Neuropathy bilateral feet    Other premature beats     PVC's (premature ventricular contractions) 05/14/2015    Sleep apnea     Zoster 2000     Past Surgical History:   Procedure Laterality Date    ADULT DERMATOLOGY  REFERRAL  2000    melanoma resection L shoulder    ARTHROPLASTY HIP ANTERIOR Left 11/14/2014    Procedure: ARTHROPLASTY HIP ANTERIOR;  Surgeon: Rudy Tobias MD;  Location: [facility]    ARTHROPLASTY REVISION HIP  3/12/2014     right Procedure: ARTHROPLASTY REVISION HIP;  Surgeon: Rudy Tobias MD;  Location: [facility] OR    ARTHROSCOPY KNEE  2002    meniscus ( side?)    BIOPSY OF PROSTATE,NEEDLE/PUNCH      both hips replaced      CATARACT IOL, RT/LT Bilateral     COLONOSCOPY N/A 5/28/2020    Procedure: COLONOSCOPY (fv)  (please mail packet as soon as possible) fv;  Surgeon: Kevin cMcarty MD;  Location: [facility]    DISCECTOMY LUMBAR POSTERIOR MICROSCOPIC ONE LEVEL N/A 2016    Procedure: DISCECTOMY LUMBAR POSTERIOR MICROSCOPIC ONE LEVEL;  Surgeon: Ruben Marsh MD;  Location: [facility]    ENT SURGERY      Tonsils    ORTHOPEDIC SURGERY  2008    R hip replacement    ORTHOPEDIC SURGERY  2004    big toe in lawn mower, needed reconstructive surgery    PHACOEMULSIFICATION CLEAR CORNEA WITH TORIC INTRAOCULAR LENS IMPLANT Left 2019    Procedure: LEFT EYE, CATARACT EXTRACTION WITH TORIC INTRAOCULAR LENS IMPLANT;  Surgeon: Shannon Yang MD;  [facility]    PHACOEMULSIFICATION CLEAR CORNEA WITH TORIC INTRAOCULAR LENS IMPLANT Right 2019    Procedure: RIGHT EYE, CATARACT EXTRACTION WITH TORIC INTRAOCULAR LENS IMPLANT;  Surgeon: Shannon Yang MD;  [facility]    TRABECULAR MICRO-BYPASS WITH ISTENT Left 2019    Procedure: LEFT EYE, ISTENT INSERTION;  Surgeon: Shannon Yang MD;  [facility]    TRABECULAR MICRO-BYPASS WITH ISTENT Right 2019    Procedure: RIGHT EYE, ISTENT INSERTION;  Surgeon: Shannon Yang MD;  [facility]    ZZC TOTAL HIP ARTHROPLASTY Right      Family History   Problem Relation Age of Onset    Cerebrovascular Disease Mother     Hypertension Mother     Glaucoma Mother     Heart Disease Father 58        MI    Cancer Father         lung cancer;  age 91    Colon Cancer No family hx of     Diabetes No family hx of     Macular Degeneration No family hx of      Social History     Socioeconomic History    Marital status:      Spouse name: Not on file    Number of children: 2    Years of education: Not on file    Highest education level: Not on file   Occupational History    Occupation:      Employer: RETIRED   Tobacco Use    Smoking status: Former     Packs/day: 0     Types: Cigarettes     Quit date: 1976     Years since quittin.4     Smokeless tobacco: Never   Vaping Use    Vaping Use: Never used   Substance and Sexual Activity    Alcohol use: No     Alcohol/week: 0.0 standard drinks of alcohol     Comment: Quit 8/1014    Drug use: No    Sexual activity: Not Currently     Partners: Female   Other Topics Concern    Parent/sibling w/ CABG, MI or angioplasty before 65F 55M? No   Social History Narrative    Not on file     Social Determinants of Health     Financial Resource Strain: Unknown (11/28/2023)    Financial Resource Strain     Within the past 12 months, have you or your family members you live with been unable to get utilities (heat, electricity) when it was really needed?: Patient refused   Food Insecurity: Unknown (11/28/2023)    Food Insecurity     Within the past 12 months, did you worry that your food would run out before you got money to buy more?: Patient refused     Within the past 12 months, did the food you bought just not last and you didn t have money to get more?: Patient refused   Transportation Needs: Unknown (11/28/2023)    Transportation Needs     Within the past 12 months, has lack of transportation kept you from medical appointments, getting your medicines, non-medical meetings or appointments, work, or from getting things that you need?: Patient refused   Physical Activity: Sufficiently Active (6/8/2022)    Exercise Vital Sign     Days of Exercise per Week: 7 days     Minutes of Exercise per Session: 140 min   Stress: No Stress Concern Present (6/8/2022)    [employer] [place] [employer] - Occupational Stress Questionnaire     Feeling of Stress : Only a little   Social Connections: Unknown (6/8/2022)    Social Connection and Isolation Panel [NHANES]     Frequency of Communication with Friends and Family: Not on file     Frequency of Social Gatherings with Friends and Family: Not on file     Attends Scientologist Services: More than 4 times per year     Active Member of Clubs or Organizations: Yes     Attends Club  "or Organization Meetings: Not on file     Marital Status:    Interpersonal Safety: Not on file   Housing Stability: Unknown (11/28/2023)    Housing Stability     Do you have housing? : Patient refused     Are you worried about losing your housing?: Patient refused     Current Outpatient Medications   Medication Sig Dispense Refill    oxyCODONE (ROXICODONE) 5 MG tablet Take 1 tablet (5 mg) by mouth every 4 hours as needed for moderate to severe pain 20 tablet 0       ALLERGIES/SENSITIVITIES: No Known Allergies    PHYSICAL EXAM:   /73 (BP Location: Left arm)   Pulse 78   Temp 98  F (36.7  C) (Oral)   Resp 16   Ht 1.778 m (5' 10\")   Wt 70.9 kg (156 lb 6.4 oz)   SpO2 97%   BMI 22.44 kg/m    Body mass index is 22.44 kg/m .  General Appearance:   Nontoxic  Unlabored breathing  SNTND  Circ penis  No LE edema      WBC   Date Value Ref Range Status   11/18/2020 7.6 4.0 - 11.0 10e9/L Final   05/07/2020 6.4 4.0 - 11.0 10e9/L Final   11/01/2019 5.2 4.0 - 11.0 10e9/L Final     WBC Count   Date Value Ref Range Status   12/13/2023 6.1 4.0 - 11.0 10e3/uL Final   02/08/2023 5.2 4.0 - 11.0 10e3/uL Final   06/08/2022 4.7 4.0 - 11.0 10e3/uL Final     Hemoglobin   Date Value Ref Range Status   12/13/2023 13.3 13.3 - 17.7 g/dL Final   02/08/2023 14.1 13.3 - 17.7 g/dL Final   02/01/2023 14.2 13.3 - 17.7 g/dL Final   06/11/2021 13.1 (L) 13.3 - 17.7 g/dL Final   11/18/2020 13.7 13.3 - 17.7 g/dL Final   05/07/2020 13.0 (L) 13.3 - 17.7 g/dL Final     Platelet Count   Date Value Ref Range Status   12/13/2023 225 150 - 450 10e3/uL Final   02/08/2023 234 150 - 450 10e3/uL Final   06/08/2022 200 150 - 450 10e3/uL Final   11/18/2020 226 150 - 450 10e9/L Final   05/07/2020 212 150 - 450 10e9/L Final   11/01/2019 221 150 - 450 10e9/L Final     Recent Labs   Lab Test 12/13/23  0624 06/29/23  1503 06/08/22  1200    144 138   POTASSIUM 4.1 4.5 4.2   CHLORIDE 107 109* 107   CO2 26 26 29   BUN 19.0 19.7 19   CR 0.86 1.04 0.90 "   ANIONGAP 9 9 2*   EDY 9.1 9.4 9.0   GLC 97 87 94         PROCEDURE:  Under sterile conditions and using a single Urojet, a 16 Fr straight toledo catheter was placed into the bladder without difficulty or resistance.  About 1000 mL of clear yellow urine returned. The catheter was secured with a stat lock on the right upper thigh.    CONSULTATION ASSESSMENT AND PLAN:    83 year old male with post-op retention s/p catheter placement. Will update Dr. Dawn and will help arrange for a voiding trial next week.  Would typically remove the catheter in 2-3 days but that lands on a Friday/weekend. Would suggest leaving the catheter until Monday.  Would start tamsulosin (Flomax) as well.  Should discharge home with catheter.       Ciro Lazcano MD

## 2023-12-13 NOTE — CONSULTS
[facility] MEDICINE CONSULT NOTE   Physician requesting consult: Kevin Zamarripa MD    Reason for consult: Postoperative medical management of medical co-morbidities as below    Assessment and Plan    Ciro Almonte is a 83 year old  male with a history of essential hypertension, dyslipidemia, restless leg syndrome, mitral insufficiency, underwent left total knee arthroplasty due to osteoarthritis and right knee cortisone injection.  Left knee pain is stable.  Hemodynamically stable postoperatively.    Procedure(s):  LEFT TOTAL KNEE ARTHROPLASTY  RIGHT KNEE CORTISONE INJECTION  Post-operative Day: Day of Surgery    Dyslipidemia  Lipitor 20 mg daily    Essential hypertension  Norvasc 10 mg daily  Lisinopril 40 mg daily  Hold antihypertensive if systolic blood pressure is less than 110 as risk of postop hypotension    Restless leg syndrome  Requip 0.5 to 1 mg nightly as needed    Status post left total knee arthroplasty  Resume routine postoperative care  Physical and Occupational Therapy  Use incentive spirometry frequently  DVT prophylaxis per orthopedics, aspirin 81 mg twice a day  Pain control with Tylenol 975 mg every 8 hours, 650 mg every 4 hours as needed, oxycodone 5 to 10 mg every 4 hours as needed, IV Dilaudid 0.2 to 0.4 mg every 2 hours as needed      -Reviewed the patient's preoperative H and P and updated missing elements.  -Home medication reconciliation has been reviewed.  Medications have been ordered as noted from the home list and changes are documented above     HISTORY     Ciro Almonte is a 83 year old male with a history of essential hypertension, dyslipidemia, restless leg syndrome, mitral insufficiency, underwent left total knee arthroplasty due to osteoarthritis and right knee cortisone injection.  Left knee pain is stable.  Hemodynamically stable postoperatively.  On Lipitor 20 mg daily for dyslipidemia.  On Norvasc 10 mg daily, lisinopril 40 mg daily for  hypertension.  Blood pressure is stable.  Taking Requip 0.5 to 1 mg nightly as needed for restless leg syndrome.  Denies headache, chest pain, breathing difficulty, palpitation, nausea, vomiting, abdominal pain or urinary symptoms.  Does not have history of heart disease, lung disease, bleeding or clotting disorder or sleep apnea.  Preop physical is reviewed.  History is provided by the patient.    Past Medical History     Patient Active Problem List    Diagnosis Date Noted    S/P total knee arthroplasty, left 12/13/2023     Priority: Medium    Restless legs syndrome (RLS) 11/29/2023     Priority: Medium    Cardiomyopathy, unspecified type (H) 02/08/2023     Priority: Medium    Pseudophakia of both eyes 11/21/2019     Priority: Medium    Glaucoma suspect of both eyes 10/10/2019     Priority: Medium     Added automatically from request for surgery [id number]      Nuclear senile cataract of both eyes 10/10/2019     Priority: Medium     Added automatically from request for surgery [id number]      Mixed hyperlipidemia 07/07/2017     Priority: Medium    Lumbago 05/22/2017     Priority: Medium    Hip pain, left 05/22/2017     Priority: Medium    Essential hypertension with goal blood pressure less than 140/90 07/05/2016     Priority: Medium    Primary osteoarthritis of right foot 07/05/2016     Priority: Medium    Benign non-nodular prostatic hyperplasia with lower urinary tract symptoms 07/05/2016     Priority: Medium    Chronic low back pain, unspecified back pain laterality, with sciatica presence unspecified 07/05/2016     Priority: Medium    Hereditary and idiopathic peripheral neuropathy 07/02/2015     Priority: Medium     Problem list name updated by automated process. Provider to review      Mitral regurgitation - systolic murmur 05/11/2015     Priority: Medium    Glaucoma 11/07/2014     Priority: Medium    Cervical spine degeneration 07/01/2014     Priority: Medium        Surgical History   He  has a past surgical  history that includes ENT surgery; Adult Dermatology  Referral (2000); orthopedic surgery (2008); Arthroscopy knee (2002); Arthroplasty revision hip (3/12/2014); TOTAL HIP ARTHROPLASTY (Right, 2008); orthopedic surgery (2004); Arthroplasty Hip Anterior (Left, 11/14/2014); Discectomy lumbar posterior microscopic one level (N/A, 4/11/2016); biopsy of prostate,needle/punch; Phacoemulsification clear cornea with toric intraocular lens implant (Left, 11/6/2019); Trabecular Micro-Bypass With Istent (Left, 11/6/2019); Phacoemulsification clear cornea with toric intraocular lens implant (Right, 11/20/2019); Trabecular Micro-Bypass With Istent (Right, 11/20/2019); both hips replaced; Colonoscopy (N/A, 5/28/2020); and cataract iol, rt/lt (Bilateral).     Past Surgical History:   Procedure Laterality Date    ADULT DERMATOLOGY  REFERRAL  2000    melanoma resection L shoulder    ARTHROPLASTY HIP ANTERIOR Left 11/14/2014    Procedure: ARTHROPLASTY HIP ANTERIOR;  Surgeon: Rudy Tobias MD;  Location:  OR    ARTHROPLASTY REVISION HIP  3/12/2014     right Procedure: ARTHROPLASTY REVISION HIP;  Surgeon: Rudy Tobias MD;  Location:  OR    ARTHROSCOPY KNEE  2002    meniscus ( side?)    BIOPSY OF PROSTATE,NEEDLE/PUNCH      both hips replaced      CATARACT IOL, RT/LT Bilateral     COLONOSCOPY N/A 5/28/2020    Procedure: COLONOSCOPY (fv) (please mail packet as soon as possible) fv;  Surgeon: Kevin Mccarty MD;  Location:  GI    DISCECTOMY LUMBAR POSTERIOR MICROSCOPIC ONE LEVEL N/A 4/11/2016    Procedure: DISCECTOMY LUMBAR POSTERIOR MICROSCOPIC ONE LEVEL;  Surgeon: Ruben Marsh MD;  Location:  OR    ENT SURGERY      Tonsils    ORTHOPEDIC SURGERY  2008    R hip replacement    ORTHOPEDIC SURGERY  2004    big toe in , needed reconstructive surgery    PHACOEMULSIFICATION CLEAR CORNEA WITH TORIC INTRAOCULAR LENS IMPLANT Left 11/6/2019    Procedure: LEFT EYE, CATARACT EXTRACTION WITH TORIC  INTRAOCULAR LENS IMPLANT;  Surgeon: Shannon Yang MD;  [facility]    PHACOEMULSIFICATION CLEAR CORNEA WITH TORIC INTRAOCULAR LENS IMPLANT Right 2019    Procedure: RIGHT EYE, CATARACT EXTRACTION WITH TORIC INTRAOCULAR LENS IMPLANT;  Surgeon: Shannon Yang MD;  [facility]    TRABECULAR MICRO-BYPASS WITH ISTENT Left 2019    Procedure: LEFT EYE, ISTENT INSERTION;  Surgeon: Shannon Yang MD;  [facility]    TRABECULAR MICRO-BYPASS WITH ISTENT Right 2019    Procedure: RIGHT EYE, ISTENT INSERTION;  Surgeon: Shannon Yang MD;  [facility]    ZZC TOTAL HIP ARTHROPLASTY Right        Family History    Reviewed, and family history includes Cancer in his father; Cerebrovascular Disease in his mother; Glaucoma in his mother; Heart Disease (age of onset: 58) in his father; Hypertension in his mother.    Social History    Reviewed, and he  reports that he quit smoking about 47 years ago. His smoking use included cigarettes. He has never used smokeless tobacco. He reports that he does not drink alcohol and does not use drugs.  Social History     Tobacco Use    Smoking status: Former     Packs/day: 0     Types: Cigarettes     Quit date: 1976     Years since quittin.4    Smokeless tobacco: Never   Substance Use Topics    Alcohol use: No     Alcohol/week: 0.0 standard drinks of alcohol     Comment: Quit 1014       Allergies   No Known Allergies    Prior to Admission Medications      Medications Prior to Admission   Medication Sig Dispense Refill Last Dose    Acetaminophen (TYLENOL PO) Take 1,000 mg by mouth 2 times daily   2023    amLODIPine (NORVASC) 10 MG tablet Take 1 tablet (10 mg) by mouth daily 90 tablet 3 2023 at am    ASPIRIN PO Take 81 mg by mouth daily   Past Week    atorvastatin (LIPITOR) 20 MG tablet Take 1 tablet (20 mg) by mouth daily 90 tablet 3 2023    Calcium Citrate-Vitamin D (CITRACAL + D PO) Take 2 tablets by mouth daily    Past Week    diclofenac (VOLTAREN) 75 MG EC tablet TAKE 2 TABLETS BY MOUTH AS NEEDED FOR MODERATE PAIN 180 tablet 1 12/12/2023 at am    latanoprost (XALATAN) 0.005 % ophthalmic solution Place 1 drop into both eyes At Bedtime For additional refills, please schedule a follow-up appointment at [phone]. 2.5 mL 0 12/12/2023 at [facility] with    lisinopril (ZESTRIL) 40 MG tablet Take 1 tablet (40 mg) by mouth daily 90 tablet 3 12/12/2023    Magnesium 500 MG CAPS Take 500 mg by mouth daily   Past Week    Multiple Vitamins-Minerals (MENS MULTIVITAMIN PLUS PO) Take 1 tablet by mouth daily   Past Week    Nutritional Supplements (PROSTATE 2.4) CAPS Take 2 capsules by mouth daily Prostate cap 2.0   Past Week    rOPINIRole (REQUIP) 0.5 MG tablet Take 0.5-1 mg by mouth nightly as needed   12/12/2023    tadalafil (CIALIS) 5 MG tablet Take 1 tablet by mouth daily as needed   More than a month at prn    Testosterone 1.62 % GEL Place 2 Pump onto the skin daily   12/12/2023 at ok with missing    UNABLE TO FIND Apply topically at bedtime MEDICATION NAME: Theraworx Relief Foam (magnesium sulfate) for muscle cramps   12/12/2023 at [facility] with       Review of Systems   A 12 point comprehensive review of systems was negative except as noted above.    OBJECTIVE         Physical Exam   Temp:  [98.5  F (36.9  C)] 98.5  F (36.9  C)  Pulse:  [62-72] 70  Resp:  [12-23] 12  BP: (117-184)/(56-79) 137/64  SpO2:  [95 %-100 %] 97 %  Body mass index is 22.44 kg/m .    GENERAL:  Alert, appears comfortable, in no acute distress, appears stated age   HEAD:  Normocephalic, without obvious abnormality, atraumatic   EYES:  PERRL, conjunctiva  clear,  EOM's intact   NOSE: Nares normal,  mucosa normal, no drainage   THROAT: Lips, mucosa,  gums normal, mouth moist   NECK: Supple, symmetrical, trachea midline   BACK:   Symmetric, no curvature, ROM normal   LUNGS:   Clear to auscultation bilaterally, no rales, rhonchi, or wheezing, symmetric chest rise on  inhalation, respirations unlabored   CHEST WALL:  No tenderness or deformity   HEART:  Regular rate and rhythm, S1 and S2 normal, no murmur     ABDOMEN:   Soft, non-tender, bowel sounds active , no masses, no organomegaly, no rebound or guarding   EXTREMITIES: Status post left total knee arthroplasty   SKIN: No rashes on visible skin   NEURO: Alert, oriented x 3   PSYCH: Cooperative, behavior is appropriate        Imaging Reviewed Personally By Myself    Radiology Results:   Left knee xray  There are immediate postoperative changes from left total knee arthroplasty, in standard alignment. No periprosthetic fracture is identified.    Labs Reviewed Personally By Myself   Most Recent 3 CBC's:  Recent Labs   Lab Test 12/13/23  0624 02/08/23  1119 02/01/23  0909 07/11/22  1410 06/08/22  1200   WBC 6.1 5.2  --   --  4.7   HGB 13.3 14.1 14.2   < > 14.1   MCV 96 99  --   --  97    234  --   --  200    < > = values in this interval not displayed.     Most Recent 3 BMP's:  Recent Labs   Lab Test 12/13/23  0624 06/29/23  1503 06/08/22  1200    144 138   POTASSIUM 4.1 4.5 4.2   CHLORIDE 107 109* 107   CO2 26 26 29   BUN 19.0 19.7 19   CR 0.86 1.04 0.90   ANIONGAP 9 9 2*   EDY 9.1 9.4 9.0   GLC 97 87 94       Preoperative Labs Reviewed Personally By Myself     Total time spent 70 min    Thank you for this consultation.  Appreciate the opportunity to participate in the care of Ciro Almonte, please feel free to contact us for any questions or concerns.    Michelle Mahoney MD  [facility]  [facility]  Phone: #[phone]

## 2023-12-13 NOTE — OP NOTE
DATE OF SERVICE: 12/13/2023     PREOPERATIVE DIAGNOSIS: Osteoarthritis of left knee [M17.12]     POSTOPERATIVE DIAGNOSIS: Same    PROCEDURE: Procedure(s):  LEFT TOTAL KNEE ARTHROPLASTY  RIGHT KNEE CORTISONE INJECTION     IMPLANTS:   Implant Name Type Inv. Item Serial No.  Lot No. LRB No. Used Action   BONE CEMENT SIMPLEX FULL DOSE 6191-1-001 - [id number] Cement, Bone BONE CEMENT SIMPLEX FULL DOSE 6191-1-001  [employer] [id number] Left 2 Implanted   INSERT TIB 6 10MM KN PE CNDRL STAB BRNG TRTHLN STRL LF - [id number] Total Joint Component/Insert INSERT TIB 6 10MM KN PE CNDRL STAB BRNG TRTHLN STRL LF  [employer] [id number] Left 1 Implanted   IMP COMP PATELLA HOWM TRI 38 10MM 5551-L-381 - [id number] Total Joint Component/Insert IMP COMP PATELLA HOWM TRI 38 10MM 5551-L-381  [employer] [id number] Left 1 Implanted   IMP COMP FEM STRK TRIATHLN CR LT 6 5510-F-601 - [id number] Total Joint Component/Insert IMP COMP FEM STRK TRIATHLN CR LT 6 5510-F-601  [employer] [id number] Left 1 Implanted   IMP BASEPLATE TIBIAL HOWM TRI 6 5520-B-600 - [id number] Total Joint Component/Insert IMP BASEPLATE TIBIAL HOWM TRI 6 5520-B-600  [employer] [id number] Left 1 Implanted        FINDINGS: Severe left knee degenerative arthritis with tricompartmental changes.  Varus malalignment and slight flexion contracture.    SURGEON: WAYNE STACK MD     ASSISTANT: Rasheeda Lora PA-C  The assistant was necessary for patient safety including positioning, retraction, instrumentation, placement of implants, wound closure and dressing application.    INDICATIONS: The patient is an 83-year-old normally healthy gentleman who has had persistent progressively worsening left knee pain over the past few years time.  Despite a long course of nonoperative therapy failed improved.  After having reviewed the risk and benefits of different treatment options he elected to proceed with a left total knee arthroplasty.  The normal  postop course was discussed in detail preoperatively.  He also opted for a right knee cortisone injection to be done at the same time.    PROCEDURE: After informed written consent was obtained, the patient underwent successful placement of a left knee block and was brought to the OR where they underwent successful placement of a Spinal with Block.  At this point after short pause the right knee was sterilely prepped and then under strict sterile technique was injected with 5 cc of Marcaine and 2 cc of Depo-Medrol.  The patient received IV antibiotics.  The leg was sterilely prepped and draped in usual fashion.  After a pause for the cause or timeout with identification of the operative limb, a tourniquet was inflated to 225 mmHg after exsanguination and an Ioban drape was applied.  The surgical team were operative hoods.    Direct midline approach to the knee was made with sharp dissection being carried down through the skin and subcutaneous tissue and a medial parapatellar approach was performed.  An effusion was noted.  There was synovitis.  There was significant degenerative change noted with full loss of articular cartilage as well as osteophytic changes apparent.    A rongeur was used to remove osteophytes.  A guide nigel was gently advanced up the femur and the distal femoral cut made without difficulty.  The femur was sized to the appropriate component with the anterior posterior and chamfer cuts being made without difficulty.  A line to line fit with the trial component was noted.    Attention was then directed towards the tibia.  The tibia was cut referenced off the deficient tibial plateau.  The tibia was sized to the appropriate component.  The appropriate spacer was then applied with full extension being noted as well as excellent flexion.  Good varus valgus stability was appreciated at both 0 and 30 .    The patella was then measured with a caliper cut and sized to the appropriate component.  Midline  patellar tracking was appreciated.    The rotation was determined off the trial components.  Meniscal and ACL remnants were debrided.  The  pericapsular tissues were injected with an analgesic anesthetic mix.  Care was taken to avoid the neurovascular  throughout the injection procedure as well as the cutting of both the femur and the tibia.    The wing punch was used for tibial prep.  Copious jet lavage with an additional liter of fluid was performed.  At this point cementing of the tibia was followed by placement of the spacer then the femur then the patella.  All components were held under compression during cement hardening and all extra cement was meticulously removed.    Range of motion tracking and stability were unchanged post fixation.  At this point the tourniquet was let down and hemostasis was achieved using electrocautery.  1 L of dilute sterile Betadine solution was used to irrigate the joint for 3 minutes time.  Further irrigation was then followed by closure with the knee flexed at 90  with multiple #1 Vicryl reinforced with a stratofix 0 Vicryl subcutaneous tissue with 2-0 Vicryl and skin with Monocryl and Dermabond.  A sterile dressing was applied.  The patient tolerated procedure the procedure well.  There were no known complications.  Sponge and needle counts were reported as correct ×2.  The patient the patient was transferred to [facility] for recovery.  Estimated blood loss was 20 cc.    Kevin Zamarripa MD

## 2023-12-13 NOTE — ANESTHESIA PROCEDURE NOTES
"Intrathecal injection Procedure Note    Pre-Procedure   Staff -        Anesthesiologist:  Radha Montanez MD       Performed By: anesthesiologist       Location: OR       Procedure Start/Stop Times: 12/13/2023 7:21 AM and 7/25/2023 8:22 AM       Pre-Anesthestic Checklist: patient identified, IV checked, risks and benefits discussed, informed consent, monitors and equipment checked, pre-op evaluation, at physician/surgeon's request and post-op pain management  Timeout:       Correct Patient: Yes        Correct Procedure: Yes        Correct Site: Yes        Correct Position: Yes   Procedure Documentation  Procedure: intrathecal injection       Patient Position: sitting       Patient Prep/Sterile Barriers: sterile gloves, mask, patient draped       Skin prep: Chloraprep       Insertion Site: L3-4. (midline approach).       Needle Gauge: 25.        Needle Length (Inches): 3.5        Spinal Needle Type: Pencan       Introducer used       Introducer: 20 G       # of attempts: 1 and  # of redirects:  0    Assessment/Narrative         Paresthesias: No.       CSF fluid: clear.       Opening pressure was cmH2O while  Sitting.      Medication(s) Administered   0.75% Hyperbaric Bupivacaine (Intrathecal) - Intrathecal   1.8 mL - 12/13/2023 7:21:00 AM  Medication Administration Time: 12/13/2023 7:21 AM      FOR [facility] ([facility]) ONLY:   Pain Team Contact information: please page the Pain Team Via [employer]. Search \"Pain\". During daytime hours, please page the attending first. At night please page the resident first.      "

## 2023-12-13 NOTE — ANESTHESIA CARE TRANSFER NOTE
Patient: Ciro Almonte    Procedure: Procedure(s):  LEFT TOTAL KNEE ARTHROPLASTY  RIGHT KNEE CORTISONE INJECTION       Diagnosis: Osteoarthritis of left knee [M17.12]  Diagnosis Additional Information: No value filed.    Anesthesia Type:   No value filed.     Note:    Oropharynx: oropharynx clear of all foreign objects  Level of Consciousness: drowsy  Oxygen Supplementation: face mask  Level of Supplemental Oxygen (L/min / FiO2): 5  Independent Airway: airway patency satisfactory and stable  Dentition: dentition unchanged  Vital Signs Stable: post-procedure vital signs reviewed and stable  Report to RN Given: handoff report given  Patient transferred to: PACU    Handoff Report: Identifed the Patient, Identified the Reponsible Provider, Reviewed the pertinent medical history, Discussed the surgical course, Reviewed Intra-OP anesthesia mangement and issues during anesthesia, Set expectations for post-procedure period and Allowed opportunity for questions and acknowledgement of understanding    Vitals:  Vitals Value Taken Time   /56 12/13/23 0920   Temp     Pulse 71 12/13/23 0920   Resp 15 12/13/23 0920   SpO2 100 % 12/13/23 0920       Electronically Signed By: TACHO MARTINEZ CRNA  December 13, 2023  9:22 AM

## 2023-12-14 ENCOUNTER — APPOINTMENT (OUTPATIENT)
Dept: OCCUPATIONAL THERAPY | Facility: CLINIC | Age: 83
End: 2023-12-14
Attending: PHYSICIAN ASSISTANT
Payer: COMMERCIAL

## 2023-12-14 ENCOUNTER — APPOINTMENT (OUTPATIENT)
Dept: PHYSICAL THERAPY | Facility: CLINIC | Age: 83
End: 2023-12-14
Attending: ORTHOPAEDIC SURGERY
Payer: COMMERCIAL

## 2023-12-14 VITALS
HEART RATE: 81 BPM | WEIGHT: 156.4 LBS | SYSTOLIC BLOOD PRESSURE: 131 MMHG | TEMPERATURE: 97.3 F | HEIGHT: 70 IN | BODY MASS INDEX: 22.39 KG/M2 | RESPIRATION RATE: 18 BRPM | DIASTOLIC BLOOD PRESSURE: 83 MMHG | OXYGEN SATURATION: 98 %

## 2023-12-14 LAB
ANION GAP SERPL CALCULATED.3IONS-SCNC: 8 MMOL/L (ref 7–15)
BUN SERPL-MCNC: 17.9 MG/DL (ref 8–23)
CALCIUM SERPL-MCNC: 8.6 MG/DL (ref 8.8–10.2)
CHLORIDE SERPL-SCNC: 106 MMOL/L (ref 98–107)
CREAT SERPL-MCNC: 0.9 MG/DL (ref 0.67–1.17)
DEPRECATED HCO3 PLAS-SCNC: 28 MMOL/L (ref 22–29)
EGFRCR SERPLBLD CKD-EPI 2021: 86 ML/MIN/1.73M2
ERYTHROCYTE [DISTWIDTH] IN BLOOD BY AUTOMATED COUNT: 13.2 % (ref 10–15)
GLUCOSE SERPL-MCNC: 124 MG/DL (ref 70–99)
HCT VFR BLD AUTO: 33.5 % (ref 40–53)
HGB BLD-MCNC: 11.3 G/DL (ref 13.3–17.7)
MCH RBC QN AUTO: 32.6 PG (ref 26.5–33)
MCHC RBC AUTO-ENTMCNC: 33.7 G/DL (ref 31.5–36.5)
MCV RBC AUTO: 97 FL (ref 78–100)
PLATELET # BLD AUTO: 204 10E3/UL (ref 150–450)
POTASSIUM SERPL-SCNC: 4.1 MMOL/L (ref 3.4–5.3)
RBC # BLD AUTO: 3.47 10E6/UL (ref 4.4–5.9)
SODIUM SERPL-SCNC: 142 MMOL/L (ref 135–145)
WBC # BLD AUTO: 11.7 10E3/UL (ref 4–11)

## 2023-12-14 PROCEDURE — 258N000003 HC RX IP 258 OP 636: Performed by: PHYSICIAN ASSISTANT

## 2023-12-14 PROCEDURE — 97535 SELF CARE MNGMENT TRAINING: CPT | Mod: GO

## 2023-12-14 PROCEDURE — 250N000011 HC RX IP 250 OP 636: Performed by: PHYSICIAN ASSISTANT

## 2023-12-14 PROCEDURE — 97116 GAIT TRAINING THERAPY: CPT | Mod: GP

## 2023-12-14 PROCEDURE — 99232 SBSQ HOSP IP/OBS MODERATE 35: CPT | Performed by: INTERNAL MEDICINE

## 2023-12-14 PROCEDURE — 80048 BASIC METABOLIC PNL TOTAL CA: CPT | Performed by: FAMILY MEDICINE

## 2023-12-14 PROCEDURE — 36415 COLL VENOUS BLD VENIPUNCTURE: CPT | Performed by: FAMILY MEDICINE

## 2023-12-14 PROCEDURE — 97110 THERAPEUTIC EXERCISES: CPT | Mod: GP

## 2023-12-14 PROCEDURE — 97166 OT EVAL MOD COMPLEX 45 MIN: CPT | Mod: GO

## 2023-12-14 PROCEDURE — 97161 PT EVAL LOW COMPLEX 20 MIN: CPT | Mod: GP

## 2023-12-14 PROCEDURE — 250N000013 HC RX MED GY IP 250 OP 250 PS 637

## 2023-12-14 PROCEDURE — 250N000013 HC RX MED GY IP 250 OP 250 PS 637: Performed by: FAMILY MEDICINE

## 2023-12-14 PROCEDURE — 250N000013 HC RX MED GY IP 250 OP 250 PS 637: Performed by: PHYSICIAN ASSISTANT

## 2023-12-14 PROCEDURE — 85027 COMPLETE CBC AUTOMATED: CPT | Performed by: FAMILY MEDICINE

## 2023-12-14 RX ORDER — ASPIRIN 81 MG/1
81 TABLET ORAL 2 TIMES DAILY
Qty: 60 TABLET | Refills: 0 | Status: SHIPPED | OUTPATIENT
Start: 2023-12-14

## 2023-12-14 RX ORDER — METHOCARBAMOL 500 MG/1
500 TABLET, FILM COATED ORAL 4 TIMES DAILY PRN
Qty: 30 TABLET | Refills: 0 | Status: SHIPPED | OUTPATIENT
Start: 2023-12-14 | End: 2024-08-20

## 2023-12-14 RX ORDER — TAMSULOSIN HYDROCHLORIDE 0.4 MG/1
0.4 CAPSULE ORAL EVERY EVENING
Qty: 30 CAPSULE | Refills: 0 | Status: SHIPPED | OUTPATIENT
Start: 2023-12-14

## 2023-12-14 RX ORDER — ACETAMINOPHEN 325 MG/1
650 TABLET ORAL EVERY 4 HOURS PRN
COMMUNITY
Start: 2023-12-14

## 2023-12-14 RX ORDER — LISINOPRIL 40 MG/1
40 TABLET ORAL DAILY
Start: 2023-12-16 | End: 2024-02-14

## 2023-12-14 RX ORDER — AMOXICILLIN 250 MG
1-2 CAPSULE ORAL 2 TIMES DAILY
COMMUNITY
Start: 2023-12-14 | End: 2024-08-20

## 2023-12-14 RX ADMIN — Medication 400 MG: at 09:35

## 2023-12-14 RX ADMIN — OXYCODONE HYDROCHLORIDE 5 MG: 5 TABLET ORAL at 02:24

## 2023-12-14 RX ADMIN — CEFAZOLIN 1 G: 1 INJECTION, POWDER, FOR SOLUTION INTRAMUSCULAR; INTRAVENOUS at 02:20

## 2023-12-14 RX ADMIN — POLYETHYLENE GLYCOL 3350 17 G: 17 POWDER, FOR SOLUTION ORAL at 09:34

## 2023-12-14 RX ADMIN — ROPINIROLE HYDROCHLORIDE 1 MG: 0.5 TABLET, FILM COATED ORAL at 00:28

## 2023-12-14 RX ADMIN — AMLODIPINE BESYLATE 10 MG: 10 TABLET ORAL at 09:35

## 2023-12-14 RX ADMIN — METHOCARBAMOL 500 MG: 500 TABLET ORAL at 09:35

## 2023-12-14 RX ADMIN — ACETAMINOPHEN 975 MG: 325 TABLET ORAL at 05:04

## 2023-12-14 RX ADMIN — LISINOPRIL 40 MG: 40 TABLET ORAL at 09:35

## 2023-12-14 RX ADMIN — SODIUM CHLORIDE, POTASSIUM CHLORIDE, SODIUM LACTATE AND CALCIUM CHLORIDE: 600; 310; 30; 20 INJECTION, SOLUTION INTRAVENOUS at 02:19

## 2023-12-14 RX ADMIN — ATORVASTATIN CALCIUM 20 MG: 10 TABLET, FILM COATED ORAL at 09:35

## 2023-12-14 RX ADMIN — SENNOSIDES AND DOCUSATE SODIUM 1 TABLET: 8.6; 5 TABLET ORAL at 09:35

## 2023-12-14 RX ADMIN — ASPIRIN 81 MG: 81 TABLET, COATED ORAL at 09:35

## 2023-12-14 ASSESSMENT — ACTIVITIES OF DAILY LIVING (ADL)
ADLS_ACUITY_SCORE: 27
IADL_COMMENTS: FAMILY WILL DO UNTIL PT IS RECOVERED
ADLS_ACUITY_SCORE: 27
ADLS_ACUITY_SCORE: 27

## 2023-12-14 NOTE — PROGRESS NOTES
[facility] Daily Progress Note    Assessment/Plan:  Acute urinary retention  History of BPH  Sanders placed on 12/13 by urology  Started on Flomax  Follow-up with urology as outpatient for a void trial  Creatinine is normal.    Dyslipidemia  Lipitor 20 mg daily     Essential hypertension  Norvasc 10 mg daily  Lisinopril 40 mg daily  Hold antihypertensive if systolic blood pressure is less than 110 as risk of postop hypotension     Restless leg syndrome  Requip 0.5 to 1 mg nightly as needed     Status post left total knee arthroplasty  Continue PT OT, pain control, DVT prophylaxis per orthopedic surgery.    Principal Problem:    S/P total knee arthroplasty, left     LOS: 0 days     Subjective:  Reports more pain overnight, Sanders catheter placed last night for urinary retention.  No other complaints of shortness of breath, chest pain, bowel complaints.   acetaminophen  975 mg Oral Q8H    amLODIPine  10 mg Oral Daily    aspirin  81 mg Oral BID    atorvastatin  20 mg Oral Daily    latanoprost  1 drop Both Eyes At Bedtime    lisinopril  40 mg Oral Daily    magnesium oxide  400 mg Oral Daily    methocarbamol  500 mg Oral 4x Daily    polyethylene glycol  17 g Oral Daily    senna-docusate  1 tablet Oral BID    sodium chloride (PF)  3 mL Intracatheter Q8H    tamsulosin  0.4 mg Oral QPM       Objective:  Vital signs in last 24 hours:  Temp:  [97  F (36.1  C)-98.3  F (36.8  C)] 97.8  F (36.6  C)  Pulse:  [61-78] 70  Resp:  [12-18] 18  BP: (117-159)/(60-78) 118/68  SpO2:  [95 %-98 %] 96 %  Weight:   [unfilled]    Intake/Output last 3 shifts:  I/O last 3 completed shifts:  In: 2480 [P.O.:100; I.V.:2380]  Out: 525 [Urine:525]  Intake/Output this shift:  No intake/output data recorded.    Review of Systems:   As per subjective, all others negative.    Physical Exam:    General Appearance:  Alert, cooperative, no distress, appears stated age   Head:  Normocephalic, without obvious abnormality, atraumatic   Lungs:   Clear to auscultation  bilaterally, respirations unlabored   Chest Wall:  No tenderness or deformity   Heart:  Regular rate and rhythm, S1, S2 normal,no murmur, rub or gallop   Abdomen:   Soft, non tender, non distended, bowel sounds present, no guarding or rigidity   Extremities: S/p left TKA   Skin: Skin color, texture, turgor normal, no rashes or lesions   Neurologic: Alert and oriented X 3, Moves all 4 extremities       Lab Results:  Personally Reviewed.   Recent Results (from the past 24 hour(s))   CBC with platelets    Collection Time: 12/14/23  6:15 AM   Result Value Ref Range    WBC Count 11.7 (H) 4.0 - 11.0 10e3/uL    RBC Count 3.47 (L) 4.40 - 5.90 10e6/uL    Hemoglobin 11.3 (L) 13.3 - 17.7 g/dL    Hematocrit 33.5 (L) 40.0 - 53.0 %    MCV 97 78 - 100 fL    MCH 32.6 26.5 - 33.0 pg    MCHC 33.7 31.5 - 36.5 g/dL    RDW 13.2 10.0 - 15.0 %    Platelet Count 204 150 - 450 10e3/uL   Basic metabolic panel    Collection Time: 12/14/23  6:15 AM   Result Value Ref Range    Sodium 142 135 - 145 mmol/L    Potassium 4.1 3.4 - 5.3 mmol/L    Chloride 106 98 - 107 mmol/L    Carbon Dioxide (CO2) 28 22 - 29 mmol/L    Anion Gap 8 7 - 15 mmol/L    Urea Nitrogen 17.9 8.0 - 23.0 mg/dL    Creatinine 0.90 0.67 - 1.17 mg/dL    GFR Estimate 86 >60 mL/min/1.73m2    Calcium 8.6 (L) 8.8 - 10.2 mg/dL    Glucose 124 (H) 70 - 99 mg/dL         Sarah Mark M.D  [facility] Service  Internal Medicine

## 2023-12-14 NOTE — PROGRESS NOTES
12/14/23 0855   Appointment Info   Signing Clinician's Name / Credentials (OT) Kimberley Almonte, OTR/L   Quick Adds   Quick Adds Certification   Living Environment   People in Home spouse   Current Living Arrangements house   Living Environment Comments has rts with bars, walk in shower with bars and chair, reacher, sockaid and long show horn   Self-Care   Activity/Exercise/Self-Care Comment Pt was independent with ADLS and IADLS prior to admit   Instrumental Activities of Daily Living (IADL)   IADL Comments Family will do until pt is recovered   General Information   Onset of Illness/Injury or Date of Surgery 12/13/23   Referring Physician Kevin Zamarripa   Patient/Family Therapy Goal Statement (OT) heal well so I can stay active   Additional Occupational Profile Info/Pertinent History of Current Problem Ciro Almonte is a 83 year old  male with a history of essential hypertension, dyslipidemia, restless leg syndrome, mitral insufficiency, underwent left total knee arthroplasty due to osteoarthritis and right knee cortisone injection.   Existing Precautions/Restrictions fall;no pivoting or twisting   Cognitive Status Examination   Affect/Mental Status (Cognitive) WNL   Range of Motion Comprehensive   General Range of Motion no range of motion deficits identified   Strength Comprehensive (MMT)   General Manual Muscle Testing (MMT) Assessment no strength deficits identified   Bed Mobility   Comment (Bed Mobility) cga   Transfers   Transfer Comments cga   Activities of Daily Living   BADL Assessment/Intervention lower body dressing;toileting   Lower Body Dressing Assessment/Training   [place] Level (Lower Body Dressing) moderate assist (50% patient effort)   Toileting   Comment, (Toileting) will be going home with a catheter   [place] Level (Toileting) minimum assist (75% patient effort)   Clinical Impression   Criteria for Skilled Therapeutic Interventions Met (OT) Yes, treatment indicated   OT Diagnosis  Decreased independence with adls   OT Problem List-Impairments impacting ADL post-surgical precautions;pain;mobility   Assessment of Occupational Performance 3-5 Performance Deficits   Identified Performance Deficits dressing, transfers, bed mobility, toileting   Planned Therapy Interventions (OT) ADL retraining;transfer training;bed mobility training   Clinical Decision Making Complexity (OT) detailed assessment/moderate complexity   Risk & Benefits of therapy have been explained evaluation/treatment results reviewed;care plan/treatment goals reviewed;risks/benefits reviewed;current/potential barriers reviewed;participants voiced agreement with care plan;participants included;patient   OT Total Evaluation Time   OT Eval, Moderate Complexity Minutes (80736) 15   Therapy Certification   Medical Diagnosis TKA   Start of Care Date 12/14/23   Certification date from 12/14/23   Certification date to 12/28/23   OT Goals   Therapy Frequency (OT) One time eval and treatment   OT Goals Lower Body Dressing;Bed Mobility;Toilet Transfer/Toileting   OT: Lower Body Dressing Modified independent;within precautions;Goal Met;Completed   OT: Bed Mobility Modified independent;supine to/from sitting;rolling;bridging;within precautions;Goal Met;Completed   OT: Toilet Transfer/Toileting Modified independent;toilet transfer;cleaning and garment management;within precautions;Goal Met;Completed   Interventions   Interventions Quick Adds Self-Care/Home Management   Self-Care/Home Management   Self-Care/Home Mgmt/ADL, Compensatory, Meal Prep Minutes (74279) 30   Symptoms Noted During/After Treatment (Meal Preparation/Planning Training) none   Treatment Detail/Skilled Intervention Taught total body dressing with pt after total joint replacement surgery, following tka precautions.  Taught pt how to perform transfers safely to chair, bed, toilet, and car. Taught pt how to get in and out of bed and position leg in bed for comfort and safety.  Taught pt walker safety skills. Answered all questions.  Pt mod I with all after OT intervention.   OT Discharge Planning   OT Plan DC OT   OT Discharge Recommendation (DC Rec) other (see comments)  (defer to ortho)   OT Rationale for DC Rec Pt has good support at home   OT Brief overview of current status Pt is mod I with basic adls and functional mobility after OT intervention.   Total Session Time   Timed Code Treatment Minutes 30   Total Session Time (sum of timed and untimed services) 45      [facility]  OUTPATIENT OCCUPATIONAL THERAPY  EVALUATION  PLAN OF TREATMENT FOR OUTPATIENT REHABILITATION  (COMPLETE FOR INITIAL CLAIMS ONLY)  Patient's Last Name, First Name, M.I.  YOB: 1940  Ciro Almonte                          Provider's Name  [facility] Medical Record No.  [id number]                             Onset Date:  12/13/23   Start of Care Date:  12/14/23   Type:     ___PT   _X_OT   ___SLP Medical Diagnosis:  TKA                    OT Diagnosis:  Decreased independence with adls Visits from SOC:  1     See note for plan of treatment, functional goals and certification details    I CERTIFY THE NEED FOR THESE SERVICES FURNISHED UNDER        THIS PLAN OF TREATMENT AND WHILE UNDER MY CARE     (Physician co-signature of this document indicates review and certification of the therapy plan).

## 2023-12-14 NOTE — PROGRESS NOTES
Physical Therapy Discharge Summary    Reason for therapy discharge:    All goals and outcomes met, no further needs identified.    Progress towards therapy goal(s). See goals on Care Plan in [facility] electronic health record for goal details.  Goals met    Therapy recommendation(s):    Continued therapy is recommended.  Rationale/Recommendations:  OP PT.  Continue home exercise program.

## 2023-12-14 NOTE — DISCHARGE SUMMARY
ORTHOPEDIC DISCHARGE SUMMARY       Ciro Almonte,  1940, MRN [id number]    Admission Date: 2023      Admission Diagnoses: Osteoarthritis of left knee [M17.12]  S/P total knee arthroplasty, left [Z96.652]     Discharge Date:   2023    Post-operative Day:  1 Day Post-Op    Reason for Admission: The patient was admitted for the following: Procedure(s):  LEFT TOTAL KNEE ARTHROPLASTY  RIGHT KNEE CORTISONE INJECTION    BRIEF HOSPITAL COURSE   Ciro Almonte is a pleasant 83 year old male who underwent the aforementioned procedure with Dr. Zamarripa on 2023. There were no intraoperative complications and the patient was transferred to the recovery room and later the orthopedic unit in stable condition. Once the patient reached the orthopedic floor our orthopedic pain protocol was implemented along with the following:    Anticoagulation Medications: ASA  Therapy: PT and OT  Activity: WBAT  Bracing: None    Consultations during Admission: Hospitalist service for medical management     COMPLICATIONS/SIGNIFICANT FINDINGS    Post op urinary retention. He was seen by urology who is discharging him with a toledo. He will have outpatient follow up with urology on Monday.     DISCHARGE INFORMATION   Condition at discharge: Good  Discharge destination: Home  Patient was seen by myself on the date of discharge.    FOLLOW UP CARE   Follow up with orthopedics in 2 weeks or sooner should the need arise. Ortho will continue to manage pain control, post op anticoagulation and incision care.     Follow up with your PCP for management of chronic medical problems and to evaluate for post op medical complications including constipation, nausea/vomiting, DVT/PE, anemia, changes in blood pressure, fevers/chills, urinary retention and atelectasis/pneumonia.     Subjective   Patient is doing well on POD #1. Pain is well controlled with oral medications. Ambulating. Tolerating oral intake.     Physical Exam   BP (!) 141/65   " Pulse 70   Temp 97.8  F (36.6  C) (Oral)   Resp 18   Ht 1.778 m (5' 10\")   Wt 70.9 kg (156 lb 6.4 oz)   SpO2 96%   BMI 22.44 kg/m    The patient is A&Ox3. Appears comfortable.   Sensation is intact.  Dorsiflexion and plantar flexion is intact.  Dorsalis pedis pulse intact.  Calves are soft and non-tender. Negative Steven's.  The incision is covered. Dressing C/D/I    Pertinent Results at Discharge     Hemoglobin   Date/Time Value Ref Range Status   12/14/2023 06:15 AM 11.3 (L) 13.3 - 17.7 g/dL Final   12/13/2023 06:24 AM 13.3 13.3 - 17.7 g/dL Final   02/08/2023 11:19 AM 14.1 13.3 - 17.7 g/dL Final   06/11/2021 03:03 PM 13.1 (L) 13.3 - 17.7 g/dL Final   11/18/2020 03:19 PM 13.7 13.3 - 17.7 g/dL Final   05/07/2020 04:08 PM 13.0 (L) 13.3 - 17.7 g/dL Final     Platelet Count   Date/Time Value Ref Range Status   12/14/2023 06:15  150 - 450 10e3/uL Final   12/13/2023 06:24  150 - 450 10e3/uL Final   02/08/2023 11:19  150 - 450 10e3/uL Final   11/18/2020 03:19  150 - 450 10e9/L Final   05/07/2020 04:08  150 - 450 10e9/L Final   11/01/2019 09:58  150 - 450 10e9/L Final       Problem List   Principal Problem:    S/P total knee arthroplasty, left      Suki Christensen PA-C/Dr. Zamarripa  [facility]  [phone]  Date: 12/14/2023  Time: 11:16 AM    "

## 2023-12-14 NOTE — PROGRESS NOTES
"Orthopedic Progress Note      Assessment: 1 Day Post-Op  S/P Procedure(s):  LEFT TOTAL KNEE ARTHROPLASTY  RIGHT KNEE CORTISONE INJECTION @ [facility]    Plan:   - Passed PT/OT  - Weightbearing status: WBAT  - Anticoagulation: 81 mg BID in addition to SCDs, rian stockings and early ambulation.  - Urinary retention: Seen by urology. Will be discharged with toledo and follow up with urology on Monday.   - Add robaxin at home for muscle spasms.   - Discharge planning: home today      Subjective:  Pain: Very minimal knee pain today.   Chest pain, SOB: no  Nausea, Vomiting:  no  Lightheadedness, Dizziness:  no  Neuro:  Patient denies new onset numbness or paresthesias    Patient reports very minimal knee pain today. He does report muscle spasms in his quad overnight. The block is still in place. He did have urinary retention in which urology was consulted. He will be discharged with a toledo and follow up outpatient with urology on Monday.     Objective:  BP (!) 141/65   Pulse 70   Temp 97.8  F (36.6  C) (Oral)   Resp 18   Ht 1.778 m (5' 10\")   Wt 70.9 kg (156 lb 6.4 oz)   SpO2 96%   BMI 22.44 kg/m    The patient is A&Ox3. Appears comfortable.   Sensation is intact.  Dorsiflexion and plantar flexion is intact.  Dorsalis pedis pulse intact.  Calves are soft and non-tender. Negative Steven's.  The incision is covered. Dressing C/D/I.    No drain     Pertinent Labs   Lab Results: personally reviewed.   No results found for: \"INR\", \"PROTIME\"  Lab Results   Component Value Date    WBC 11.7 (H) 12/14/2023    HGB 11.3 (L) 12/14/2023    HCT 33.5 (L) 12/14/2023    MCV 97 12/14/2023     12/14/2023     Lab Results   Component Value Date     12/14/2023    CO2 28 12/14/2023         Report completed by:  Suki Christensen PA-C/Dr[facility]    Date: 12/14/2023  Time: 11:06 AM    "

## 2023-12-14 NOTE — PROGRESS NOTES
Patient vital signs are at baseline: Yes  Patient able to ambulate as they were prior to admission or with assist devices provided by therapies during their stay:  Yes  Patient MUST void prior to discharge:  No,  Reason:  Sanders catheter  Patient able to tolerate oral intake:  Yes  Pain has adequate pain control using Oral analgesics:  Yes  Does patient have an identified :  Yes  Has goal D/C date and time been discussed with patient:  Yes

## 2023-12-14 NOTE — PROGRESS NOTES
Discharge AVS reviewed with patient and wife.  Questions answered and teachings acknowledged including Sanders Catheter cares + Urology follow-up + Sanders Care video and demonstrated instructions reviewed - Leg bag provided but patient and wife refused to switch to leg bag.  Belongings and paperwork + scripts sent with via wife transport. Orthopedic Stop[employer] Tool acknowledged (YES)

## 2023-12-14 NOTE — DISCHARGE INSTRUCTIONS
[facility] will call you to arrange an appointment next week.  If needed, their number is [phone]

## 2023-12-14 NOTE — PROGRESS NOTES
12/14/23 0800   Appointment Info   Signing Clinician's Name / Credentials (PT) Delmis Nava, PT, DPT   Quick Adds   Quick Adds Certification   Living Environment   People in Home spouse   Current Living Arrangements house   Home Accessibility stairs to enter home;stairs within home   Number of Stairs, Main Entrance 3   Stair Railings, Main Entrance railings on both sides of stairs   Number of Stairs, Within Home, Primary greater than 10 stairs   Living Environment Comments able to stay on main level, has hospital bed   Self-Care   Equipment Currently Used at Home none   Fall history within last six months no   General Information   Onset of Illness/Injury or Date of Surgery 12/13/23   Referring Physician Dr. Kevin Zamarripa   Patient/Family Therapy Goals Statement (PT) Walk without pain   Pertinent History of Current Problem (include personal factors and/or comorbidities that impact the POC) s/p L TKA   Weight-Bearing Status - LLE weight-bearing as tolerated   Weight-Bearing Status - RLE full weight-bearing   Range of Motion (ROM)   ROM Comment decreased ROM s/p L TKA   Bed Mobility   Bed Mobility supine-sit   Supine-Sit [place] (Bed Mobility) contact guard;verbal cues   Transfers   Transfers sit-stand transfer   Maintains Weight-bearing Status (Transfers) able to maintain   Sit-Stand Transfer   Sit-Stand [place] (Transfers) contact guard;verbal cues   Assistive Device (Sit-Stand Transfers) walker, front-wheeled   Gait/Stairs (Locomotion)   [place] Level (Gait) contact guard;verbal cues   Assistive Device (Gait) walker, front-wheeled   Distance in Feet (Gait) 10   Pattern (Gait) step-through   Deviations/Abnormal Patterns (Gait) antalgic;base of support, wide;gait speed decreased   Maintains Weight-bearing Status (Gait) able to maintain   Clinical Impression   Criteria for Skilled Therapeutic Intervention Yes, treatment indicated   PT Diagnosis (PT) impaired functional mobility   Influenced  by the following impairments pain, decreased strength   Functional limitations due to impairments gait, stairs, transfers   Clinical Presentation (PT Evaluation Complexity) stable   Clinical Presentation Rationale pt presents as medically diagnosed   Clinical Decision Making (Complexity) low complexity   Planned Therapy Interventions (PT) bed mobility training;gait training;home exercise program;patient/family education;stair training;strengthening;transfer training;ROM (range of motion)   Risk & Benefits of therapy have been explained care plan/treatment goals reviewed;patient   PT Total Evaluation Time   PT Eval, Low Complexity Minutes (55746) 10   Therapy Certification   Start of care date 12/14/23   Certification date from 12/14/23   Certification date to 01/11/24   Medical Diagnosis S/P L TKA   Physical Therapy Goals   PT Frequency One time eval and treatment only   PT Predicted Duration/Target Date for Goal Attainment 12/14/23   PT Goals PT Goal 1;Transfers;Gait;Stairs   PT: Transfers Sit to/from stand;Assistive device;Goal Met;Supervision/stand-by assist   PT: Gait Modified independent;Rolling walker;150 feet;Goal Met   PT: Stairs Supervision/stand-by assist;3 stairs;Rail on left;Goal Met   PT: Goal 1 Independent with written HEP for LE strengthening and ROM  (Goal Met)   Interventions   Interventions Quick Adds Therapeutic Procedure;Therapeutic Activity;Gait Training   Therapeutic Procedure/Exercise   Ther. Procedure: strength, endurance, ROM, flexibillity Minutes (90676) 15   Symptoms Noted During/After Treatment increased pain   Treatment Detail/Skilled Intervention TKA protocol therex x10 reps with L LE, independent after verbal cueing for technique. reviewed OP PT   Therapeutic Activity   Symptoms Noted During/After Treatment None   Treatment Detail/Skilled Intervention Supine to sit with CGA, cueing for technique. sit to/from stand with FWW, SBA verbal cueing for safe hand placement and LE placement    Gait Training   Gait Training Minutes (29317) 15   Symptoms Noted During/After Treatment (Gait Training) increased pain   Treatment Detail/Skilled Intervention verbal cueing for safety, device advancement, weight bearing. Stairs: 4 stairs with right rail, using both hands, nonreciprocal pattern, verbal cueing for safety and patterning. Education on ambulation at home   Distance in Feet 50, 150   [place] Level (Gait Training) independent   Physical Assistance Level (Gait Training) verbal cues   Weight Bearing (Gait Training) weight-bearing as tolerated   Assistive Device (Gait Training) rolling walker   Pattern Analysis (Gait Training) swing-through gait   Gait Analysis Deviations decreased boyd;decreased step length   Impairments (Gait Analysis/Training) pain;strength decreased   PT Discharge Planning   PT Plan d/c PT   PT Discharge Recommendation (DC Rec) other (see comments)   PT Rationale for DC Rec Patient ambulating safely with FWW and able to negotiate stairs, spouse for support at home   PT Brief overview of current status Amb 150ft with FWW, mod I   PT Equipment Needed at Discharge walker, rolling   Total Session Time   Timed Code Treatment Minutes 30   Total Session Time (sum of timed and untimed services) 40   [facility]  OUTPATIENT PHYSICAL THERAPY EVALUATION  PLAN OF TREATMENT FOR OUTPATIENT REHABILITATION  (COMPLETE FOR INITIAL CLAIMS ONLY)  Patient's Last Name, First Name, M.I.  YOB: 1940  Ciro Almonte                        Provider's Name  [facility] Medical Record No.  [id number]                             Onset Date:  12/13/23   Start of Care Date:  12/14/23   Type:     _X_PT   ___OT   ___SLP Medical Diagnosis:  S/P L TKA              PT Diagnosis:  impaired functional mobility Visits from SOC:  1     See note for plan of treatment, functional goals and certification details    I CERTIFY THE NEED FOR THESE  SERVICES FURNISHED UNDER        THIS PLAN OF TREATMENT AND WHILE UNDER MY CARE     (Physician co-signature of this document indicates review and certification of the therapy plan).

## 2023-12-14 NOTE — PROGRESS NOTES
Care Management Discharge Note    Discharge Date: 12/14/2023       Discharge Disposition: Home    Discharge Services: None    Discharge DME: None    Discharge Transportation:        Education Provided on the Discharge Plan: Yes  Persons Notified of Discharge Plans: pt and family  Patient/Family in Agreement with the Plan: yes    Handoff Referral Completed: Yes    Additional Information:  Pt discharged to home. Family to transport. OP to follow.     Juma Angulo RN

## 2023-12-18 ENCOUNTER — TRANSFERRED RECORDS (OUTPATIENT)
Dept: HEALTH INFORMATION MANAGEMENT | Facility: CLINIC | Age: 83
End: 2023-12-18
Payer: COMMERCIAL

## 2023-12-23 ENCOUNTER — TRANSFERRED RECORDS (OUTPATIENT)
Dept: HEALTH INFORMATION MANAGEMENT | Facility: CLINIC | Age: 83
End: 2023-12-23
Payer: COMMERCIAL

## 2023-12-26 ENCOUNTER — TELEPHONE (OUTPATIENT)
Dept: PEDIATRICS | Facility: CLINIC | Age: 83
End: 2023-12-26
Payer: COMMERCIAL

## 2023-12-26 NOTE — TELEPHONE ENCOUNTER
Dr Banks    Patient had knee surgery 12/13/23. Pain medication prescribed by surgical team, pharmacy no longer has supply. Asking for medication (oxycodone 5mg immediate release, methocarbamol 500mg) to be sent to [facility] pharmacy, they have medication in stock.     RN advised that patient and wife should reach out to the surgical team for pharmacy change of medications. They stated they have left 3 messages for surgical team. RN advised that Dr Banks did not prescribe medication, so they will need to contact team for refills and pharmacy change. Patient requesting message be sent to provider to see if they can get short script.      Linsey KIM RN on 12/26/2023 at 3:43 PM

## 2023-12-26 NOTE — PROGRESS NOTES
INITIAL NEUROLOGY CONSULTATION    DATE OF VISIT: 12/27/2023  CLINIC LOCATION: [facility]  MRN: [id number]  PATIENT NAME: Ciro Almonte  YOB: 1940    REASON FOR VISIT: No chief complaint on file.    HISTORY OF PRESENT ILLNESS:                                                    Mr. Ciro Almonte is 83 year old right handed male patient with past medical history of peripheral neuropathy, hypertension, chronic low back pain, hyperlipidemia, restless leg syndrome, and cardiomyopathy, who was seen today for balance difficulty.    Per patient's report, ***.    Laboratory evaluation from December 2023 includes unremarkable BMP, except glucose of 124, elevated WBC of 11.7, and low hemoglobin of 11.3.  Vitamin B12 was 752 in February 2023.  TSH was normal (1.9) at that time.    Head CT from 1/4/2014 was negative.  CT of the cervical spine from the same day was negative for acute abnormalities.  MRI of the brain with and without contrast from 2/22/2023 demonstrated diffuse cerebral volume loss and stable limited changes, consistent with chronic small vessel ischemic disease, but no evidence of acute intracranial pathology.  All images were personally reviewed and independently interpreted.    According to scanned report from [facility], EMG from 8/15/2014 demonstrated evidence of polyneuropathy along with underlying carpal tunnel syndrome.  Tabulated data from EMG report were personally reviewed and independently interpreted.    No additional useful information is available in Care Everywhere, which was reviewed.  PAST MEDICAL/SURGICAL HISTORY:                                                    I personally reviewed patient's past medical and surgical history with the patient at today's visit.  MEDICATIONS:                                                    I personally reviewed patient's medications and allergies with the patient at today's visit.  ALLERGIES:                                                     No Known Allergies  EXAM:                                                    VITAL SIGNS:   There were no vitals taken for this visit.  Mini-Cog Assessment:       General: pt is in NAD, cooperative.  Skin: normal turgor, moist mucous membranes, no lesions/rashes noticed.  HEENT: ATNC, EOMI, PERRL, white sclera, normal conjunctiva, no nystagmus or ptosis. No carotid bruits bilaterally.  Respiratory: lung sounds clear to auscultation bilaterally, no crackles, wheezes, rhonchi. Symmetric lung excursion, no accessory respiratory muscle use.  Cardiovascular: normal S1/S2, no murmurs/rubs/gallops.   Abdomen: Not distended.  : deferred.    Neurological:  Mental: alert, follows commands,  /5 with ***/3 on memory recall, no aphasia or dysarthria. Fund of knowledge is {MYAPPROPRIATE:650378}  Cranial Nerves:  CN II: visual acuity - able to accurately count fingers with each eye. Visual fields intact, fundi: discs sharp, no papilledema and normal vessels bilaterally.  CN III, IV, VI: EOM intact, pupils equal and reactive  CN V: facial sensation nl  CN VII: face symmetric, no facial droop  CN VIII: hearing normal  CN IX: palate elevation symmetric, uvula at midline  CN XI SCM normal, shoulder shrug nl  CN XII: tongue midline  Motor: Strength: 5/5 in all major groups of all extremities. Normal tone. No abnormal movements. No pronator drift b/l.  Reflexes: Triceps, biceps, brachioradialis, patellar, and achilles reflexes normal and symmetric. No clonus noted. Toes are down-going b/l.   Sensory: light touch, pinprick, and vibration intact. Romberg: negative.  Coordination: FNF and heel-shin tests intact b/l.   Gait:  Normal, able to tandem walk *** without difficulty.  DATA:   LABS/EEG/IMAGING/OTHER STUDIES: I reviewed pertinent medical records, as detailed in the history of present illness.  ASSESSMENT AND PLAN:      ASSESSMENT: Ciro Almonte is a 83 year old male patient with listed  above past medical history, who presents with ***.    We had a detailed discussion with the patient regarding his presenting complaints.  The neurological exam today is ***.    DIAGNOSES:  No diagnosis found.  PLAN: There are no Patient Instructions on file for this visit.    Total Time: *** minutes spent on the date of the encounter doing chart review, history and exam, documentation and further activities per the note.    Hari Myles MD  [facility]  (Chart documentation was completed in part with Dragon voice-recognition software. Even though reviewed, some grammatical, spelling, and word errors may remain.)

## 2023-12-27 ENCOUNTER — OFFICE VISIT (OUTPATIENT)
Dept: NEUROLOGY | Facility: CLINIC | Age: 83
End: 2023-12-27
Attending: INTERNAL MEDICINE
Payer: COMMERCIAL

## 2023-12-27 VITALS
OXYGEN SATURATION: 96 % | HEART RATE: 68 BPM | BODY MASS INDEX: 23.94 KG/M2 | HEIGHT: 70 IN | WEIGHT: 167.2 LBS | SYSTOLIC BLOOD PRESSURE: 137 MMHG | DIASTOLIC BLOOD PRESSURE: 65 MMHG

## 2023-12-27 DIAGNOSIS — R26.89 BALANCE PROBLEMS: ICD-10-CM

## 2023-12-27 DIAGNOSIS — G60.9 IDIOPATHIC PERIPHERAL NEUROPATHY: Primary | ICD-10-CM

## 2023-12-27 PROCEDURE — 99205 OFFICE O/P NEW HI 60 MIN: CPT | Performed by: PSYCHIATRY & NEUROLOGY

## 2023-12-27 NOTE — PROGRESS NOTES
INITIAL NEUROLOGY CONSULTATION    DATE OF VISIT: 12/27/2023  CLINIC LOCATION: [facility]  MRN: [id number]  PATIENT NAME: Ciro Almonte  YOB: 1940    REASON FOR VISIT:   Chief Complaint   Patient presents with    Consult     Pain/Tingling- legs bilateral   Balance Concerns      HISTORY OF PRESENT ILLNESS:                                                    Mr. Ciro Almonte is 83 year old right handed male patient with past medical history of peripheral neuropathy, hypertension, chronic low back pain, hyperlipidemia, restless leg syndrome, and cardiomyopathy, who was seen today for balance difficulty.  Accompanied by his wife.    Per patient's report, symptoms started approximately 5 to 7 years ago.  He developed bilateral feet numbness, tingling, and burning pain along with balance difficulty.  Eventually he was diagnosed with peripheral polyneuropathy.  His symptoms worsened throughout the years.  He went to physical therapy to help with balance, which was somewhat helpful.  He tried gabapentin, but did not feel that it is helpful.  No other medications tried.  Denies any additional focal neurological symptoms.    Laboratory evaluation from December 2023 includes unremarkable BMP, except glucose of 124, elevated WBC of 11.7, and low hemoglobin of 11.3.  Vitamin B12 was 752 in February 2023.  TSH was normal (1.9) at that time.    Head CT from 1/4/2014 was negative.  CT of the cervical spine from the same day was negative for acute abnormalities.  MRI of the brain with and without contrast from 2/22/2023 demonstrated diffuse cerebral volume loss and stable white matter changes, consistent with chronic small vessel ischemic disease, but no evidence of acute intracranial pathology.  All images were personally reviewed and independently interpreted.    According to scanned report from [facility], EMG from 8/15/2014 demonstrated evidence of polyneuropathy along  "with underlying carpal tunnel syndrome.  Tabulated data from EMG report were personally reviewed and independently interpreted.    No additional useful information is available in Care Everywhere, which was reviewed.  PAST MEDICAL/SURGICAL HISTORY:                                                    I personally reviewed patient's past medical and surgical history with the patient at today's visit.  MEDICATIONS:                                                    I personally reviewed patient's medications and allergies with the patient at today's visit.  ALLERGIES:                                                    No Known Allergies  EXAM:                                                    VITAL SIGNS:   /65 (BP Location: Left arm, Patient Position: Sitting, Cuff Size: Adult Regular)   Pulse 68   Ht 1.778 m (5' 10\")   Wt 75.8 kg (167 lb 3.2 oz)   SpO2 96%   BMI 23.99 kg/m    Mini-Cog Assessment:  Mini Cog Assessment  Clock Draw Score: 2 Normal  3 Item Recall: 3 objects recalled  Mini Cog Total Score: 5  Administered by: : Radha ALVARADO    General: pt is in NAD, cooperative.  Skin: normal turgor, moist mucous membranes, no lesions/rashes noticed.  HEENT: ATNC, EOMI, PERRL, white sclera, normal conjunctiva, no nystagmus or ptosis. No carotid bruits bilaterally.  Respiratory: lung sounds clear to auscultation bilaterally, no crackles, wheezes, rhonchi. Symmetric lung excursion, no accessory respiratory muscle use.  Cardiovascular: normal S1/S2, no murmurs/rubs/gallops.   Abdomen: Not distended.  : deferred.    Neurological:  Mental: alert, follows commands, Mini Cog Total Score: 5/5 with 3/3 on memory recall, no aphasia or dysarthria. Fund of knowledge is appropriate for age.  Cranial Nerves:  CN II: visual acuity - able to accurately count fingers with each eye. Visual fields intact, fundi: discs sharp, no papilledema and normal vessels bilaterally.  CN III, IV, VI: EOM intact, pupils equal and reactive  CN V: " facial sensation nl  CN VII: face symmetric, no facial droop  CN VIII: hearing normal  CN IX: palate elevation symmetric, uvula at midline  CN XI SCM normal, shoulder shrug nl  CN XII: tongue midline  Motor: Strength: 5/5 in all major groups of all extremities (except left hip and knee flexion/extension that were not tested due to recent surgery and associated pain). Normal tone. No abnormal movements. No pronator drift b/l.  Reflexes: Triceps, biceps, and brachioradialis reflexes normal and symmetric, patellar reflexes preserved on the right, not tested on the left, achilles reflexes are absent bilaterally. No clonus noted. Toes are down-going b/l.   Sensory: light touch, pinprick, and vibration reduced in both feet. Romberg: Positive.  Coordination: FNF and heel-shin tests intact b/l.   Gait: Limping and cautious (due to recent left knee surgery), uses a walker.  DATA:   LABS/EEG/IMAGING/OTHER STUDIES: I reviewed pertinent medical records, as detailed in the history of present illness.  ASSESSMENT AND PLAN:      ASSESSMENT: Ciro Almonte is a 83 year old male patient with listed above past medical history, who presents with peripheral polyneuropathy along with associated balance difficulty.    We had a detailed discussion with the patient and his wife regarding his presenting complaints.  The neurological exam today is consistent with his diagnosis of peripheral polyneuropathy.  We discussed that most likely his reported balance difficulties related to this condition, but reviewed additional differential possibilities that would affect his spinal cord.  We discussed utility of obtaining cervical, lumbar, and possibly thoracic spine MRI's, but decided against it.  He is still in the process of recovery after his recent left arthroplasty, and we discussed that he could consider a trial of alpha lipoic acid.  We also reviewed additional treatment options for neuropathic pain, including Lyrica, Cymbalta, Effexor,  and topical treatments.  We will review them later once he is fully recovered after his knee surgery.    DIAGNOSES:    ICD-10-CM    1. Idiopathic peripheral neuropathy  G60.9       2. Balance problems  R26.89 Adult Neurology  Referral        PLAN: At today's visit we thoroughly discussed current symptoms, diagnosis, available treatment options, and the plan.    We decided to try alpha lipoic acid at 600 mg daily.  I advised the patient to take it with food to avoid stomach irritation.    For balance difficulty, he will continue doing PT exercises.    Next follow-up appointment is in the next 3 months or earlier if needed.    Total Time: 61 minutes spent on the date of the encounter doing chart review, history and exam, documentation and further activities per the note.    Hari Myles MD  [facility]  (Chart documentation was completed in part with Dragon voice-recognition software. Even though reviewed, some grammatical, spelling, and word errors may remain.)

## 2023-12-27 NOTE — PATIENT INSTRUCTIONS
AFTER VISIT SUMMARY (AVS):    At today's visit we thoroughly discussed current symptoms, diagnosis, available treatment options, and the plan.    We decided to try alpha lipoic acid at 600 mg daily.  Please take it with food to avoid stomach irritation.    For balance difficulty, please continue doing PT exercises.    Next follow-up appointment is in the next 3 months or earlier if needed.    Please do not hesitate to call me with any questions or concerns.    Thanks.

## 2023-12-27 NOTE — TELEPHONE ENCOUNTER
Dr. SHIELDS  out of office. Pain medication needs to be prescribed by surgeon or surgery team.   Jaquelin Fountain PA-C

## 2023-12-27 NOTE — TELEPHONE ENCOUNTER
Called and spoke with patient. Patient states he already picked up his prescriptions for pain medication. No further questions at this time.     Bony MORRIS RN 12/27/2023 at 2:57 PM

## 2023-12-27 NOTE — LETTER
12/27/2023         RE: Ciro Almonte  [address]  [place] [address]        Dear Colleague,    Thank you for referring your patient, Ciro Almonte, to the [facility] NEUROLOGY CLINICS [facility]. Please see a copy of my visit note below.    INITIAL NEUROLOGY CONSULTATION    DATE OF VISIT: 12/27/2023  CLINIC LOCATION: [facility]  MRN: [id number]  PATIENT NAME: Ciro Almonte  YOB: 1940    REASON FOR VISIT:   Chief Complaint   Patient presents with     Consult     Pain/Tingling- legs bilateral   Balance Concerns      HISTORY OF PRESENT ILLNESS:                                                    Mr. Ciro Almonte is 83 year old right handed male patient with past medical history of peripheral neuropathy, hypertension, chronic low back pain, hyperlipidemia, restless leg syndrome, and cardiomyopathy, who was seen today for balance difficulty.  Accompanied by his wife.    Per patient's report, symptoms started approximately 5 to 7 years ago.  He developed bilateral feet numbness, tingling, and burning pain along with balance difficulty.  Eventually he was diagnosed with peripheral polyneuropathy.  His symptoms worsened throughout the years.  He went to physical therapy to help with balance, which was somewhat helpful.  He tried gabapentin, but did not feel that it is helpful.  No other medications tried.  Denies any additional focal neurological symptoms.    Laboratory evaluation from December 2023 includes unremarkable BMP, except glucose of 124, elevated WBC of 11.7, and low hemoglobin of 11.3.  Vitamin B12 was 752 in February 2023.  TSH was normal (1.9) at that time.    Head CT from 1/4/2014 was negative.  CT of the cervical spine from the same day was negative for acute abnormalities.  MRI of the brain with and without contrast from 2/22/2023 demonstrated diffuse cerebral volume loss and stable white matter changes, consistent with chronic small vessel ischemic  "disease, but no evidence of acute intracranial pathology.  All images were personally reviewed and independently interpreted.    According to scanned report from [facility], EMG from 8/15/2014 demonstrated evidence of polyneuropathy along with underlying carpal tunnel syndrome.  Tabulated data from EMG report were personally reviewed and independently interpreted.    No additional useful information is available in Care Everywhere, which was reviewed.  PAST MEDICAL/SURGICAL HISTORY:                                                    I personally reviewed patient's past medical and surgical history with the patient at today's visit.  MEDICATIONS:                                                    I personally reviewed patient's medications and allergies with the patient at today's visit.  ALLERGIES:                                                    No Known Allergies  EXAM:                                                    VITAL SIGNS:   /65 (BP Location: Left arm, Patient Position: Sitting, Cuff Size: Adult Regular)   Pulse 68   Ht 1.778 m (5' 10\")   Wt 75.8 kg (167 lb 3.2 oz)   SpO2 96%   BMI 23.99 kg/m    Mini-Cog Assessment:  Mini Cog Assessment  Clock Draw Score: 2 Normal  3 Item Recall: 3 objects recalled  Mini Cog Total Score: 5  Administered by: : Radha ALVARADO    General: pt is in NAD, cooperative.  Skin: normal turgor, moist mucous membranes, no lesions/rashes noticed.  HEENT: ATNC, EOMI, PERRL, white sclera, normal conjunctiva, no nystagmus or ptosis. No carotid bruits bilaterally.  Respiratory: lung sounds clear to auscultation bilaterally, no crackles, wheezes, rhonchi. Symmetric lung excursion, no accessory respiratory muscle use.  Cardiovascular: normal S1/S2, no murmurs/rubs/gallops.   Abdomen: Not distended.  : deferred.    Neurological:  Mental: alert, follows commands, Mini Cog Total Score: 5/5 with 3/3 on memory recall, no aphasia or dysarthria. Fund of knowledge is " appropriate for age.  Cranial Nerves:  CN II: visual acuity - able to accurately count fingers with each eye. Visual fields intact, fundi: discs sharp, no papilledema and normal vessels bilaterally.  CN III, IV, VI: EOM intact, pupils equal and reactive  CN V: facial sensation nl  CN VII: face symmetric, no facial droop  CN VIII: hearing normal  CN IX: palate elevation symmetric, uvula at midline  CN XI SCM normal, shoulder shrug nl  CN XII: tongue midline  Motor: Strength: 5/5 in all major groups of all extremities (except left hip and knee flexion/extension that were not tested due to recent surgery and associated pain). Normal tone. No abnormal movements. No pronator drift b/l.  Reflexes: Triceps, biceps, and brachioradialis reflexes normal and symmetric, patellar reflexes preserved on the right, not tested on the left, achilles reflexes are absent bilaterally. No clonus noted. Toes are down-going b/l.   Sensory: light touch, pinprick, and vibration reduced in both feet. Romberg: Positive.  Coordination: FNF and heel-shin tests intact b/l.   Gait: Limping and cautious (due to recent left knee surgery), uses a walker.  DATA:   LABS/EEG/IMAGING/OTHER STUDIES: I reviewed pertinent medical records, as detailed in the history of present illness.  ASSESSMENT AND PLAN:      ASSESSMENT: Ciro Almonte is a 83 year old male patient with listed above past medical history, who presents with peripheral polyneuropathy along with associated balance difficulty.    We had a detailed discussion with the patient and his wife regarding his presenting complaints.  The neurological exam today is consistent with his diagnosis of peripheral polyneuropathy.  We discussed that most likely his reported balance difficulties related to this condition, but reviewed additional differential possibilities that would affect his spinal cord.  We discussed utility of obtaining cervical, lumbar, and possibly thoracic spine MRI's, but decided  against it.  He is still in the process of recovery after his recent left arthroplasty, and we discussed that he could consider a trial of alpha lipoic acid.  We also reviewed additional treatment options for neuropathic pain, including Lyrica, Cymbalta, Effexor, and topical treatments.  We will review them later once he is fully recovered after his knee surgery.    DIAGNOSES:    ICD-10-CM    1. Idiopathic peripheral neuropathy  G60.9       2. Balance problems  R26.89 Adult Neurology  Referral        PLAN: At today's visit we thoroughly discussed current symptoms, diagnosis, available treatment options, and the plan.    We decided to try alpha lipoic acid at 600 mg daily.  I advised the patient to take it with food to avoid stomach irritation.    For balance difficulty, he will continue doing PT exercises.    Next follow-up appointment is in the next 3 months or earlier if needed.    Total Time: 61 minutes spent on the date of the encounter doing chart review, history and exam, documentation and further activities per the note.    Hari Myles MD  [facility]  (Chart documentation was completed in part with Dragon voice-recognition software. Even though reviewed, some grammatical, spelling, and word errors may remain.)            Again, thank you for allowing me to participate in the care of your patient.        Sincerely,        Hari Myles MD

## 2023-12-27 NOTE — TELEPHONE ENCOUNTER
Attempted to reach patient, step-son answered call,  patient is currently at orthopedic appointment. Will try back later.     Bony MORRIS RN 12/27/2023 at 9:43 AM

## 2023-12-29 ENCOUNTER — LAB (OUTPATIENT)
Dept: LAB | Facility: CLINIC | Age: 83
End: 2023-12-29
Payer: COMMERCIAL

## 2023-12-29 DIAGNOSIS — E29.1 TESTICULAR HYPOFUNCTION: ICD-10-CM

## 2023-12-29 LAB
HGB BLD-MCNC: 12 G/DL (ref 13.3–17.7)
PSA FREE MFR SERPL: 31.2 %
PSA FREE SERPL-MCNC: 2 NG/ML
PSA SERPL DL<=0.01 NG/ML-MCNC: 6.41 NG/ML

## 2023-12-29 PROCEDURE — 84153 ASSAY OF PSA TOTAL: CPT | Mod: GA

## 2023-12-29 PROCEDURE — 84403 ASSAY OF TOTAL TESTOSTERONE: CPT

## 2023-12-29 PROCEDURE — 84154 ASSAY OF PSA FREE: CPT | Mod: GA

## 2023-12-29 PROCEDURE — 36415 COLL VENOUS BLD VENIPUNCTURE: CPT

## 2023-12-29 PROCEDURE — 85018 HEMOGLOBIN: CPT

## 2024-01-02 ENCOUNTER — TRANSFERRED RECORDS (OUTPATIENT)
Dept: HEALTH INFORMATION MANAGEMENT | Facility: CLINIC | Age: 84
End: 2024-01-02
Payer: COMMERCIAL

## 2024-01-05 LAB — TESTOST SERPL-MCNC: 8 NG/DL (ref 240–950)

## 2024-01-15 ENCOUNTER — TRANSFERRED RECORDS (OUTPATIENT)
Dept: HEALTH INFORMATION MANAGEMENT | Facility: CLINIC | Age: 84
End: 2024-01-15
Payer: COMMERCIAL

## 2024-01-16 ENCOUNTER — TRANSFERRED RECORDS (OUTPATIENT)
Dept: HEALTH INFORMATION MANAGEMENT | Facility: CLINIC | Age: 84
End: 2024-01-16
Payer: COMMERCIAL

## 2024-01-17 DIAGNOSIS — R97.20 ELEVATED PROSTATE SPECIFIC ANTIGEN (PSA): Primary | ICD-10-CM

## 2024-01-22 ENCOUNTER — TRANSFERRED RECORDS (OUTPATIENT)
Dept: HEALTH INFORMATION MANAGEMENT | Facility: CLINIC | Age: 84
End: 2024-01-22
Payer: COMMERCIAL

## 2024-02-05 ENCOUNTER — TRANSFERRED RECORDS (OUTPATIENT)
Dept: HEALTH INFORMATION MANAGEMENT | Facility: CLINIC | Age: 84
End: 2024-02-05
Payer: COMMERCIAL

## 2024-02-14 DIAGNOSIS — I10 ESSENTIAL HYPERTENSION WITH GOAL BLOOD PRESSURE LESS THAN 140/90: ICD-10-CM

## 2024-02-15 RX ORDER — LISINOPRIL 40 MG/1
40 TABLET ORAL DAILY
Qty: 90 TABLET | Refills: 1 | Status: SHIPPED | OUTPATIENT
Start: 2024-02-15 | End: 2024-08-20

## 2024-02-29 ENCOUNTER — APPOINTMENT (OUTPATIENT)
Dept: URBAN - METROPOLITAN AREA CLINIC 256 | Age: 84
Setting detail: DERMATOLOGY
End: 2024-02-29

## 2024-02-29 VITALS — HEIGHT: 70 IN | WEIGHT: 158 LBS

## 2024-02-29 DIAGNOSIS — Z71.89 OTHER SPECIFIED COUNSELING: ICD-10-CM

## 2024-02-29 DIAGNOSIS — L57.0 ACTINIC KERATOSIS: ICD-10-CM

## 2024-02-29 DIAGNOSIS — D18.0 HEMANGIOMA: ICD-10-CM

## 2024-02-29 DIAGNOSIS — Z85.820 PERSONAL HISTORY OF MALIGNANT MELANOMA OF SKIN: ICD-10-CM

## 2024-02-29 DIAGNOSIS — L57.8 OTHER SKIN CHANGES DUE TO CHRONIC EXPOSURE TO NONIONIZING RADIATION: ICD-10-CM

## 2024-02-29 DIAGNOSIS — D22 MELANOCYTIC NEVI: ICD-10-CM

## 2024-02-29 DIAGNOSIS — L82.1 OTHER SEBORRHEIC KERATOSIS: ICD-10-CM

## 2024-02-29 DIAGNOSIS — I89.0 LYMPHEDEMA, NOT ELSEWHERE CLASSIFIED: ICD-10-CM

## 2024-02-29 PROBLEM — D22.62 MELANOCYTIC NEVI OF LEFT UPPER LIMB, INCLUDING SHOULDER: Status: ACTIVE | Noted: 2024-02-29

## 2024-02-29 PROBLEM — D22.72 MELANOCYTIC NEVI OF LEFT LOWER LIMB, INCLUDING HIP: Status: ACTIVE | Noted: 2024-02-29

## 2024-02-29 PROBLEM — D18.01 HEMANGIOMA OF SKIN AND SUBCUTANEOUS TISSUE: Status: ACTIVE | Noted: 2024-02-29

## 2024-02-29 PROBLEM — D22.61 MELANOCYTIC NEVI OF RIGHT UPPER LIMB, INCLUDING SHOULDER: Status: ACTIVE | Noted: 2024-02-29

## 2024-02-29 PROBLEM — D22.5 MELANOCYTIC NEVI OF TRUNK: Status: ACTIVE | Noted: 2024-02-29

## 2024-02-29 PROBLEM — D22.71 MELANOCYTIC NEVI OF RIGHT LOWER LIMB, INCLUDING HIP: Status: ACTIVE | Noted: 2024-02-29

## 2024-02-29 PROCEDURE — OTHER LIQUID NITROGEN: OTHER

## 2024-02-29 PROCEDURE — OTHER MIPS QUALITY: OTHER

## 2024-02-29 PROCEDURE — OTHER COUNSELING: OTHER

## 2024-02-29 PROCEDURE — OTHER ADDITIONAL NOTES: OTHER

## 2024-02-29 PROCEDURE — 99213 OFFICE O/P EST LOW 20 MIN: CPT | Mod: 25

## 2024-02-29 PROCEDURE — 17004 DESTROY PREMAL LESIONS 15/>: CPT

## 2024-02-29 ASSESSMENT — LOCATION SIMPLE DESCRIPTION DERM
LOCATION SIMPLE: LEFT SCALP
LOCATION SIMPLE: RIGHT THIGH
LOCATION SIMPLE: UPPER BACK
LOCATION SIMPLE: LEFT LOWER BACK
LOCATION SIMPLE: LEFT FOREHEAD
LOCATION SIMPLE: LEFT PRETIBIAL REGION
LOCATION SIMPLE: RIGHT OCCIPITAL SCALP
LOCATION SIMPLE: LEFT UPPER ARM
LOCATION SIMPLE: LEFT CHEEK
LOCATION SIMPLE: RIGHT FOREHEAD
LOCATION SIMPLE: SCALP
LOCATION SIMPLE: LEFT OCCIPITAL SCALP
LOCATION SIMPLE: LEFT EAR
LOCATION SIMPLE: LEFT THIGH
LOCATION SIMPLE: RIGHT FOREARM
LOCATION SIMPLE: LEFT UPPER BACK
LOCATION SIMPLE: LEFT FOREARM

## 2024-02-29 ASSESSMENT — LOCATION DETAILED DESCRIPTION DERM
LOCATION DETAILED: LEFT SUPERIOR LATERAL MALAR CHEEK
LOCATION DETAILED: RIGHT DISTAL DORSAL FOREARM
LOCATION DETAILED: SUPERIOR THORACIC SPINE
LOCATION DETAILED: RIGHT VENTRAL PROXIMAL FOREARM
LOCATION DETAILED: LEFT VENTRAL DISTAL FOREARM
LOCATION DETAILED: LEFT SUPERIOR MEDIAL FOREHEAD
LOCATION DETAILED: LEFT LATERAL MALAR CHEEK
LOCATION DETAILED: LEFT SUPERIOR PARIETAL SCALP
LOCATION DETAILED: LEFT VENTRAL PROXIMAL FOREARM
LOCATION DETAILED: RIGHT SUPERIOR FOREHEAD
LOCATION DETAILED: LEFT MID PREAURICULAR CHEEK
LOCATION DETAILED: LEFT ANTECUBITAL SKIN
LOCATION DETAILED: LEFT FOREHEAD
LOCATION DETAILED: LEFT SUPERIOR HELIX
LOCATION DETAILED: INFERIOR THORACIC SPINE
LOCATION DETAILED: RIGHT ANTERIOR DISTAL THIGH
LOCATION DETAILED: RIGHT VENTRAL DISTAL FOREARM
LOCATION DETAILED: LEFT ANTERIOR DISTAL THIGH
LOCATION DETAILED: LEFT CENTRAL FRONTAL SCALP
LOCATION DETAILED: RIGHT SUPERIOR OCCIPITAL SCALP
LOCATION DETAILED: LEFT SUPERIOR OCCIPITAL SCALP
LOCATION DETAILED: LEFT DISTAL PRETIBIAL REGION
LOCATION DETAILED: LEFT INFERIOR LATERAL MIDBACK
LOCATION DETAILED: LEFT MEDIAL UPPER BACK
LOCATION DETAILED: LEFT MEDIAL FOREHEAD
LOCATION DETAILED: LEFT INFERIOR CENTRAL MALAR CHEEK
LOCATION DETAILED: LEFT DISTAL DORSAL FOREARM
LOCATION DETAILED: RIGHT MEDIAL FOREHEAD

## 2024-02-29 ASSESSMENT — LOCATION ZONE DERM
LOCATION ZONE: EAR
LOCATION ZONE: ARM
LOCATION ZONE: SCALP
LOCATION ZONE: TRUNK
LOCATION ZONE: LEG
LOCATION ZONE: FACE

## 2024-02-29 NOTE — PROCEDURE: LIQUID NITROGEN
Application Tool (Optional): Liquid Nitrogen Sprayer
Render Note In Bullet Format When Appropriate: No
Consent: The patient's consent was obtained including but not limited to risks of crusting, scabbing, blistering, scarring, darker or lighter pigmentary change, recurrence, incomplete removal and infection.
Number Of Freeze-Thaw Cycles: 1 freeze-thaw cycle
Show Aperture Variable?: Yes
Duration Of Freeze Thaw-Cycle (Seconds): 3
Detail Level: Simple
Post-Care Instructions: I reviewed with the patient in detail post-care instructions. Patient is to wear sunprotection, and avoid picking at any of the treated lesions. Pt may apply Vaseline to crusted or scabbing areas.
Detail Level: Detailed
Duration Of Freeze Thaw-Cycle (Seconds): 5

## 2024-02-29 NOTE — PROCEDURE: ADDITIONAL NOTES
Render Risk Assessment In Note?: no
Detail Level: Simple
Additional Notes: - Patient had hip surgery in December 2024. Recommend to wear compession stockings. Patient states he will contact his surgeon if there are any further instructions.

## 2024-03-28 ENCOUNTER — LAB (OUTPATIENT)
Dept: LAB | Facility: CLINIC | Age: 84
End: 2024-03-28
Payer: COMMERCIAL

## 2024-03-28 DIAGNOSIS — R97.20 ELEVATED PROSTATE SPECIFIC ANTIGEN (PSA): ICD-10-CM

## 2024-03-28 PROCEDURE — 84154 ASSAY OF PSA FREE: CPT

## 2024-03-28 PROCEDURE — 36415 COLL VENOUS BLD VENIPUNCTURE: CPT

## 2024-03-28 PROCEDURE — 84153 ASSAY OF PSA TOTAL: CPT

## 2024-03-29 ENCOUNTER — OFFICE VISIT (OUTPATIENT)
Dept: NEUROLOGY | Facility: CLINIC | Age: 84
End: 2024-03-29
Payer: COMMERCIAL

## 2024-03-29 VITALS
BODY MASS INDEX: 24.54 KG/M2 | HEART RATE: 70 BPM | DIASTOLIC BLOOD PRESSURE: 66 MMHG | SYSTOLIC BLOOD PRESSURE: 154 MMHG | WEIGHT: 171 LBS | OXYGEN SATURATION: 98 %

## 2024-03-29 DIAGNOSIS — R26.89 BALANCE PROBLEMS: ICD-10-CM

## 2024-03-29 DIAGNOSIS — G60.9 IDIOPATHIC PERIPHERAL NEUROPATHY: Primary | ICD-10-CM

## 2024-03-29 LAB
PSA FREE MFR SERPL: 30.77 %
PSA FREE SERPL-MCNC: 0.4 NG/ML
PSA SERPL DL<=0.01 NG/ML-MCNC: 1.3 NG/ML

## 2024-03-29 PROCEDURE — 99215 OFFICE O/P EST HI 40 MIN: CPT | Performed by: PSYCHIATRY & NEUROLOGY

## 2024-03-29 RX ORDER — DULOXETIN HYDROCHLORIDE 20 MG/1
20 CAPSULE, DELAYED RELEASE ORAL DAILY
Qty: 30 CAPSULE | Refills: 3 | Status: SHIPPED | OUTPATIENT
Start: 2024-03-29 | End: 2024-08-09

## 2024-03-29 NOTE — PROGRESS NOTES
ESTABLISHED PATIENT NEUROLOGY NOTE    DATE OF VISIT: 3/29/2024  CLINIC LOCATION: [facility]  MRN: [id number]  PATIENT NAME: Ciro Almonte  YOB: 1940    REASON FOR VISIT:   Chief Complaint   Patient presents with    RECHECK     Neuropathy in feet bilat and very bad sense of balance. - very bad 9/10     SUBJECTIVE:                                                      HISTORY OF PRESENT ILLNESS: Patient is here to follow up regarding peripheral polyneuropathy and associated balance difficulty. Please refer to my initial note from 12/27/2023 for further information.  Accompanied by his wife.    Since the last visit, the patient reports worsening of the symptoms despite the trial of alpha lipoic acid, which he continues to take.  Symptoms include numbness, tingling and occasional burning. Balance difficulty is significant. He denies interval development of new focal neurological complaints.  EXAM:                                                    Physical Exam:   Vitals: BP (!) 154/66   Pulse 70   Wt 77.6 kg (171 lb)   SpO2 98%   BMI 24.54 kg/m      General: pt is in NAD, cooperative.  Skin: normal turgor, moist mucous membranes, no lesions/rashes noticed.  HEENT: ATNC, white sclera, normal conjunctiva.  Respiratory: Symmetric lung excursion, no accessory respiratory muscle use.  Abdomen: Non distended.  Neurological: awake, cooperative, follows commands, no exam changes compared to the initial visit.  ASSESSMENT AND PLAN:                                                    Assessment: 84 year old male patient presents for follow-up of polyneuropathy and associated balance difficulty.  He reports symptoms worsening despite adding alpha lipoic acid.  He previously was on gabapentin up to 600 mg 3 times per day without noticeable improvement.  We reviewed his current symptoms in details and discussed various additional treatment options to treat his neuropathic pain that include  Lyrica, Cymbalta, Effexor, and topical treatments.  We reviewed typical side effects of each medicine, and decided to try Cymbalta at the low-dose, which could be further increased if necessary in the future.  I also provided a referral to physical therapy to work on his balance.    Ciro to follow up with Primary Care provider regarding elevated blood pressure.     Diagnoses:    ICD-10-CM    1. Idiopathic peripheral neuropathy  G60.9       2. Balance problems  R26.89         Plan: At today's visit we thoroughly discussed current symptoms, available treatment options, and the plan.    We decided to add Cymbalta 20 mg daily to alpha lipoic acid.  I advised the patient to let me know if he experiences any significant side effects.  I also asked him to provide me an update in 4 to 6 weeks after starting the medicine.  If it is not providing good relief and tolerated well, we could increase the dose.    I also placed a referral to physical therapy to help with his balance.    Next follow-up appointment is in the next 4 months or earlier if needed.    Total Time: 43 minutes spent on the date of the encounter doing chart review, history and exam, documentation and further activities per the note.  Additional time was needed to review patient's current symptoms, available treatment options, and answering numerous questions that he and his wife had.    Hari Myles MD  [facility]  (Chart documentation was completed in part with Dragon voice-recognition software. Even though reviewed, some grammatical, spelling, and word errors may remain.)

## 2024-03-29 NOTE — LETTER
3/29/2024         RE: Ciro Almonte  [address]  [place] [address]        Dear Colleague,    Thank you for referring your patient, Ciro Almonte, to the [facility] NEUROLOGY CLINICS [facility]. Please see a copy of my visit note below.    ESTABLISHED PATIENT NEUROLOGY NOTE    DATE OF VISIT: 3/29/2024  CLINIC LOCATION: [facility]  MRN: [id number]  PATIENT NAME: Ciro Almonte  YOB: 1940    REASON FOR VISIT:   Chief Complaint   Patient presents with     RECHECK     Neuropathy in feet bilat and very bad sense of balance. - very bad 9/10     SUBJECTIVE:                                                      HISTORY OF PRESENT ILLNESS: Patient is here to follow up regarding peripheral polyneuropathy and associated balance difficulty. Please refer to my initial note from 12/27/2023 for further information.  Accompanied by his wife.    Since the last visit, the patient reports worsening of the symptoms despite the trial of alpha lipoic acid, which he continues to take.  Symptoms include numbness, tingling and occasional burning. Balance difficulty is significant. He denies interval development of new focal neurological complaints.  EXAM:                                                    Physical Exam:   Vitals: BP (!) 154/66   Pulse 70   Wt 77.6 kg (171 lb)   SpO2 98%   BMI 24.54 kg/m      General: pt is in NAD, cooperative.  Skin: normal turgor, moist mucous membranes, no lesions/rashes noticed.  HEENT: ATNC, white sclera, normal conjunctiva.  Respiratory: Symmetric lung excursion, no accessory respiratory muscle use.  Abdomen: Non distended.  Neurological: awake, cooperative, follows commands, no exam changes compared to the initial visit.  ASSESSMENT AND PLAN:                                                    Assessment: 84 year old male patient presents for follow-up of her polyneuropathy and associated balance difficulty.  He reports symptoms worsening  despite adding alpha lipoic acid.  He previously was on gabapentin up to 600 mg 3 times per day without noticeable improvement.  We reviewed his current symptoms in details and discussed various additional treatment options to treat his neuropathic pain that include Lyrica, Cymbalta, Effexor, and topical treatments.  We reviewed typical side effects of each medicine, and decided to try Cymbalta at the low-dose, which could be further increased if necessary in the future.  I also provided a referral to physical therapy to work on his balance.    Ciro to follow up with Primary Care provider regarding elevated blood pressure.     Diagnoses:    ICD-10-CM    1. Idiopathic peripheral neuropathy  G60.9       2. Balance problems  R26.89         Plan: At today's visit we thoroughly discussed current symptoms, available treatment options, and the plan.    We decided to add Cymbalta 20 mg daily to alpha lipoic acid.  I advised the patient to let me know if he experiences any significant side effects.  I also asked him to provide me an update in 4 to 6 weeks after starting the medicine.  If it is not providing good relief and tolerated well, we could increase the dose.    I also placed a referral to physical therapy to help with his balance.    Next follow-up appointment is in the next 4 months or earlier if needed.    Total Time: 43 minutes spent on the date of the encounter doing chart review, history and exam, documentation and further activities per the note.  Additional time was needed to review patient's current symptoms, available treatment options, and answering numerous questions that he and his wife had.    Hari Myles MD  [facility]  (Chart documentation was completed in part with Dragon voice-recognition software. Even though reviewed, some grammatical, spelling, and word errors may remain.)      Again, thank you for allowing me to participate in the care of your patient.         Sincerely,        Hari Myles MD

## 2024-03-29 NOTE — PATIENT INSTRUCTIONS
AFTER VISIT SUMMARY (AVS):    At today's visit we thoroughly discussed current symptoms, available treatment options, and the plan.    We decided to add Cymbalta 20 mg daily to alpha lipoic acid.  Please let me know if you experience any side effects.  Please also provide me an update in 4 to 6 weeks after starting the medicine.  If it is not providing good relief and tolerated well, we could increase the dose.    I also placed a referral to physical therapy to help with your balance.    Next follow-up appointment is in the next 4 months or earlier if needed.    Please do not hesitate to call me with any questions or concerns.    Thanks.

## 2024-03-29 NOTE — NURSING NOTE
"Ciro Almonte is a 84 year old male who presents for:  Chief Complaint   Patient presents with    RECHECK     Neuropathy in feet bilat and very bad sense of balance. - very bad 9/10        Initial Vitals:  BP (!) 154/66   Pulse 70   Wt 77.6 kg (171 lb)   SpO2 98%   BMI 24.54 kg/m   Estimated body mass index is 24.54 kg/m  as calculated from the following:    Height as of 12/27/23: 1.778 m (5' 10\").    Weight as of this encounter: 77.6 kg (171 lb).. Body surface area is 1.96 meters squared. BP completed using cuff size: bobbi Engle    "

## 2024-04-01 ENCOUNTER — TRANSFERRED RECORDS (OUTPATIENT)
Dept: HEALTH INFORMATION MANAGEMENT | Facility: CLINIC | Age: 84
End: 2024-04-01
Payer: COMMERCIAL

## 2024-04-12 ENCOUNTER — THERAPY VISIT (OUTPATIENT)
Dept: PHYSICAL THERAPY | Facility: CLINIC | Age: 84
End: 2024-04-12
Attending: PSYCHIATRY & NEUROLOGY
Payer: COMMERCIAL

## 2024-04-12 DIAGNOSIS — R26.89 BALANCE PROBLEMS: ICD-10-CM

## 2024-04-12 DIAGNOSIS — R26.89 IMPAIRED GAIT AND MOBILITY: Primary | ICD-10-CM

## 2024-04-12 PROCEDURE — 97162 PT EVAL MOD COMPLEX 30 MIN: CPT | Mod: GP

## 2024-04-12 PROCEDURE — 97112 NEUROMUSCULAR REEDUCATION: CPT | Mod: GP

## 2024-04-12 NOTE — PROGRESS NOTES
PHYSICAL THERAPY EVALUATION  Type of Visit: Evaluation    See electronic medical record for Abuse and Falls Screening details.    Subjective       Presenting condition or subjective complaint:    Pt is an 83 y/o M, presenting to PT for evaluation of balance. He was previously treated at this clinic for balance impairment for 8-week periods in 08/2022 and 08/2023. He's not been completing balance training at home. Reporting he stopped doing the exercises after his last PT session. Reporting increased balance challenge in the dark, on unstable surfaces, and feels he should be ambulating with an SPC (though doesn't like the stigma). He's recently had a L TKA in 12/2023. He had PT to follow; though, still has increased pain and limited knee flexion. He reports continued pain in the ankles 2' hx of arthritis & flat feet (L > R pain), and pain in the hips (hx of R/L TKA; feels R has more pain recently). He continues to do limited house/yard work, as they've not yet sold their home. Considering moving into a 65+ community; however, would like to sell the house first.    Date of onset: 04/08/24 (Order date 2' chronic balance problems.)      Relevant medical history:     Past Medical History:   Diagnosis Date    Aortic valve stenosis     Cancer (H) 2000    L shoulder melanoma    Cardiomyopathy (H)     Glaucoma     Heart murmur     Hypertension     Irregular heart beat     Melanoma (H) 2000    Left shoulder - Dr. Carrillo - follows q6 months    Mitral valve insufficiency     Neuropathy     Nonsenile cataract     Other chronic pain     Neuropathy bilateral feet    Other premature beats     PVC's (premature ventricular contractions) 05/14/2015    Sleep apnea     Zoster 2000     Dates & types of surgery:    Past Surgical History:   Procedure Laterality Date    ADULT DERMATOLOGY [facility] REFERRAL  2000    melanoma resection L shoulder    ARTHROPLASTY HIP ANTERIOR Left 11/14/2014    Procedure: ARTHROPLASTY HIP ANTERIOR;  Surgeon:  Rudy Tobias MD;  Location:  OR    ARTHROPLASTY KNEE Left 12/13/2023    Procedure: LEFT TOTAL KNEE ARTHROPLASTY;  Surgeon: Kevin Zamarripa MD;  Location: [facility] OR    ARTHROPLASTY REVISION HIP  3/12/2014     right Procedure: ARTHROPLASTY REVISION HIP;  Surgeon: Rudy Tobias MD;  Location:  OR    ARTHROSCOPY KNEE  2002    meniscus ( side?)    BIOPSY OF PROSTATE,NEEDLE/PUNCH      both hips replaced      CATARACT IOL, RT/LT Bilateral     COLONOSCOPY N/A 5/28/2020    Procedure: COLONOSCOPY (fv) (please mail packet as soon as possible) fv;  Surgeon: Kevin Mccarty MD;  Location:  GI    DISCECTOMY LUMBAR POSTERIOR MICROSCOPIC ONE LEVEL N/A 4/11/2016    Procedure: DISCECTOMY LUMBAR POSTERIOR MICROSCOPIC ONE LEVEL;  Surgeon: Ruben Marsh MD;  Location:  OR    ENT SURGERY      Tonsils    INJECT STEROID (LOCATION) Right 12/13/2023    Procedure: RIGHT KNEE CORTISONE INJECTION;  Surgeon: Kevin Zamarripa MD;  Location: [facility] OR    ORTHOPEDIC SURGERY  2008    R hip replacement    ORTHOPEDIC SURGERY  2004    big toe in , needed reconstructive surgery    PHACOEMULSIFICATION CLEAR CORNEA WITH TORIC INTRAOCULAR LENS IMPLANT Left 11/6/2019    Procedure: LEFT EYE, CATARACT EXTRACTION WITH TORIC INTRAOCULAR LENS IMPLANT;  Surgeon: Shannon Yang MD;  Location: [facility] OR    PHACOEMULSIFICATION CLEAR CORNEA WITH TORIC INTRAOCULAR LENS IMPLANT Right 11/20/2019    Procedure: RIGHT EYE, CATARACT EXTRACTION WITH TORIC INTRAOCULAR LENS IMPLANT;  Surgeon: Shannon Yang MD;  Location: [facility] OR    TRABECULAR MICRO-BYPASS WITH ISTENT Left 11/6/2019    Procedure: LEFT EYE, ISTENT INSERTION;  Surgeon: Shannon Yang MD;  Location: [facility] OR    TRABECULAR MICRO-BYPASS WITH ISTENT Right 11/20/2019    Procedure: RIGHT EYE, ISTENT INSERTION;  Surgeon: Shannon Yang MD;  Location:  OR    ZZC TOTAL HIP ARTHROPLASTY Right 2008     Prior diagnostic imaging/testing results: Hx of  head MRI, with small vessel ischemia and cerebral matter loss - typical for age.    Prior therapy history for the same diagnosis, illness or injury: Yes, pt completed balance intervention with this PT/at this clinic in Summer 2022 and Fall 2023.    Prior Level of Function  Transfers: Independent; reporting occasional use of SPC.  Ambulation: Independent; reporting occasional use of SPC.  ADL: Independent  IADL: Driving, Finances, Housekeeping, Yard work      Living Environment  Social support: Pt lives with his wife  Type of home: Home. They are considering move to a one-level apartment.  Stairs to enter the home: x4 BARRY; uses railing.  Stairs inside the home: Pt uses railing for both up/down to take pressure off of the feet/to balance.  Help at home: Pt's wife with meals, laundry, chores, etc.  Equipment owned: SPC; 2WW; grab bars in the bathroom     Employment: N/A; pt retired  Hobbies/Interests: House/yard work    Patient goals for therapy:  To improve balance.    Pain assessment: Pain present in the feet. See subjective above.     Objective      Cognitive Status Examination  Pt with no cognitive deficits.     OBSERVATION:  Pt ambulating with antalgic gait; 2' L foot and knee pain with weightbearing.     POSTURE: Standing Posture: Rounded shoulders, Forward head    PALPATION: N/A    RANGE OF MOTION:  Knee flexion ROM limited in the L LE. He also has poor ankle PF/DF bilaterally, due to pain and arthritis restricting joint motion.     STRENGTH:  Pt with 4-/5 strength in the R/L LE.    BED MOBILITY: Independent, reporting sitting on EOB to ensure wellness prior to STS.    TRANSFERS: Independent, typically uses B UE to push to stand.    WHEELCHAIR MOBILITY: N/A    GAIT:   Level of [place]: Independent  Assistive Device(s): None; Has SPC, though doesn't typically use.  Gait Deviations: Antalgic  Base of support decreased  Stance time decreased  Stride length decreased  Nano decreased  Gait Distance: >100ft  during PT session.  Stairs: Completed in step-to to ascend, and reciprocal to descend. Requires UE use on HR.    BALANCE/SPECIAL TESTS  10 Meter Walk Test (Comfortable)  0.8m/s, with no device    Previously 1.03m/s with no device.    10 Meter Walk Test (Fast)  0.9m/s, with no device.     5 Times Sit-to-Stand (5TSTS)  13.65s (pt at risk for falling)    Previously 21/24 points in Fall 2023.     Dynamic Gait Index (DGI) Total Score (out of 24): 12; pt at risk for falling.     Previously 21/24 at time of discharge in Fall 2023.   Modified CTSIB Conditions (sec) Cond 1: 30s  Cond 2: 6s (CGAx1 to catch balance)  Cond 4: 30s (inc sway)  Cond 5 : 3s (MinAx1 to catch balance)    Previously in Fall 2023:   Cond 1: 30s  Cond 2: 30s  Cond 4: 30s  Cond 5 : 28s     SENSATION: Pt with numbness/tingling in the toes; he has hx of peripheral neuropathy.     COORDINATION: Deferred; not likely impaired 2' etiology of pt's condition.     Assessment & Plan   CLINICAL IMPRESSIONS  Medical Diagnosis: balance problems    Treatment Diagnosis: impairment of balance with household/community activities; LE pain   Impression/Assessment: Patient is a 84 year old male with instability and impaired functional mobility complaints.  The following significant findings have been identified: Pain, Decreased ROM/flexibility, Decreased joint mobility, Decreased strength, Impaired balance, Decreased proprioception, Impaired sensation, Impaired gait, Impaired muscle performance, Decreased activity tolerance, Impaired posture, and Instability. These impairments interfere with their ability to perform self care tasks, recreational activities, household chores, household mobility, and community mobility as compared to previous level of function.     Clinical Decision Making (Complexity):  Clinical Presentation: Evolving/Changing  Clinical Presentation Rationale: based on medical and personal factors listed in PT evaluation  Clinical Decision Making  (Complexity): Moderate complexity    PLAN OF CARE  Treatment Interventions:  Interventions: Gait Training, Manual Therapy, Neuromuscular Re-education, Therapeutic Activity, Therapeutic Exercise, Self-Care/Home Management    Long Term Goals     PT Goal 1  Goal Identifier: HEP  Goal Description: Pt will demonstrate IND with strength, balance, and muscular and aerobic endurance HEP, while working to improve capacity for functional mobility.  Goal Progress: Initiated at evaluation; see PTRx.  Target Date: 07/10/24  PT Goal 2  Goal Identifier: 5xSTS  Goal Description: Pt will complete x5 STS in <12s without use of B UEs, to demonstrate increased B LE strength and reduced risk for falling.  Goal Progress: baseline: 13.65s at no UE from EOM at 18in.  Target Date: 07/10/24  PT Goal 3  Goal Identifier: DGI  Goal Description: Pt will score >19/24pts on DGI, to demonstrate improved dynamic balance and postural control with ambulation and decreased risk for falling with functional mobility.  Goal Progress: baseline: 12/24 points; inc risk for falling.  Target Date: 07/10/24  PT Goal 4  Goal Identifier: 10 MWT  Goal Description: Patient will achieve MDC for self-selected and fast gait speeds to show improved gait efficiency, boyd, and mechanics to reduce risk for falling.  Goal Progress: baseline: comf=0.8m/s, fast=0.9m/s; at inc risk for falling.  Target Date: 07/10/24  PT Goal 5  Goal Identifier: 6MWT  Goal Description: Pt will achieve an increased ambulation distance to meet the MDC for the 6MWT to indicate an improved tolerance for aerobic exercise and community ambulation.  Target Date: 07/10/24      Frequency of Treatment: 1x/week  Duration of Treatment: 90 days    Education Assessment:   Learner/Method: Patient;Listening;Demonstration;Pictures/Video;Reading;No Barriers to Learning    Risks and benefits of evaluation/treatment have been explained.   Patient/Family/caregiver agrees with Plan of Care.     Evaluation Time:      PT Eval, Moderate Complexity Minutes (05015): 20     Signing Clinician: Beatrice Jorgensen PT, DPT, NCS      [facility]                                                                                   OUTPATIENT PHYSICAL THERAPY      PLAN OF TREATMENT FOR OUTPATIENT REHABILITATION   Patient's Last Name, First Name, Ciro García YOB: 1940   Provider's Name   [facility]   Medical Record No.  [id number]     Onset Date: 04/08/24 (Order date 2' chronic balance problems.)  Start of Care Date: 04/12/24     Medical Diagnosis:  balance problems      PT Treatment Diagnosis:  impairment of balance with household/community activities; LE pain Plan of Treatment  Frequency/Duration: 1x/week/ 90 days    Certification date from 04/12/24 to 07/10/24         See note for plan of treatment details and functional goals     Beatrice Jorgensen PT, DPT, NCS                          I CERTIFY THE NEED FOR THESE SERVICES FURNISHED UNDER        THIS PLAN OF TREATMENT AND WHILE UNDER MY CARE     (Physician attestation of this document indicates review and certification of the therapy plan).              Referring Provider:  Hari Myles MD  Sending to pt's PCP: Radha Banks MD    Initial Assessment  See Epic Evaluation- Start of Care Date: 04/12/24

## 2024-04-15 ENCOUNTER — OFFICE VISIT (OUTPATIENT)
Dept: URGENT CARE | Facility: URGENT CARE | Age: 84
End: 2024-04-15
Payer: COMMERCIAL

## 2024-04-15 VITALS
TEMPERATURE: 96.8 F | DIASTOLIC BLOOD PRESSURE: 58 MMHG | OXYGEN SATURATION: 97 % | SYSTOLIC BLOOD PRESSURE: 112 MMHG | WEIGHT: 171 LBS | BODY MASS INDEX: 24.54 KG/M2 | HEART RATE: 76 BPM | RESPIRATION RATE: 16 BRPM

## 2024-04-15 DIAGNOSIS — I10 ESSENTIAL HYPERTENSION WITH GOAL BLOOD PRESSURE LESS THAN 140/90: Primary | ICD-10-CM

## 2024-04-15 PROCEDURE — 99213 OFFICE O/P EST LOW 20 MIN: CPT | Performed by: NURSE PRACTITIONER

## 2024-04-15 RX ORDER — TRAMADOL HYDROCHLORIDE 50 MG/1
TABLET ORAL
COMMUNITY
End: 2024-08-20

## 2024-04-15 NOTE — PROGRESS NOTES
Assessment & Plan     Essential hypertension with goal blood pressure less than 140/90     Patient Instructions   No results found for any visits on 04/15/24.    BP stable today.    Drink more water  Keep checking BP's periodically, if they remain low follow up with your PCP  If BP is below 100/60 hold amlodipine dose and call PCP.      Return in about 1 week (around 4/22/2024) for with regular provider if symptoms persist.    TACHO Martinez CNP  [facility] FLORENCIA Tanner is a 84 year old male who presents to clinic today for the following health issues:  Chief Complaint   Patient presents with    Hypotension     Pt states he has had some low bp readings-checked it a couple of days ago because he didn't feel right-pt is on bp meds      HPI    Cardiac concerns    Symptom Onset: 3 day(s) ago.  Timing of illness: intermittent episodes lasting  10  minutes.  Current and Associated symptoms: dizzy and fatigued.  Character: n/a.  Severity mild.  Location: n/a.  Radiation: n/a.  Associated Symptoms: lightheadedness.  Exacerbated by: nothing.  Relieved by: nothing.  Cardiac risk factors: htn.      Review of Systems  Constitutional, HEENT, cardiovascular, pulmonary, gi and gu systems are negative, except as otherwise noted.      Objective    /58   Pulse 76   Temp 96.8  F (36  C) (Tympanic)   Resp 16   Wt 77.6 kg (171 lb)   SpO2 97%   BMI 24.54 kg/m    Physical Exam   GENERAL: alert and no distress  RESP: lungs clear to auscultation - no rales, rhonchi or wheezes  CV: regular rate and rhythm, normal S1 S2, no S3 or S4, no murmur, click or rub, no peripheral edema  ABDOMEN: soft, nontender, no hepatosplenomegaly, no masses and bowel sounds normal  MS: no gross musculoskeletal defects noted, no edema  SKIN: no suspicious lesions or rashes

## 2024-04-15 NOTE — PATIENT INSTRUCTIONS
No results found for any visits on 04/15/24.    BP stable today.    Drink more water  Keep checking BP's periodically, if they remain low follow up with your PCP  If BP is below 100/60 hold amlodipine dose and call PCP.

## 2024-04-16 NOTE — PROGRESS NOTES
Ciro Almonte is a 77 year old male who presents today for Ear Wash. with the complaint of need ears clean for [facility]  and usual hearing loss.    Ear exam showing minor wax in both  ears.  No drops were place in bilateral ear(s) and let soak for 0 minutes.  The patients ear(s) were irrigated using warm water and  elephant spray bottle  with mild amount of wax extracted.  Patient tolerated procedure well.    Patient instructed to irrigate ears with warm water daily.    Outcome:conpletely removed cerumen form both ears.     Deann Calderon MA// April 21, 2017 9:43 AM             Post-Op Assessment Note    CV Status:  Stable  Pain Score: 0    Pain management: adequate       Mental Status:  Alert and awake   Hydration Status:  Euvolemic and stable   PONV Controlled:  Controlled   Airway Patency:  Patent and adequate     Post Op Vitals Reviewed: Yes    No anethesia notable event occurred.    Staff: CRNA               BP      Temp 97.7 °F (36.5 °C) (04/16/24 1233)    Pulse 88 (04/16/24 1233)   Resp 18 (04/16/24 1233)    SpO2 98 % (04/16/24 1233)

## 2024-04-19 ENCOUNTER — THERAPY VISIT (OUTPATIENT)
Dept: PHYSICAL THERAPY | Facility: CLINIC | Age: 84
End: 2024-04-19
Attending: PSYCHIATRY & NEUROLOGY
Payer: COMMERCIAL

## 2024-04-19 DIAGNOSIS — R26.89 IMPAIRED GAIT AND MOBILITY: ICD-10-CM

## 2024-04-19 DIAGNOSIS — E29.1 TESTICULAR HYPOFUNCTION: ICD-10-CM

## 2024-04-19 DIAGNOSIS — R26.89 BALANCE PROBLEMS: Primary | ICD-10-CM

## 2024-04-19 PROCEDURE — 97530 THERAPEUTIC ACTIVITIES: CPT | Mod: GP

## 2024-04-19 PROCEDURE — 97112 NEUROMUSCULAR REEDUCATION: CPT | Mod: GP

## 2024-04-26 ENCOUNTER — THERAPY VISIT (OUTPATIENT)
Dept: PHYSICAL THERAPY | Facility: CLINIC | Age: 84
End: 2024-04-26
Attending: PSYCHIATRY & NEUROLOGY
Payer: COMMERCIAL

## 2024-04-26 DIAGNOSIS — R26.89 IMPAIRED GAIT AND MOBILITY: ICD-10-CM

## 2024-04-26 DIAGNOSIS — R26.89 BALANCE PROBLEMS: Primary | ICD-10-CM

## 2024-04-26 PROCEDURE — 97112 NEUROMUSCULAR REEDUCATION: CPT | Mod: GP

## 2024-04-26 PROCEDURE — 97530 THERAPEUTIC ACTIVITIES: CPT | Mod: GP

## 2024-05-03 ENCOUNTER — THERAPY VISIT (OUTPATIENT)
Dept: PHYSICAL THERAPY | Facility: CLINIC | Age: 84
End: 2024-05-03
Payer: COMMERCIAL

## 2024-05-03 DIAGNOSIS — R42 DIZZINESS: ICD-10-CM

## 2024-05-03 DIAGNOSIS — R26.89 BALANCE PROBLEMS: Primary | ICD-10-CM

## 2024-05-03 DIAGNOSIS — R26.89 IMBALANCE: ICD-10-CM

## 2024-05-03 DIAGNOSIS — R26.89 IMPAIRED GAIT AND MOBILITY: ICD-10-CM

## 2024-05-03 PROCEDURE — 97112 NEUROMUSCULAR REEDUCATION: CPT | Mod: GP

## 2024-05-31 DIAGNOSIS — I10 ESSENTIAL HYPERTENSION WITH GOAL BLOOD PRESSURE LESS THAN 140/90: ICD-10-CM

## 2024-05-31 RX ORDER — LISINOPRIL 40 MG/1
40 TABLET ORAL DAILY
Qty: 90 TABLET | Refills: 1 | OUTPATIENT
Start: 2024-05-31

## 2024-06-07 ENCOUNTER — THERAPY VISIT (OUTPATIENT)
Dept: PHYSICAL THERAPY | Facility: CLINIC | Age: 84
End: 2024-06-07
Payer: COMMERCIAL

## 2024-06-07 DIAGNOSIS — R26.89 BALANCE PROBLEMS: Primary | ICD-10-CM

## 2024-06-07 DIAGNOSIS — R26.89 IMBALANCE: ICD-10-CM

## 2024-06-07 DIAGNOSIS — R26.89 IMPAIRED GAIT AND MOBILITY: ICD-10-CM

## 2024-06-07 DIAGNOSIS — R42 DIZZINESS: ICD-10-CM

## 2024-06-07 PROCEDURE — 97112 NEUROMUSCULAR REEDUCATION: CPT | Mod: GP

## 2024-06-12 ENCOUNTER — THERAPY VISIT (OUTPATIENT)
Dept: PHYSICAL THERAPY | Facility: CLINIC | Age: 84
End: 2024-06-12
Payer: COMMERCIAL

## 2024-06-12 DIAGNOSIS — R26.89 IMBALANCE: ICD-10-CM

## 2024-06-12 DIAGNOSIS — R26.89 BALANCE PROBLEMS: ICD-10-CM

## 2024-06-12 DIAGNOSIS — R26.89 IMPAIRED GAIT AND MOBILITY: Primary | ICD-10-CM

## 2024-06-12 PROCEDURE — 97112 NEUROMUSCULAR REEDUCATION: CPT | Mod: GP

## 2024-07-27 DIAGNOSIS — G60.9 IDIOPATHIC PERIPHERAL NEUROPATHY: ICD-10-CM

## 2024-07-30 RX ORDER — DULOXETIN HYDROCHLORIDE 20 MG/1
20 CAPSULE, DELAYED RELEASE ORAL DAILY
Qty: 30 CAPSULE | Refills: 3 | OUTPATIENT
Start: 2024-07-30

## 2024-07-30 NOTE — TELEPHONE ENCOUNTER
Per note. Pt will call if needed prior to 8/9/24 visit.     Will deny for now.     Rose NINO RN, BSN  [facility]

## 2024-08-03 ENCOUNTER — HEALTH MAINTENANCE LETTER (OUTPATIENT)
Age: 84
End: 2024-08-03

## 2024-08-09 ENCOUNTER — OFFICE VISIT (OUTPATIENT)
Dept: NEUROLOGY | Facility: CLINIC | Age: 84
End: 2024-08-09
Payer: COMMERCIAL

## 2024-08-09 VITALS — SYSTOLIC BLOOD PRESSURE: 130 MMHG | OXYGEN SATURATION: 99 % | HEART RATE: 54 BPM | DIASTOLIC BLOOD PRESSURE: 65 MMHG

## 2024-08-09 DIAGNOSIS — R26.89 BALANCE PROBLEMS: ICD-10-CM

## 2024-08-09 DIAGNOSIS — G60.9 IDIOPATHIC PERIPHERAL NEUROPATHY: Primary | ICD-10-CM

## 2024-08-09 PROCEDURE — 99214 OFFICE O/P EST MOD 30 MIN: CPT | Performed by: PSYCHIATRY & NEUROLOGY

## 2024-08-09 PROCEDURE — G2211 COMPLEX E/M VISIT ADD ON: HCPCS | Performed by: PSYCHIATRY & NEUROLOGY

## 2024-08-09 NOTE — PROGRESS NOTES
ESTABLISHED PATIENT NEUROLOGY NOTE    DATE OF VISIT: 8/9/2024  CLINIC LOCATION: [facility]  MRN: [id number]  PATIENT NAME: Ciro Almonte  YOB: 1940    REASON FOR VISIT:   Chief Complaint   Patient presents with    Follow Up     Balance- patient feels it has declined      SUBJECTIVE:                                                      HISTORY OF PRESENT ILLNESS: Patient is here to follow up regarding peripheral polyneuropathy with associated balance difficulty.  The last visit was on 3/29/2024.  Please refer to my initial/other prior notes for further information.  Accompanied by his wife.    Since the last visit, the patient reports that his symptoms are the same.  Unsure if physical therapy was beneficial, but had to pause it because they are in the process of selling their house, which takes a lot of time.  He feels that 20 mg of Cymbalta daily in addition to alpha lipoic acid at 200 mg 3 times per day did not make a difference in his symptoms.  Wonders if experienced feet pain is due to advanced arthritis.  Reports stomach irritation with alpha lipoic acid.  No other side effects.  No interval development of new neurological symptoms.  EXAM:                                                    Physical Exam:   Vitals: /65 (BP Location: Right arm, Patient Position: Sitting, Cuff Size: Adult Regular)   Pulse 54   SpO2 99%     General: pt is in NAD, cooperative.  Skin: normal turgor, moist mucous membranes, no lesions/rashes noticed.  HEENT: ATNC, white sclera, normal conjunctiva.  Respiratory: Symmetric lung excursion, no accessory respiratory muscle use.  Abdomen: Non distended.  Neurological: awake, cooperative, follows commands, face is symmetric, equally moves all extremities, Achilles reflexes absent bilaterally, pinprick/vibration reduced in both feet, no dysmetria bilaterally..  ASSESSMENT AND PLAN:                                                    Assessment: 84  year old male patient presents for follow-up of peripheral polyneuropathy and associated balance difficulty.  He reports no pain improvement after adding Cymbalta at 20 mg daily.  We discussed that this dose could be further increased.  However, the patient questions if the pain that he currently experiences is arthritic versus neuropathic, which is reasonable question.  He denies any burning or stabbing quality.  The pain is mainly present when he walks and steps on his feet.  We decided to stop Cymbalta and see if pain worsens.  In such case, we will restart him and possibly increase the dose to 30 mg daily.  Additional treatment options include Lyrica, Effexor, and topical treatments.  Previous trial of gabapentin at 600 mg 3 times per day was not helpful.  Physical therapy should be resumed when he is able.    Diagnoses:    ICD-10-CM    1. Idiopathic peripheral neuropathy  G60.9       2. Balance problems  R26.89         Plan: At today's visit we thoroughly discussed current symptoms, available treatment options, and the plan.    We decided to stop Cymbalta and see if his pain worsens.  In such case, I advised the patient to contact my clinic to restart it on increased dose of 30 mg daily.    Because of the stomach irritation, I recommended to reduce the dose of alpha lipoic acid to 200 mg twice daily with meals.    We discussed that he should resume physical therapy when able.    Next follow-up appointment is in the next 6 months or earlier if needed.    Total Time: 27 minutes spent on the date of the encounter doing chart review, history and exam, documentation and further activities per the note.    Hari Myles MD  [facility]  (Chart documentation was completed in part with Dragon voice-recognition software. Even though reviewed, some grammatical, spelling, and word errors may remain.)

## 2024-08-09 NOTE — PATIENT INSTRUCTIONS
AFTER VISIT SUMMARY (AVS):    At today's visit we thoroughly discussed current symptoms, available treatment options, and the plan.    We decided to stop Cymbalta and see if your pain worsens.  In such case, please contact my clinic to restart it on increased dose of 30 mg daily.    Because of the stomach irritation, please reduce the dose of alpha lipoic acid to 200 mg twice daily with meals.    Please resume physical therapy when able.    Next follow-up appointment is in the next 6 months or earlier if needed.    Please do not hesitate to call me with any questions or concerns.    Thanks.

## 2024-08-09 NOTE — LETTER
8/9/2024      Ciro Almonte  [address]  [place] [address]      Dear Colleague,    Thank you for referring your patient, Ciro Almonte, to the [facility]. Please see a copy of my visit note below.    ESTABLISHED PATIENT NEUROLOGY NOTE    DATE OF VISIT: 8/9/2024  CLINIC LOCATION: [facility]  MRN: [id number]  PATIENT NAME: Ciro Almonte  YOB: 1940    REASON FOR VISIT:   Chief Complaint   Patient presents with     Follow Up     Balance- patient feels it has declined      SUBJECTIVE:                                                      HISTORY OF PRESENT ILLNESS: Patient is here to follow up regarding peripheral polyneuropathy with associated balance difficulty.  The last visit was on 3/29/2024.  Please refer to my initial/other prior notes for further information.  Accompanied by his wife.    Since the last visit, the patient reports that his symptoms are the same.  Unsure if physical therapy was beneficial, but had to pause it because they are in the process of selling their house, which takes a lot of time.  He feels that 20 mg of Cymbalta daily in addition to alpha lipoic acid at 200 mg 3 times per day did not make a difference in his symptoms.  Wonders if experienced feet pain is due to advanced arthritis.  Reports stomach irritation with alpha lipoic acid.  No other side effects.  No interval development of new neurological symptoms.  EXAM:                                                    Physical Exam:   Vitals: /65 (BP Location: Right arm, Patient Position: Sitting, Cuff Size: Adult Regular)   Pulse 54   SpO2 99%     General: pt is in NAD, cooperative.  Skin: normal turgor, moist mucous membranes, no lesions/rashes noticed.  HEENT: ATNC, white sclera, normal conjunctiva.  Respiratory: Symmetric lung excursion, no accessory respiratory muscle use.  Abdomen: Non distended.  Neurological: awake, cooperative, follows  commands, face is symmetric, equally moves all extremities, Achilles reflexes absent bilaterally, pinprick/vibration reduced in both feet, no dysmetria bilaterally..  ASSESSMENT AND PLAN:                                                    Assessment: 84 year old male patient presents for follow-up of peripheral polyneuropathy and associated balance difficulty.  He reports no pain improvement after adding Cymbalta at 20 mg daily.  We discussed that this dose could be further increased.  However, the patient questions if the pain that he currently experiences is arthritic versus neuropathic, which is reasonable question.  He denies any burning or stabbing quality.  The pain is mainly present when he walks and steps on his feet.  We decided to stop Cymbalta and see if pain worsens.  In such case, we will restart him and possibly increase the dose to 30 mg daily.  Additional treatment options include Lyrica, Effexor, and topical treatments.  Previous trial of gabapentin at 600 mg 3 times per day was not helpful.  Physical therapy should be resumed when he is able.    Diagnoses:    ICD-10-CM    1. Idiopathic peripheral neuropathy  G60.9       2. Balance problems  R26.89         Plan: At today's visit we thoroughly discussed current symptoms, available treatment options, and the plan.    We decided to stop Cymbalta and see if his pain worsens.  In such case, I advised the patient to contact my clinic to restart it on increased dose of 30 mg daily.    Because of the stomach irritation, I recommended to reduce the dose of alpha lipoic acid to 200 mg twice daily with meals.    We discussed that he should resume physical therapy when able.    Next follow-up appointment is in the next 6 months or earlier if needed.    Total Time: 27 minutes spent on the date of the encounter doing chart review, history and exam, documentation and further activities per the note.    Hari Myles MD  [facility]  Neurology  (Chart documentation was completed in part with Dragon voice-recognition software. Even though reviewed, some grammatical, spelling, and word errors may remain.)      Again, thank you for allowing me to participate in the care of your patient.        Sincerely,        Hari Myles MD

## 2024-08-20 ENCOUNTER — OFFICE VISIT (OUTPATIENT)
Dept: PEDIATRICS | Facility: CLINIC | Age: 84
End: 2024-08-20
Payer: COMMERCIAL

## 2024-08-20 VITALS
OXYGEN SATURATION: 98 % | SYSTOLIC BLOOD PRESSURE: 145 MMHG | RESPIRATION RATE: 18 BRPM | HEART RATE: 56 BPM | WEIGHT: 161.6 LBS | HEIGHT: 69 IN | BODY MASS INDEX: 23.93 KG/M2 | TEMPERATURE: 98.4 F | DIASTOLIC BLOOD PRESSURE: 69 MMHG

## 2024-08-20 DIAGNOSIS — R73.9 HYPERGLYCEMIA: ICD-10-CM

## 2024-08-20 DIAGNOSIS — L98.9 SKIN LESION: ICD-10-CM

## 2024-08-20 DIAGNOSIS — Z00.00 ENCOUNTER FOR MEDICARE ANNUAL WELLNESS EXAM: Primary | ICD-10-CM

## 2024-08-20 DIAGNOSIS — I42.9 CARDIOMYOPATHY, UNSPECIFIED TYPE (H): ICD-10-CM

## 2024-08-20 DIAGNOSIS — E78.2 MIXED HYPERLIPIDEMIA: ICD-10-CM

## 2024-08-20 DIAGNOSIS — L21.9 SEBORRHEIC DERMATITIS: ICD-10-CM

## 2024-08-20 DIAGNOSIS — I10 ESSENTIAL HYPERTENSION WITH GOAL BLOOD PRESSURE LESS THAN 140/90: ICD-10-CM

## 2024-08-20 LAB — HBA1C MFR BLD: 5.6 % (ref 0–5.6)

## 2024-08-20 PROCEDURE — 80053 COMPREHEN METABOLIC PANEL: CPT | Performed by: INTERNAL MEDICINE

## 2024-08-20 PROCEDURE — 99397 PER PM REEVAL EST PAT 65+ YR: CPT | Performed by: INTERNAL MEDICINE

## 2024-08-20 PROCEDURE — 80061 LIPID PANEL: CPT | Performed by: INTERNAL MEDICINE

## 2024-08-20 PROCEDURE — 36415 COLL VENOUS BLD VENIPUNCTURE: CPT | Performed by: INTERNAL MEDICINE

## 2024-08-20 PROCEDURE — 99214 OFFICE O/P EST MOD 30 MIN: CPT | Mod: 25 | Performed by: INTERNAL MEDICINE

## 2024-08-20 PROCEDURE — 83036 HEMOGLOBIN GLYCOSYLATED A1C: CPT | Performed by: INTERNAL MEDICINE

## 2024-08-20 RX ORDER — AMLODIPINE BESYLATE 10 MG/1
10 TABLET ORAL DAILY
Qty: 90 TABLET | Refills: 3 | Status: SHIPPED | OUTPATIENT
Start: 2024-08-20

## 2024-08-20 RX ORDER — ATORVASTATIN CALCIUM 20 MG/1
20 TABLET, FILM COATED ORAL DAILY
Qty: 90 TABLET | Refills: 3 | Status: SHIPPED | OUTPATIENT
Start: 2024-08-20

## 2024-08-20 RX ORDER — LISINOPRIL 40 MG/1
40 TABLET ORAL DAILY
Qty: 90 TABLET | Refills: 1 | Status: SHIPPED | OUTPATIENT
Start: 2024-08-20

## 2024-08-20 SDOH — HEALTH STABILITY: PHYSICAL HEALTH: ON AVERAGE, HOW MANY DAYS PER WEEK DO YOU ENGAGE IN MODERATE TO STRENUOUS EXERCISE (LIKE A BRISK WALK)?: 0 DAYS

## 2024-08-20 ASSESSMENT — SOCIAL DETERMINANTS OF HEALTH (SDOH): HOW OFTEN DO YOU GET TOGETHER WITH FRIENDS OR RELATIVES?: THREE TIMES A WEEK

## 2024-08-20 NOTE — PATIENT INSTRUCTIONS
Patient Education   Preventive Care Advice   This is general advice given by our system to help you stay healthy. However, your care team may have specific advice just for you. Please talk to your care team about your preventive care needs.  Nutrition  Eat 5 or more servings of fruits and vegetables each day.  Try wheat bread, brown rice and whole grain pasta (instead of white bread, rice, and pasta).  Get enough calcium and vitamin D. Check the label on foods and aim for 100% of the RDA (recommended daily allowance).  Lifestyle  Exercise at least 150 minutes each week  (30 minutes a day, 5 days a week).  Do muscle strengthening activities 2 days a week. These help control your weight and prevent disease.  No smoking.  Wear sunscreen to prevent skin cancer.  Have a dental exam and cleaning every 6 months.  Yearly exams  See your health care team every year to talk about:  Any changes in your health.  Any medicines your care team has prescribed.  Preventive care, family planning, and ways to prevent chronic diseases.  Shots (vaccines)   HPV shots (up to age 26), if you've never had them before.  Hepatitis B shots (up to age 59), if you've never had them before.  COVID-19 shot: Get this shot when it's due.  Flu shot: Get a flu shot every year.  Tetanus shot: Get a tetanus shot every 10 years.  Pneumococcal, hepatitis A, and RSV shots: Ask your care team if you need these based on your risk.  Shingles shot (for age 50 and up)  General health tests  Diabetes screening:  Starting at age 35, Get screened for diabetes at least every 3 years.  If you are younger than age 35, ask your care team if you should be screened for diabetes.  Cholesterol test: At age 39, start having a cholesterol test every 5 years, or more often if advised.  Bone density scan (DEXA): At age 50, ask your care team if you should have this scan for osteoporosis (brittle bones).  Hepatitis C: Get tested at least once in your life.  STIs (sexually  transmitted infections)  Before age 24: Ask your care team if you should be screened for STIs.  After age 24: Get screened for STIs if you're at risk. You are at risk for STIs (including HIV) if:  You are sexually active with more than one person.  You don't use condoms every time.  You or a partner was diagnosed with a sexually transmitted infection.  If you are at risk for HIV, ask about PrEP medicine to prevent HIV.  Get tested for HIV at least once in your life, whether you are at risk for HIV or not.  Cancer screening tests  Cervical cancer screening: If you have a cervix, begin getting regular cervical cancer screening tests starting at age 21.  Breast cancer scan (mammogram): If you've ever had breasts, begin having regular mammograms starting at age 40. This is a scan to check for breast cancer.  Colon cancer screening: It is important to start screening for colon cancer at age 45.  Have a colonoscopy test every 10 years (or more often if you're at risk) Or, ask your provider about stool tests like a FIT test every year or Cologuard test every 3 years.  To learn more about your testing options, visit:   .  For help making a decision, visit:   https://bit.ly/ad68450.  Prostate cancer screening test: If you have a prostate, ask your care team if a prostate cancer screening test (PSA) at age 55 is right for you.  Lung cancer screening: If you are a current or former smoker ages 50 to 80, ask your care team if ongoing lung cancer screenings are right for you.  For informational purposes only. Not to replace the advice of your health care provider. Copyright   2023 [facility]. All rights reserved. Clinically reviewed by the [facility]. [employer] [id number] - REV 01/24.  Preventing Falls: Care Instructions  Injuries and health problems such as trouble walking or poor eyesight can increase your risk of falling. So can some medicines. But there are things you can do to help  "prevent falls. You can exercise to get stronger. You can also arrange your home to make it safer.    Talk to your doctor about the medicines you take. Ask if any of them increase the risk of falls and whether they can be changed or stopped.   Try to exercise regularly. It can help improve your strength and balance. This can help lower your risk of falling.     Practice fall safety and prevention.    Wear low-heeled shoes that fit well and give your feet good support. Talk to your doctor if you have foot problems that make this hard.  Carry a cellphone or wear a medical alert device that you can use to call for help.  Use stepladders instead of chairs to reach high objects. Don't climb if you're at risk for falls. Ask for help, if needed.  Wear the correct eyeglasses, if you need them.    Make your home safer.    Remove rugs, cords, clutter, and furniture from walkways.  Keep your house well lit. Use night-lights in hallways and bathrooms.  Install and use sturdy handrails on stairways.  Wear nonskid footwear, even inside. Don't walk barefoot or in socks without shoes.    Be safe outside.    Use handrails, curb cuts, and ramps whenever possible.  Keep your hands free by using a shoulder bag or backpack.  Try to walk in well-lit areas. Watch out for uneven ground, changes in pavement, and debris.  Be careful in the winter. Walk on the grass or gravel when sidewalks are slippery. Use de-icer on steps and walkways. Add non-slip devices to shoes.    Put grab bars and nonskid mats in your shower or tub and near the toilet. Try to use a shower chair or bath bench when bathing.   Get into a tub or shower by putting in your weaker leg first. Get out with your strong side first. Have a phone or medical alert device in the bathroom with you.   Where can you learn more?  Go to https://www.HTP.net/patiented  Enter G117 in the search box to learn more about \"Preventing Falls: Care Instructions.\"  Current as of: July 17, " 2023               Content Version: 14.0    5699-7914 [employer].   Care instructions adapted under license by your healthcare professional. If you have questions about a medical condition or this instruction, always ask your healthcare professional. [employer] disclaims any warranty or liability for your use of this information.      Hearing Loss: Care Instructions  Overview     Hearing loss is a sudden or slow decrease in how well you hear. It can range from slight to profound. Permanent hearing loss can occur with aging. It also can happen when you are exposed long-term to loud noise. Examples include listening to loud music, riding motorcycles, or being around other loud machines.  Hearing loss can affect your work and home life. It can make you feel lonely or depressed. You may feel that you have lost your independence. But hearing aids and other devices can help you hear better and feel connected to others.  Follow-up care is a key part of your treatment and safety. Be sure to make and go to all appointments, and call your doctor if you are having problems. It's also a good idea to know your test results and keep a list of the medicines you take.  How can you care for yourself at home?  Avoid loud noises whenever possible. This helps keep your hearing from getting worse.  Always wear hearing protection around loud noises.  Wear a hearing aid as directed.  A professional can help you pick a hearing aid that will work best for you.  You can also get hearing aids over the counter for mild to moderate hearing loss.  Have hearing tests as your doctor suggests. They can show whether your hearing has changed. Your hearing aid may need to be adjusted.  Use other devices as needed. These may include:  Telephone amplifiers and hearing aids that can connect to a television, stereo, radio, or microphone.  Devices that use lights or vibrations. These alert you to the doorbell, a ringing  "telephone, or a baby monitor.  Television closed-captioning. This shows the words at the bottom of the screen. Most new TVs can do this.  TTY (text telephone). This lets you type messages back and forth on the telephone instead of talking or listening. These devices are also called TDD. When messages are typed on the keyboard, they are sent over the phone line to a receiving TTY. The message is shown on a monitor.  Use text messaging, social media, and email if it is hard for you to communicate by telephone.  Try to learn a listening technique called speechreading. It is not lipreading. You pay attention to people's gestures, expressions, posture, and tone of voice. These clues can help you understand what a person is saying. Face the person you are talking to, and have them face you. Make sure the lighting is good. You need to see the other person's face clearly.  Think about counseling if you need help to adjust to your hearing loss.  When should you call for help?  Watch closely for changes in your health, and be sure to contact your doctor if:    You think your hearing is getting worse.     You have new symptoms, such as dizziness or nausea.   Where can you learn more?  Go to https://www.Flinja.net/patiented  Enter R798 in the search box to learn more about \"Hearing Loss: Care Instructions.\"  Current as of: September 27, 2023               Content Version: 14.0    9408-1616 [employer].   Care instructions adapted under license by your healthcare professional. If you have questions about a medical condition or this instruction, always ask your healthcare professional. [employer] disclaims any warranty or liability for your use of this information.      Bladder Training: Care Instructions  Your Care Instructions     Bladder training is used to treat urge incontinence and stress incontinence. Urge incontinence means that the need to urinate comes on so fast that you can't get to a " toilet in time. Stress incontinence means that you leak urine because of pressure on your bladder. For example, it may happen when you laugh, cough, or lift something heavy.  Bladder training can increase how long you can wait before you have to urinate. It can also help your bladder hold more urine. And it can give you better control over the urge to urinate.  It is important to remember that bladder training takes a few weeks to a few months to make a difference. You may not see results right away, but don't give up.  Follow-up care is a key part of your treatment and safety. Be sure to make and go to all appointments, and call your doctor if you are having problems. It's also a good idea to know your test results and keep a list of the medicines you take.  How can you care for yourself at home?  Work with your doctor to come up with a bladder training program that is right for you. You may use one or more of the following methods.  Delayed urination  In the beginning, try to keep from urinating for 5 minutes after you first feel the need to go.  While you wait, take deep, slow breaths to relax. Kegel exercises can also help you delay the need to go to the bathroom.  After some practice, when you can easily wait 5 minutes to urinate, try to wait 10 minutes before you urinate.  Slowly increase the waiting period until you are able to control when you have to urinate.  Scheduled urination  Empty your bladder when you first wake up in the morning.  Schedule times throughout the day when you will urinate.  Start by going to the bathroom every hour, even if you don't need to go.  Slowly increase the time between trips to the bathroom.  When you have found a schedule that works well for you, keep doing it.  If you wake up during the night and have to urinate, do it. Apply your schedule to waking hours only.  Kegel exercises  These tighten and strengthen pelvic muscles, which can help you control the flow of urine. (If  "doing these exercises causes pain, stop doing them and talk with your doctor.) To do Kegel exercises:  Squeeze your muscles as if you were trying not to pass gas. Or squeeze your muscles as if you were stopping the flow of urine. Your belly, legs, and buttocks shouldn't move.  Hold the squeeze for 3 seconds, then relax for 5 to 10 seconds.  Start with 3 seconds, then add 1 second each week until you are able to squeeze for 10 seconds.  Repeat the exercise 10 times a session. Do 3 to 8 sessions a day.  When should you call for help?  Watch closely for changes in your health, and be sure to contact your doctor if:    Your incontinence is getting worse.     You do not get better as expected.   Where can you learn more?  Go to https://www.[employer].net/patiented  Enter V684 in the search box to learn more about \"Bladder Training: Care Instructions.\"  Current as of: November 15, 2023               Content Version: 14.0    9805-9992 [employer].   Care instructions adapted under license by your healthcare professional. If you have questions about a medical condition or this instruction, always ask your healthcare professional. [employer] disclaims any warranty or liability for your use of this information.         "

## 2024-08-20 NOTE — PROGRESS NOTES
Preventive Care Visit  [facility]  Christianne Roman MD, Internal Medicine  Aug 20, 2024      Assessment & Plan     (Z00.00) Encounter for Medicare annual wellness exam  (primary encounter diagnosis)  Comment: up to date on screening.       (E78.2) Mixed hyperlipidemia  Comment: due for labs  Plan: Lipid panel reflex to direct LDL Fasting,         atorvastatin (LIPITOR) 20 MG tablet            (I10) Essential hypertension with goal blood pressure less than 140/90  Comment: high BP today, will be seeing cardiology tomorrow.   Plan: lisinopril (ZESTRIL) 40 MG tablet,         Comprehensive metabolic panel (BMP + Alb, Alk         Phos, ALT, AST, Total. Bili, TP), amLODIPine         (NORVASC) 10 MG tablet            (L21.9) Seborrheic dermatitis  Comment: noted on anterior chest this am. No significant underlying dermatitis.     (I42.9) Cardiomyopathy, unspecified type (H)  Comment: stable. Managed by cardiology.      (L98.9) Skin lesion  Comment: on the nasolabial fold (left nare), look concerning for basal call carcinoma.  Plan: Adult Dermatology  Referral            (R73.9) Hyperglycemia  Comment: needs a1c  Plan: Hemoglobin A1c                    Counseling  Appropriate preventive services were addressed with this patient via screening, questionnaire, or discussion as appropriate for fall prevention, nutrition, physical activity, Tobacco-use cessation, social engagement, weight loss and cognition.  Checklist reviewing preventive services available has been given to the patient.  Reviewed patient's diet, addressing concerns and/or questions.   The patient was provided with written information regarding signs of hearing loss.   Information on urinary incontinence and treatment options given to patient.           Gabino Tanner is a 84 year old, presenting for the following:  Wellness Visit        8/20/2024    10:34 AM   Additional Questions   Roomed by Micheline Dubois   Accompanied by Wife,  Phoebe           OLMAN  Ciro is here for a preventive health visit.  Overall, he is doing well with the following concerns:   A small spot/pimple on the side of the nose appeared about 3 months ago, wondering what it is. History of melanoma in 2012, has had mohs on his face x2.   Chest - on the anterior chest this morning he noticed brownish color that wiped off.   Blood pressure - has been high for a long time. Seeing cardiology tomorrow.   Cialis - doesn't seem to be working now. Has been getting testosterone checked and treated for hypofunctioning testosterone.                 8/20/2024   General Health   How would you rate your overall physical health? (!) FAIR   Feel stress (tense, anxious, or unable to sleep) To some extent      (!) STRESS CONCERN      8/20/2024   Nutrition   Diet: Regular (no restrictions)            8/20/2024   Exercise   Days per week of moderate/strenous exercise 0 days      (!) EXERCISE CONCERN      8/20/2024   Social Factors   Frequency of gathering with friends or relatives Three times a week   Worry food won't last until get money to buy more No   Food not last or not have enough money for food? No   Do you have housing? (Housing is defined as stable permanent housing and does not include staying ouside in a car, in a tent, in an abandoned building, in an overnight shelter, or couch-surfing.) Yes   Are you worried about losing your housing? No   Lack of transportation? No   Unable to get utilities (heat,electricity)? No            8/20/2024   Fall Risk   Fallen 2 or more times in the past year? Yes    Yes   Trouble with walking or balance? Yes    Yes   Gait Speed Test (Document in seconds) 4.2   Gait Speed Test Interpretation Less than or equal to 5.00 seconds - PASS       Multiple values from one day are sorted in reverse-chronological order          8/20/2024   Activities of Daily Living- Home Safety   Needs help with the following daily activites None of the above   Safety concerns  in the home None of the above            2024   Dental   Dentist two times every year? Yes            2024   Hearing Screening   Hearing concerns? (!) I NEED TO ASK PEOPLE TO SPEAK UP OR REPEAT THEMSELVES.    (!) IT'S HARD TO FOLLOW A CONVERSATION IN A NOISY RESTAURANT OR CROWDED ROOM.       Multiple values from one day are sorted in reverse-chronological order         2024   Driving Risk Screening   Patient/family members have concerns about driving No            2024   General Alertness/Fatigue Screening   Have you been more tired than usual lately? No            2024   Urinary Incontinence Screening   Bothered by leaking urine in past 6 months Yes            2024   TB Screening   Were you born outside of the [place]? No            Today's PHQ-2 Score:       2024    10:28 AM   PHQ-2 (  [employer])   Q1: Little interest or pleasure in doing things 0   Q2: Feeling down, depressed or hopeless 0   PHQ-2 Score 0   Q1: Little interest or pleasure in doing things Not at all   Q2: Feeling down, depressed or hopeless Not at all   PHQ-2 Score 0           2024   Substance Use   Alcohol more than 3/day or more than 7/wk No   Do you have a current opioid prescription? No   How severe/bad is pain from 1 to 10? 5/10   Do you use any other substances recreationally? No        Social History     Tobacco Use    Smoking status: Former     Current packs/day: 0.00     Types: Cigarettes     Quit date: 1976     Years since quittin.1    Smokeless tobacco: Never   Vaping Use    Vaping status: Never Used   Substance Use Topics    Alcohol use: No     Alcohol/week: 0.0 standard drinks of alcohol     Comment: Quit 1014    Drug use: No                 Reviewed and updated as needed this visit by Provider   Tobacco  Allergies  Meds  Problems  Med Hx  Surg Hx  Fam Hx              Current providers sharing in care for this patient include:  Patient Care Team:  Radha Banks MD as PCP -  "General (Internal Medicine)  DaneseMiriam MD as MD (Ophthalmology)  Shannon Yang MD as MD (Ophthalmology)  Radha Banks MD as Assigned PCP  Kishor Lord MD as Assigned Heart and Vascular Provider  Hari Myles MD as MD (Neurology)  Hari Myles MD as Assigned Neuroscience Provider  Kishor Lord MD as MD (Cardiovascular Disease)    The following health maintenance items are reviewed in Epic and correct as of today:  Health Maintenance   Topic Date Due    ANNUAL REVIEW OF HM ORDERS  Never done    RSV VACCINE (Pregnancy & 60+) (1 - 1-dose 60+ series) Never done    COVID-19 Vaccine (8 - 2023-24 season) 03/28/2024    LIPID  06/29/2024    INFLUENZA VACCINE (1) 09/01/2024    MEDICARE ANNUAL WELLNESS VISIT  08/20/2025    FALL RISK ASSESSMENT  08/20/2025    DTAP/TDAP/TD IMMUNIZATION (2 - Td or Tdap) 04/06/2027    ADVANCE CARE PLANNING  06/30/2028    PHQ-2 (once per calendar year)  Completed    Pneumococcal Vaccine: 65+ Years  Completed    ZOSTER IMMUNIZATION  Completed    IPV IMMUNIZATION  Aged Out    HPV IMMUNIZATION  Aged Out    MENINGITIS IMMUNIZATION  Aged Out    RSV MONOCLONAL ANTIBODY  Aged Out            Objective    Exam  BP (!) 145/69 (BP Location: Right arm, Patient Position: Sitting, Cuff Size: Adult Regular)   Pulse 56   Temp 98.4  F (36.9  C) (Tympanic)   Resp 18   Ht 1.76 m (5' 9.29\")   Wt 73.3 kg (161 lb 9.6 oz)   SpO2 98%   BMI 23.66 kg/m     Estimated body mass index is 23.66 kg/m  as calculated from the following:    Height as of this encounter: 1.76 m (5' 9.29\").    Weight as of this encounter: 73.3 kg (161 lb 9.6 oz).    Physical Exam  GENERAL: alert and no distress  EYES: Eyes grossly normal to inspection, PERRL and conjunctivae and sclerae normal  HENT: ear canals and TM's normal, nose and mouth without ulcers or lesions  NECK: no adenopathy, no asymmetry, masses, or scars  RESP: lungs clear to auscultation " - no rales, rhonchi or wheezes  CV: regular rate and rhythm, normal S1 S2, no S3 or S4, no murmur, click or rub, no peripheral edema  MS: no gross musculoskeletal defects noted, no edema  SKIN: no suspicious lesions or rashes and on the left nasolabial fold, a round pearly nodule approximately 5 mm  NEURO: Normal strength and tone, mentation intact and speech normal  PSYCH: mentation appears normal, affect normal/bright         8/20/2024   Mini Cog   Clock Draw Score 2 Normal   3 Item Recall 3 objects recalled   Mini Cog Total Score 5                   Signed Electronically by: Christianne Roman MD

## 2024-08-21 ENCOUNTER — TELEPHONE (OUTPATIENT)
Dept: CARDIOLOGY | Facility: CLINIC | Age: 84
End: 2024-08-21

## 2024-08-21 ENCOUNTER — OFFICE VISIT (OUTPATIENT)
Dept: CARDIOLOGY | Facility: CLINIC | Age: 84
End: 2024-08-21
Payer: COMMERCIAL

## 2024-08-21 VITALS
SYSTOLIC BLOOD PRESSURE: 138 MMHG | WEIGHT: 161.5 LBS | BODY MASS INDEX: 23.12 KG/M2 | OXYGEN SATURATION: 99 % | HEART RATE: 71 BPM | DIASTOLIC BLOOD PRESSURE: 70 MMHG | HEIGHT: 70 IN

## 2024-08-21 DIAGNOSIS — I42.9 CARDIOMYOPATHY, UNSPECIFIED TYPE (H): ICD-10-CM

## 2024-08-21 DIAGNOSIS — I35.0 AORTIC VALVE STENOSIS, ETIOLOGY OF CARDIAC VALVE DISEASE UNSPECIFIED: ICD-10-CM

## 2024-08-21 DIAGNOSIS — I35.1 NONRHEUMATIC AORTIC VALVE INSUFFICIENCY: Primary | ICD-10-CM

## 2024-08-21 LAB
ALBUMIN SERPL BCG-MCNC: 4.2 G/DL (ref 3.5–5.2)
ALP SERPL-CCNC: 98 U/L (ref 40–150)
ALT SERPL W P-5'-P-CCNC: 18 U/L (ref 0–70)
ANION GAP SERPL CALCULATED.3IONS-SCNC: 10 MMOL/L (ref 7–15)
AST SERPL W P-5'-P-CCNC: 30 U/L (ref 0–45)
BILIRUB SERPL-MCNC: 0.8 MG/DL
BUN SERPL-MCNC: 16.6 MG/DL (ref 8–23)
CALCIUM SERPL-MCNC: 9.6 MG/DL (ref 8.8–10.4)
CHLORIDE SERPL-SCNC: 106 MMOL/L (ref 98–107)
CHOLEST SERPL-MCNC: 128 MG/DL
CREAT SERPL-MCNC: 1.09 MG/DL (ref 0.67–1.17)
EGFRCR SERPLBLD CKD-EPI 2021: 68 ML/MIN/1.73M2
FASTING STATUS PATIENT QL REPORTED: YES
FASTING STATUS PATIENT QL REPORTED: YES
GLUCOSE SERPL-MCNC: 95 MG/DL (ref 70–99)
HCO3 SERPL-SCNC: 26 MMOL/L (ref 22–29)
HDLC SERPL-MCNC: 63 MG/DL
LDLC SERPL CALC-MCNC: 51 MG/DL
NONHDLC SERPL-MCNC: 65 MG/DL
POTASSIUM SERPL-SCNC: 4.6 MMOL/L (ref 3.4–5.3)
PROT SERPL-MCNC: 6.5 G/DL (ref 6.4–8.3)
SODIUM SERPL-SCNC: 142 MMOL/L (ref 135–145)
TRIGL SERPL-MCNC: 68 MG/DL

## 2024-08-21 PROCEDURE — 99214 OFFICE O/P EST MOD 30 MIN: CPT | Performed by: INTERNAL MEDICINE

## 2024-08-21 PROCEDURE — 93005 ELECTROCARDIOGRAM TRACING: CPT | Performed by: INTERNAL MEDICINE

## 2024-08-21 NOTE — PROGRESS NOTES
HISTORY:    Ciro Almonte is a pleasant 84-year-old gentleman history of palpitations, elevated calcium score, and very mild cardiomyopathy in the past, resolved.  He has a history of PVCs treated with beta-blocker for many years but he developed bradycardia and his beta-blocker was stopped.  He no longer complains of palpitations.  Ciro is here today for routine annual visit    Last year Ciro had an echocardiogram done which showed a normal ejection fraction with no aortic stenosis but some aortic sclerosis.  Previous echoes had actually called this mild aortic stenosis.  He was also noted to have aortic insufficiency which has been chronic.  His last echo deemed this to be moderate in severity but previous echoes had shown mild AI.    Today Ciro reports that overall he feels that he is doing well.  He is gradually slowing down as he ages but no major changes in his exercise capacity.  He is not having exertional chest arm neck shoulder or jaw discomfort, significant palpitations, PND/orthopnea, syncope or near syncope, peripheral edema, claudication, or strokelike symptoms.    Ciro takes a lot of medications, mostly over-the-counter months etc.  He divides these into 4 doses throughout the day and forgets to take his medications.  Advised him to use all of his medications at once and to try to be more consistent in the timing of this in the morning.  He has a blood pressure monitor at home that he does not use.  On presentation today his blood pressure was elevated to the mid 150s systolic but on repeat it was the upper 130s.  He took his medicine shortly before he left home this morning.    Ciro reports that last spring he was out in the yard doing some mild activity when he suddenly just did not feel right, mildly dizzy but no sense of palpitations.  He felt a little bit faint and sat down and this passed within 5 minutes.  This is the only time he is experienced these symptoms wondered if I knew what  they were.  I cannot answer that question.  Contact us if this recurs.      ASSESSMENT/PLAN:    1.  Aortic valve disease.  Aortic sclerosis without stenosis, moderate aortic insufficiency.  I do not hear an AI murmur on exam today but I do hear the aortic systolic murmur.  We will plan on rechecking his echocardiogram next year.  2.  Hypertension.  I asked him to start checking his blood pressure on a regular basis and contact his primary care physician if it is frequently greater than 140 systolic.  3.  History of mild cardiomyopathy.  His ejection fraction has now normalized and we will continue current medications.  His EF was never particularly low, EF in the 45% to 50% range  4.  History of PVCs.  No symptoms of palpitations at this time and no evidence for atrial fibs.    Thank you for inviting me to participate in the care of your patient.  Please don't hesitate to call if I can be of further assistance.  30 minutes were spent today reviewing the chart and other records, interviewing and examining the patient, and documenting our visit.  This patient needs to have his aortic valve monitored over time, probably every other year.  Since last echo was a year ago I will arrange for a 1 year follow-up visit and probably every 2 years thereafter    Chart documentation was completed, in part, with [employer] voice-recognition software. Even though reviewed, some grammatical, spelling, and word errors may remain.       Orders Placed This Encounter   Procedures    Follow-Up with Cardiology    EKG 12-lead complete w/read - Clinics    Echocardiogram Complete     No orders of the defined types were placed in this encounter.    There are no discontinued medications.    10 year ASCVD risk: The ASCVD Risk score ([place] DK, et al., 2019) failed to calculate for the following reasons:    The 2019 ASCVD risk score is only valid for ages 40 to 79    Encounter Diagnoses   Name Primary?    Aortic valve stenosis, etiology of cardiac  valve disease unspecified     Cardiomyopathy, unspecified type (H)     Nonrheumatic aortic valve insufficiency Yes       CURRENT MEDICATIONS:  Current Outpatient Medications   Medication Sig Dispense Refill    acetaminophen (TYLENOL) 325 MG tablet Take 2 tablets (650 mg) by mouth every 4 hours as needed for other (mild pain)      amLODIPine (NORVASC) 10 MG tablet Take 1 tablet (10 mg) by mouth daily 90 tablet 3    aspirin 81 MG EC tablet Take 1 tablet (81 mg) by mouth 2 times daily (Patient taking differently: Take 81 mg by mouth daily.) 60 tablet 0    atorvastatin (LIPITOR) 20 MG tablet Take 1 tablet (20 mg) by mouth daily 90 tablet 3    Calcium Citrate-Vitamin D (CITRACAL + D PO) Take 2 tablets by mouth daily      latanoprost (XALATAN) 0.005 % ophthalmic solution Place 1 drop into both eyes At Bedtime For additional refills, please schedule a follow-up appointment at [phone]. 2.5 mL 0    lisinopril (ZESTRIL) 40 MG tablet Take 1 tablet (40 mg) by mouth daily 90 tablet 1    Magnesium 500 MG CAPS Take 500 mg by mouth daily      Multiple Vitamins-Minerals (MENS MULTIVITAMIN PLUS PO) Take 1 tablet by mouth daily      Nutritional Supplements (PROSTATE 2.4) CAPS Take 2 capsules by mouth daily Prostate cap 2.0      rOPINIRole (REQUIP) 0.5 MG tablet Take 0.5-1 mg by mouth nightly as needed      tamsulosin (FLOMAX) 0.4 MG capsule Take 1 capsule (0.4 mg) by mouth every evening 30 capsule 0    Testosterone 1.62 % GEL Place 2 Pump onto the skin daily      UNABLE TO FIND Apply topically at bedtime MEDICATION NAME: Theraworx Relief Foam (magnesium sulfate) for muscle cramps      ASPIRIN PO Take 81 mg by mouth daily (Patient not taking: Reported on 12/27/2023)      tadalafil (CIALIS) 5 MG tablet Take 1 tablet by mouth daily as needed (Patient not taking: Reported on 3/29/2024)         ALLERGIES   No Known Allergies    PAST MEDICAL HISTORY:  Past Medical History:   Diagnosis Date    Aortic valve stenosis     Cancer (H) 2000     L shoulder melanoma    Cardiomyopathy (H)     Glaucoma     Heart murmur     Hypertension     Irregular heart beat     Melanoma (H) 2000    Left shoulder - Dr. Carrillo - follows q6 months    Mitral valve insufficiency     Neuropathy     Nonsenile cataract     Other chronic pain     Neuropathy bilateral feet    Other premature beats     PVC's (premature ventricular contractions) 05/14/2015    Sleep apnea     Zoster 2000       PAST SURGICAL HISTORY:  Past Surgical History:   Procedure Laterality Date    ARTHROPLASTY HIP ANTERIOR Left 11/14/2014    Procedure: ARTHROPLASTY HIP ANTERIOR;  Surgeon: Rudy Tobias MD;  Location:  OR    ARTHROPLASTY KNEE Left 12/13/2023    Procedure: LEFT TOTAL KNEE ARTHROPLASTY;  Surgeon: Kevin Zamarripa MD;  Location: [facility]    ARTHROPLASTY REVISION HIP  3/12/2014     right Procedure: ARTHROPLASTY REVISION HIP;  Surgeon: Rudy Tobias MD;  Location:  OR    ARTHROSCOPY KNEE  2002    meniscus ( side?)    BIOPSY OF PROSTATE,NEEDLE/PUNCH      both hips replaced      CATARACT IOL, RT/LT Bilateral     COLONOSCOPY N/A 5/28/2020    Procedure: COLONOSCOPY (fv) (please mail packet as soon as possible) fv;  Surgeon: Kevin Mccarty MD;  Location:  GI    DERMATOLOGY ADULT  REFERRAL  2000    melanoma resection L shoulder    DISCECTOMY LUMBAR POSTERIOR MICROSCOPIC ONE LEVEL N/A 4/11/2016    Procedure: DISCECTOMY LUMBAR POSTERIOR MICROSCOPIC ONE LEVEL;  Surgeon: Ruben Marsh MD;  Location:  OR    ENT SURGERY      Tonsils    INJECT STEROID (LOCATION) Right 12/13/2023    Procedure: RIGHT KNEE CORTISONE INJECTION;  Surgeon: Kevin Zamarripa MD;  Location: [facility]    ORTHOPEDIC SURGERY  2008    R hip replacement    ORTHOPEDIC SURGERY  2004    big toe in , needed reconstructive surgery    PHACOEMULSIFICATION CLEAR CORNEA WITH TORIC INTRAOCULAR LENS IMPLANT Left 11/6/2019    Procedure: LEFT EYE, CATARACT EXTRACTION WITH TORIC INTRAOCULAR  LENS IMPLANT;  Surgeon: Shannon Yang MD;  Location: [facility] OR    PHACOEMULSIFICATION CLEAR CORNEA WITH TORIC INTRAOCULAR LENS IMPLANT Right 2019    Procedure: RIGHT EYE, CATARACT EXTRACTION WITH TORIC INTRAOCULAR LENS IMPLANT;  Surgeon: Shannon Yang MD;  Location: [facility] OR    TRABECULAR MICRO-BYPASS WITH ISTENT Left 2019    Procedure: LEFT EYE, ISTENT INSERTION;  Surgeon: Shannon Yang MD;  Location: [facility] OR    TRABECULAR MICRO-BYPASS WITH ISTENT Right 2019    Procedure: RIGHT EYE, ISTENT INSERTION;  Surgeon: Shannon Yang MD;  Location: [facility] OR    ZZC TOTAL HIP ARTHROPLASTY Right        FAMILY HISTORY:  Family History   Problem Relation Age of Onset    Cerebrovascular Disease Mother     Hypertension Mother     Glaucoma Mother     Heart Disease Father 58        MI    Cancer Father         lung cancer;  age 91    Colon Cancer No family hx of     Diabetes No family hx of     Macular Degeneration No family hx of        SOCIAL HISTORY:  Social History     Socioeconomic History    Marital status:      Spouse name: None    Number of children: 2    Years of education: None    Highest education level: None   Occupational History    Occupation:      Employer: RETIRED   Tobacco Use    Smoking status: Former     Current packs/day: 0.00     Types: Cigarettes     Quit date: 1976     Years since quittin.1    Smokeless tobacco: Never   Vaping Use    Vaping status: Never Used   Substance and Sexual Activity    Alcohol use: No     Alcohol/week: 0.0 standard drinks of alcohol     Comment: Quit 1014    Drug use: No    Sexual activity: Not Currently     Partners: Female   Other Topics Concern    Parent/sibling w/ CABG, MI or angioplasty before 65F 55M? No     Social Determinants of Health     Financial Resource Strain: Low Risk  (2024)    Financial Resource Strain     Within the past 12 months, have you or your family members you live with been unable to get  "utilities (heat, electricity) when it was really needed?: No   Food Insecurity: Low Risk  (8/20/2024)    Food Insecurity     Within the past 12 months, did you worry that your food would run out before you got money to buy more?: No     Within the past 12 months, did the food you bought just not last and you didn t have money to get more?: No   Transportation Needs: Low Risk  (8/20/2024)    Transportation Needs     Within the past 12 months, has lack of transportation kept you from medical appointments, getting your medicines, non-medical meetings or appointments, work, or from getting things that you need?: No   Physical Activity: Unknown (8/20/2024)    Exercise Vital Sign     Days of Exercise per Week: 0 days   Stress: Stress Concern Present (8/20/2024)    [employer] - Occupational Stress Questionnaire     Feeling of Stress : To some extent   Social Connections: Unknown (8/20/2024)    Social Connection and Isolation Panel [NHANES]     Frequency of Social Gatherings with Friends and Family: Three times a week   Housing Stability: Low Risk  (8/20/2024)    Housing Stability     Do you have housing? : Yes     Are you worried about losing your housing?: No       Review of Systems:  Skin:  Positive for bruising   Eyes:  Positive for glasses;glaucoma  ENT:  Negative    Respiratory:  Negative    Cardiovascular:  Negative for;chest pain fatigue;dizziness;Positive for;palpitations  Gastroenterology: Negative for hematochezia;melena  Genitourinary:  Negative for hematuria  Musculoskeletal:  Positive for back pain;arthritis;joint pain  Neurologic:  Positive for numbness or tingling of hands  Psychiatric:  not assessed    Heme/Lymph/Imm:  Negative for bleeding disorder  Endocrine:  Negative for diabetes    Physical Exam:  Vitals: /70   Pulse 71   Ht 1.765 m (5' 9.5\")   Wt 73.3 kg (161 lb 8 oz)   SpO2 99%   BMI 23.51 kg/m      Constitutional:  cooperative, alert and oriented, well " developed, well nourished, in no acute distress   Fit in appearance    Skin:  warm and dry to the touch, no apparent skin lesions or masses noted        Head:  normocephalic, no masses or lesions        Eyes:  pupils equal and round, conjunctivae and lids unremarkable, sclera white, no xanthalasma, EOMS intact, no nystagmus        ENT:  no pallor or cyanosis, dentition good        Neck:  carotid pulses are full and equal bilaterally, JVP normal, no carotid bruit        Chest:  normal breath sounds, clear to auscultation, normal A-P diameter, normal symmetry, normal respiratory excursion, no use of accessory muscles        Cardiac: regular rhythm, normal S1/S2, no S3 or S4, apical impulse not displaced, no murmurs, gallops or rubs       grade 1;grade 2;systolic ejection murmur          Abdomen:  abdomen soft;BS normoactive        Vascular: pulses full and equal                                      Extremities and Muscular Skeletal:  no edema           Neurological:  no gross motor deficits        Psych:  affect appropriate, oriented to time, person and place     Recent Lab Results:  LIPID RESULTS:  Lab Results   Component Value Date    CHOL 128 08/20/2024    CHOL 161 02/15/2021    HDL 63 08/20/2024    HDL 76 02/15/2021    LDL 51 08/20/2024    LDL 71 02/15/2021    TRIG 68 08/20/2024    TRIG 68 02/15/2021    CHOLHDLRATIO 3.1 07/02/2015       LIVER ENZYME RESULTS:  Lab Results   Component Value Date    AST 30 08/20/2024    AST 25 02/15/2021    ALT 18 08/20/2024    ALT 30 02/15/2021       CBC RESULTS:  Lab Results   Component Value Date    WBC 11.7 (H) 12/14/2023    WBC 7.6 11/18/2020    RBC 3.47 (L) 12/14/2023    RBC 4.31 (L) 11/18/2020    HGB 12.0 (L) 12/29/2023    HGB 13.1 (L) 06/11/2021    HCT 33.5 (L) 12/14/2023    HCT 41.4 11/18/2020    MCV 97 12/14/2023    MCV 96 11/18/2020    MCH 32.6 12/14/2023    MCH 31.8 11/18/2020    MCHC 33.7 12/14/2023    MCHC 33.1 11/18/2020    RDW 13.2 12/14/2023    RDW 13.4 11/18/2020     " 12/14/2023     11/18/2020       BMP RESULTS:  Lab Results   Component Value Date     08/20/2024     02/15/2021    POTASSIUM 4.6 08/20/2024    POTASSIUM 4.2 06/08/2022    POTASSIUM 4.3 02/15/2021    CHLORIDE 106 08/20/2024    CHLORIDE 107 06/08/2022    CHLORIDE 107 02/15/2021    CO2 26 08/20/2024    CO2 29 06/08/2022    CO2 27 02/15/2021    ANIONGAP 10 08/20/2024    ANIONGAP 2 (L) 06/08/2022    ANIONGAP 6 02/15/2021    GLC 95 08/20/2024    GLC 94 06/08/2022    GLC 88 02/15/2021    BUN 16.6 08/20/2024    BUN 19 06/08/2022    BUN 19 02/15/2021    CR 1.09 08/20/2024    CR 0.91 02/15/2021    GFRESTIMATED 68 08/20/2024    GFRESTIMATED 80 02/15/2021    GFRESTBLACK >90 02/15/2021    EDY 9.6 08/20/2024    EDY 9.2 02/15/2021        A1C RESULTS:  Lab Results   Component Value Date    A1C 5.6 08/20/2024    A1C 5.4 02/15/2021       INR RESULTS:  No results found for: \"INR\"      Kishor Lord MD, FACC    CC  Kishor Lord MD  [address] [place] [id number]                  "

## 2024-08-21 NOTE — TELEPHONE ENCOUNTER
Called pt's wife back, Phoebe, to further discuss Amlodipine.     No answer. Left  for callback.     Lisa MADRIGAL  [facility]

## 2024-08-21 NOTE — TELEPHONE ENCOUNTER
[facility] Call Center    Phone Message    May a detailed message be left on voicemail: yes     Reason for Call: Other: Ciro's spouse, Phoebe, called to inquire about the dosage on his amlodipine.  She states that he does have 10 mg tabs, but cuts them in half so he is only taking 5 mg.  Phoebe would like to know if he should stick with the 5 mg, or do the full 10 mg.  Please call back to further advise.     Action Taken: Other: Cardiology    Travel Screening: Not Applicable    Thank you!  [facility]

## 2024-08-21 NOTE — LETTER
8/21/2024    Radha Herrera MD  [address] [place] [address]    RE: Ciro Almonte       Dear Colleague,     I had the pleasure of seeing Ciro Almonte in the [facility].  HISTORY:    Ciro Almonte is a pleasant 84-year-old gentleman history of palpitations, elevated calcium score, and very mild cardiomyopathy in the past, resolved.  He has a history of PVCs treated with beta-blocker for many years but he developed bradycardia and his beta-blocker was stopped.  He no longer complains of palpitations.  Ciro is here today for routine annual visit    Last year Ciro had an echocardiogram done which showed a normal ejection fraction with no aortic stenosis but some aortic sclerosis.  Previous echoes had actually called this mild aortic stenosis.  He was also noted to have aortic insufficiency which has been chronic.  His last echo deemed this to be moderate in severity but previous echoes had shown mild AI.    Today Ciro reports that overall he feels that he is doing well.  He is gradually slowing down as he ages but no major changes in his exercise capacity.  He is not having exertional chest arm neck shoulder or jaw discomfort, significant palpitations, PND/orthopnea, syncope or near syncope, peripheral edema, claudication, or strokelike symptoms.    Ciro takes a lot of medications, mostly over-the-counter months etc.  He divides these into 4 doses throughout the day and forgets to take his medications.  Advised him to use all of his medications at once and to try to be more consistent in the timing of this in the morning.  He has a blood pressure monitor at home that he does not use.  On presentation today his blood pressure was elevated to the mid 150s systolic but on repeat it was the upper 130s.  He took his medicine shortly before he left home this morning.    Ciro reports that last spring he was out in the yard doing some mild activity when he suddenly just did not  feel right, mildly dizzy but no sense of palpitations.  He felt a little bit faint and sat down and this passed within 5 minutes.  This is the only time he is experienced these symptoms wondered if I knew what they were.  I cannot answer that question.  Contact us if this recurs.      ASSESSMENT/PLAN:    1.  Aortic valve disease.  Aortic sclerosis without stenosis, moderate aortic insufficiency.  I do not hear an AI murmur on exam today but I do hear the aortic systolic murmur.  We will plan on rechecking his echocardiogram next year.  2.  Hypertension.  I asked him to start checking his blood pressure on a regular basis and contact his primary care physician if it is frequently greater than 140 systolic.  3.  History of mild cardiomyopathy.  His ejection fraction has now normalized and we will continue current medications.  His EF was never particularly low, EF in the 45% to 50% range  4.  History of PVCs.  No symptoms of palpitations at this time and no evidence for atrial fibs.    Thank you for inviting me to participate in the care of your patient.  Please don't hesitate to call if I can be of further assistance.  30 minutes were spent today reviewing the chart and other records, interviewing and examining the patient, and documenting our visit.  This patient needs to have his aortic valve monitored over time, probably every other year.  Since last echo was a year ago I will arrange for a 1 year follow-up visit and probably every 2 years thereafter    Chart documentation was completed, in part, with Piiku voice-recognition software. Even though reviewed, some grammatical, spelling, and word errors may remain.       Orders Placed This Encounter   Procedures     Follow-Up with Cardiology     EKG 12-lead complete w/read - Clinics     Echocardiogram Complete     No orders of the defined types were placed in this encounter.    There are no discontinued medications.    10 year ASCVD risk: The ASCVD Risk score (Gaetano  VIDA, et al., 2019) failed to calculate for the following reasons:    The 2019 ASCVD risk score is only valid for ages 40 to 79    Encounter Diagnoses   Name Primary?     Aortic valve stenosis, etiology of cardiac valve disease unspecified      Cardiomyopathy, unspecified type (H)      Nonrheumatic aortic valve insufficiency Yes       CURRENT MEDICATIONS:  Current Outpatient Medications   Medication Sig Dispense Refill     acetaminophen (TYLENOL) 325 MG tablet Take 2 tablets (650 mg) by mouth every 4 hours as needed for other (mild pain)       amLODIPine (NORVASC) 10 MG tablet Take 1 tablet (10 mg) by mouth daily 90 tablet 3     aspirin 81 MG EC tablet Take 1 tablet (81 mg) by mouth 2 times daily (Patient taking differently: Take 81 mg by mouth daily.) 60 tablet 0     atorvastatin (LIPITOR) 20 MG tablet Take 1 tablet (20 mg) by mouth daily 90 tablet 3     Calcium Citrate-Vitamin D (CITRACAL + D PO) Take 2 tablets by mouth daily       latanoprost (XALATAN) 0.005 % ophthalmic solution Place 1 drop into both eyes At Bedtime For additional refills, please schedule a follow-up appointment at [phone]. 2.5 mL 0     lisinopril (ZESTRIL) 40 MG tablet Take 1 tablet (40 mg) by mouth daily 90 tablet 1     Magnesium 500 MG CAPS Take 500 mg by mouth daily       Multiple Vitamins-Minerals (MENS MULTIVITAMIN PLUS PO) Take 1 tablet by mouth daily       Nutritional Supplements (PROSTATE 2.4) CAPS Take 2 capsules by mouth daily Prostate cap 2.0       rOPINIRole (REQUIP) 0.5 MG tablet Take 0.5-1 mg by mouth nightly as needed       tamsulosin (FLOMAX) 0.4 MG capsule Take 1 capsule (0.4 mg) by mouth every evening 30 capsule 0     Testosterone 1.62 % GEL Place 2 Pump onto the skin daily       UNABLE TO FIND Apply topically at bedtime MEDICATION NAME: Theraworx Relief Foam (magnesium sulfate) for muscle cramps       ASPIRIN PO Take 81 mg by mouth daily (Patient not taking: Reported on 12/27/2023)       tadalafil (CIALIS) 5 MG tablet  Take 1 tablet by mouth daily as needed (Patient not taking: Reported on 3/29/2024)         ALLERGIES   No Known Allergies    PAST MEDICAL HISTORY:  Past Medical History:   Diagnosis Date     Aortic valve stenosis      Cancer (H) 2000    L shoulder melanoma     Cardiomyopathy (H)      Glaucoma      Heart murmur      Hypertension      Irregular heart beat      Melanoma (H) 2000    Left shoulder - Dr. Carrillo - follows q6 months     Mitral valve insufficiency      Neuropathy      Nonsenile cataract      Other chronic pain     Neuropathy bilateral feet     Other premature beats      PVC's (premature ventricular contractions) 05/14/2015     Sleep apnea      Zoster 2000       PAST SURGICAL HISTORY:  Past Surgical History:   Procedure Laterality Date     ARTHROPLASTY HIP ANTERIOR Left 11/14/2014    Procedure: ARTHROPLASTY HIP ANTERIOR;  Surgeon: Rudy Tobias MD;  Location:  OR     ARTHROPLASTY KNEE Left 12/13/2023    Procedure: LEFT TOTAL KNEE ARTHROPLASTY;  Surgeon: Kevin Zamarripa MD;  Location: [facility] OR     ARTHROPLASTY REVISION HIP  3/12/2014     right Procedure: ARTHROPLASTY REVISION HIP;  Surgeon: Rudy Tobias MD;  Location:  OR     ARTHROSCOPY KNEE  2002    meniscus ( side?)     BIOPSY OF PROSTATE,NEEDLE/PUNCH       both hips replaced       CATARACT IOL, RT/LT Bilateral      COLONOSCOPY N/A 5/28/2020    Procedure: COLONOSCOPY (fv) (please mail packet as soon as possible) fv;  Surgeon: Kevin Mccarty MD;  Location:  GI     DERMATOLOGY ADULT  REFERRAL  2000    melanoma resection L shoulder     DISCECTOMY LUMBAR POSTERIOR MICROSCOPIC ONE LEVEL N/A 4/11/2016    Procedure: DISCECTOMY LUMBAR POSTERIOR MICROSCOPIC ONE LEVEL;  Surgeon: Ruben Marsh MD;  Location:  OR     ENT SURGERY      Tonsils     INJECT STEROID (LOCATION) Right 12/13/2023    Procedure: RIGHT KNEE CORTISONE INJECTION;  Surgeon: Kevin Zamarripa MD;  Location: [facility] OR     ORTHOPEDIC SURGERY  2008     R hip replacement     ORTHOPEDIC SURGERY  2004    big toe in lawn mower, needed reconstructive surgery     PHACOEMULSIFICATION CLEAR CORNEA WITH TORIC INTRAOCULAR LENS IMPLANT Left 2019    Procedure: LEFT EYE, CATARACT EXTRACTION WITH TORIC INTRAOCULAR LENS IMPLANT;  Surgeon: Shannon Yang MD;  Location: [facility] OR     PHACOEMULSIFICATION CLEAR CORNEA WITH TORIC INTRAOCULAR LENS IMPLANT Right 2019    Procedure: RIGHT EYE, CATARACT EXTRACTION WITH TORIC INTRAOCULAR LENS IMPLANT;  Surgeon: Shannon Yang MD;  Location: [facility] OR     TRABECULAR MICRO-BYPASS WITH ISTENT Left 2019    Procedure: LEFT EYE, ISTENT INSERTION;  Surgeon: Shannon Yang MD;  Location: [facility] OR     TRABECULAR MICRO-BYPASS WITH ISTENT Right 2019    Procedure: RIGHT EYE, ISTENT INSERTION;  Surgeon: Shannon Yang MD;  Location: [facility] OR     ZZC TOTAL HIP ARTHROPLASTY Right        FAMILY HISTORY:  Family History   Problem Relation Age of Onset     Cerebrovascular Disease Mother      Hypertension Mother      Glaucoma Mother      Heart Disease Father 58        MI     Cancer Father         lung cancer;  age 91     Colon Cancer No family hx of      Diabetes No family hx of      Macular Degeneration No family hx of        SOCIAL HISTORY:  Social History     Socioeconomic History     Marital status:      Spouse name: None     Number of children: 2     Years of education: None     Highest education level: None   Occupational History     Occupation:      Employer: RETIRED   Tobacco Use     Smoking status: Former     Current packs/day: 0.00     Types: Cigarettes     Quit date: 1976     Years since quittin.1     Smokeless tobacco: Never   Vaping Use     Vaping status: Never Used   Substance and Sexual Activity     Alcohol use: No     Alcohol/week: 0.0 standard drinks of alcohol     Comment: Quit 1014     Drug use: No     Sexual activity: Not Currently     Partners: Female   Other Topics  Concern     Parent/sibling w/ CABG, MI or angioplasty before 65F 55M? No     Social Determinants of Health     Financial Resource Strain: Low Risk  (8/20/2024)    Financial Resource Strain      Within the past 12 months, have you or your family members you live with been unable to get utilities (heat, electricity) when it was really needed?: No   Food Insecurity: Low Risk  (8/20/2024)    Food Insecurity      Within the past 12 months, did you worry that your food would run out before you got money to buy more?: No      Within the past 12 months, did the food you bought just not last and you didn t have money to get more?: No   Transportation Needs: Low Risk  (8/20/2024)    Transportation Needs      Within the past 12 months, has lack of transportation kept you from medical appointments, getting your medicines, non-medical meetings or appointments, work, or from getting things that you need?: No   Physical Activity: Unknown (8/20/2024)    Exercise Vital Sign      Days of Exercise per Week: 0 days   Stress: Stress Concern Present (8/20/2024)    [employer] [place] [employer] - Occupational Stress Questionnaire      Feeling of Stress : To some extent   Social Connections: Unknown (8/20/2024)    Social Connection and Isolation Panel [NHANES]      Frequency of Social Gatherings with Friends and Family: Three times a week   Housing Stability: Low Risk  (8/20/2024)    Housing Stability      Do you have housing? : Yes      Are you worried about losing your housing?: No       Review of Systems:  Skin:  Positive for bruising   Eyes:  Positive for glasses;glaucoma  ENT:  Negative    Respiratory:  Negative    Cardiovascular:  Negative for;chest pain fatigue;dizziness;Positive for;palpitations  Gastroenterology: Negative for hematochezia;melena  Genitourinary:  Negative for hematuria  Musculoskeletal:  Positive for back pain;arthritis;joint pain  Neurologic:  Positive for numbness or tingling of hands  Psychiatric:   "not assessed    Heme/Lymph/Imm:  Negative for bleeding disorder  Endocrine:  Negative for diabetes    Physical Exam:  Vitals: /70   Pulse 71   Ht 1.765 m (5' 9.5\")   Wt 73.3 kg (161 lb 8 oz)   SpO2 99%   BMI 23.51 kg/m      Constitutional:  cooperative, alert and oriented, well developed, well nourished, in no acute distress   Fit in appearance    Skin:  warm and dry to the touch, no apparent skin lesions or masses noted        Head:  normocephalic, no masses or lesions        Eyes:  pupils equal and round, conjunctivae and lids unremarkable, sclera white, no xanthalasma, EOMS intact, no nystagmus        ENT:  no pallor or cyanosis, dentition good        Neck:  carotid pulses are full and equal bilaterally, JVP normal, no carotid bruit        Chest:  normal breath sounds, clear to auscultation, normal A-P diameter, normal symmetry, normal respiratory excursion, no use of accessory muscles        Cardiac: regular rhythm, normal S1/S2, no S3 or S4, apical impulse not displaced, no murmurs, gallops or rubs       grade 1;grade 2;systolic ejection murmur          Abdomen:  abdomen soft;BS normoactive        Vascular: pulses full and equal                                      Extremities and Muscular Skeletal:  no edema           Neurological:  no gross motor deficits        Psych:  affect appropriate, oriented to time, person and place     Recent Lab Results:  LIPID RESULTS:  Lab Results   Component Value Date    CHOL 128 08/20/2024    CHOL 161 02/15/2021    HDL 63 08/20/2024    HDL 76 02/15/2021    LDL 51 08/20/2024    LDL 71 02/15/2021    TRIG 68 08/20/2024    TRIG 68 02/15/2021    CHOLHDLRATIO 3.1 07/02/2015       LIVER ENZYME RESULTS:  Lab Results   Component Value Date    AST 30 08/20/2024    AST 25 02/15/2021    ALT 18 08/20/2024    ALT 30 02/15/2021       CBC RESULTS:  Lab Results   Component Value Date    WBC 11.7 (H) 12/14/2023    WBC 7.6 11/18/2020    RBC 3.47 (L) 12/14/2023    RBC 4.31 (L) " "11/18/2020    HGB 12.0 (L) 12/29/2023    HGB 13.1 (L) 06/11/2021    HCT 33.5 (L) 12/14/2023    HCT 41.4 11/18/2020    MCV 97 12/14/2023    MCV 96 11/18/2020    MCH 32.6 12/14/2023    MCH 31.8 11/18/2020    MCHC 33.7 12/14/2023    MCHC 33.1 11/18/2020    RDW 13.2 12/14/2023    RDW 13.4 11/18/2020     12/14/2023     11/18/2020       BMP RESULTS:  Lab Results   Component Value Date     08/20/2024     02/15/2021    POTASSIUM 4.6 08/20/2024    POTASSIUM 4.2 06/08/2022    POTASSIUM 4.3 02/15/2021    CHLORIDE 106 08/20/2024    CHLORIDE 107 06/08/2022    CHLORIDE 107 02/15/2021    CO2 26 08/20/2024    CO2 29 06/08/2022    CO2 27 02/15/2021    ANIONGAP 10 08/20/2024    ANIONGAP 2 (L) 06/08/2022    ANIONGAP 6 02/15/2021    GLC 95 08/20/2024    GLC 94 06/08/2022    GLC 88 02/15/2021    BUN 16.6 08/20/2024    BUN 19 06/08/2022    BUN 19 02/15/2021    CR 1.09 08/20/2024    CR 0.91 02/15/2021    GFRESTIMATED 68 08/20/2024    GFRESTIMATED 80 02/15/2021    GFRESTBLACK >90 02/15/2021    EDY 9.6 08/20/2024    EDY 9.2 02/15/2021        A1C RESULTS:  Lab Results   Component Value Date    A1C 5.6 08/20/2024    A1C 5.4 02/15/2021       INR RESULTS:  No results found for: \"INR\"      Kishor Lord MD, FACC    CC  Kishor Lord MD  [address] [place] [id number]                    Thank you for allowing me to participate in the care of your patient.      Sincerely,     Kishor Lord MD     [facility]  cc:   Kishor Lord MD  [address] [place] [id number]      "

## 2024-10-29 ENCOUNTER — TRANSFERRED RECORDS (OUTPATIENT)
Dept: HEALTH INFORMATION MANAGEMENT | Facility: CLINIC | Age: 84
End: 2024-10-29
Payer: COMMERCIAL

## 2024-11-11 ENCOUNTER — APPOINTMENT (OUTPATIENT)
Dept: URBAN - METROPOLITAN AREA CLINIC 256 | Age: 84
Setting detail: DERMATOLOGY
End: 2024-11-11

## 2024-11-11 ENCOUNTER — LAB (OUTPATIENT)
Dept: LAB | Facility: CLINIC | Age: 84
End: 2024-11-11
Payer: COMMERCIAL

## 2024-11-11 VITALS — WEIGHT: 160 LBS | HEIGHT: 70 IN

## 2024-11-11 DIAGNOSIS — E29.1 TESTICULAR HYPOFUNCTION: ICD-10-CM

## 2024-11-11 DIAGNOSIS — L57.0 ACTINIC KERATOSIS: ICD-10-CM

## 2024-11-11 DIAGNOSIS — Z71.89 OTHER SPECIFIED COUNSELING: ICD-10-CM

## 2024-11-11 DIAGNOSIS — L57.8 OTHER SKIN CHANGES DUE TO CHRONIC EXPOSURE TO NONIONIZING RADIATION: ICD-10-CM

## 2024-11-11 DIAGNOSIS — L82.1 OTHER SEBORRHEIC KERATOSIS: ICD-10-CM

## 2024-11-11 DIAGNOSIS — D485 NEOPLASM OF UNCERTAIN BEHAVIOR OF SKIN: ICD-10-CM

## 2024-11-11 DIAGNOSIS — D22 MELANOCYTIC NEVI: ICD-10-CM

## 2024-11-11 PROBLEM — D22.5 MELANOCYTIC NEVI OF TRUNK: Status: ACTIVE | Noted: 2024-11-11

## 2024-11-11 PROBLEM — D22.61 MELANOCYTIC NEVI OF RIGHT UPPER LIMB, INCLUDING SHOULDER: Status: ACTIVE | Noted: 2024-11-11

## 2024-11-11 PROBLEM — D48.5 NEOPLASM OF UNCERTAIN BEHAVIOR OF SKIN: Status: ACTIVE | Noted: 2024-11-11

## 2024-11-11 PROBLEM — D22.39 MELANOCYTIC NEVI OF OTHER PARTS OF FACE: Status: ACTIVE | Noted: 2024-11-11

## 2024-11-11 PROBLEM — D22.62 MELANOCYTIC NEVI OF LEFT UPPER LIMB, INCLUDING SHOULDER: Status: ACTIVE | Noted: 2024-11-11

## 2024-11-11 LAB — HGB BLD-MCNC: 13.2 G/DL (ref 13.3–17.7)

## 2024-11-11 PROCEDURE — OTHER LIQUID NITROGEN: OTHER

## 2024-11-11 PROCEDURE — 36415 COLL VENOUS BLD VENIPUNCTURE: CPT

## 2024-11-11 PROCEDURE — OTHER COUNSELING: OTHER

## 2024-11-11 PROCEDURE — 11102 TANGNTL BX SKIN SINGLE LES: CPT | Mod: 59

## 2024-11-11 PROCEDURE — OTHER PHOTO-DOCUMENTATION: OTHER

## 2024-11-11 PROCEDURE — 17004 DESTROY PREMAL LESIONS 15/>: CPT

## 2024-11-11 PROCEDURE — OTHER BIOPSY BY SHAVE METHOD: OTHER

## 2024-11-11 PROCEDURE — OTHER EDUCATIONAL RESOURCES PROVIDED: OTHER

## 2024-11-11 PROCEDURE — 85018 HEMOGLOBIN: CPT

## 2024-11-11 PROCEDURE — 84403 ASSAY OF TOTAL TESTOSTERONE: CPT

## 2024-11-11 PROCEDURE — OTHER MIPS QUALITY: OTHER

## 2024-11-11 PROCEDURE — 99213 OFFICE O/P EST LOW 20 MIN: CPT | Mod: 25

## 2024-11-11 ASSESSMENT — LOCATION SIMPLE DESCRIPTION DERM
LOCATION SIMPLE: RIGHT FOREHEAD
LOCATION SIMPLE: POSTERIOR NECK
LOCATION SIMPLE: LEFT UPPER ARM
LOCATION SIMPLE: LEFT EAR
LOCATION SIMPLE: SCALP
LOCATION SIMPLE: LEFT UPPER BACK
LOCATION SIMPLE: RIGHT FOREARM
LOCATION SIMPLE: LEFT OCCIPITAL SCALP
LOCATION SIMPLE: LEFT TEMPLE
LOCATION SIMPLE: NOSE
LOCATION SIMPLE: SUPERIOR FOREHEAD
LOCATION SIMPLE: POSTERIOR SCALP
LOCATION SIMPLE: RIGHT OCCIPITAL SCALP
LOCATION SIMPLE: UPPER BACK
LOCATION SIMPLE: LEFT NOSE
LOCATION SIMPLE: LEFT CHEEK
LOCATION SIMPLE: RIGHT CHEEK
LOCATION SIMPLE: LEFT FOREHEAD
LOCATION SIMPLE: LEFT FOREARM

## 2024-11-11 ASSESSMENT — LOCATION DETAILED DESCRIPTION DERM
LOCATION DETAILED: LEFT SUPERIOR MEDIAL UPPER BACK
LOCATION DETAILED: RIGHT SUPERIOR OCCIPITAL SCALP
LOCATION DETAILED: LEFT SUPERIOR FOREHEAD
LOCATION DETAILED: RIGHT DISTAL DORSAL FOREARM
LOCATION DETAILED: LEFT INFERIOR LATERAL MALAR CHEEK
LOCATION DETAILED: RIGHT SUPERIOR FOREHEAD
LOCATION DETAILED: POSTERIOR MID-PARIETAL SCALP
LOCATION DETAILED: LEFT CENTRAL MALAR CHEEK
LOCATION DETAILED: LEFT NASAL ALAR GROOVE
LOCATION DETAILED: SUPERIOR THORACIC SPINE
LOCATION DETAILED: LEFT SUPERIOR CENTRAL MALAR CHEEK
LOCATION DETAILED: LEFT SUPERIOR HELIX
LOCATION DETAILED: MID POSTERIOR NECK
LOCATION DETAILED: LEFT CENTRAL TEMPLE
LOCATION DETAILED: LEFT SUPERIOR OCCIPITAL SCALP
LOCATION DETAILED: RIGHT PROXIMAL DORSAL FOREARM
LOCATION DETAILED: LEFT FOREHEAD
LOCATION DETAILED: NASAL DORSUM
LOCATION DETAILED: LEFT INFERIOR CENTRAL MALAR CHEEK
LOCATION DETAILED: RIGHT VENTRAL PROXIMAL FOREARM
LOCATION DETAILED: LEFT VENTRAL PROXIMAL FOREARM
LOCATION DETAILED: RIGHT FOREHEAD
LOCATION DETAILED: LEFT MEDIAL FOREHEAD
LOCATION DETAILED: RIGHT SUPERIOR PARIETAL SCALP
LOCATION DETAILED: SUPERIOR MID FOREHEAD
LOCATION DETAILED: LEFT INFERIOR FOREHEAD
LOCATION DETAILED: LEFT ANTECUBITAL SKIN
LOCATION DETAILED: RIGHT MEDIAL FOREHEAD
LOCATION DETAILED: RIGHT INFERIOR CENTRAL MALAR CHEEK

## 2024-11-11 ASSESSMENT — LOCATION ZONE DERM
LOCATION ZONE: SCALP
LOCATION ZONE: ARM
LOCATION ZONE: NOSE
LOCATION ZONE: EAR
LOCATION ZONE: FACE
LOCATION ZONE: NECK
LOCATION ZONE: TRUNK

## 2024-11-11 NOTE — PROCEDURE: BIOPSY BY SHAVE METHOD
Detail Level: Detailed
Depth Of Biopsy: dermis
Was A Bandage Applied: Yes
Size Of Lesion In Cm: 0.4
X Size Of Lesion In Cm: 0
Anticipated Plan (Based On Presumed Biopsy Results): ED&C
Biopsy Type: H and E
Biopsy Method: double edge Personna blade
Anesthesia Type: 1% lidocaine with epinephrine
Anesthesia Volume In Cc: 0.5
Hemostasis: Drysol
Wound Care: Petrolatum
Dressing: bandage
Destruction After The Procedure: No
Type Of Destruction Used: Curettage
Curettage Text: The wound bed was treated with curettage after the biopsy was performed.
Cryotherapy Text: The wound bed was treated with cryotherapy after the biopsy was performed.
Electrodesiccation Text: The wound bed was treated with electrodesiccation after the biopsy was performed.
Electrodesiccation And Curettage Text: The wound bed was treated with electrodesiccation and curettage after the biopsy was performed.
Silver Nitrate Text: The wound bed was treated with silver nitrate after the biopsy was performed.
Lab: -[id number]
Path Notes (To The Dermatopathologist): Please Check Margins
Consent: Written consent was obtained and risks were reviewed including but not limited to scarring, infection, bleeding, scabbing, incomplete removal, nerve damage and allergy to anesthesia.
Post-Care Instructions: I reviewed with the patient in detail post-care instructions. Patient is to keep the biopsy site dry overnight, and then apply bacitracin twice daily until healed. Patient may apply hydrogen peroxide soaks to remove any crusting.
Notification Instructions: Patient will be notified of biopsy results. However, patient instructed to call the office if not contacted within 2 weeks.
Billing Type: Third-Party Bill
Information: Selecting Yes will display possible errors in your note based on the variables you have selected. This validation is only offered as a suggestion for you. PLEASE NOTE THAT THE VALIDATION TEXT WILL BE REMOVED WHEN YOU FINALIZE YOUR NOTE. IF YOU WANT TO FAX A PRELIMINARY NOTE YOU WILL NEED TO TOGGLE THIS TO 'NO' IF YOU DO NOT WANT IT IN YOUR FAXED NOTE.

## 2024-11-11 NOTE — HPI: SKIN LESIONS
Is This A New Presentation, Or A Follow-Up?: Skin Lesions
Additional History: Lump that fell off left scalp

## 2024-11-11 NOTE — PROCEDURE: LIQUID NITROGEN
Application Tool (Optional): Liquid Nitrogen Sprayer
Render Note In Bullet Format When Appropriate: No
Consent: The patient's verbal consent was obtained including but not limited to risks of crusting, scabbing, blistering, scarring, darker or lighter pigmentary change, recurrence, incomplete removal and infection.
Number Of Freeze-Thaw Cycles: 1 freeze-thaw cycle
Show Aperture Variable?: Yes
Duration Of Freeze Thaw-Cycle (Seconds): 5
Detail Level: Simple
Post-Care Instructions: I reviewed with the patient in detail post-care instructions. Patient is to wear sunprotection, and avoid picking at any of the treated lesions. Pt may apply Vaseline to crusted or scabbing areas.
Detail Level: Detailed

## 2024-11-14 LAB — TESTOST SERPL-MCNC: 66 NG/DL (ref 240–950)

## 2024-11-15 ENCOUNTER — APPOINTMENT (OUTPATIENT)
Dept: URBAN - METROPOLITAN AREA CLINIC 256 | Age: 84
Setting detail: DERMATOLOGY
End: 2024-11-16

## 2024-11-15 VITALS — HEIGHT: 70 IN | WEIGHT: 160 LBS

## 2024-11-15 PROBLEM — C44.311 BASAL CELL CARCINOMA OF SKIN OF NOSE: Status: ACTIVE | Noted: 2024-11-15

## 2024-11-15 PROCEDURE — OTHER EDUCATIONAL RESOURCES PROVIDED: OTHER

## 2024-11-15 PROCEDURE — OTHER CURETTAGE AND DESTRUCTION: OTHER

## 2024-11-15 PROCEDURE — 17281 DSTR MAL LS F/E/E/N/L/M .6-1: CPT | Mod: 79

## 2024-11-15 PROCEDURE — OTHER MIPS QUALITY: OTHER

## 2024-11-15 PROCEDURE — OTHER COUNSELING: OTHER

## 2024-11-15 NOTE — PROCEDURE: CURETTAGE AND DESTRUCTION
Concentration (Mg/Ml Or Millions Of Plaque Forming Units/Cc): 0.01
Render Post-Care Instructions In Note?: no
Number Of Curettages: 2
Size Of Lesion After Curettage: 0.7
What Was Performed First?: Curettage
Detail Level: Detailed
Additional Information: (Optional): The wound was cleaned, and a dressing was applied. The patient received detailed post-op instructions.
Consent was obtained from the patient. The risks, benefits and alternatives to therapy were discussed in detail. Specifically, the risks of infection, scarring, bleeding, prolonged wound healing, nerve injury, incomplete removal, allergy to anesthesia and recurrence were addressed. Alternatives to ED&C, such as: surgical removal and XRT were also discussed.  Prior to the procedure, the treatment site was clearly identified and confirmed by the patient. All components of Universal Protocol/PAUSE Rule completed.
Total Volume (Ccs): 1
Anesthesia Type: 1% lidocaine with epinephrine
Bill As A Line Item Or As Units: Line Item
Cautery Type: electrodesiccation
Post-Care Instructions: I reviewed with the patient in detail post-care instructions. Patient is to keep the area dry for 48 hours, and not to engage in any swimming until the area is healed. Should the patient develop any fevers, chills, bleeding, severe pain patient will contact the office immediately.
Final Size Statement: The size of the lesion after curettage was
Size Of Lesion In Cm: 0.4

## 2024-12-16 ENCOUNTER — TRANSFERRED RECORDS (OUTPATIENT)
Dept: HEALTH INFORMATION MANAGEMENT | Facility: CLINIC | Age: 84
End: 2024-12-16
Payer: COMMERCIAL

## 2025-01-14 ENCOUNTER — OFFICE VISIT (OUTPATIENT)
Dept: PEDIATRICS | Facility: CLINIC | Age: 85
End: 2025-01-14
Payer: COMMERCIAL

## 2025-01-14 VITALS
BODY MASS INDEX: 24.6 KG/M2 | WEIGHT: 171.8 LBS | SYSTOLIC BLOOD PRESSURE: 145 MMHG | HEIGHT: 70 IN | DIASTOLIC BLOOD PRESSURE: 63 MMHG | HEART RATE: 76 BPM | TEMPERATURE: 98 F | OXYGEN SATURATION: 95 % | RESPIRATION RATE: 18 BRPM

## 2025-01-14 DIAGNOSIS — R26.89 BALANCE PROBLEMS: ICD-10-CM

## 2025-01-14 DIAGNOSIS — R51.9 NONINTRACTABLE HEADACHE, UNSPECIFIED CHRONICITY PATTERN, UNSPECIFIED HEADACHE TYPE: ICD-10-CM

## 2025-01-14 DIAGNOSIS — R41.3 MEMORY LOSS: ICD-10-CM

## 2025-01-14 DIAGNOSIS — M43.6 NECK STIFFNESS: ICD-10-CM

## 2025-01-14 DIAGNOSIS — S09.90XA INJURY OF HEAD, INITIAL ENCOUNTER: Primary | ICD-10-CM

## 2025-01-14 DIAGNOSIS — Z96.652 STATUS POST TOTAL LEFT KNEE REPLACEMENT: ICD-10-CM

## 2025-01-14 PROBLEM — M54.50 LUMBAGO: Status: RESOLVED | Noted: 2017-05-22 | Resolved: 2025-01-14

## 2025-01-14 PROCEDURE — G2211 COMPLEX E/M VISIT ADD ON: HCPCS | Performed by: PHYSICIAN ASSISTANT

## 2025-01-14 PROCEDURE — 99214 OFFICE O/P EST MOD 30 MIN: CPT | Performed by: PHYSICIAN ASSISTANT

## 2025-01-14 RX ORDER — ASPIRIN 81 MG/1
81 TABLET ORAL DAILY
Qty: 90 TABLET | Refills: 3 | Status: SHIPPED | OUTPATIENT
Start: 2025-01-14

## 2025-01-14 NOTE — PROGRESS NOTES
Assessment & Plan     Injury of head, initial encounter  Given fall and worsening symptoms, obtain imaging.   - CT Head w/o Contrast; Future  - CT Cervical Spine w/o Contrast; Future  - Concussion  Referral; Future    Neck stiffness  As above.   - CT Cervical Spine w/o Contrast; Future    Balance problems  Patient referred to concussion specialist given constellation of symptoms.   - CT Head w/o Contrast; Future  - Concussion  Referral; Future    Nonintractable headache, unspecified chronicity pattern, unspecified headache type  - CT Head w/o Contrast; Future  - Concussion  Referral; Future    Memory loss  Worsening memory loss should be addressed with neurologist at next appointment. Patient and wife agree.  - CT Head w/o Contrast; Future  - Concussion  Referral; Future    Status post total left knee replacement  - aspirin 81 MG EC tablet; Take 1 tablet (81 mg) by mouth daily.      Gabino Tanner is a 84 year old, presenting for the following health issues: accompanied by wife  Balance/ Vestibular, Headache, Memory Loss, and Neck Pain  HPI   Patient has history of dizziness and unbalance x years. He has gone through physical and vestibular therapies.     In Oct 2024, patient fell off a chair backwards and hit posterior head on concrete. Since then, patient finds that his dizziness persists. He finds that he still has instability. He has headaches present. Neck stiffness. Wife finds that his ongoing mental decline and memory issues has worsened more rapidly since trauma.    Patient has neurologist. MRI head completed two years ago. They have follow up with neuro next month. Wife inquiring about meds to slow cognitive decline.    In addition, patient has left medial knee pain. Patient has been seeing [facility] and they ordered an US. Sharp pain along the medial knee yesterday. Limping. Improving slightly. Visible raised spot. Lump resolved.     Review of Systems  Constitutional,  "neuro, ENT, endocrine, pulmonary, cardiac, gastrointestinal, genitourinary, musculoskeletal, integument and psychiatric systems are negative, except as otherwise noted.      Objective    BP (!) 145/63 (BP Location: Right arm, Patient Position: Sitting, Cuff Size: Adult Regular)   Pulse 76   Temp 98  F (36.7  C) (Temporal)   Resp 18   Ht 1.765 m (5' 9.5\")   Wt 77.9 kg (171 lb 12.8 oz)   SpO2 95%   BMI 25.01 kg/m    Body mass index is 25.01 kg/m .  Physical Exam   GENERAL: alert and no distress  EYES: Eyes grossly normal to inspection, PERRL and conjunctivae and sclerae normal  HENT: ear canals and TM's normal, nose and mouth without ulcers or lesions  NECK: tender over the posterior neck; limited ROM  RESP: lungs clear to auscultation - no rales, rhonchi or wheezes  CV: regular rate and rhythm, normal S1 S2, no S3 or S4  Neuro:  Normal strength and tone, mentation intact, speech normal, and cranial nerves 2-12 intact    No results found for any visits on 01/14/25.        Signed Electronically by: Jaquelin Fountain PA-C    "

## 2025-01-15 ENCOUNTER — NURSE TRIAGE (OUTPATIENT)
Dept: NURSING | Facility: CLINIC | Age: 85
End: 2025-01-15
Payer: COMMERCIAL

## 2025-01-16 ENCOUNTER — VIRTUAL VISIT (OUTPATIENT)
Dept: URGENT CARE | Facility: CLINIC | Age: 85
End: 2025-01-16
Payer: COMMERCIAL

## 2025-01-16 DIAGNOSIS — U07.1 INFECTION DUE TO 2019 NOVEL CORONAVIRUS: Primary | ICD-10-CM

## 2025-01-16 NOTE — TELEPHONE ENCOUNTER
Patient takes amlodipine and does not qualify for RN protocol.     Scheduled patient for virtual Urgent Care today.    Wife informed, consent to communicate on file. Agreeable to plan.    Geno Palomo RN

## 2025-01-16 NOTE — PATIENT INSTRUCTIONS
STOP TAMSULOSIN WHILE ON PAXLOVID AND FOR 2 DAY AFTER   STOP ATORVASTATIN WHILE ON PAXLOVID AND FOR 2 DAYS AFTER  CUTE AMLODIPINE IN 1/2 WHILE ON PAXLOVID AND FOR 2 DAYS AFTER.

## 2025-01-16 NOTE — TELEPHONE ENCOUNTER
"  COVID Positive/Requesting COVID treatment    Patient is positive for COVID and requesting treatment options.    Date of positive COVID test (PCR or at home)? 1/15/25  Current COVID symptoms: fever or chills, cough, muscle or body aches, and congestion or runny nose  Date COVID symptoms began: 1/14/25    Message should be routed to clinic RN pool. Best phone number to use for call back: Phoebe (wife) [phone]     Nurse Triage SBAR    Is this a 2nd Level Triage? NO    Situation:  COVID positive result today    Wife Phoebe calling about her  Ciro who tested positive for COVID today, symptoms include fever, stuffy nose, body aches, and cough. Concerned also because family coming into town soon, wanting to \"get on top of this\" right away.    Background:  Wife Phoebe calling about her  Ciro who tested positive for COVID today, symptoms include fever, stuffy nose, body aches, and cough. Concerned also because family coming into town soon, wanting to \"get on top of this\" right away.    Assessment:  Fever 99.5, O2 sats 96%, Pulse rate 78. Cough and congestion, not feeling well. Has taken Tylenol as part of daily medications.     Protocol Recommended Disposition:   Call PCP Within 24 Hours    Recommendation:  Gave care advice regarding symptom management, and call back instructions, also routing message to care team for Paxlovid.     Routing to care team    Does the patient meet one of the following criteria for ADS visit consideration? 16+ years old, with an [facility] PCP     TIP  Providers, please consider if this condition is appropriate for management at one of our Acute and Diagnostic Services sites.     If patient is a good candidate, please use dotphrase <dot>triageresponse and select Refer to ADS to document.    Reason for Disposition   [1] HIGH RISK patient (e.g., weak immune system, age > 64 years, obesity with BMI 30 or higher, pregnant, chronic lung disease) AND [2] COVID symptoms (e.g., cough, " fever)  (Exceptions: Already seen by PCP and no new or worsening symptoms.)    Additional Information   Negative: SEVERE difficulty breathing (e.g., struggling for each breath, speaks in single words)   Negative: Difficult to awaken or acting confused (e.g., disoriented, slurred speech)   Negative: Bluish (or gray) lips or face now   Negative: Shock suspected (e.g., cold/pale/clammy skin, too weak to stand, low BP, rapid pulse)   Negative: Sounds like a life-threatening emergency to the triager   Negative: [1] Diagnosed or suspected COVID-19 AND [2] symptoms lasting 3 or more weeks   Negative: [1] COVID-19 exposure AND [2] no symptoms   Negative: COVID-19 vaccine reaction suspected (e.g., fever, headache, muscle aches) occurring 1 to 3 days after getting vaccine   Negative: COVID-19 vaccine, questions about   Negative: [1] Exposure to someone known to have influenza (flu test positive) AND [2] flu-like symptoms (e.g., cough, runny nose, sore throat, SOB; with or without fever)   Negative: [1] Possible COVID-19 symptoms AND [2] triager concerned about severity of symptoms or other causes   Negative: COVID-19 and breastfeeding, questions about   Negative: SEVERE or constant chest pain or pressure  (Exception: Mild central chest pain, present only when coughing.)   Negative: MODERATE difficulty breathing (e.g., speaks in phrases, SOB even at rest, pulse 100-120)   Negative: [1] Headache AND [2] stiff neck (can't touch chin to chest)   Negative: Chest pain or pressure  (Exception: MILD central chest pain, present only when coughing.)   Negative: Oxygen level (e.g., pulse oximetry) 90% or lower     Pulse 78 96% O2 level   Negative: [1] Drinking very little AND [2] dehydration suspected (e.g., no urine > 12 hours, very dry mouth, very lightheaded)   Negative: Patient sounds very sick or weak to the triager   Negative: MILD difficulty breathing (e.g., minimal/no SOB at rest, SOB with walking, pulse <100)   Negative: Fever  > 103 F (39.4 C)   Negative: [1] Fever > 101 F (38.3 C) AND [2] age > 60 years   Negative: [1] Fever > 100 F (37.8 C) AND [2] bedridden (e.g., CVA, chronic illness, recovering from surgery)   Negative: Oxygen level (e.g., pulse oximetry) 91 to 94%    Protocols used: COVID-19 - Diagnosed or [id number]-EL

## 2025-01-16 NOTE — PROGRESS NOTES
Ciro is a 84 year old male who presents for a billable telephone visit.   ASSESSMENT/PLAN:  Diagnoses and all orders for this visit:    Infection due to 2019 novel coronavirus  -     nirmatrelvir and ritonavir (PAXLOVID) 300 mg/100 mg therapy pack; Take 3 tablets by mouth 2 times daily for 5 days.    Patient is 84 years old who is on day 2 of COVID infection. Symptoms include cough, chills, body aches, congestion.  Patient has comorbidities of age, HTN, hyperlipidemia.  They are currently taking amlodipine, tamsulosin, and atorvastatin which can interact with Paxlovid. Discussed stopping the atorvastatin and tamulosis during treatment and for 2 days after, cutting amlodipine in half for same time frame. They understand how to monitor the symptoms. She does consent to taking Paxlovid.  GFR 68.  No other drug drug interaction concerns. Discussed that if covid symptoms worsen to go to UC if significant go to ER.       Follow up with primary care provider with any problems, questions or concerns or if symptoms worsen or fail to improve. Patient agreed to plan and verbalized understanding.     SUBJECTIVE: Spoke with wife, patient woke up from nap at end of call, consent on file to speak with wife. Pt started with symptoms on 1/14, tested positive for covid yesterday. Fever/chills, body aches, cough and congestion.           ROS: Pertinent ROS neg other than the symptoms noted above in the HPI.     OBJECTIVE:  Vitals not done due to this being a virtual visit  GEN: No distress  RESP: No cough, no audible wheezing, able to talk in full sentences  Remainder of exam unable to be completed due to telephone visits    Sabrina Woo NP    Call length: 14 minutes  Provider location during call: home  Patient location during call: home

## 2025-01-24 ENCOUNTER — ANCILLARY PROCEDURE (OUTPATIENT)
Dept: CT IMAGING | Facility: CLINIC | Age: 85
End: 2025-01-24
Attending: PHYSICIAN ASSISTANT
Payer: COMMERCIAL

## 2025-01-24 DIAGNOSIS — M43.6 NECK STIFFNESS: ICD-10-CM

## 2025-01-24 DIAGNOSIS — R41.3 MEMORY LOSS: ICD-10-CM

## 2025-01-24 DIAGNOSIS — R26.89 BALANCE PROBLEMS: ICD-10-CM

## 2025-01-24 DIAGNOSIS — R51.9 NONINTRACTABLE HEADACHE, UNSPECIFIED CHRONICITY PATTERN, UNSPECIFIED HEADACHE TYPE: ICD-10-CM

## 2025-01-24 DIAGNOSIS — S09.90XA INJURY OF HEAD, INITIAL ENCOUNTER: ICD-10-CM

## 2025-01-24 PROCEDURE — 70450 CT HEAD/BRAIN W/O DYE: CPT

## 2025-01-24 PROCEDURE — 72125 CT NECK SPINE W/O DYE: CPT

## 2025-01-29 NOTE — PROCEDURE: MOHS SURGERY
Please advise what the Max DD of humalog should be so that an updated script may be sent   Burow's Advancement Flap Text: The defect edges were debeveled with a #15 scalpel blade.  Given the location of the defect and the proximity to free margins a Burow's advancement flap was deemed most appropriate.  Using a sterile surgical marker, the appropriate advancement flap was drawn incorporating the defect and placing the expected incisions within the relaxed skin tension lines where possible.    The area thus outlined was incised deep to adipose tissue with a #15 scalpel blade.  The skin margins were undermined to an appropriate distance in all directions utilizing iris scissors.

## 2025-02-10 ENCOUNTER — OFFICE VISIT (OUTPATIENT)
Dept: NEUROLOGY | Facility: CLINIC | Age: 85
End: 2025-02-10
Payer: COMMERCIAL

## 2025-02-10 VITALS — DIASTOLIC BLOOD PRESSURE: 85 MMHG | SYSTOLIC BLOOD PRESSURE: 156 MMHG | OXYGEN SATURATION: 97 % | HEART RATE: 70 BPM

## 2025-02-10 DIAGNOSIS — R26.89 BALANCE PROBLEMS: ICD-10-CM

## 2025-02-10 DIAGNOSIS — G60.9 IDIOPATHIC PERIPHERAL NEUROPATHY: Primary | ICD-10-CM

## 2025-02-10 PROCEDURE — 99215 OFFICE O/P EST HI 40 MIN: CPT | Performed by: PSYCHIATRY & NEUROLOGY

## 2025-02-10 PROCEDURE — G2211 COMPLEX E/M VISIT ADD ON: HCPCS | Performed by: PSYCHIATRY & NEUROLOGY

## 2025-02-10 NOTE — PROGRESS NOTES
ESTABLISHED PATIENT NEUROLOGY NOTE    DATE OF VISIT: 2/10/2025  CLINIC LOCATION: [facility]  MRN: [id number]  PATIENT NAME: Ciro Almonte  YOB: 1940    REASON FOR VISIT:   Chief Complaint   Patient presents with    RECHECK     SUBJECTIVE:                                                      HISTORY OF PRESENT ILLNESS: Patient is here to follow up regarding peripheral polyneuropathy with associated balance difficulty.  She was last seen on 8/9/2024. Please refer to my initial/other prior notes for further information.  Accompanied by his wife.    Since the last visit, the patient reports no pain worsening after stopping Cymbalta.  He is on 200 mg of alpha lipoic acid with meals twice daily.  He feels that neuropathy symptoms are somewhat better, but he continues to struggle with his balance that led to a fall in January.  He also reports cognitive complaints.  He denies interval development of new focal neurological symptoms.    Head CT from 1/24/2025, performed after fall demonstrated brain atrophy and presumed chronic microvascular ischemic changes without evidence of acute intracranial pathology.  Cervical spine CT from the same date demonstrated multilevel degenerative changes, but no evidence of fracture or posttraumatic subluxation.  Images were personally reviewed and independently interpreted.  EXAM:                                                    Physical Exam:   Vitals: BP (!) 156/85 (BP Location: Left arm, Patient Position: Sitting, Cuff Size: Adult Regular)   Pulse 70   SpO2 97%     General: pt is in NAD, cooperative.  Skin: normal turgor, moist mucous membranes, no lesions/rashes noticed.  HEENT: ATNC, white sclera, normal conjunctiva.  Respiratory: Symmetric lung excursion, no accessory respiratory muscle use.  Abdomen: Non distended.  Neurological: awake, cooperative, follows commands, no exam changes compared to previous visits.  ASSESSMENT AND PLAN:                                                     Assessment: 84 year old male patient presents for follow-up of peripheral polyneuropathy with associated balance difficulty.  At the last visit we stopped Cymbalta to see if pain worsens.  He reports worsening of the balance and improvement in neuropathy symptoms on current therapy with alpha lipoic acid.  The plan was to restart it at the higher dose if pain worsens.  Additional treatment options include Lyrica, Effexor, and topical treatments.  Gabapentin at 600 mg 3 times per day was not helpful.  However, given persistent balance worsening in context of recent falls, I am concerned regarding possibility of compressive myelopathy at the cervical or thoracic region as well as progression of lumbar spinal stenosis at the lumbar region.  For these reasons, we decided to proceed with complete spine MRI, which is ordered.    Regarding his cognitive complaints, we will do MoCA at the next visit and order necessary evaluation, including possible repeated MRI, lab work, and CPT.    Ciro to follow up with Primary Care provider regarding elevated blood pressure.     Diagnoses:    ICD-10-CM    1. Idiopathic peripheral neuropathy  G60.9       2. Balance problems  R26.89         Plan: At today's visit we thoroughly discussed various diagnostic possibilities for patient's symptoms, necessary evaluation, and the plan, which includes:  Orders Placed This Encounter   Procedures    MR Cervical Spine w/o Contrast    MR Thoracic Spine w/o Contrast    MR Lumbar Spine w/o Contrast     No new medications.    At the next visit we will also test his memory and discuss additional evaluation.    Next follow-up appointment is in the next 4 weeks or earlier if needed.    Total Time: 43 minutes spent on the date of the encounter doing chart review, history and exam, documentation and further activities per the note.    Hari Myles MD  [facility]  (Chart documentation was  completed in part with Dragon voice-recognition software. Even though reviewed, some grammatical, spelling, and word errors may remain.)

## 2025-02-10 NOTE — LETTER
2/10/2025      Ciro Almonte  [address]  [place] [address]      Dear Colleague,    Thank you for referring your patient, Ciro Almonte, to the [facility]. Please see a copy of my visit note below.    ESTABLISHED PATIENT NEUROLOGY NOTE    DATE OF VISIT: 2/10/2025  CLINIC LOCATION: [facility]  MRN: [id number]  PATIENT NAME: Ciro Almonte  YOB: 1940    REASON FOR VISIT:   Chief Complaint   Patient presents with     RECHECK     SUBJECTIVE:                                                      HISTORY OF PRESENT ILLNESS: Patient is here to follow up regarding peripheral polyneuropathy with associated balance difficulty.  She was last seen on 8/9/2024. Please refer to my initial/other prior notes for further information.  Accompanied by his wife.    Since the last visit, the patient reports no pain worsening after stopping Cymbalta.  He is on 200 mg of alpha lipoic acid with meals twice daily.  He feels that neuropathy symptoms are somewhat better, but he continues to struggle with his balance that led to a fall in January.  He also reports cognitive complaints.  He denies interval development of new focal neurological symptoms.    Head CT from 1/24/2025, performed after fall demonstrated brain atrophy and presumed chronic microvascular ischemic changes without evidence of acute intracranial pathology.  Cervical spine CT from the same date demonstrated multilevel degenerative changes, but no evidence of fracture or posttraumatic subluxation.  Images were personally reviewed and independently interpreted.  EXAM:                                                    Physical Exam:   Vitals: BP (!) 156/85 (BP Location: Left arm, Patient Position: Sitting, Cuff Size: Adult Regular)   Pulse 70   SpO2 97%     General: pt is in NAD, cooperative.  Skin: normal turgor, moist mucous membranes, no lesions/rashes noticed.  HEENT: ATNC, white sclera,  normal conjunctiva.  Respiratory: Symmetric lung excursion, no accessory respiratory muscle use.  Abdomen: Non distended.  Neurological: awake, cooperative, follows commands, no exam changes compared to previous visits.  ASSESSMENT AND PLAN:                                                    Assessment: 84 year old male patient presents for follow-up of peripheral polyneuropathy with associated balance difficulty.  At the last visit we stopped Cymbalta to see if pain worsens.  He reports worsening of the balance and improvement in neuropathy symptoms on current therapy with alpha lipoic acid.  The plan was to restart it at the higher dose if pain worsens.  Additional treatment options include Lyrica, Effexor, and topical treatments.  Gabapentin at 600 mg 3 times per day was not helpful.  However, given persistent balance worsening in context of recent falls, I am concerned regarding possibility of compressive myelopathy at the cervical or thoracic region as well as progression of lumbar spinal stenosis at the lumbar region.  For these reasons, we decided to proceed with complete spine MRI, which is ordered.    Regarding his cognitive complaints, we will do MoCA at the next visit and order necessary evaluation, including possible repeated MRI, lab work, and CPT.    Ciro to follow up with Primary Care provider regarding elevated blood pressure.     Diagnoses:    ICD-10-CM    1. Idiopathic peripheral neuropathy  G60.9       2. Balance problems  R26.89         Plan: At today's visit we thoroughly discussed various diagnostic possibilities for patient's symptoms, necessary evaluation, and the plan, which includes:  Orders Placed This Encounter   Procedures     MR Cervical Spine w/o Contrast     MR Thoracic Spine w/o Contrast     MR Lumbar Spine w/o Contrast     No new medications.    At the next visit we will also test his memory and discuss additional evaluation.    Next follow-up appointment is in the next 4 weeks or  earlier if needed.    Total Time: 43 minutes spent on the date of the encounter doing chart review, history and exam, documentation and further activities per the note.    Hari Myles MD  [facility]  (Chart documentation was completed in part with Dragon voice-recognition software. Even though reviewed, some grammatical, spelling, and word errors may remain.)      Again, thank you for allowing me to participate in the care of your patient.        Sincerely,        Hari Myles MD    Electronically signed

## 2025-02-10 NOTE — NURSING NOTE
"Ciro Almonte is a 84 year old male who presents for:  Chief Complaint   Patient presents with    RECHECK        Initial Vitals:  BP (!) 156/85 (BP Location: Left arm, Patient Position: Sitting, Cuff Size: Adult Regular)   Pulse 70   SpO2 97%  Estimated body mass index is 25.01 kg/m  as calculated from the following:    Height as of 1/14/25: 5' 9.5\" (1.765 m).    Weight as of 1/14/25: 171 lb 12.8 oz (77.9 kg).. There is no height or weight on file to calculate BSA. BP completed using cuff size: regular    Sage Forbes    "

## 2025-02-21 ENCOUNTER — HOSPITAL ENCOUNTER (OUTPATIENT)
Dept: MRI IMAGING | Facility: CLINIC | Age: 85
Discharge: HOME | End: 2025-02-21
Attending: PSYCHIATRY & NEUROLOGY | Admitting: PSYCHIATRY & NEUROLOGY
Payer: COMMERCIAL

## 2025-02-21 DIAGNOSIS — R26.89 BALANCE PROBLEMS: ICD-10-CM

## 2025-02-21 PROCEDURE — 72146 MRI CHEST SPINE W/O DYE: CPT

## 2025-02-21 PROCEDURE — 72148 MRI LUMBAR SPINE W/O DYE: CPT

## 2025-02-21 PROCEDURE — 72141 MRI NECK SPINE W/O DYE: CPT

## 2025-03-12 NOTE — PROGRESS NOTES
ESTABLISHED PATIENT NEUROLOGY NOTE    DATE OF VISIT: 3/13/2025  CLINIC LOCATION: [facility]  MRN: [id number]  PATIENT NAME: Ciro Almonte  YOB: 1940    REASON FOR VISIT: No chief complaint on file.    SUBJECTIVE:                                                      HISTORY OF PRESENT ILLNESS: Patient is here to follow up regarding cognitive complaints, peripheral polyneuropathy and associated balance difficulty. Please refer to my initial/other prior notes for further information.    Since the last visit, the patient reports ***.  He denies interval development of new focal neurological symptoms.    MRI of the cervical spine was negative for cardiac grade spinal canal stenosis or abnormal cord signal.  Moderate to severe neuroforaminal stenosis on the left at C2-3, on the right at C4-5, on the right at C5-6 and bilaterally at C6-C7 was seen.  A lumbar spine MRI demonstrated multilevel degenerative changes including moderate to severe spinal canal narrowing at L1-L2, moderate to severe right and mild left neuroforaminal narrowing at T11-T12, moderate to severe right and moderate left neuroforaminal narrowing at L4-5.  Thoracic spine MRI was negative for compressive myelopathy.  All images were personally reviewed and independently interpreted.  EXAM:                                                    Physical Exam:   Vitals: There were no vitals taken for this visit.  [place] Cognitive Assessment:          Nam Cognitive Assessment Score:   /30.   General: pt is in NAD, cooperative.  Skin: normal turgor, moist mucous membranes, no lesions/rashes noticed.  HEENT: ATNC, white sclera, normal conjunctiva.  Respiratory: Symmetric lung excursion, no accessory respiratory muscle use.  Abdomen: Non distended.  Neurological: awake, cooperative, follows commands, the rest of exam was deferred today.  ASSESSMENT AND PLAN:                                                    Assessment: 85  year old male patient presents for follow-up of cognitive complaints, peripheral polyneuropathy and associated balance difficulty.  He completed MRI of entire spine, which was negative for compressive myelopathy or significant degree of spinal canal stenosis.  We reviewed images together.  At this point, his balance difficulty would be most likely related to cerebral atrophy along with peripheral polyneuropathy.    Regarding his cognitive complaints, MoCA today is ***/30, consistent with ***.  I do not believe that we need to repeat brain imaging (he had head CT in January 2025, which was only notable for chronic microvascular changes and brain atrophy), but would suggest obtaining labs, CPT, and neuropsychologic testing.    Diagnoses:    ICD-10-CM    1. Cognitive complaints  R41.9       2. Idiopathic peripheral neuropathy  G60.9       3. Balance problems  R26.89         Plan:  There are no Patient Instructions on file for this visit.    Total Time: *** minutes spent on the date of the encounter doing chart review, history and exam, documentation and further activities per the note.    Hari Myles MD  [facility]rology  (Chart documentation was completed in part with Dragon voice-recognition software. Even though reviewed, some grammatical, spelling, and word errors may remain.)

## 2025-03-13 ENCOUNTER — OFFICE VISIT (OUTPATIENT)
Dept: NEUROLOGY | Facility: CLINIC | Age: 85
End: 2025-03-13
Payer: COMMERCIAL

## 2025-03-13 VITALS — OXYGEN SATURATION: 100 % | DIASTOLIC BLOOD PRESSURE: 79 MMHG | HEART RATE: 73 BPM | SYSTOLIC BLOOD PRESSURE: 177 MMHG

## 2025-03-13 DIAGNOSIS — R26.89 BALANCE PROBLEMS: ICD-10-CM

## 2025-03-13 DIAGNOSIS — G60.9 IDIOPATHIC PERIPHERAL NEUROPATHY: ICD-10-CM

## 2025-03-13 DIAGNOSIS — R41.9 COGNITIVE COMPLAINTS: Primary | ICD-10-CM

## 2025-03-13 DIAGNOSIS — M48.061 SPINAL STENOSIS, LUMBAR REGION, WITHOUT NEUROGENIC CLAUDICATION: ICD-10-CM

## 2025-03-13 ASSESSMENT — MONTREAL COGNITIVE ASSESSMENT (MOCA)
WHAT IS THE TOTAL SCORE (OUT OF 30): 29
7. [VIGILENCE] TAP WHEN HEARING DESIGNATED LETTER: 1
11. FOR EACH PAIR OF WORDS, WHAT CATEGORY DO THEY BELONG TO (OUT OF 2): 2
8. SERIAL SUBTRACTION OF 7S: 3
10. [FLUENCY] NAME WORDS STARTING WITH DESIGNATED LETTER: 1
WHAT LEVEL OF EDUCATION WAS ATTAINED: 0
9. REPEAT EACH SENTENCE: 2
12. MEMORY INDEX SCORE: 4
VISUOSPATIAL/EXECUTIVE SUBSCORE: 5
4. NAME EACH OF THE THREE ANIMALS SHOWN: 3
6. READ LIST OF DIGITS [FORWARD/BACKWARD]: 2
13. ORIENTATION SUBSCORE: 6

## 2025-03-13 NOTE — PROGRESS NOTES
ESTABLISHED PATIENT NEUROLOGY NOTE    DATE OF VISIT: 3/13/2025  CLINIC LOCATION: [facility]  MRN: [id number]  PATIENT NAME: Ciro Almonte  YOB: 1940    REASON FOR VISIT:   Chief Complaint   Patient presents with    RECHECK     MRI review and memory test     SUBJECTIVE:                                                      HISTORY OF PRESENT ILLNESS: Patient is here to follow up regarding cognitive complaints, peripheral polyneuropathy and associated balance difficulty. Please refer to my initial/other prior notes for further information.  Accompanied by his wife.    Since the last visit, the patient reports continued difficulty with the balance and gait.  He denies interval development of new focal neurological symptoms.  He continues to take alpha lipoic acid with meals.    Regarding his memory, he feels that his ability to recall names of people is affected for some time, though usually he is able to recall them eventually/later on.  He also reports word finding difficulty, which is somewhat worse from his baseline.  He denies misplacing his belongings, but needs to heavily rely on calendar for upcoming events.  He does not repeat himself.  Denies any difficulty with medications, finances, or driving.  No safety concerns.    MRI of the cervical spine was negative for high grade spinal canal stenosis or abnormal cord signal.  Moderate to severe neuroforaminal stenosis on the left at C2-3, on the right at C4-5, on the right at C5-6 and bilaterally at C6-C7 was seen.  A lumbar spine MRI demonstrated multilevel degenerative changes including moderate to severe spinal canal narrowing at L1-L2, moderate to severe right and mild left neuroforaminal narrowing at T11-T12, moderate to severe right and moderate left neuroforaminal narrowing at L4-5.  Thoracic spine MRI was negative for compressive myelopathy.  All images were personally reviewed and independently interpreted.  EXAM:                                                     Physical Exam:   Vitals: BP (!) 177/79   Pulse 73   SpO2 100%   Nam Cognitive Assessment:    Nam Cognitive Assessment (MOCA)  Visuospatial/Executive : 5  Naming: 3  Attention - Digits: 2  Attention - Letters: 1  Attention - Subtraction: 3  Language - Repeat: 2  Language - Fluency : 1  Abstraction: 2  Delayed Recall: 4  Orientation: 6  Education: 0  MOCA Score: 29     Nam Cognitive Assessment Score:  MOCA Score: 29/30.   General: pt is in NAD, cooperative.  Skin: normal turgor, moist mucous membranes, no lesions/rashes noticed.  HEENT: ATNC, white sclera, normal conjunctiva.  Respiratory: Symmetric lung excursion, no accessory respiratory muscle use.  Abdomen: Non distended.  Neurological: awake, cooperative, follows commands, the rest of exam was deferred today.  ASSESSMENT AND PLAN:                                                    Assessment: 85 year old male patient presents for follow-up of cognitive complaints, peripheral polyneuropathy and associated balance difficulty.      He completed MRI of entire spine, which was negative for compressive myelopathy, but demonstrated moderate to severe spinal canal stenosis at L1-L2, which might explain his balance difficulty.  We reviewed images together.  In addition, cerebral atrophy along with peripheral polyneuropathy might be contributory.      We decided to start with physical therapy to see if it helps his balance and low back pain, related to lumbar spinal stenosis and lumbar radiculopathy.  If it is not helpful, might consider neurosurgery referral.    Regarding his cognitive complaints, MoCA today is 29/30 (within normal limits).  We discussed that reported symptoms could be age-related, but neurodegenerative condition is not totally excluded.  I do not believe that we need to repeat brain imaging (he had head CT in January 2025, which was only notable for chronic microvascular changes and brain  atrophy) or do labs, CPT, or neuropsychologic testing, but they might be considered if the patient perceives worsening, and his cognitive scores decline over time.  We will monitor and only repeat cognitive test if he reports worsening.    Ciro to follow up with Primary Care provider regarding elevated blood pressure.     Diagnoses:    ICD-10-CM    1. Cognitive complaints  R41.9       2. Idiopathic peripheral neuropathy  G60.9       3. Balance problems  R26.89         Plan: At today's visit we thoroughly discussed current symptoms, evaluation results, diagnosis, available treatment options, and the plan.    We decided to try a course of physical therapy to see if it helps the balance and low back pain.  If no improvement, might consider referral to neurosurgery.    The longitudinal plan of care for the diagnosis(es)/condition(s) as documented were addressed during this visit. Due to the added complexity in care, I will continue to support Ciro in the subsequent management and with ongoing continuity of care.    Next follow-up appointment is in the next 3 months or earlier if needed.    Total Time: 48 minutes spent on the date of the encounter doing chart review, history and exam, documentation and further activities per the note.    Hari Myles MD  [facility] Neurology  (Chart documentation was completed in part with Dragon voice-recognition software. Even though reviewed, some grammatical, spelling, and word errors may remain.)

## 2025-03-13 NOTE — LETTER
3/13/2025      Ciro Almonte  [address]  [place] [address]      Dear Colleague,    Thank you for referring your patient, Ciro Almonte, to the [facility]. Please see a copy of my visit note below.    ESTABLISHED PATIENT NEUROLOGY NOTE    DATE OF VISIT: 3/13/2025  CLINIC LOCATION: [facility]  MRN: [id number]  PATIENT NAME: Ciro Almonte  YOB: 1940    REASON FOR VISIT:   Chief Complaint   Patient presents with     RECHECK     MRI review and memory test     SUBJECTIVE:                                                      HISTORY OF PRESENT ILLNESS: Patient is here to follow up regarding cognitive complaints, peripheral polyneuropathy and associated balance difficulty. Please refer to my initial/other prior notes for further information.  Accompanied by his wife.    Since the last visit, the patient reports continued difficulty with the balance and gait.  He denies interval development of new focal neurological symptoms.  He continues to take alpha lipoic acid with meals.    Regarding his memory, he feels that his ability to recall names of people is affected for some time, though usually he is able to recall them eventually/later on.  He also reports word finding difficulty, which is somewhat worse from his baseline.  He denies misplacing his belongings, but needs to heavily rely on calendar for upcoming events.  He does not repeat himself.  Denies any difficulty with medications, finances, or driving.  No safety concerns.    MRI of the cervical spine was negative for high grade spinal canal stenosis or abnormal cord signal.  Moderate to severe neuroforaminal stenosis on the left at C2-3, on the right at C4-5, on the right at C5-6 and bilaterally at C6-C7 was seen.  A lumbar spine MRI demonstrated multilevel degenerative changes including moderate to severe spinal canal narrowing at L1-L2, moderate to severe right and mild left  neuroforaminal narrowing at T11-T12, moderate to severe right and moderate left neuroforaminal narrowing at L4-5.  Thoracic spine MRI was negative for compressive myelopathy.  All images were personally reviewed and independently interpreted.  EXAM:                                                    Physical Exam:   Vitals: BP (!) 177/79   Pulse 73   SpO2 100%   Nam Cognitive Assessment:    [place] Cognitive Assessment (MOCA)  Visuospatial/Executive : 5  Naming: 3  Attention - Digits: 2  Attention - Letters: 1  Attention - Subtraction: 3  Language - Repeat: 2  Language - Fluency : 1  Abstraction: 2  Delayed Recall: 4  Orientation: 6  Education: 0  MOCA Score: 29     Nam Cognitive Assessment Score:  MOCA Score: 29/30.   General: pt is in NAD, cooperative.  Skin: normal turgor, moist mucous membranes, no lesions/rashes noticed.  HEENT: ATNC, white sclera, normal conjunctiva.  Respiratory: Symmetric lung excursion, no accessory respiratory muscle use.  Abdomen: Non distended.  Neurological: awake, cooperative, follows commands, the rest of exam was deferred today.  ASSESSMENT AND PLAN:                                                    Assessment: 85 year old male patient presents for follow-up of cognitive complaints, peripheral polyneuropathy and associated balance difficulty.      He completed MRI of entire spine, which was negative for compressive myelopathy, but demonstrated moderate to severe spinal canal stenosis at L1-L2, which might explain his balance difficulty.  We reviewed images together.  In addition, cerebral atrophy along with peripheral polyneuropathy might be contributory.      We decided to start with physical therapy to see if it helps his balance and low back pain, related to lumbar spinal stenosis and lumbar radiculopathy.  If it is not helpful, might consider neurosurgery referral.    Regarding his cognitive complaints, MoCA today is 29/30 (within normal limits).  We discussed that  reported symptoms could be age-related, but neurodegenerative condition is not totally excluded.  I do not believe that we need to repeat brain imaging (he had head CT in January 2025, which was only notable for chronic microvascular changes and brain atrophy) or do labs, CPT, or neuropsychologic testing, but they might be considered if the patient perceives worsening, and his cognitive scores decline over time.  We will monitor and only repeat cognitive test if he reports worsening.    Ciro to follow up with Primary Care provider regarding elevated blood pressure.     Diagnoses:    ICD-10-CM    1. Cognitive complaints  R41.9       2. Idiopathic peripheral neuropathy  G60.9       3. Balance problems  R26.89         Plan: At today's visit we thoroughly discussed current symptoms, evaluation results, diagnosis, available treatment options, and the plan.    We decided to try a course of physical therapy to see if it helps the balance and low back pain.  If no improvement, might consider referral to neurosurgery.    The longitudinal plan of care for the diagnosis(es)/condition(s) as documented were addressed during this visit. Due to the added complexity in care, I will continue to support Ciro in the subsequent management and with ongoing continuity of care.    Next follow-up appointment is in the next 3 months or earlier if needed.    Total Time: 48 minutes spent on the date of the encounter doing chart review, history and exam, documentation and further activities per the note.    Hari Myles MD  [facility] Neurology  (Chart documentation was completed in part with Dragon voice-recognition software. Even though reviewed, some grammatical, spelling, and word errors may remain.)      Again, thank you for allowing me to participate in the care of your patient.        Sincerely,        Hari Myles MD    Electronically signed

## 2025-03-13 NOTE — PATIENT INSTRUCTIONS
AFTER VISIT SUMMARY (AVS):    At today's visit we thoroughly discussed current symptoms, evaluation results, diagnosis, available treatment options, and the plan.    We decided to try a course of physical therapy to see if it helps the balance and low back pain.  If no improvement, might consider referral to neurosurgery.    Next follow-up appointment is in the next 3 months or earlier if needed.    Please do not hesitate to call me with any questions or concerns.    Thanks.

## 2025-03-22 ENCOUNTER — HEALTH MAINTENANCE LETTER (OUTPATIENT)
Age: 85
End: 2025-03-22

## 2025-03-24 ENCOUNTER — LAB (OUTPATIENT)
Dept: LAB | Facility: CLINIC | Age: 85
End: 2025-03-24
Payer: COMMERCIAL

## 2025-03-24 DIAGNOSIS — E29.1 MALE HYPOGONADISM: ICD-10-CM

## 2025-03-24 DIAGNOSIS — I10 ESSENTIAL HYPERTENSION WITH GOAL BLOOD PRESSURE LESS THAN 140/90: ICD-10-CM

## 2025-03-24 LAB
HGB BLD-MCNC: 12.8 G/DL (ref 13.3–17.7)
PSA FREE MFR SERPL: 17.65 %
PSA FREE SERPL-MCNC: 0.3 NG/ML
PSA SERPL DL<=0.01 NG/ML-MCNC: 1.7 NG/ML

## 2025-03-24 PROCEDURE — 85018 HEMOGLOBIN: CPT

## 2025-03-24 PROCEDURE — 84153 ASSAY OF PSA TOTAL: CPT | Mod: GA

## 2025-03-24 PROCEDURE — 84154 ASSAY OF PSA FREE: CPT

## 2025-03-24 PROCEDURE — 36415 COLL VENOUS BLD VENIPUNCTURE: CPT

## 2025-03-25 RX ORDER — LISINOPRIL 40 MG/1
40 TABLET ORAL DAILY
Qty: 90 TABLET | Refills: 1 | Status: SHIPPED | OUTPATIENT
Start: 2025-03-25

## 2025-03-28 PROBLEM — M48.061 SPINAL STENOSIS, LUMBAR REGION, WITHOUT NEUROGENIC CLAUDICATION: Status: ACTIVE | Noted: 2025-03-28

## 2025-03-29 LAB — TESTOST SERPL-MCNC: 16 NG/DL (ref 240–950)

## 2025-04-02 ENCOUNTER — THERAPY VISIT (OUTPATIENT)
Dept: PHYSICAL THERAPY | Facility: CLINIC | Age: 85
End: 2025-04-02
Attending: PSYCHIATRY & NEUROLOGY
Payer: COMMERCIAL

## 2025-04-02 DIAGNOSIS — M48.061 SPINAL STENOSIS, LUMBAR REGION, WITHOUT NEUROGENIC CLAUDICATION: Primary | ICD-10-CM

## 2025-04-02 PROCEDURE — 97110 THERAPEUTIC EXERCISES: CPT | Mod: GP | Performed by: PHYSICAL THERAPIST

## 2025-04-08 ENCOUNTER — THERAPY VISIT (OUTPATIENT)
Dept: PHYSICAL THERAPY | Facility: CLINIC | Age: 85
End: 2025-04-08
Attending: PSYCHIATRY & NEUROLOGY
Payer: COMMERCIAL

## 2025-04-08 DIAGNOSIS — M48.061 SPINAL STENOSIS, LUMBAR REGION, WITHOUT NEUROGENIC CLAUDICATION: Primary | ICD-10-CM

## 2025-04-08 PROCEDURE — 97110 THERAPEUTIC EXERCISES: CPT | Mod: GP | Performed by: PHYSICAL THERAPIST

## 2025-04-28 ENCOUNTER — OFFICE VISIT (OUTPATIENT)
Dept: INTERNAL MEDICINE | Facility: CLINIC | Age: 85
End: 2025-04-28
Payer: COMMERCIAL

## 2025-04-28 VITALS
OXYGEN SATURATION: 97 % | DIASTOLIC BLOOD PRESSURE: 70 MMHG | HEART RATE: 67 BPM | TEMPERATURE: 97.7 F | HEIGHT: 70 IN | BODY MASS INDEX: 24.47 KG/M2 | SYSTOLIC BLOOD PRESSURE: 148 MMHG | WEIGHT: 170.9 LBS

## 2025-04-28 DIAGNOSIS — G60.9 HEREDITARY AND IDIOPATHIC PERIPHERAL NEUROPATHY: ICD-10-CM

## 2025-04-28 DIAGNOSIS — M48.061 SPINAL STENOSIS, LUMBAR REGION, WITHOUT NEUROGENIC CLAUDICATION: ICD-10-CM

## 2025-04-28 DIAGNOSIS — M48.061 SPINAL STENOSIS OF LUMBAR REGION WITHOUT NEUROGENIC CLAUDICATION: ICD-10-CM

## 2025-04-28 DIAGNOSIS — M54.50 CHRONIC RIGHT-SIDED LOW BACK PAIN WITHOUT SCIATICA: Primary | ICD-10-CM

## 2025-04-28 DIAGNOSIS — G89.29 CHRONIC RIGHT-SIDED LOW BACK PAIN WITHOUT SCIATICA: Primary | ICD-10-CM

## 2025-04-28 PROBLEM — N52.9 ED (ERECTILE DYSFUNCTION): Status: ACTIVE | Noted: 2025-04-28

## 2025-04-28 PROBLEM — R79.89 DECREASED TESTOSTERONE LEVEL: Status: ACTIVE | Noted: 2025-03-31

## 2025-04-28 PROBLEM — I25.10 CORONARY ARTERY CALCIFICATION: Status: ACTIVE | Noted: 2025-04-28

## 2025-04-28 PROBLEM — N40.1 LOWER URINARY TRACT SYMPTOMS DUE TO BENIGN PROSTATIC HYPERPLASIA: Status: ACTIVE | Noted: 2025-03-31

## 2025-04-28 PROBLEM — I49.3 PVC (PREMATURE VENTRICULAR CONTRACTION): Status: ACTIVE | Noted: 2025-04-28

## 2025-04-28 PROBLEM — E78.5 HYPERLIPIDEMIA: Status: ACTIVE | Noted: 2017-07-07

## 2025-04-28 PROBLEM — C43.9 MELANOMA OF SKIN (H): Status: ACTIVE | Noted: 2025-04-28

## 2025-04-28 PROCEDURE — 3077F SYST BP >= 140 MM HG: CPT | Performed by: INTERNAL MEDICINE

## 2025-04-28 PROCEDURE — 1125F AMNT PAIN NOTED PAIN PRSNT: CPT | Performed by: INTERNAL MEDICINE

## 2025-04-28 PROCEDURE — 99213 OFFICE O/P EST LOW 20 MIN: CPT | Performed by: INTERNAL MEDICINE

## 2025-04-28 PROCEDURE — 3078F DIAST BP <80 MM HG: CPT | Performed by: INTERNAL MEDICINE

## 2025-04-28 RX ORDER — BRIMONIDINE TARTRATE 2 MG/ML
1 SOLUTION/ DROPS OPHTHALMIC 2 TIMES DAILY
COMMUNITY

## 2025-04-28 ASSESSMENT — PAIN SCALES - GENERAL: PAINLEVEL_OUTOF10: MILD PAIN (3)

## 2025-04-28 NOTE — PROGRESS NOTES
Assessment & Plan     (M54.50,  G89.29) Chronic right-sided low back pain without sciatica  (primary encounter diagnosis)  Comment: Low back pain with no acute red flag symptoms.   Discussed typical mechanical back pain, typical causes, and atypical back pain, including red flag symptoms.    Discussed conservative tratments inclduing physical therpy, stretching and strengthening, use of heat and/or ice, NSAIDs with food, antispasmodics where indicated.     Plan:     (M48.061) Spinal stenosis of lumbar region without neurogenic claudication  Comment:   Plan:     (M48.061) Spinal stenosis, lumbar region, without neurogenic claudication  Comment:   Plan:     (G60.9) Hereditary and idiopathic peripheral neuropathy  Comment:   Plan:                    Gabino Tanner is a 85 year old, presenting for the following health issues:  Pain (Right side)        4/28/2025    10:52 AM   Additional Questions   Roomed by Patti HURLEY CMA     History of Present Illness       Back Pain:  He presents for follow up of back pain. Patient's back pain is a chronic problem.  Location of back pain:  Right side of waist  Description of back pain: sharp  Back pain spreads: nowhere    Since patient first noticed back pain, pain is: always present, but gets better and worse  Does back pain interfere with his job:  Not applicable    He is missing 1 dose(s) of medications per week.  He is not taking prescribed medications regularly due to remembering to take.      Appt notes state above right hip ongoing for 2 months        **I reviewed the information recorded in the patient's EPIC chart (including but not limited to medical history, surgical history, family history, problem list, medication list, and allergy list) and updated the information as indicated based on the patients reported information.         Review of Systems  Constitutional, HEENT, cardiovascular, pulmonary, gi and gu systems are negative, except as otherwise noted.     "  Objective    BP (!) 148/70   Pulse 67   Temp 97.7  F (36.5  C) (Temporal)   Ht 1.765 m (5' 9.5\")   Wt 77.5 kg (170 lb 14.4 oz)   SpO2 97%   BMI 24.88 kg/m    Body mass index is 24.88 kg/m .  Physical Exam   Physical Exam  Vitals and nursing note reviewed.   Constitutional:       Comments: Moves normally, alert, no apparent distress  HENT:      Head: Normocephalic and atraumatic.      Nose: Nose normal.   Eyes:      Extraocular Movements: Extraocular movements intact.   Cardiovascular:      Rate and Rhythm: Normal rate and regular rhythm.      Heart sounds: Normal heart sounds.   Pulmonary:      Effort: Pulmonary effort is normal.      Breath sounds: Normal breath sounds.   Abdominal:      General: There is no distension.      Palpations: There is no mass.      Tenderness: No abdominal tenderness, no distention.     Bowel sounds: normal  Musculoskeletal:         General: moves slowly out of chair onto exam table due to tightness in his back but no visible foot or lower extremity weakness or incoordination.   straight leg-raise negative for radicular sx.  Tight hamstrigns on both sides    Skin:     General: Skin is warm and dry.   Neurological:      General: No focal deficit present.      Mental Status: Alert, responds to questions, Speech coherent  Psychiatric:         Mood and Affect: Mood normal.              Signed Electronically by: Eladio Lawrence MD    "

## 2025-04-28 NOTE — PATIENT INSTRUCTIONS
Follow up with Cardiology this fall.             LUMBAR STRAIN/SPASM:     *  based on your history, No evidence for herniated disc, spinal stenosis, or vertebral fracture.    *  take over the counter Motrin/Ibuprofen/Advil or Aleve to help relieve pain and inflammation.  follow the directions on the bottle.  Be sure to take with a small meal to prevent stomach upset.     *  do not go out of your way for activity, however, do not avoid basic activates and gentle activity.  Do not lay on the sofa, do not go on bed rest.        *  moist heat as needed like a micorwavable bean bag.  Apply the heat for 10-15 minutes a few times per day as needed.  I would avoid any sort of electrical heating pad out of fear of causing skin burns.  If you do use an electric heating pad, do not use a heating pad for longer than 15 minutes to lower the risk of burns.    *  Physical therapy through [facility] (third floor of the [facility] in [place] and many other locations throughout the [facility]).    *  expect your back to be more easily re-aggravated over the next few months.  the soft tissue and muscles are going to be more easily re-irritated over the next several weeks so be careful to return to physical action slowly.

## 2025-04-30 PROBLEM — M48.061 SPINAL STENOSIS, LUMBAR REGION, WITHOUT NEUROGENIC CLAUDICATION: Status: RESOLVED | Noted: 2025-03-28 | Resolved: 2025-04-30

## 2025-05-14 ENCOUNTER — APPOINTMENT (OUTPATIENT)
Dept: URBAN - METROPOLITAN AREA CLINIC 256 | Age: 85
Setting detail: DERMATOLOGY
End: 2025-05-15

## 2025-05-14 VITALS — WEIGHT: 160 LBS | HEIGHT: 70 IN

## 2025-05-14 DIAGNOSIS — Z85.820 PERSONAL HISTORY OF MALIGNANT MELANOMA OF SKIN: ICD-10-CM

## 2025-05-14 DIAGNOSIS — D22 MELANOCYTIC NEVI: ICD-10-CM

## 2025-05-14 DIAGNOSIS — L57.0 ACTINIC KERATOSIS: ICD-10-CM

## 2025-05-14 DIAGNOSIS — L57.8 OTHER SKIN CHANGES DUE TO CHRONIC EXPOSURE TO NONIONIZING RADIATION: ICD-10-CM

## 2025-05-14 DIAGNOSIS — Z85.828 PERSONAL HISTORY OF OTHER MALIGNANT NEOPLASM OF SKIN: ICD-10-CM

## 2025-05-14 DIAGNOSIS — L82.1 OTHER SEBORRHEIC KERATOSIS: ICD-10-CM

## 2025-05-14 DIAGNOSIS — Z71.89 OTHER SPECIFIED COUNSELING: ICD-10-CM

## 2025-05-14 DIAGNOSIS — D18.0 HEMANGIOMA: ICD-10-CM

## 2025-05-14 PROBLEM — D22.71 MELANOCYTIC NEVI OF RIGHT LOWER LIMB, INCLUDING HIP: Status: ACTIVE | Noted: 2025-05-14

## 2025-05-14 PROBLEM — D22.5 MELANOCYTIC NEVI OF TRUNK: Status: ACTIVE | Noted: 2025-05-14

## 2025-05-14 PROBLEM — D22.39 MELANOCYTIC NEVI OF OTHER PARTS OF FACE: Status: ACTIVE | Noted: 2025-05-14

## 2025-05-14 PROBLEM — D22.61 MELANOCYTIC NEVI OF RIGHT UPPER LIMB, INCLUDING SHOULDER: Status: ACTIVE | Noted: 2025-05-14

## 2025-05-14 PROBLEM — D18.01 HEMANGIOMA OF SKIN AND SUBCUTANEOUS TISSUE: Status: ACTIVE | Noted: 2025-05-14

## 2025-05-14 PROBLEM — D22.62 MELANOCYTIC NEVI OF LEFT UPPER LIMB, INCLUDING SHOULDER: Status: ACTIVE | Noted: 2025-05-14

## 2025-05-14 PROBLEM — D22.72 MELANOCYTIC NEVI OF LEFT LOWER LIMB, INCLUDING HIP: Status: ACTIVE | Noted: 2025-05-14

## 2025-05-14 PROCEDURE — OTHER MIPS QUALITY: OTHER

## 2025-05-14 PROCEDURE — 17004 DESTROY PREMAL LESIONS 15/>: CPT

## 2025-05-14 PROCEDURE — OTHER COUNSELING: OTHER

## 2025-05-14 PROCEDURE — OTHER LIQUID NITROGEN: OTHER

## 2025-05-14 PROCEDURE — OTHER PHOTO-DOCUMENTATION: OTHER

## 2025-05-14 PROCEDURE — 99213 OFFICE O/P EST LOW 20 MIN: CPT | Mod: 25

## 2025-05-14 ASSESSMENT — LOCATION DETAILED DESCRIPTION DERM
LOCATION DETAILED: LEFT ANTERIOR PROXIMAL THIGH
LOCATION DETAILED: MID-FRONTAL SCALP
LOCATION DETAILED: LEFT INFERIOR CENTRAL MALAR CHEEK
LOCATION DETAILED: LEFT SUPERIOR HELIX
LOCATION DETAILED: LEFT CENTRAL FRONTAL SCALP
LOCATION DETAILED: POSTERIOR MID-PARIETAL SCALP
LOCATION DETAILED: LEFT LATERAL ABDOMEN
LOCATION DETAILED: RIGHT CENTRAL FRONTAL SCALP
LOCATION DETAILED: RIGHT SUPERIOR PARIETAL SCALP
LOCATION DETAILED: SUPERIOR MID FOREHEAD
LOCATION DETAILED: LEFT VENTRAL PROXIMAL FOREARM
LOCATION DETAILED: LEFT SUPERIOR PARIETAL SCALP
LOCATION DETAILED: RIGHT VENTRAL DISTAL FOREARM
LOCATION DETAILED: RIGHT DISTAL POSTERIOR UPPER ARM
LOCATION DETAILED: RIGHT ANTERIOR DISTAL THIGH
LOCATION DETAILED: LEFT DISTAL POSTERIOR UPPER ARM
LOCATION DETAILED: RIGHT LATERAL EYEBROW
LOCATION DETAILED: LEFT DISTAL POSTERIOR THIGH
LOCATION DETAILED: RIGHT FOREHEAD
LOCATION DETAILED: LEFT FOREHEAD
LOCATION DETAILED: RIGHT DISTAL POSTERIOR THIGH
LOCATION DETAILED: LEFT INFERIOR MEDIAL MIDBACK
LOCATION DETAILED: LEFT MEDIAL FRONTAL SCALP

## 2025-05-14 ASSESSMENT — LOCATION SIMPLE DESCRIPTION DERM
LOCATION SIMPLE: SCALP
LOCATION SIMPLE: SUPERIOR FOREHEAD
LOCATION SIMPLE: RIGHT FOREHEAD
LOCATION SIMPLE: LEFT UPPER ARM
LOCATION SIMPLE: RIGHT FOREARM
LOCATION SIMPLE: RIGHT UPPER ARM
LOCATION SIMPLE: ABDOMEN
LOCATION SIMPLE: RIGHT THIGH
LOCATION SIMPLE: LEFT EAR
LOCATION SIMPLE: LEFT FOREARM
LOCATION SIMPLE: RIGHT EYEBROW
LOCATION SIMPLE: LEFT POSTERIOR THIGH
LOCATION SIMPLE: LEFT CHEEK
LOCATION SIMPLE: LEFT THIGH
LOCATION SIMPLE: POSTERIOR SCALP
LOCATION SIMPLE: LEFT LOWER BACK
LOCATION SIMPLE: RIGHT SCALP
LOCATION SIMPLE: RIGHT POSTERIOR THIGH
LOCATION SIMPLE: ANTERIOR SCALP
LOCATION SIMPLE: LEFT SCALP
LOCATION SIMPLE: LEFT FOREHEAD

## 2025-05-14 ASSESSMENT — LOCATION ZONE DERM
LOCATION ZONE: LEG
LOCATION ZONE: ARM
LOCATION ZONE: EAR
LOCATION ZONE: SCALP
LOCATION ZONE: TRUNK
LOCATION ZONE: FACE

## 2025-06-04 ENCOUNTER — THERAPY VISIT (OUTPATIENT)
Dept: PHYSICAL THERAPY | Facility: CLINIC | Age: 85
End: 2025-06-04
Payer: COMMERCIAL

## 2025-06-04 DIAGNOSIS — Z96.641 S/P REVISION OF RIGHT TOTAL HIP: ICD-10-CM

## 2025-06-04 DIAGNOSIS — M70.61 TROCHANTERIC BURSITIS OF RIGHT HIP: ICD-10-CM

## 2025-06-04 DIAGNOSIS — M25.551 HIP PAIN, RIGHT: Primary | ICD-10-CM

## 2025-06-04 PROCEDURE — 97162 PT EVAL MOD COMPLEX 30 MIN: CPT | Mod: GP | Performed by: PHYSICAL THERAPIST

## 2025-06-04 PROCEDURE — 97110 THERAPEUTIC EXERCISES: CPT | Mod: GP | Performed by: PHYSICAL THERAPIST

## 2025-06-04 ASSESSMENT — ACTIVITIES OF DAILY LIVING (ADL)
WALKING_APPROXIMATELY_10_MINUTES: MODERATE DIFFICULTY
SPORTS_HIGHEST_POTENTIAL_SCORE: 36
WALKING_15_MINUTES_OR_GREATER: MODERATE DIFFICULTY
LOW_IMPACT_ACTIVITIES_LIKE_FAST_WALKING: SLIGHT DIFFICULTY
SWINGING_OBJECTS_LIKE_A_GOLF_CLUB: MODERATE DIFFICULTY
WALKING_UP_STEEP_HILLS: MODERATE DIFFICULTY
ADL_TOTAL_ITEM_SCORE: 0
TWISTING/PIVOTING_ON_INVOLVED_LEG: SLIGHT DIFFICULTY
RECREATIONAL ACTIVITIES: MODERATE DIFFICULTY
STANDING FOR 15 MINUTES: SLIGHT DIFFICULTY
WALKING_FOR_APPROXIMATELY_10_MINUTES: MODERATE DIFFICULTY
GOING_UP_1_FLIGHT_OF_STAIRS: SLIGHT DIFFICULTY
WALKING_INITIALLY: SLIGHT DIFFICULTY
GETTING_INTO_AND_OUT_OF_AN_AVERAGE_CAR: SLIGHT DIFFICULTY
HOS_ADL_ITEM_SCORE_TOTAL: 41
ROLLING OVER IN BED: SLIGHT DIFFICULTY
ROLLING_OVER_IN_BED: SLIGHT DIFFICULTY
STEPPING UP AND DOWN CURBS: SLIGHT DIFFICULTY
RECREATIONAL_ACTIVITIES: MODERATE DIFFICULTY
SITTING FOR 15 MINUTES: NO DIFFICULTY AT ALL
SPORTS_COUNT: 9
WALKING_15_MINUTES_OR_GREATER: MODERATE DIFFICULTY
PUTTING ON SOCKS AND SHOES: MODERATE DIFFICULTY
STANDING_FOR_15_MINUTES: SLIGHT DIFFICULTY
PUTTING_ON_SOCKS_AND_SHOES: MODERATE DIFFICULTY
ABILITY_TO_PARTICIPATE_IN_YOUR_DESIRED_SPORT_AS_LONG_AS_YOU_WOULD_LIKE: EXTREME DIFFICULTY
GOING DOWN 1 FLIGHT OF STAIRS: SLIGHT DIFFICULTY
RUNNING_ONE_MILE: UNABLE TO DO
CUTTING/LATERAL_MOVEMENTS: SLIGHT DIFFICULTY
PLEASE_INDICATE_YOR_PRIMARY_REASON_FOR_REFERRAL_TO_THERAPY:: HIP
STARTING_AND_STOPPING_QUICKLY: SLIGHT DIFFICULTY
LANDING: MODERATE DIFFICULTY
GOING_DOWN_1_FLIGHT_OF_STAIRS: SLIGHT DIFFICULTY
ADL_SCORE(%): 0
GOING UP 1 FLIGHT OF STAIRS: SLIGHT DIFFICULTY
SPORTS_TOTAL_ITEM_SCORE: 0
LIGHT_TO_MODERATE_WORK: MODERATE DIFFICULTY
GETTING INTO AND OUT OF AN AVERAGE CAR: SLIGHT DIFFICULTY
HOW_WOULD_YOU_RATE_YOUR_CURRENT_LEVEL_OF_FUNCTION?: ABNORMAL
HEAVY_WORK: MODERATE DIFFICULTY
HOS_ADL_HIGHEST_POTENTIAL_SCORE: 64
ADL_HIGHEST_POTENTIAL_SCORE: 68
HEAVY_WORK: MODERATE DIFFICULTY
ADL_COUNT: 17
DEEP SQUATTING: SLIGHT DIFFICULTY
TWISTING/PIVOTING ON INVOLVED LEG: SLIGHT DIFFICULTY
SPORTS_SCORE(%): 0
WALKING_UP_STEEP_HILLS: MODERATE DIFFICULTY
LIGHT_TO_MODERATE_WORK: MODERATE DIFFICULTY
HOS_ADL_SCORE(%): 64.06
JUMPING: EXTREME DIFFICULTY
ABILITY_TO_PERFORM_ACTIVITY_WITH_YOUR_NORMAL_TECHNIQUE: MODERATE DIFFICULTY
SITTING_FOR_15_MINUTES: NO DIFFICULTY AT ALL
WALKING_DOWN_STEEP_HILLS: MODERATE DIFFICULTY
WALKING_DOWN_STEEP_HILLS: MODERATE DIFFICULTY
WALKING_INITIALLY: SLIGHT DIFFICULTY
STEPPING_UP_AND_DOWN_CURBS: SLIGHT DIFFICULTY
DEEP_SQUATTING: SLIGHT DIFFICULTY

## 2025-06-04 NOTE — PROGRESS NOTES
PHYSICAL THERAPY EVALUATION  Type of Visit: Evaluation       Fall Risk Screen:  Have you fallen 2 or more times in the past year?: Yes  Have you fallen and had an injury in the past year?: Yes  Is patient receiving Physical Therapy Services?: Yes    Subjective         Presenting condition or subjective complaint: Pt was experiencing R hip pain off/on over the past 3 months or more.  Pt notes the most pain with walking his dogs especially, finds that he doesn't hurt when he is sitting, but especially when he is walking/standing.  Pt had a R GARETH in 2007, and a revision around 2012.  Pt returned to his surgeon because of the pain, who ordered PT to work on his hip, focusing on the hip flexor.  Pt notes that the pain comes and goes to some extent.  Pt rates the pain ranging from 0-7/10.  Pt struggles with walking fast, as that causes more pain.  Date of onset: 05/30/25    Relevant medical history: Arthritis; Bladder or bowel problems; Cancer; Dizziness; Heart problems; High blood pressure; History of fractures; Implanted device; Neck injury   Dates & types of surgery: 3 hip replacements 1knee replacement hammer toe cut toe cancer tonsoles    Prior diagnostic imaging/testing results: MRI; X-ray     Prior therapy history for the same diagnosis, illness or injury: Yes here unknown dates    Prior Level of Function  Transfers:   Ambulation:   ADL:   IADL:     Living Environment  Social support: With family members   Type of home: Apartment/condo   Stairs to enter the home: No       Ramp:     Stairs inside the home: No       Help at home: Home management tasks (cooking, cleaning)  Equipment owned: Straight Cane; Walker; Grab bars     Employment: No    Hobbies/Interests: fishing yard work    Patient goals for therapy: walk without pain       Objective   HIP EVALUATION  PAIN: Pain Level at Rest: 2/10  Pain Level with Use: 7/10  Pain Location: hip  Pain Quality: Aching, Sharp, and Stabbing  Pain Frequency: intermittent  Pain is  Worst: daytime  Pain is Exacerbated By: walking, standing  Pain is Relieved By: rest  Pain Progression: up and down, feeling better the last few days  INTEGUMENTARY (edema, incisions):   POSTURE: Standing Posture: Rounded shoulders, Forward head, forward trunk lean  GAIT:   Weightbearing Status:   Assistive Device(s):   Gait Deviations: Antalgic  Stride length decreased  Nano decreased  Forward trunk lean, decreased hip extension  BALANCE/PROPRIOCEPTION:   WEIGHTBEARING ALIGNMENT:   NON-WEIGHTBEARING ALIGNMENT:    ROM:   (Degrees) Left AROM Left PROM  Right AROM Right PROM   Hip Flexion    Min loss erp   Hip Extension    Major loss erp   Hip Abduction    Mod loss erp   Hip Adduction    Mod loss erp   Hip Internal Rotation    [place] loss erp   Hip External Rotation    Mod loss erp   Knee Flexion       Knee Extension       Lumbar Side glide     Lumbar Flexion    Lumbar Extension    Pain:   End feel:     PELVIC/SI SCREEN:   STRENGTH: MMT: R HIP: flex 4+/5, abd 4/5, ext 4/5  LE FLEXIBILITY: tight hip flexors/adductors  SPECIAL TESTS:   FUNCTIONAL TESTS:   PALPATION:       Assessment & Plan   CLINICAL IMPRESSIONS  Medical Diagnosis: S/p revision of right total hip  Trochanteric bursitis of right hip    Treatment Diagnosis: R hip pain   Impression/Assessment: Patient is a 85 year old male with R hip complaints.  The following significant findings have been identified: Pain, Decreased ROM/flexibility, Decreased joint mobility, Decreased strength, Impaired balance, Inflammation, Impaired gait, Impaired muscle performance, Decreased activity tolerance, and Impaired posture. These impairments interfere with their ability to perform self care tasks, work tasks, recreational activities, household chores, driving , household mobility, and community mobility as compared to previous level of function.     Clinical Decision Making (Complexity):  Clinical Presentation: Evolving/Changing  Clinical Presentation Rationale: based on  medical and personal factors listed in PT evaluation  Clinical Decision Making (Complexity): Moderate complexity    PLAN OF CARE  Treatment Interventions:  Interventions: Gait Training, Manual Therapy, Neuromuscular Re-education, Therapeutic Activity, Therapeutic Exercise, Self-Care/Home Management    Long Term Goals     PT Goal 1  Goal Identifier: walking  Goal Description: Pt will be able to walk 20 minutes pain free  Rationale: to maximize safety and independence with performance of ADLs and functional tasks  Target Date: 07/30/25      Frequency of Treatment: 1x/week  Duration of Treatment: 8 weeks    Recommended Referrals to Other Professionals:   Education Assessment:   Learner/Method: Patient;Listening;Demonstration;Pictures/Video  Education Comments: Educated pt on findings of the evaluation and reasoning for plan of care.  Pt was able to understand how treatment plan aligns with goals.    Risks and benefits of evaluation/treatment have been explained.   Patient/Family/caregiver agrees with Plan of Care.     Evaluation Time:     PT Eval, Moderate Complexity Minutes (66899): 15       Signing Clinician: JOSELYN Sarkar [facility]                                                                                   OUTPATIENT PHYSICAL THERAPY      PLAN OF TREATMENT FOR OUTPATIENT REHABILITATION   Patient's Last Name, First Name, Ciro García YOB: 1940   Provider's Name   [facility]   Medical Record No.  [id number]     Onset Date: 05/30/25  Start of Care Date: 06/04/25     Medical Diagnosis:  S/p revision of right total hip  Trochanteric bursitis of right hip      PT Treatment Diagnosis:  R hip pain Plan of Treatment  Frequency/Duration: 1x/week/ 8 weeks    Certification date from 06/04/25 to 07/30/25         See note for plan of treatment details and functional goals     Mauricio Hernandez PT                         I  CERTIFY THE NEED FOR THESE SERVICES FURNISHED UNDER        THIS PLAN OF TREATMENT AND WHILE UNDER MY CARE     (Physician attestation of this document indicates review and certification of the therapy plan).              Referring Provider:  Rudy Tobias    Initial Assessment  See Epic Evaluation- Start of Care Date: 06/04/25

## 2025-06-09 ENCOUNTER — THERAPY VISIT (OUTPATIENT)
Dept: PHYSICAL THERAPY | Facility: CLINIC | Age: 85
End: 2025-06-09
Payer: COMMERCIAL

## 2025-06-09 DIAGNOSIS — R26.89 IMBALANCE: Primary | ICD-10-CM

## 2025-06-09 PROCEDURE — 97112 NEUROMUSCULAR REEDUCATION: CPT | Mod: GP

## 2025-06-09 PROCEDURE — 97161 PT EVAL LOW COMPLEX 20 MIN: CPT | Mod: GP

## 2025-06-09 NOTE — PROGRESS NOTES
"PHYSICAL THERAPY EVALUATION  Type of Visit: Evaluation       Fall Risk Screen:  Have you fallen 2 or more times in the past year?: Yes  Have you fallen and had an injury in the past year?: Yes  Is patient receiving Physical Therapy Services?: Yes    Subjective         Presenting condition or subjective complaint: Pt was experiencing R hip pain off/on over the past 3 months or more.  Pt notes the most pain with walking his dogs especially, finds that he doesn't hurt when he is sitting, but especially when he is walking/standing.  Pt had a R GARETH in 2007, and a revision around 2012.  Pt returned to his surgeon because of the pain, who ordered PT to work on his hip, focusing on the hip flexor.  Pt notes that the pain comes and goes to some extent.  Pt rates the pain ranging from 0-7/10.  Pt struggles with walking fast, as that causes more pain.    He continues to feel like his balance is off in his head. Has been having sinus pressure & rounds of steroids. Feels this isn't getting better. He is wondering if this could be affecting his vestibular system. Feels \"hollow,\" in the head. His feet are no longer painful, which he does attribute to less instability. Does get an occasional shooting pain in the calves 2' peripheral neuropathy (PN). Has follow-up with Neurology 2' PN on 6/12/2025 (this Thursday). Has had occasional falls - [place] bend [place] and fix a sprinkler system. He's using SPC today - though reports his balance is not significantly improved with the device.    Date of onset: 06/09/25      Relevant medical history: Arthritis; Bladder or bowel problems; Cancer; Dizziness; Heart problems; High blood pressure; History of fractures; Implanted device; Neck injury   Past Medical History:   Diagnosis Date    Aortic valve stenosis     Cancer (H) 2000    L shoulder melanoma    Cardiomyopathy (H)     Glaucoma     Heart murmur     Hypertension     Irregular heart beat     " Melanoma (H) 2000    Left shoulder - Dr. Carrillo - follows q6 months    Mitral valve insufficiency     Neuropathy     Nonsenile cataract     Other chronic pain     Neuropathy bilateral feet    Other premature beats     PVC's (premature ventricular contractions) 05/14/2015    Sleep apnea     Z[place] 2000     Dates & types of surgery: 3 hip replacements 1knee replacement hammer toe cut toe cancer tonsoles  Past Surgical History:   Procedure Laterality Date    ARTHROPLASTY HIP ANTERIOR Left 11/14/2014    Procedure: ARTHROPLASTY HIP ANTERIOR;  Surgeon: Rudy Tobias MD;  Location:  OR    ARTHROPLASTY KNEE Left 12/13/2023    Procedure: LEFT TOTAL KNEE ARTHROPLASTY;  Surgeon: Kevin Zamarripa MD;  Location: [facility]    ARTHROPLASTY REVISION HIP  3/12/2014     right Procedure: ARTHROPLASTY REVISION HIP;  Surgeon: Rudy Tobias MD;  Location:  OR    ARTHROSCOPY KNEE  2002    meniscus ( side?)    BIOPSY OF PROSTATE,NEEDLE/PUNCH      both hips replaced      CATARACT IOL, RT/LT Bilateral     COLONOSCOPY N/A 5/28/2020    Procedure: COLONOSCOPY (fv) (please mail packet as soon as possible) fv;  Surgeon: Kevin Mccarty MD;  Location:  GI    DERMATOLOGY ADULT  REFERRAL  2000    melanoma resection L shoulder    DISCECTOMY LUMBAR POSTERIOR MICROSCOPIC ONE LEVEL N/A 4/11/2016    Procedure: DISCECTOMY LUMBAR POSTERIOR MICROSCOPIC ONE LEVEL;  Surgeon: Ruben Marsh MD;  Location:  OR    ENT SURGERY      Tonsils    INJECT STEROID (LOCATION) Right 12/13/2023    Procedure: RIGHT KNEE CORTISONE INJECTION;  Surgeon: Kevin Zamarripa MD;  Location: [facility]    ORTHOPEDIC SURGERY  2008    R hip replacement    ORTHOPEDIC SURGERY  2004    big toe in , needed reconstructive surgery    PHACOEMULSIFICATION CLEAR CORNEA WITH TORIC INTRAOCULAR LENS IMPLANT Left 11/6/2019    Procedure: LEFT EYE, CATARACT EXTRACTION WITH TORIC INTRAOCULAR LENS IMPLANT;  Surgeon: Shannon Yang MD;   Location: [facility]    PHACOEMULSIFICATION CLEAR CORNEA WITH TORIC INTRAOCULAR LENS IMPLANT Right 11/20/2019    Procedure: RIGHT EYE, CATARACT EXTRACTION WITH TORIC INTRAOCULAR LENS IMPLANT;  Surgeon: Shannon Yang MD;  Location: [facility] OR    TRABECULAR MICRO-BYPASS WITH ISTENT Left 11/6/2019    Procedure: LEFT EYE, ISTENT INSERTION;  Surgeon: Shannon Yang MD;  [facility]: [facility]    TRABECULAR MICRO-BYPASS WITH ISTENT Right 11/20/2019    Procedure: RIGHT EYE, ISTENT INSERTION;  Surgeon: Shannon Yang MD;  Location: [facility] OR    ZZC TOTAL HIP ARTHROPLASTY Right 2008     Prior diagnostic imaging/testing results: MRI; X-ray     Prior therapy history for the same diagnosis, illness or injury: Yes here unknown dates    Prior Level of Function  Pt IND with all transfers, ambulation, and ADLs/IADLs at baseline.     Living Environment  Social support: With family members   Type of home: Apartment/condo   Stairs to enter the home: No       Ramp:     Stairs inside the home: No       Help at home: Home management tasks (cooking, cleaning)  Equipment owned: Straight Cane; Walker; Grab bars     Employment: No    Hobbies/Interests: fishing yard work    Patient goals for therapy: walk without pain    Pain assessment: Pain present in the hip - being managed by Sports & PT therapist.      Objective      Cognitive Status Examination  Pt with intact cognition.    OBSERVATION: Pt using SPC.    POSTURE: Standing Posture: Rounded shoulders  Sitting Posture: Rounded shoulders    RANGE OF MOTION:   LE ROM WFL  UE ROM WFL  See Sports & PT's evaluation for greater detail.    STRENGTH:   LE Strength WFL  UE Strength WFL  See Sports & PT's evaluation for greater detail.    BED MOBILITY: Independent, reporting no room-spinning dizziness.    TRANSFERS: Independent, does have instability with rise to feet.    GAIT:   Level of [place]: SBA  Assistive Device(s): Cane (single end)  Gait Deviations: Antalgic  Base of support  increased  Nano decreased  Gait Distance: >100ft during PT eval.  Stairs: Reporting he doesn't use them at home - uses elevator.    BALANCE: Standing Balance (static):Poor  Standing Balance (dynamic):Poor    VESTIBULAR:    -VORx1 = asymptomatic.    - VOR Cx = asymptomatic.    - Static stance with HHT vs. VHT = more instability with VHT, posterior LOB for which he caught with IND and use of legs against chair.    - Static stance with EC for 30s = SUPV needed and SBAx1 to prevent LOB.     SENSATION: Pt reporting his sensation is intact; however, does feel his nerves and muscles are not as responsive as previously.    COORDINATION: Deferred    Assessment & Plan   CLINICAL IMPRESSIONS  Medical Diagnosis: balance problems & lumbar stenosis    Treatment Diagnosis: imbalance; physical deconditioning; peripheral neuropathy   Impression/Assessment: Patient is a 85 year old male with imbalance complaints in presence of peripheral neuropathy, joint pain (hip bursitis), and general deconditioning. PT suspecting poor sensory integration and his history of orthopedic impairments are contributing to his unsteadiness. Will address with PT intervention. The following significant findings have been identified: Pain, Impaired balance, Decreased proprioception, Impaired sensation, Impaired gait, Impaired muscle performance, Decreased activity tolerance, Impaired posture, Instability, and Disequilibrium . These impairments interfere with their ability to perform household mobility and community mobility as compared to previous level of function.     Clinical Decision Making (Complexity):  Clinical Presentation: Stable/Uncomplicated  Clinical Presentation Rationale: based on medical and personal factors listed in PT evaluation  Clinical Decision Making (Complexity): Low complexity    PLAN OF CARE  Treatment Interventions:  Interventions: Gait Training, Manual Therapy, Neuromuscular Re-education, Therapeutic Activity, Therapeutic  Exercise, Self-Care/Home Management    Long Term Goals     PT Goal 1  Goal Identifier: HEP  Goal Description: Pt will demonstrate IND with strength, balance, and muscular and aerobic endurance HEP, while working to improve capacity for functional mobility.  Goal Progress: Initiated at evaluation; see PTRx.  Target Date: 09/06/25  PT Goal 2  Goal Identifier: 10 MWT  Goal Description: Patient will achieve MDC for self-selected and fast gait speeds to show improved gait efficiency, boyd, and mechanics to reduce risk for falling.  Goal Progress: assess at upcoming session  Target Date: 09/06/25  PT Goal 3  Goal Identifier: Static Stance with FT/EC  Goal Description: Pt will tolerate static stance with FT/EC for 30s with IND and no postural instability  Rationale: to maximize safety and independence with performance of ADLs and functional tasks  Target Date: 09/06/25      Frequency of Treatment: x2/month (alternating with orthopedic PT - this way pt will have x1 visit/week)  Duration of Treatment: 90 days    Recommended Referrals to Other Professionals: Continue with Sports & PT.   Education Assessment:   Learner/Method: Patient;Listening;Demonstration;Pictures/Video;Reading;No Barriers to Learning    Risks and benefits of evaluation/treatment have been explained.   Patient/Family/caregiver agrees with Plan of Care.     Evaluation Time:     PT Eval, Low Complexity Minutes (48971): 15     Signing Clinician: Beatrice Jorgensen, PT, DPT, NCS          [facility]                                                                                   OUTPATIENT PHYSICAL THERAPY      PLAN OF TREATMENT FOR OUTPATIENT REHABILITATION   Patient's Last Name, First Name, Ciro García YOB: 1940   Provider's Name   [facility]   Medical Record No.  [id number]     Onset Date: 06/09/25  Start of Care Date: 06/09/25     Medical Diagnosis:  balance problems &  lumbar stenosis      PT Treatment Diagnosis:  imbalance; physical deconditioning; peripheral neuropathy Plan of Treatment  Frequency/Duration: x2/month (alternating with orthopedic PT - this way pt will have x1 visit/week)/ 90 days    Certification date from 06/09/25 to 09/06/25         See note for plan of treatment details and functional goals     Beatrice Jorgensen, PT, DPT, NCS                           I CERTIFY THE NEED FOR THESE SERVICES FURNISHED UNDER        THIS PLAN OF TREATMENT AND WHILE UNDER MY CARE     (Physician attestation of this document indicates review and certification of the therapy plan).              Referring Provider:  Hari Myles MD    Initial Assessment  See Epic Evaluation- Start of Care Date: 06/09/25

## 2025-06-11 NOTE — PROGRESS NOTES
ESTABLISHED PATIENT NEUROLOGY NOTE    DATE OF VISIT: 6/12/2025  CLINIC LOCATION: [facility]  MRN: [id number]  PATIENT NAME: Ciro Almonte  YOB: 1940    REASON FOR VISIT: No chief complaint on file.    SUBJECTIVE:                                                      HISTORY OF PRESENT ILLNESS: Patient is here to follow up regarding cognitive complaints, peripheral polyneuropathy, and balance difficulty.  The last visit was on 3/13/2025.  At that time physical therapy referral was made.  Please refer to my initial/other prior notes for further information.  He is accompanied by his wife.    Since the last visit, the patient reports ***.  On alpha lipoic acid without side effects.  He denies interval development of new focal neurological symptoms.  EXAM:                                                    Physical Exam:   Vitals: There were no vitals taken for this visit.    General: pt is in NAD, cooperative.  Skin: normal turgor, moist mucous membranes, no lesions/rashes noticed.  HEENT: ATNC, white sclera, normal conjunctiva.  Respiratory: Symmetric lung excursion, no accessory respiratory muscle use.  Abdomen: Non distended.  Neurological: awake, cooperative, follows commands, ***.  ASSESSMENT AND PLAN:                                                    Assessment: 85 year old male patient presents for follow-up of cognitive complaints, peripheral polyneuropathy and associated balance difficulty.  He reports *** with physical therapy.    Regarding his peripheral polyneuropathy, he should continue alpha lipoic acid and we discussed additional available options to treat neuropathic pain in the future if necessary.    Regarding his multifactorial balance difficulty, which is related to moderate to severe spinal canal stenosis at L1-L2, peripheral polyneuropathy, and cerebellar atrophy, he feels that physical therapy ***.  Previously we discussed potential for neurosurgery referral if  therapy is not helpful.  Today we reviewed it again and decided ***.    Regarding his cognitive complaints, he reports ***.  His MoCA score was 29/30 at the last visit in March 2025.  This test could be repeated if he perceives worsening followed if necessary by brain imaging, neuropsychologic testing, and labs.    Diagnoses:    ICD-10-CM    1. Cognitive complaints  R41.9       2. Idiopathic peripheral neuropathy  G60.9       3. Balance problems  R26.89       4. Spinal stenosis, lumbar region, without neurogenic claudication  M48.061         Plan:  There are no Patient Instructions on file for this visit.    Total Time: *** minutes spent on the date of the encounter doing chart review, history and exam, documentation and further activities per the note.    Hari Myles MD  [facility]  (Chart documentation was completed in part with Dragon voice-recognition software. Even though reviewed, some grammatical, spelling, and word errors may remain.)

## 2025-06-12 ENCOUNTER — OFFICE VISIT (OUTPATIENT)
Dept: NEUROLOGY | Facility: CLINIC | Age: 85
End: 2025-06-12
Payer: COMMERCIAL

## 2025-06-12 VITALS — HEART RATE: 54 BPM | OXYGEN SATURATION: 98 % | DIASTOLIC BLOOD PRESSURE: 67 MMHG | SYSTOLIC BLOOD PRESSURE: 125 MMHG

## 2025-06-12 DIAGNOSIS — G60.9 IDIOPATHIC PERIPHERAL NEUROPATHY: ICD-10-CM

## 2025-06-12 DIAGNOSIS — R26.89 BALANCE PROBLEMS: ICD-10-CM

## 2025-06-12 DIAGNOSIS — R41.9 COGNITIVE COMPLAINTS: Primary | ICD-10-CM

## 2025-06-12 DIAGNOSIS — M48.061 SPINAL STENOSIS, LUMBAR REGION, WITHOUT NEUROGENIC CLAUDICATION: ICD-10-CM

## 2025-06-12 NOTE — PROGRESS NOTES
ESTABLISHED PATIENT NEUROLOGY NOTE    DATE OF VISIT: 6/12/2025  CLINIC LOCATION: [facility]  MRN: [id number]  PATIENT NAME: Ciro Almonte  YOB: 1940    REASON FOR VISIT:   Chief Complaint   Patient presents with    Follow Up     SUBJECTIVE:                                                      HISTORY OF PRESENT ILLNESS: Patient is here to follow up regarding cognitive complaints, peripheral polyneuropathy, and balance difficulty.  The last visit was on 3/13/2025.  At that time physical therapy referral was made.  Please refer to my initial/other prior notes for further information.  He is accompanied by his wife.    Since the last visit, the patient reports persistent neck pain and fluid in the right ear.  His bilateral feet pain subsided on alpha lipoic acid.  No significant side effects.  Balance continues to be a problem.  He is working with physical therapy.  He denies interval development of new focal neurological symptoms.  EXAM:                                                    Physical Exam:   Vitals: /67   Pulse 54   SpO2 98%     General: pt is in NAD, cooperative.  Skin: normal turgor, moist mucous membranes, no lesions/rashes noticed.  HEENT: ATNC, white sclera, normal conjunctiva.  Respiratory: Symmetric lung excursion, no accessory respiratory muscle use.  Abdomen: Non distended.  Neurological: awake, cooperative, follows commands, face is symmetric, equally moves all extremities, Achilles reflexes absent bilaterally, pinprick/vibration reduced in both feet, no dysmetria bilaterally.  ASSESSMENT AND PLAN:                                                    Assessment: 85 year old male patient presents for follow-up of cognitive complaints, peripheral polyneuropathy and associated balance difficulty.  He reports no change with physical therapy, but continues to work with them.    Regarding his peripheral polyneuropathy, he should continue alpha lipoic acid and  we discussed additional available options to treat neuropathic pain in the future if necessary.    Regarding his multifactorial balance difficulty, which is related to moderate to severe spinal canal stenosis at L1-L2, peripheral polyneuropathy, and cerebellar atrophy, he feels that physical therapy not quite helpful, but he did not finish it yet.  Previously we discussed potential for neurosurgery referral if therapy is not helpful.  Today we reviewed it again and decided to continue physical therapy before pursuing the referral.    Regarding his cognitive complaints, he reports that his memory is stable.  His MoCA score was 29/30 at the last visit in March 2025.  Will repeat MoCA at the next visit, followed if necessary by brain imaging, neuropsychologic testing, and labs.    Diagnoses:    ICD-10-CM    1. Cognitive complaints  R41.9       2. Idiopathic peripheral neuropathy  G60.9       3. Balance problems  R26.89       4. Spinal stenosis, lumbar region, without neurogenic claudication  M48.061         Plan: At today's visit we thoroughly discussed current symptoms, available treatment options, and the plan.    We decided to continue alpha lipoic acid and physical therapy.    Next follow-up appointment is in the next 6 months or earlier if needed.    Total Time: 21 minutes spent on the date of the encounter doing chart review, history and exam, documentation and further activities per the note.    Hari Myles MD  [facility]  (Chart documentation was completed in part with Dragon voice-recognition software. Even though reviewed, some grammatical, spelling, and word errors may remain.)

## 2025-06-12 NOTE — LETTER
6/12/2025      Ciro Almonte  [address]  [place] [address]      Dear Colleague,    Thank you for referring your patient, Ciro Almonte, to the [facility]. Please see a copy of my visit note below.    ESTABLISHED PATIENT NEUROLOGY NOTE    DATE OF VISIT: 6/12/2025  CLINIC LOCATION: [facility]  MRN: [id number]  PATIENT NAME: Ciro Almonte  YOB: 1940    REASON FOR VISIT:   Chief Complaint   Patient presents with     Follow Up     SUBJECTIVE:                                                      HISTORY OF PRESENT ILLNESS: Patient is here to follow up regarding cognitive complaints, peripheral polyneuropathy, and balance difficulty.  The last visit was on 3/13/2025.  At that time physical therapy referral was made.  Please refer to my initial/other prior notes for further information.  He is accompanied by his wife.    Since the last visit, the patient reports persistent neck pain and fluid in the right ear.  His bilateral feet pain subsided on alpha lipoic acid.  No significant side effects.  Balance continues to be a problem.  He is working with physical therapy.  He denies interval development of new focal neurological symptoms.  EXAM:                                                    Physical Exam:   Vitals: /67   Pulse 54   SpO2 98%     General: pt is in NAD, cooperative.  Skin: normal turgor, moist mucous membranes, no lesions/rashes noticed.  HEENT: ATNC, white sclera, normal conjunctiva.  Respiratory: Symmetric lung excursion, no accessory respiratory muscle use.  Abdomen: Non distended.  Neurological: awake, cooperative, follows commands, face is symmetric, equally moves all extremities, Achilles reflexes absent bilaterally, pinprick/vibration reduced in both feet, no dysmetria bilaterally.  ASSESSMENT AND PLAN:                                                    Assessment: 85 year old male patient presents for follow-up  of cognitive complaints, peripheral polyneuropathy and associated balance difficulty.  He reports no change with physical therapy, but continues to work with them.    Regarding his peripheral polyneuropathy, he should continue alpha lipoic acid and we discussed additional available options to treat neuropathic pain in the future if necessary.    Regarding his multifactorial balance difficulty, which is related to moderate to severe spinal canal stenosis at L1-L2, peripheral polyneuropathy, and cerebellar atrophy, he feels that physical therapy not quite helpful, but he did not finish it yet.  Previously we discussed potential for neurosurgery referral if therapy is not helpful.  Today we reviewed it again and decided to continue physical therapy before pursuing the referral.    Regarding his cognitive complaints, he reports that his memory is stable.  His MoCA score was 29/30 at the last visit in March 2025.  Will repeat MoCA at the next visit, followed if necessary by brain imaging, neuropsychologic testing, and labs.    Diagnoses:    ICD-10-CM    1. Cognitive complaints  R41.9       2. Idiopathic peripheral neuropathy  G60.9       3. Balance problems  R26.89       4. Spinal stenosis, lumbar region, without neurogenic claudication  M48.061         Plan: At today's visit we thoroughly discussed current symptoms, available treatment options, and the plan.    We decided to continue alpha lipoic acid and physical therapy.    Next follow-up appointment is in the next 6 months or earlier if needed.    Total Time: 21 minutes spent on the date of the encounter doing chart review, history and exam, documentation and further activities per the note.    Hari Myles MD  [facility]  (Chart documentation was completed in part with Dragon voice-recognition software. Even though reviewed, some grammatical, spelling, and word errors may remain.)    Again, thank you for allowing me to participate in the  care of your patient.        Sincerely,        Hari Myles MD    Electronically signed

## 2025-06-12 NOTE — PATIENT INSTRUCTIONS
AFTER VISIT SUMMARY (AVS):    At today's visit we thoroughly discussed current symptoms, available treatment options, and the plan.    We decided to continue alpha lipoic acid and physical therapy.    Next follow-up appointment is in the next 6 months or earlier if needed.    Please do not hesitate to call me with any questions or concerns.    Thanks.

## 2025-06-18 NOTE — TELEPHONE ENCOUNTER
Called pt's wife again to review questions regarding Amlodipine. Spoke with pt's wife & pt.     Pt has been taking Amlodipine 5 mg daily instead of 10 mg but they don't remember who told them this and why.     In review of chart pt has had lower BPs before but medication list states he should be on Amlodipine 10 mg daily.     Advised pt take his BPs for the next couple of wks to see what his BP is running. If running low keep at 5 mg daily and send FYI to provider. If high, take full dose Amlodipine 10 mg as prescribed.     Pt and wife understand. They will call with updates in a couple of weeks if BPs running low to update med list.    Lisa MADRIGAL  [facility]     2-point gait

## 2025-06-24 ENCOUNTER — THERAPY VISIT (OUTPATIENT)
Dept: PHYSICAL THERAPY | Facility: CLINIC | Age: 85
End: 2025-06-24
Payer: COMMERCIAL

## 2025-06-24 DIAGNOSIS — M25.551 HIP PAIN, RIGHT: Primary | ICD-10-CM

## 2025-06-24 PROCEDURE — 97110 THERAPEUTIC EXERCISES: CPT | Mod: GP | Performed by: PHYSICAL THERAPIST

## 2025-07-02 ENCOUNTER — THERAPY VISIT (OUTPATIENT)
Dept: PHYSICAL THERAPY | Facility: CLINIC | Age: 85
End: 2025-07-02
Payer: COMMERCIAL

## 2025-07-02 DIAGNOSIS — R26.89 IMBALANCE: Primary | ICD-10-CM

## 2025-07-02 PROCEDURE — 97112 NEUROMUSCULAR REEDUCATION: CPT | Mod: GP

## 2025-07-15 ENCOUNTER — THERAPY VISIT (OUTPATIENT)
Dept: PHYSICAL THERAPY | Facility: CLINIC | Age: 85
End: 2025-07-15
Payer: COMMERCIAL

## 2025-07-15 DIAGNOSIS — R26.89 IMBALANCE: Primary | ICD-10-CM

## 2025-07-15 PROCEDURE — 97112 NEUROMUSCULAR REEDUCATION: CPT | Mod: GP

## 2025-08-12 PROBLEM — M25.551 HIP PAIN, RIGHT: Status: RESOLVED | Noted: 2025-06-04 | Resolved: 2025-08-12

## 2025-08-14 ENCOUNTER — APPOINTMENT (OUTPATIENT)
Dept: URBAN - METROPOLITAN AREA CLINIC 256 | Age: 85
Setting detail: DERMATOLOGY
End: 2025-08-14

## 2025-08-14 VITALS — HEIGHT: 70 IN | WEIGHT: 165 LBS

## 2025-08-14 DIAGNOSIS — L57.0 ACTINIC KERATOSIS: ICD-10-CM

## 2025-08-14 DIAGNOSIS — L82.1 OTHER SEBORRHEIC KERATOSIS: ICD-10-CM

## 2025-08-14 DIAGNOSIS — L57.8 OTHER SKIN CHANGES DUE TO CHRONIC EXPOSURE TO NONIONIZING RADIATION: ICD-10-CM

## 2025-08-14 PROCEDURE — OTHER PATIENT SPECIFIC COUNSELING: OTHER

## 2025-08-14 PROCEDURE — 17004 DESTROY PREMAL LESIONS 15/>: CPT

## 2025-08-14 PROCEDURE — OTHER MIPS QUALITY: OTHER

## 2025-08-14 PROCEDURE — OTHER COUNSELING: OTHER

## 2025-08-14 PROCEDURE — OTHER EDUCATIONAL RESOURCES PROVIDED: OTHER

## 2025-08-14 PROCEDURE — OTHER LIQUID NITROGEN: OTHER

## 2025-08-14 PROCEDURE — 99213 OFFICE O/P EST LOW 20 MIN: CPT | Mod: 25

## 2025-08-14 ASSESSMENT — LOCATION DETAILED DESCRIPTION DERM
LOCATION DETAILED: LEFT INFERIOR CENTRAL MALAR CHEEK
LOCATION DETAILED: NASAL DORSUM
LOCATION DETAILED: LEFT CENTRAL LATERAL NECK
LOCATION DETAILED: LEFT SUPERIOR PARIETAL SCALP
LOCATION DETAILED: LEFT RADIAL DORSAL HAND
LOCATION DETAILED: LEFT PROXIMAL DORSAL FOREARM
LOCATION DETAILED: LEFT SUPERIOR FOREHEAD
LOCATION DETAILED: 1ST WEB SPACE RIGHT HAND
LOCATION DETAILED: LEFT CENTRAL PARIETAL SCALP
LOCATION DETAILED: LEFT MEDIAL FOREHEAD
LOCATION DETAILED: LEFT CENTRAL TEMPLE
LOCATION DETAILED: RIGHT MEDIAL FOREHEAD
LOCATION DETAILED: LEFT SUPERIOR LATERAL MALAR CHEEK
LOCATION DETAILED: RIGHT SUPERIOR HELIX
LOCATION DETAILED: RIGHT CENTRAL ZYGOMA
LOCATION DETAILED: RIGHT SUPERIOR LATERAL MALAR CHEEK
LOCATION DETAILED: LEFT INFERIOR HELIX
LOCATION DETAILED: LEFT ULNAR DORSAL HAND
LOCATION DETAILED: LEFT MEDIAL TEMPLE
LOCATION DETAILED: RIGHT SUPERIOR PARIETAL SCALP
LOCATION DETAILED: RIGHT PROXIMAL DORSAL FOREARM
LOCATION DETAILED: NASAL ROOT
LOCATION DETAILED: RIGHT CENTRAL TEMPLE
LOCATION DETAILED: LEFT LATERAL FOREHEAD
LOCATION DETAILED: RIGHT RADIAL DORSAL HAND
LOCATION DETAILED: RIGHT LATERAL MALAR CHEEK
LOCATION DETAILED: SUPERIOR THORACIC SPINE

## 2025-08-14 ASSESSMENT — LOCATION SIMPLE DESCRIPTION DERM
LOCATION SIMPLE: RIGHT ZYGOMA
LOCATION SIMPLE: UPPER BACK
LOCATION SIMPLE: RIGHT EAR
LOCATION SIMPLE: RIGHT FOREARM
LOCATION SIMPLE: RIGHT CHEEK
LOCATION SIMPLE: RIGHT THUMB
LOCATION SIMPLE: RIGHT TEMPLE
LOCATION SIMPLE: RIGHT HAND
LOCATION SIMPLE: LEFT EAR
LOCATION SIMPLE: LEFT CHEEK
LOCATION SIMPLE: LEFT FOREHEAD
LOCATION SIMPLE: NOSE
LOCATION SIMPLE: LEFT FOREARM
LOCATION SIMPLE: RIGHT FOREHEAD
LOCATION SIMPLE: LEFT HAND
LOCATION SIMPLE: NECK
LOCATION SIMPLE: SCALP
LOCATION SIMPLE: LEFT TEMPLE

## 2025-08-14 ASSESSMENT — LOCATION ZONE DERM
LOCATION ZONE: TRUNK
LOCATION ZONE: SCALP
LOCATION ZONE: NOSE
LOCATION ZONE: HAND
LOCATION ZONE: ARM
LOCATION ZONE: NECK
LOCATION ZONE: FACE
LOCATION ZONE: EAR
LOCATION ZONE: FINGER

## 2025-08-28 ENCOUNTER — TELEPHONE (OUTPATIENT)
Dept: PEDIATRICS | Facility: CLINIC | Age: 85
End: 2025-08-28
Payer: COMMERCIAL

## (undated) DEVICE — A3 SUPPLIES- SEE NURSING INFO PAGE

## (undated) DEVICE — GOWN IMPERVIOUS BREATHABLE SMART LG 89015

## (undated) DEVICE — SUTURE VICRYL+ 0 27IN CT-1 UND VCP260H

## (undated) DEVICE — EYE SHIELD PLASTIC

## (undated) DEVICE — GLOVE SURG PI ULTRA TOUCH M SZ 7 LF 42670

## (undated) DEVICE — EYE KNIFE STILETTO VISITEC 1.1MM ANG 45DEG SIDEPORT 376620

## (undated) DEVICE — EYE CANN IRR 27GA ANTERIOR CHAMBER 581280

## (undated) DEVICE — DRSG AQUACEL AG HYDROFIBER  3.5X10" 422605

## (undated) DEVICE — EYE TIP IRRIGATION & ASPIRATION POLYMER 35D BENT 8065751511

## (undated) DEVICE — CUFF TOURN 30IN STRL DISP 5921030235

## (undated) DEVICE — GLOVE PROTEXIS MICRO 7.5  2D73PM75

## (undated) DEVICE — LINEN TOWEL PACK X5 5464

## (undated) DEVICE — SOL ISOPROPYL RUBBING ALCOHOL USP 70% 4OZ HDX-20 I0020

## (undated) DEVICE — CAST PADDING 6" STERILE 9046S

## (undated) DEVICE — SOL WATER IRRIG 1000ML BOTTLE 2F7114

## (undated) DEVICE — NDL BLUNT 18GA 1" W/O FILTER 305181

## (undated) DEVICE — GLOVE BIOGEL PI SZ 8.0 40880

## (undated) DEVICE — KIT ENDO TURNOVER/PROCEDURE W/CLEAN A SCOPE LINERS 103888

## (undated) DEVICE — SUTURE VICRYL+ 1 27IN CT-1 UND VCP261H

## (undated) DEVICE — CUSTOM PACK TOTAL KNEE SOP5BTKHEC

## (undated) DEVICE — PACK CATARACT CUSTOM ASC SEY15CPUMC

## (undated) DEVICE — SYR 10ML LL W/O NDL 302995

## (undated) DEVICE — BLADE SAW SAGITTAL STRK DUAL CUT 4118-135-090

## (undated) DEVICE — CUSTOM PACK TOTAL KNEE ACCESSORY SOP5BTAHEA

## (undated) DEVICE — EYE CANN IRR 25GA CYSTOTOME 581610

## (undated) DEVICE — NEEDLE HYPO MAGELLAN SAFETY 22GA 1 1/2IN 8881850215

## (undated) DEVICE — SU STRATAFIX PDS PLUS 1 CT-1 18" SXPP1A404

## (undated) DEVICE — GLOVE UNDER INDICATOR PI SZ 8.5 LF 41685

## (undated) DEVICE — ELECTRODE PATIENT RETURN ADULT L10 FT 2 PLATE CORD 0855C

## (undated) DEVICE — PREP CHLORAPREP W/ORANGE TINT 10.5ML 930715

## (undated) DEVICE — HOLDER LIMB VELCRO OR 0814-1533

## (undated) DEVICE — EYE KNIFE SLIT XSTAR VISITEC 2.5MM 45DEG BEVEL UP 373725

## (undated) DEVICE — GLOVE BIOGEL INDICATOR 7.5 LF 41675

## (undated) DEVICE — SU DERMABOND ADVANCED .7ML DNX12

## (undated) DEVICE — SUCTION MANIFOLD NEPTUNE 2 SYS 4 PORT 0702-020-000

## (undated) DEVICE — SUTURE MONOCRYL 3-0 18 PS2 UND MCP497G

## (undated) DEVICE — SOLUTION IRRIG 2B7127 .9NS 3000ML BAG

## (undated) DEVICE — DECANTER VIAL 2006S

## (undated) DEVICE — SYR 01ML TBC SLIP TIP W/O NDL

## (undated) DEVICE — SUTURE VICRYL+ 2-0 27IN CT-1 UND VCP259H

## (undated) RX ORDER — DEXAMETHASONE SODIUM PHOSPHATE 10 MG/ML
INJECTION, EMULSION INTRAMUSCULAR; INTRAVENOUS
Status: DISPENSED
Start: 2023-12-13

## (undated) RX ORDER — ACETAMINOPHEN 325 MG/1
TABLET ORAL
Status: DISPENSED
Start: 2019-11-20

## (undated) RX ORDER — FENTANYL CITRATE 50 UG/ML
INJECTION, SOLUTION INTRAMUSCULAR; INTRAVENOUS
Status: DISPENSED
Start: 2019-11-20

## (undated) RX ORDER — FENTANYL CITRATE 50 UG/ML
INJECTION, SOLUTION INTRAMUSCULAR; INTRAVENOUS
Status: DISPENSED
Start: 2020-05-28

## (undated) RX ORDER — LIDOCAINE HYDROCHLORIDE 10 MG/ML
INJECTION, SOLUTION EPIDURAL; INFILTRATION; INTRACAUDAL; PERINEURAL
Status: DISPENSED
Start: 2023-12-13

## (undated) RX ORDER — GLYCOPYRROLATE 0.2 MG/ML
INJECTION, SOLUTION INTRAMUSCULAR; INTRAVENOUS
Status: DISPENSED
Start: 2023-12-13

## (undated) RX ORDER — ACETAMINOPHEN 325 MG/1
TABLET ORAL
Status: DISPENSED
Start: 2019-11-06

## (undated) RX ORDER — PHENYLEPHRINE HYDROCHLORIDE 10 MG/ML
INJECTION INTRAVENOUS
Status: DISPENSED
Start: 2023-12-13

## (undated) RX ORDER — FENTANYL CITRATE 50 UG/ML
INJECTION, SOLUTION INTRAMUSCULAR; INTRAVENOUS
Status: DISPENSED
Start: 2019-11-06